# Patient Record
Sex: FEMALE | Race: BLACK OR AFRICAN AMERICAN | Employment: OTHER | URBAN - METROPOLITAN AREA
[De-identification: names, ages, dates, MRNs, and addresses within clinical notes are randomized per-mention and may not be internally consistent; named-entity substitution may affect disease eponyms.]

---

## 2017-02-28 ENCOUNTER — OFFICE VISIT (OUTPATIENT)
Dept: NEUROLOGY | Age: 82
End: 2017-02-28

## 2017-02-28 VITALS
RESPIRATION RATE: 18 BRPM | SYSTOLIC BLOOD PRESSURE: 138 MMHG | DIASTOLIC BLOOD PRESSURE: 64 MMHG | HEIGHT: 65 IN | BODY MASS INDEX: 35.65 KG/M2 | HEART RATE: 60 BPM | WEIGHT: 214 LBS | OXYGEN SATURATION: 95 %

## 2017-02-28 DIAGNOSIS — G31.84 MILD COGNITIVE IMPAIRMENT: Primary | ICD-10-CM

## 2017-02-28 NOTE — MR AVS SNAPSHOT
Visit Information Date & Time Provider Department Dept. Phone Encounter #  
 2/28/2017  3:40 PM Christiano Ordoñez MD [facility] at [address] [id number] Follow-up Instructions Return in about 4 months (around 6/28/2017). Upcoming Health Maintenance Date Due DTaP/Tdap/Td series (1 - Tdap) 11/9/1956 ZOSTER VACCINE AGE 60> 11/9/1997 GLAUCOMA SCREENING Q2Y 11/9/2002 OSTEOPOROSIS SCREENING (DEXA) 11/9/2002 MEDICARE YEARLY EXAM 11/9/2002 INFLUENZA AGE 9 TO ADULT 8/1/2016 Pneumococcal 65+ Low/Medium Risk (2 of 2 - PPSV23) 1/1/2018 Allergies as of 2/28/2017  Review Complete On: 2/28/2017 By: Carmella Erickson LPN Severity Noted Reaction Type Reactions Pcn [Penicillins] Medium 03/20/2011   Systemic Rash But can take cephalosporins. Allergic to all \"cillins\". Codeine  03/20/2011   Side Effect Other (comments) Hallucinations, takes hydrocodone at home for pain Contrast Dye [Iodine]  06/14/2016    Other (comments) Not to take due to kidney function Prednisone  06/27/2016    Other (comments) \"makes my pancreas numbers go way up\" Current Immunizations  Reviewed on 2/28/2014 Name Date Pneumococcal Vaccine (Unspecified Type) 1/1/2013 Not reviewed this visit You Were Diagnosed With   
  
 Codes Comments Mild cognitive impairment    -  Primary ICD-10-CM: G31.84 ICD-9-CM: 331.83 Vitals BP  
  
  
  
  
  
 138/64 Vitals History BMI and BSA Data Body Mass Index Body Surface Area  
 35.61 kg/m 2 2.11 m 2 Preferred Pharmacy Pharmacy Name Phone [id number] [phone] Your Updated Medication List  
  
   
This list is accurate as of: 2/28/17  4:24 PM.  Always use your most recent med list.  
  
  
  
  
 ALBUTEROL IN Take 2 Puffs by inhalation as needed. * allopurinol 100 mg tablet Commonly known as:  Xenia Diallo Take 300 mg by mouth nightly. * allopurinol 300 mg tablet Commonly known as:  ZYLOPRIM  
  
 b complex-vitamin c-folic acid 1 mg capsule Commonly known as:  Lupe Renee Take 1 Cap by mouth daily. CARDIZEM  mg ER capsule Generic drug:  dilTIAZem CD Take 120 mg by mouth nightly. chlorpheniramine-HYDROcodone 10-8 mg/5 mL suspension Commonly known as:  Ani Peace Take 1 tsp by mouth every twelve (12) hours as needed for Cough. donepezil 5 mg tablet Commonly known as:  ARICEPT  
  
 famotidine 40 mg tablet Commonly known as:  PEPCID Take 2 tablets twice a day  
  
 ferrous sulfate 325 mg (65 mg iron) tablet  
  
 furosemide 40 mg tablet Commonly known as:  LASIX Take  by mouth as needed. HYDROcodone-acetaminophen 5-325 mg per tablet Commonly known as:  Juanetta Rasp Take 1-2 Tabs by mouth every four (4) hours as needed. Max Daily Amount: 12 Tabs. losartan 100 mg tablet Commonly known as:  COZAAR Take 100 mg by mouth nightly. montelukast 10 mg tablet Commonly known as:  SINGULAIR Take 10 mg by mouth nightly. PROAIR HFA 90 mcg/actuation inhaler Generic drug:  albuterol ROLAIDS PO Take 1 Tab by mouth as needed. sertraline 100 mg tablet Commonly known as:  ZOLOFT Take 100 mg by mouth nightly. * Notice: This list has 2 medication(s) that are the same as other medications prescribed for you. Read the directions carefully, and ask your doctor or other care provider to review them with you. Follow-up Instructions Return in about 4 months (around 6/28/2017). Patient Instructions PRESCRIPTION REFILL POLICY [facility] Statement to Patients April 1, 2014 In an effort to ensure the large volume of patient prescription refills is processed in the most efficient and expeditious manner, we are asking our patients to assist us by calling your Pharmacy for all prescription refills, this will include also your  Mail Order Pharmacy. The pharmacy will contact our office electronically to continue the refill process. Please do not wait until the last minute to call your pharmacy. We need at least 48 hours (2days) to fill prescriptions. We also encourage you to call your pharmacy before going to  your prescription to make sure it is ready. With regard to controlled substance prescription refill requests (narcotic refills) that need to be picked up at our office, we ask your cooperation by providing us with at least 72 hours (3days) notice that you will need a refill. We will not refill narcotic prescription refill requests after 4:00pm on any weekday, Monday through Thursday, or after 2:00pm on Fridays, or on the weekends. We encourage everyone to explore another way of getting your prescription refill request processed using [employer], our patient web portal through our electronic medical record system. Well[employer] is an efficient and effective way to communicate your medication request directly to the office and  downloadable as an nusrat on your smart phone . Well[employer] also features a review functionality that allows you to view your medication list as well as leave messages for your physician. Are you ready to get connected? If so please review the attatched instructions or speak to any of our staff to get you set up right away! Thank you so much for your cooperation. Should you have any questions please contact our Practice Administrator. The Physicians and Staff,  [facility] Introducing [facility]! [facility] introduces [employer] patient portal. Now you can access parts of your medical record, email your doctor's office, and request medication refills online. 1. In your internet browser, go to [email or web address] 2. Click on the First Time User? Click Here link in the Sign In box. You will see the New Member Sign Up page. 3. Enter your Nextiva Access Code exactly as it appears below. You will not need to use this code after youve completed the sign-up process. If you do not sign up before the expiration date, you must request a new code. · Nextiva Access Code: [email or web address] Expires: 3/19/2017 12:54 PM 
 
4. Enter the last four digits of your Social Security Number (xxxx) and Date of Birth (mm/dd/yyyy) as indicated and click Submit. You will be taken to the next sign-up page. 5. Create a Nextiva ID. This will be your Nextiva login ID and cannot be changed, so think of one that is secure and easy to remember. 6. Create a Nextiva password. You can change your password at any time. 7. Enter your Password Reset Question and Answer. This can be used at a later time if you forget your password. 8. Enter your e-mail address. You will receive e-mail notification when new information is available in [address] 9. Click Sign Up. You can now view and download portions of your medical record. 10. Click the Download Summary menu link to download a portable copy of your medical information. If you have questions, please visit the Frequently Asked Questions section of the Nextiva website. Remember, Nextiva is NOT to be used for urgent needs. For medical emergencies, dial 911. Now available from your iPhone and Android! Please provide this summary of care documentation to your next provider. Your primary care clinician is listed as Donnie Eisenmenger. If you have any questions after today's visit, please call [phone].

## 2017-02-28 NOTE — PROGRESS NOTES
Neurology Consult Note      HISTORY PROVIDED BY: patient and daughter    Chief Complaint:   Chief Complaint   Patient presents with    Memory Loss    Results      Subjective:   Pt is a 80 y.o. right handed female initially and last seen on 12/19/16 with over one year h/o memory loss reported by family. Exam revealed MMSE score of 28/30 missing 2 at delayed recall, able to recall both with prompting, o/w non-focal and unremarkable. Suspected mild dementia, differential would include MCI after surgery and stroke given multiple stroke risk factors. Discussed diagnosis of dementia, evaluating for other causes of memory loss, and goals of therapy. Recommended CT of the head without contrast, \"memory labs \"including vitamin B12, vitamin D, CMP, CBC with diff, RPR, HgbA1C, and 24 hour urine heavy metals (well water at home.)  Continued Aricept 5 mg daily. She returns for f/u. Labs - Vit D, TSH, RPR, B12 - wnl, CMP with slightly elevated Cr 1.18 and Ca 10.9, CBC with improvement in Hgb - 10.5, HgbA1C improved - 6.4, HM was negative. CT head wo contrast 12/29/16 - reviewed in PACS, CIWM changes, reported as stable compared to 5/28/08. No new complaints. Tolerating Aricept.      Past Medical History:   Diagnosis Date    Arrhythmia     has pacemaker for low HR    Arthritis     Asthma     Cancer ([facility]     right kidney - surgery    Chronic pain     right side    Deep vein thrombosis (DVT) during current hospitalization ([facility] [address] [facility]     history of DVT was on coumadin in the past    Diabetes ([facility] [facility]     GERD (gastroesophageal reflux disease)     H/O acute pancreatitis     Hiatal hernia     Hypertension     Nausea & vomiting     MARLEN (obstructive sleep apnea)     does not use CPAP/pt states she has not used since a pacemaker inserted    Other ill-defined conditions(799.89)     lymph node enlargement - left chest - benign bx - watching    Other ill-defined conditions(799.89)     wheezes    Psychiatric disorder depression    PUD (peptic ulcer disease)     hx of    [facility]emb[facility] [facility]     Unspecified adverse effect of anesthesia     passed out during EGD per pt - stopped breathing per pt per MD      Past Surgical History:   Procedure Laterality Date    HX APPENDECTOMY      HX CHOLECYSTECTOMY      HX GYN          HX HEENT Right     laser eye surgery to stop bleeding in back of eye - multiple laser surgeries    HX KNEE ARTHROSCOPY      left knee; cartilage repair    HX ORTHOPAEDIC      right hip replacement    HX ORTHOPAEDIC      lower back surgery    HX ORTHOPAEDIC      straighten toe - 2nd toe on right    HX ORTHOPAEDIC Bilateral     carpal tunnel release    HX OTHER SURGICAL      partial right kidney removal    HX PACEMAKER      not defibrillator    HX UROLOGICAL      implant in hip to control urine      Social History     Social History    Marital status:      Spouse name: N/A    Number of children: N/A    Years of education: N/A     Occupational History    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      Social History Main Topics    Smoking status: Former Smoker     Packs/day: 0.25     Years: 20.00     Quit date: 1971    Smokeless tobacco: Never Used    Alcohol use No    Drug use: No    Sexual activity: No     Other Topics Concern    Not on file     Social History Narrative     Family History   Problem Relation Age of Onset   [facility] Stroke Mother      brain aneurysm    Hypertension Father     Heart Disease Father     Cancer Sister      breast    Cancer Brother      lung    Cancer Sister      breast    SLE Other      half siblings - (11)   [facility] Hypertension Daughter     Diabetes Daughter          Objective:   ROS:  Per HPI o/w reviewed and negative. Allergies   Allergen Reactions    Pcn [Penicillins] Rash     But can take cephalosporins. Allergic to all \"cillins\".     Codeine Other (comments)     Hallucinations, takes hydrocodone at home for pain    Contrast Dye [Iodine] Other (comments)     Not to take due to kidney function    Prednisone Other (comments)     \"makes my pancreas numbers go way up\"        Meds:  Outpatient Medications Prior to Visit   Medication Sig Dispense Refill    allopurinol (ZYLOPRIM) 300 mg tablet       donepezil (ARICEPT) 5 mg tablet   1    famotidine (PEPCID) 40 mg tablet Take 2 tablets twice a day      ferrous sulfate 325 mg (65 mg iron) tablet   0    PROAIR HFA 90 mcg/actuation inhaler       b complex-vitamin c-folic acid (NEPHROCAPS) 1 mg capsule Take 1 Cap by mouth daily.  furosemide (LASIX) 40 mg tablet Take  by mouth as needed.  montelukast (SINGULAIR) 10 mg tablet Take 10 mg by mouth nightly.  ALBUTEROL IN Take 2 Puffs by inhalation as needed.  sertraline (ZOLOFT) 100 mg tablet Take 100 mg by mouth nightly.  diltiazem CD (CARDIZEM CD) 120 mg ER capsule Take 120 mg by mouth nightly.  losartan (COZAAR) 100 mg tablet Take 100 mg by mouth nightly.  HYDROcodone-acetaminophen (NORCO) 5-325 mg per tablet Take 1-2 Tabs by mouth every four (4) hours as needed. Max Daily Amount: 12 Tabs. 80 Tab 0    CALCIUM CARB/MAGNESIUM HYDROX (ROLAIDS PO) Take 1 Tab by mouth as needed.  chlorpheniramine-HYDROcodone (TUSSIONEX) 8-10 mg/5 mL suspension Take 1 tsp by mouth every twelve (12) hours as needed for Cough.  allopurinol (ZYLOPRIM) 100 mg tablet Take 300 mg by mouth nightly. No facility-administered medications prior to visit. Imaging:  MRI Results (most recent):  No results found for this or any previous visit. CT Results (most recent):    Results from Hospital Encounter encounter on 12/29/16   CT HEAD WO CONT   Narrative EXAM:  CT HEAD WO CONT    INDICATION:   Memory loss, eval for stroke, NPH    COMPARISON: 5/27/2008. TECHNIQUE: Unenhanced CT of the head was performed using 5 mm images. Brain and  bone windows were generated.   CT dose reduction was achieved through use of a  standardized protocol tailored for this examination and automatic exposure  control for dose modulation. FINDINGS:  The ventricles and sulci are normal in size, shape and configuration and  midline. There is hypodensity of the cerebral white matter, likely due to  intracranial small vessel disease. . There is no intracranial hemorrhage,  extra-axial collection, mass, mass effect or midline shift. The basilar  cisterns are open. No acute infarct is identified. The bone windows demonstrate  no abnormalities. The visualized portions of the paranasal sinuses and mastoid  air cells are clear. Impression IMPRESSION: Hypodensity of the cerebral white matter likely due to intracranial  small vessel disease. No significant change since 5/27/2008. Reviewed records in [facility] tab today    Lab Review   Results for orders placed or performed in visit on 12/23/16   HEAVY METALS PROFILE, UR   Result Value Ref Range    Creatinine(Crt), urine 1.05 0.30 - 3.00 g/L    Arsenic total, urine 15 0 - 50 ug/L    Arsenic, Inorganic, urine None Detected 0 - 19 ug/L    Arsenic,Urine 24 Hr 14 0 - 50 ug/24 hr    Lead, urine None Detected 0 - 49 ug/L    Mercury, urine None Detected 0 - 19 ug/L        Exam:  Visit Vitals    /64    Pulse 60    Resp 18    Ht 5' 5\" (1.651 m)    Wt 97.1 kg (214 lb)    SpO2 95%    BMI 35.61 kg/m2       General:  FROM 12/19/16 VISIT:   Alert, cooperative, no distress. Head:  Normocephalic, without obvious abnormality, atraumatic. Respiratory:  Heart:   Non labored breathing  Regular rate and rhythm, 3/6 STEVIE   Neck:   2+ carotids, no bruits   Extremities: Warm, no cyanosis or edema. Pulses: 2+ radial pulses. Neurologic:  MS: Alert, speech intact. Language - see MMSE. Attention and fund of knowledge appropriate. Recent and remote memory intact.   Cranial Nerves:  II: visual fields Full to confrontation   II: pupils Equal, round, reactive to light   II: optic disc    III,VII: ptosis Left ptosis   III,IV,VI: extraocular muscles  EOMI, no nystagmus or diplopia   V: facial light touch sensation  normal   VII: facial muscle function   symmetric   VIII: hearing intact   IX: soft palate elevation  normal   XI: trapezius strength  5/5   XI: sternocleidomastoid strength 5/5   XII: tongue  Midline     Motor: normal bulk and tone, no tremor              Strength: 5/5 throughout, no PD  Sensory: intact to LT, PP  Coordination: FTN and HTS intact, BJ intact,Romberg negative  Gait: normal gait, able to heel, toe, and tandem walk  Reflexes: 2+ symmetric in UE and right knee, 1+ ankles, unable to elicit left knee, toes downgoing  Mini Mental State Exam 12/19/2016   What is the Year 1   What is the Season 1   What is the Date 1   What is the Day 1   What is the Month 1   Where are we State 1   Where are we Country 1   Where are we [place] [facility] 1   [place] are we Floor 1   Name three objects, then ask the patient to say them 3   Serial sevens Subtract 7 from 100 in increments 5   Ask for the three objects repeated above 1   Name a pencil 1   Name a watch 1   Have the patient repeat this phrase \"No ifs, ands, or buts\" 1   Three stage command: Take the paper in your right hand 1   Fold the paper in half 1   Put the paper on the floor 1   Read and obey the following: CLOSE YOUR EYES 1   Have the patient write a sentence 1   Have the patient copy a figure 1   Mini Mental Score 28            Assessment/Plan   Pt is a 80 y.o. RH female presenting in Dec, 2016 with over one year h/o memory loss reported by family and on exam had MMSE score of 28/30, o/w non-focal exam.  CT of head with stable CIWM changes, without other explanation for memory loss. Memory labs are unremarkable. Disucssed MCI and risk of progression to dementia. Continue Aricept 5 mg daily.    F/u in clinic in six months to reassess memory, instructed to call in the interim with questions, concerns, or significant changes in cognitive function. ICD-10-CM ICD-9-CM    1. Mild cognitive impairment G31.84 331.83      Contributing components for this patient's visit included discussion MCI disease process, CT and lab results, prognosis and potential progression to dementia, treatment options, medications risks and benefits. This comprised greater than 50% face-to-face time with the patient, the total of which was approximately 30 minutes. Signed:   Thais Quick MD  2/28/2017

## 2017-02-28 NOTE — PATIENT INSTRUCTIONS
[facility]   Statement to Patients  April 1, 2014      In an effort to ensure the large volume of patient prescription refills is processed in the most efficient and expeditious manner, we are asking our patients to assist us by calling your Pharmacy for all prescription refills, this will include also your  Mail Order Pharmacy. The pharmacy will contact our office electronically to continue the refill process. Please do not wait until the last minute to call your pharmacy. We need at least 48 hours (2days) to fill prescriptions. We also encourage you to call your pharmacy before going to  your prescription to make sure it is ready. With regard to controlled substance prescription refill requests (narcotic refills) that need to be picked up at our office, we ask your cooperation by providing us with at least 72 hours (3days) notice that you will need a refill. We will not refill narcotic prescription refill requests after 4:00pm on any weekday, Monday through Thursday, or after 2:00pm on Fridays, or on the weekends. We encourage everyone to explore another way of getting your prescription refill request processed using [employer], our patient web portal through our electronic medical record system. Web[employer] is an efficient and effective way to communicate your medication request directly to the office and  downloadable as an nusrat on your smart phone . [employer] also features a review functionality that allows you to view your medication list as well as leave messages for your physician. Are you ready to get connected? If so please review the attatched instructions or speak to any of our staff to get you set up right away! Thank you so much for your cooperation. Should you have any questions please contact our Practice Administrator.     The Physicians and Staff,  [facility]

## 2017-06-11 ENCOUNTER — APPOINTMENT (OUTPATIENT)
Dept: GENERAL RADIOLOGY | Age: 82
End: 2017-06-11
Attending: EMERGENCY MEDICINE
Payer: MEDICARE

## 2017-06-11 ENCOUNTER — HOSPITAL ENCOUNTER (EMERGENCY)
Age: 82
Discharge: HOME | End: 2017-06-12
Attending: EMERGENCY MEDICINE
Payer: MEDICARE

## 2017-06-11 DIAGNOSIS — S62.101A RIGHT WRIST FRACTURE, CLOSED, INITIAL ENCOUNTER: ICD-10-CM

## 2017-06-11 DIAGNOSIS — M25.551 RIGHT HIP PAIN: Primary | ICD-10-CM

## 2017-06-11 LAB
APPEARANCE UR: CLEAR
BACTERIA URNS QL MICRO: ABNORMAL /HPF
BILIRUB UR QL: NEGATIVE
COLOR UR: ABNORMAL
EPITH CASTS URNS QL MICRO: ABNORMAL /LPF
GLUCOSE UR STRIP.AUTO-MCNC: NEGATIVE MG/DL
HGB UR QL STRIP: NEGATIVE
HYALINE CASTS URNS QL MICRO: ABNORMAL /LPF (ref 0–5)
KETONES UR QL STRIP.AUTO: NEGATIVE MG/DL
LEUKOCYTE ESTERASE UR QL STRIP.AUTO: ABNORMAL
NITRITE UR QL STRIP.AUTO: NEGATIVE
PH UR STRIP: 5 [PH] (ref 5–8)
PROT UR STRIP-MCNC: NEGATIVE MG/DL
RBC #/AREA URNS HPF: ABNORMAL /HPF (ref 0–5)
SP GR UR REFRACTOMETRY: 1.01 (ref 1–1.03)
UA: UC IF INDICATED,UAUC: ABNORMAL
URATE SERPL-MCNC: 4.7 MG/DL (ref 2.6–6)
UROBILINOGEN UR QL STRIP.AUTO: 0.2 EU/DL (ref 0.2–1)
WBC URNS QL MICRO: ABNORMAL /HPF (ref 0–4)

## 2017-06-11 PROCEDURE — 81001 URINALYSIS AUTO W/SCOPE: CPT | Performed by: EMERGENCY MEDICINE

## 2017-06-11 PROCEDURE — 87077 CULTURE AEROBIC IDENTIFY: CPT | Performed by: EMERGENCY MEDICINE

## 2017-06-11 PROCEDURE — 73110 X-RAY EXAM OF WRIST: CPT

## 2017-06-11 PROCEDURE — 87186 SC STD MICRODIL/AGAR DIL: CPT | Performed by: EMERGENCY MEDICINE

## 2017-06-11 PROCEDURE — 96375 TX/PRO/DX INJ NEW DRUG ADDON: CPT

## 2017-06-11 PROCEDURE — L3908 WHO COCK-UP NONMOLDE PRE OTS: HCPCS

## 2017-06-11 PROCEDURE — 73502 X-RAY EXAM HIP UNI 2-3 VIEWS: CPT

## 2017-06-11 PROCEDURE — 87086 URINE CULTURE/COLONY COUNT: CPT | Performed by: EMERGENCY MEDICINE

## 2017-06-11 PROCEDURE — 74011250637 HC RX REV CODE- 250/637: Performed by: EMERGENCY MEDICINE

## 2017-06-11 PROCEDURE — 84550 ASSAY OF BLOOD/URIC ACID: CPT | Performed by: EMERGENCY MEDICINE

## 2017-06-11 PROCEDURE — 74011250636 HC RX REV CODE- 250/636: Performed by: EMERGENCY MEDICINE

## 2017-06-11 PROCEDURE — 99284 EMERGENCY DEPT VISIT MOD MDM: CPT

## 2017-06-11 PROCEDURE — 96374 THER/PROPH/DIAG INJ IV PUSH: CPT

## 2017-06-11 PROCEDURE — 75810000053 HC SPLINT APPLICATION

## 2017-06-11 RX ORDER — DIAZEPAM 2 MG/1
2 TABLET ORAL
Status: COMPLETED | OUTPATIENT
Start: 2017-06-11 | End: 2017-06-11

## 2017-06-11 RX ORDER — NITROFURANTOIN MACROCRYSTALS 50 MG/1
100 CAPSULE ORAL
Status: DISCONTINUED | OUTPATIENT
Start: 2017-06-11 | End: 2017-06-11

## 2017-06-11 RX ORDER — OXYCODONE AND ACETAMINOPHEN 5; 325 MG/1; MG/1
1 TABLET ORAL
Qty: 20 TAB | Refills: 0 | Status: SHIPPED | OUTPATIENT
Start: 2017-06-11 | End: 2017-06-11

## 2017-06-11 RX ORDER — ONDANSETRON 2 MG/ML
4 INJECTION INTRAMUSCULAR; INTRAVENOUS
Status: COMPLETED | OUTPATIENT
Start: 2017-06-11 | End: 2017-06-11

## 2017-06-11 RX ORDER — HYDROCODONE BITARTRATE AND ACETAMINOPHEN 5; 325 MG/1; MG/1
1 TABLET ORAL
Qty: 20 TAB | Refills: 0 | Status: SHIPPED | OUTPATIENT
Start: 2017-06-11 | End: 2017-11-25

## 2017-06-11 RX ORDER — HYDROCODONE BITARTRATE AND ACETAMINOPHEN 5; 325 MG/1; MG/1
1 TABLET ORAL
Status: COMPLETED | OUTPATIENT
Start: 2017-06-11 | End: 2017-06-11

## 2017-06-11 RX ORDER — FENTANYL CITRATE 50 UG/ML
50 INJECTION, SOLUTION INTRAMUSCULAR; INTRAVENOUS
Status: COMPLETED | OUTPATIENT
Start: 2017-06-11 | End: 2017-06-11

## 2017-06-11 RX ORDER — NITROFURANTOIN MACROCRYSTALS 50 MG/1
100 CAPSULE ORAL
Status: COMPLETED | OUTPATIENT
Start: 2017-06-11 | End: 2017-06-12

## 2017-06-11 RX ADMIN — DIAZEPAM 2 MG: 2 TABLET ORAL at 22:29

## 2017-06-11 RX ADMIN — FENTANYL CITRATE 50 MCG: 50 INJECTION, SOLUTION INTRAMUSCULAR; INTRAVENOUS at 21:31

## 2017-06-11 RX ADMIN — HYDROCODONE BITARTRATE AND ACETAMINOPHEN 1 TABLET: 5; 325 TABLET ORAL at 22:29

## 2017-06-11 RX ADMIN — ONDANSETRON HYDROCHLORIDE 4 MG: 2 INJECTION, SOLUTION INTRAMUSCULAR; INTRAVENOUS at 21:29

## 2017-06-12 VITALS
TEMPERATURE: 98.1 F | OXYGEN SATURATION: 90 % | HEIGHT: 66 IN | SYSTOLIC BLOOD PRESSURE: 149 MMHG | DIASTOLIC BLOOD PRESSURE: 72 MMHG | BODY MASS INDEX: 32.38 KG/M2 | WEIGHT: 201.5 LBS | HEART RATE: 60 BPM | RESPIRATION RATE: 18 BRPM

## 2017-06-12 PROCEDURE — 74011250637 HC RX REV CODE- 250/637: Performed by: EMERGENCY MEDICINE

## 2017-06-12 RX ADMIN — NITROFURANTOIN MACROCRYSTALS 100 MG: 50 CAPSULE ORAL at 00:04

## 2017-06-12 NOTE — ED PROVIDER NOTES
HPI Comments: Astrid Cheng is a 80 y.o. female with PMhx significant for HTN, DM, asthma, DVT,  who presents via wheelchair to the ED c/o an acute onset of 9/10 right hip pain and right wrist pain with associated swelling x this morning. She reports associated sx of transient lower back pain this morning. The pt discloses a h/o a right hip replacement but endorses she has had no complications s/p surgery. She denies sustaining any trauma or injuring the affected area PTA. The pt expresses that her pain is exacerbated with ambulation and there are no relieving factors leading her to the ED for further evaluation. She endorses that she has no back pain at this time and took an Excedrin this morning with some relief in her sx. The pt does disclose a h/o gout and is on medication to manage her sx. The pt denies any recent travels or surgeries. She specifically denies any numbness, chest pain, or SOB. PCP: Vicenta Mcgraw MD      There are no other complaints, changes or physical findings at this time. Written by LYNN Delacruz, as dictated by Suleman Gerber MD      The history is provided by the patient. No  was used.         Past Medical History:   Diagnosis Date    Arrhythmia     has pacemaker for low HR    Arthritis     Asthma     Cancer ([facility] [address]     right kidney - surgery    Chronic pain     right side    Deep vein thrombosis (DVT) during current hospitalization ([address]     history of DVT was on coumadin in the past    Diabetes ([address]     GERD (gastroesophageal reflux disease)     H/O acute pancreatitis     Hiatal hernia     Hypertension     Nausea & vomiting     MARLEN (obstructive sleep apnea)     does not use CPAP/pt states she has not used since a pacemaker inserted    Other ill-defined conditions     lymph node enlargement - left chest - benign bx - watching    Other ill-defined conditions     wheezes    Psychiatric disorder     depression    PUD (peptic ulcer disease)     hx of    [facility]boembolus [facility] [address]     Unspecified adverse effect of anesthesia     passed out during EGD per pt - stopped breathing per pt per MD       Past Surgical History:   Procedure Laterality Date    HX APPENDECTOMY      HX CHOLECYSTECTOMY      HX GYN          HX HEENT Right     laser eye surgery to stop bleeding in back of eye - multiple laser surgeries    HX KNEE ARTHROSCOPY      left knee; cartilage repair    HX ORTHOPAEDIC      right hip replacement    HX ORTHOPAEDIC      lower back surgery    HX ORTHOPAEDIC      straighten toe - 2nd toe on right    HX ORTHOPAEDIC Bilateral     carpal tunnel release    HX OTHER SURGICAL      partial right kidney removal    HX PACEMAKER      not defibrillator    HX UROLOGICAL      implant in hip to control urine         Family History:   Problem Relation Age of Onset    Stroke Mother      brain aneurysm    Hypertension Father     Heart Disease Father     Cancer Sister      breast    Cancer Brother      lung    Cancer Sister      breast    SLE Other      half siblings - (11)   Al Miguel A Hypertension Daughter     Diabetes Daughter        Social History     Social History    Marital status:      Spouse name: N/A    Number of children: N/A    Years of education: N/A     Occupational History    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 HENT: Negative for congestion. Eyes: Negative. Respiratory: Negative for shortness of breath. Cardiovascular: Negative for chest pain. Gastrointestinal: Negative for abdominal pain. Endocrine: Negative for heat intolerance. Genitourinary: Negative for dysuria. Musculoskeletal: Positive for arthralgias (right hip and wrist), back pain (lower) and joint swelling (right wrist). Skin: Negative for rash. Allergic/Immunologic: Negative for immunocompromised state. Neurological: Negative for numbness. Hematological: Does not bruise/bleed easily. Psychiatric/Behavioral: Negative. All other systems reviewed and are negative. Patient Vitals for the past 12 hrs:   Temp Pulse Resp BP SpO2   06/12/17 0001 - - - 149/72 90 %   06/11/17 2330 - - - 140/62 92 %   06/11/17 2300 - - - 155/69 93 %   06/11/17 2209 - - - 159/62 -   06/11/17 2100 - - - 157/63 95 %   06/11/17 2030 - - - 195/76 95 %   06/11/17 2020 - - - (!) 204/89 -   06/11/17 1957 98.1 °F (36.7 °C) 60 18 157/83 97 %        Physical Exam   Constitutional: She appears well-nourished. She appears distressed (mild). HENT:   Head: Normocephalic. Eyes: EOM are normal.   Neck: Neck supple. Cardiovascular: Intact distal pulses. Pulmonary/Chest: Effort normal and breath sounds normal.   Abdominal: Soft. Bowel sounds are normal. There is tenderness in the epigastric area. Musculoskeletal: Normal range of motion. Back non tender   Edema of the right wrist, TTP   TTP right hip and pelvis, decreased ROM of the RLE secondary to pain    Neurological: She is alert. She has normal strength. No sensory deficit. Equal  strength    Skin: Skin is warm and dry. Psychiatric: Her behavior is normal.   Nursing note and vitals reviewed. MDM  Number of Diagnoses or Management Options  Right hip pain:   Right wrist fracture, closed, initial encounter:   Diagnosis management comments: DDx: Arthritis, Gout, Sciatica.         Amount and/or Complexity of Data Reviewed  Clinical lab tests: ordered and reviewed  Tests in the radiology section of CPT®: ordered and reviewed  Review and summarize past medical records: yes    Patient Progress  Patient progress: stable    ED Course       Procedures    Procedure Note - Splint Placement:  11:38 PM  Performed by: Suleman Gerber MD  Neurovascularly intact prior to tx. An Orthoglass velcro splint was placed on pt's right wrist.  Joint was placed in neutral position. Neurovascularly intact after tx. The procedure took 1-15 minutes, and pt tolerated well. Written by Alexa Hendrix ED Scribe, as dictated by Suleman Gerber MD.     LABORATORY TESTS:  Recent Results (from the past 12 hour(s))   URINALYSIS W/ REFLEX CULTURE    Collection Time: 06/11/17  9:56 PM   Result Value Ref Range    Color YELLOW/STRAW      Appearance CLEAR CLEAR      Specific gravity 1.010 1.003 - 1.030      pH (UA) 5.0 5.0 - 8.0      Protein NEGATIVE  NEG mg/dL    Glucose NEGATIVE  NEG mg/dL    Ketone NEGATIVE  NEG mg/dL    Bilirubin NEGATIVE  NEG      Blood NEGATIVE  NEG      Urobilinogen 0.2 0.2 - 1.0 EU/dL    Nitrites NEGATIVE  NEG      Leukocyte Esterase SMALL (A) NEG      UA:UC IF INDICATED URINE CULTURE ORDERED (A) CNI      WBC 10-20 0 - 4 /hpf    RBC 0-5 0 - 5 /hpf    Epithelial cells FEW FEW /lpf    Bacteria 1+ (A) NEG /hpf    Hyaline cast 0-2 0 - 5 /lpf   URIC ACID    Collection Time: 06/11/17 10:35 PM   Result Value Ref Range    Uric acid 4.7 2.6 - 6.0 MG/DL       IMAGING RESULTS:  XR WRIST RT AP/LAT/OBL MIN 3V   Final Result   INDICATION: pain     COMPARISON: None     FINDINGS:  3 views of the right wrist demonstrate small fragment dorsal wrist. There is  dorsal soft tissue swelling. There is no radiopaque foreign body.     IMPRESSION  IMPRESSION:  Small fragments dorsal wrist could represent avulsion fracture, correlate with  point tenderness.    XR HIP RT W OR WO PELV 2-3 VWS   Final Result   INDICATION: pain     EXAMINATION: HIP RADIOGRAPHS     COMPARISON: 11/11/14     FINDINGS:     Single AP view of the pelvis and frogleg lateral view of the right hip  demonstrate no acute fracture or dislocation. Right hip arthroplasty. The  pubic symphysis is intact. The sacroiliac joints are intact.     IMPRESSION  IMPRESSION:  No acute process.           MEDICATIONS GIVEN:  Medications   fentaNYL citrate (PF) injection 50 mcg (50 mcg IntraVENous Given 6/11/17 2131)   ondansetron (ZOFRAN) injection 4 mg (4 mg IntraVENous Given 6/11/17 2129)   HYDROcodone-acetaminophen (NORCO) 5-325 mg per tablet 1 Tab (1 Tab Oral Given 6/11/17 2229)   diazePAM (VALIUM) tablet 2 mg (2 mg Oral Given 6/11/17 2229)   nitrofurantoin (MACRODANTIN) capsule 100 mg (100 mg Oral Given 6/12/17 0004)       IMPRESSION:  1. Right hip pain    2. Right wrist fracture, closed, initial encounter        PLAN:  1. Current Discharge Medication List      START taking these medications    Details   HYDROcodone-acetaminophen (NORCO) 5-325 mg per tablet Take 1 Tab by mouth every four (4) hours as needed for Pain. Max Daily Amount: 6 Tabs. Qty: 20 Tab, Refills: 0           2. Follow-up Information     Follow up With Details Comments Contact Info    [facility] Call today  [facility]  [address]    Gavin Vick MD In 2 days As needed [address] [facility]  [phone]      [facility] EMERGENCY DEPT  If symptoms worsen [address]  [phone]        3. Return to ED if worse   Discharge Note:  11:36 PM  The patient is ready for discharge. The patient's signs, symptoms, diagnosis, and discharge instruction have been discussed and the patient has conveyed their understanding. The patient is to follow up as recommended or return to the ER should their symptoms worsen. Plan has been discussed and the patient is in agreement. Written by Ihsan Laird ED Scribe, as dictated by Temi Mendoza MD    Attestation: This note is prepared by Tory Laird, acting as Scribe for Temi Mendoza, MD.      Lupe Norton MD: The scribe's documentation has been prepared under my direction and personally reviewed by me in its entirety. I confirm that the note above accurately reflects all work, treatment, procedures, and medical decision making performed by me.

## 2017-06-12 NOTE — ED NOTES
Discharge instructions reviewed with patient. Discharge instructions given to patient per Dr. Melissa Romero. Patient able to return/verbalize discharge instructions. Copy of discharge instructions provided. Patient condition stable, respiratory status within normal limits, neuro status intact. Escorted by wheelchair out of ER, accompanied by family.

## 2017-06-12 NOTE — DISCHARGE INSTRUCTIONS
Hip Pain: Care Instructions  Your Care Instructions  Hip pain may be caused by many things, including overuse, a fall, or a twisting movement. Another cause of hip pain is arthritis. Your pain may increase when you stand up, walk, or squat. The pain may come and go or may be constant. Home treatment can help relieve hip pain, swelling, and stiffness. If your pain is ongoing, you may need more tests and treatment. Follow-up care is a key part of your treatment and safety. Be sure to make and go to all appointments, and call your doctor if you are having problems. Its also a good idea to know your test results and keep a list of the medicines you take. How can you care for yourself at home? · Take pain medicines exactly as directed. ¨ If the doctor gave you a prescription medicine for pain, take it as prescribed. ¨ If you are not taking a prescription pain medicine, ask your doctor if you can take an over-the-counter medicine. · Rest and protect your hip. Take a break from any activity, including standing or walking, that may cause pain. · Put ice or a cold pack against your hip for 10 to 20 minutes at a time. Try to do this every 1 to 2 hours for the next 3 days (when you are awake) or until the swelling goes down. Put a thin cloth between the ice and your skin. · Sleep on your healthy side with a pillow between your knees, or sleep on your back with pillows under your knees. · If there is no swelling, you can put moist heat, a heating pad, or a warm cloth on your hip. Do gentle stretching exercises to help keep your hip flexible. · Learn how to prevent falls. Have your vision and hearing checked regularly. Wear slippers or shoes with a nonskid sole. · Stay at a healthy weight. · Wear comfortable shoes. When should you call for help? Call 911 anytime you think you may need emergency care. For example, call if:  · You have sudden chest pain and shortness of breath, or you cough up blood.   · You are not able to stand or walk or bear weight. · Your buttocks, legs, or feet feel numb or tingly. · Your leg or foot is cool or pale or changes color. · You have severe pain. Call your doctor now or seek immediate medical care if:  · You have signs of infection, such as:  ¨ Increased pain, swelling, warmth, or redness in the hip area. ¨ Red streaks leading from the hip area. ¨ Pus draining from the hip area. ¨ A fever. · You have signs of a blood clot, such as:  ¨ Pain in your calf, back of the knee, thigh, or groin. ¨ Redness and swelling in your leg or groin. · You are not able to bend, straighten, or move your leg normally. · You have trouble urinating or having bowel movements. Watch closely for changes in your health, and be sure to contact your doctor if:  · You do not get better as expected. Where can you learn more? Go to http://contsantino-aziza.info/. Enter W192 in the search box to learn more about \"Hip Pain: Care Instructions. \"  Current as of: May 27, 2016  Content Version: 11.2  © 2763-8706 [employer]. Care instructions adapted under license by [employer] (which disclaims liability or warranty for this information). If you have questions about a medical condition or this instruction, always ask your healthcare professional. [place] [address] any warranty or liability for your use of this information. Broken Wrist: Care Instructions  Your Care Instructions    Your wrist can break, or fracture, during sports, a fall, or other accidents. The break may happen when your wrist is hit or is used to protect you in a fall. Fractures can range from a small, hairline crack, to a bone or bones broken into two or more pieces. Your treatment depends on how bad the break is. Your doctor may have put your wrist in a cast or splint. This will help keep your wrist stable until your follow-up appointment.  It may take weeks or months for your wrist to heal. You can help it heal with care at home. You heal best when you take good care of yourself. Eat a variety of healthy foods, and don't smoke. Follow-up care is a key part of your treatment and safety. Be sure to make and go to all appointments, and call your doctor if you are having problems. It's also a good idea to know your test results and keep a list of the medicines you take. How can you care for yourself at home? · Put ice or a cold pack on your wrist for 10 to 20 minutes at a time. Try to do this every 1 to 2 hours for the next 3 days (when you are awake). Put a thin cloth between the ice and your cast or splint. Keep your cast or splint dry. · Follow the splint or cast care instructions your doctor gives you. If you have a splint, do not take it off unless your doctor tells you to. Be careful not to put the splint on too tight. · Be safe with medicines. Take pain medicines exactly as directed. ¨ If the doctor gave you a prescription medicine for pain, take it as prescribed. ¨ If you are not taking a prescription pain medicine, ask your doctor if you can take an over-the-counter medicine. · Prop up your wrist on pillows when you sit or lie down in the first few days after the injury. Keep your wrist higher than the level of your heart. This will help reduce swelling. · Move your fingers often to reduce swelling and stiffness, but do not use that hand to grab or carry anything. · Follow instructions for exercises to keep your arm strong. When should you call for help? Call your doctor now or seek immediate medical care if:  · You have increased or severe pain. · Your cast or splint feels too tight. · You cannot move your fingers. · You have tingling, weakness, or numbness in your hand and fingers. · Your hand and fingers are cool or pale or change color. · You have a lot of swelling near your cast or splint. · The skin under your cast or splint is burning or stinging.   Watch closely for changes in your health, and be sure to contact your doctor if:  · You do not get better as expected. Where can you learn more? Go to http://constantnio-aziza.info/. Enter [id number] in the search box to learn more about \"Broken Wrist: Care Instructions. \"  Current as of: May 23, 2016  Content Version: 11.2  © 7445-0898 [employer]. Care instructions adapted under license by [employer] (which disclaims liability or warranty for this information). If you have questions about a medical condition or this instruction, always ask your healthcare professional. [place] [address] any warranty or liability for your use of this information.

## 2017-06-12 NOTE — ED NOTES
Patient states she had an episode of dark stools earlier this afternoon and an episode of bowel incontinence shortly after. Patient denies any blood in stool. Patient states she then went to a picnic this afternoon and felt severe pain in her right hip and right wrist. Patient denies any fall or injury. Patient also states she was experiencing lower back pain this morning. Patient is alert and oriented, respirations even and unlabored, monitor x2. Call bell within reach.

## 2017-06-14 RX ORDER — CEPHALEXIN 500 MG/1
500 CAPSULE ORAL 4 TIMES DAILY
Qty: 28 CAP | Refills: 0 | Status: SHIPPED | OUTPATIENT
Start: 2017-06-14 | End: 2017-06-21

## 2017-06-15 LAB
BACTERIA SPEC CULT: ABNORMAL
BACTERIA SPEC CULT: ABNORMAL
CC UR VC: ABNORMAL
SERVICE CMNT-IMP: ABNORMAL

## 2017-06-21 ENCOUNTER — HOSPITAL ENCOUNTER (OUTPATIENT)
Age: 82
Setting detail: OUTPATIENT SURGERY
Discharge: HOME | End: 2017-06-21
Attending: INTERNAL MEDICINE | Admitting: INTERNAL MEDICINE
Payer: COMMERCIAL

## 2017-06-21 ENCOUNTER — ANESTHESIA EVENT (OUTPATIENT)
Dept: ENDOSCOPY | Age: 82
End: 2017-06-21
Payer: COMMERCIAL

## 2017-06-21 ENCOUNTER — ANESTHESIA (OUTPATIENT)
Dept: ENDOSCOPY | Age: 82
End: 2017-06-21
Payer: COMMERCIAL

## 2017-06-21 ENCOUNTER — APPOINTMENT (OUTPATIENT)
Dept: ULTRASOUND IMAGING | Age: 82
End: 2017-06-21
Attending: INTERNAL MEDICINE
Payer: COMMERCIAL

## 2017-06-21 VITALS
BODY MASS INDEX: 32.49 KG/M2 | RESPIRATION RATE: 16 BRPM | SYSTOLIC BLOOD PRESSURE: 148 MMHG | HEIGHT: 65 IN | TEMPERATURE: 97.6 F | OXYGEN SATURATION: 98 % | DIASTOLIC BLOOD PRESSURE: 94 MMHG | HEART RATE: 60 BPM | WEIGHT: 195 LBS

## 2017-06-21 PROCEDURE — 74011000250 HC RX REV CODE- 250

## 2017-06-21 PROCEDURE — 76040000019: Performed by: INTERNAL MEDICINE

## 2017-06-21 PROCEDURE — 76060000031 HC ANESTHESIA FIRST 0.5 HR: Performed by: INTERNAL MEDICINE

## 2017-06-21 PROCEDURE — 74011250636 HC RX REV CODE- 250/636

## 2017-06-21 RX ORDER — NALOXONE HYDROCHLORIDE 0.4 MG/ML
0.4 INJECTION, SOLUTION INTRAMUSCULAR; INTRAVENOUS; SUBCUTANEOUS
Status: DISCONTINUED | OUTPATIENT
Start: 2017-06-21 | End: 2017-06-21 | Stop reason: HOSPADM

## 2017-06-21 RX ORDER — PROPOFOL 10 MG/ML
INJECTION, EMULSION INTRAVENOUS AS NEEDED
Status: DISCONTINUED | OUTPATIENT
Start: 2017-06-21 | End: 2017-06-21 | Stop reason: HOSPADM

## 2017-06-21 RX ORDER — SODIUM CHLORIDE 9 MG/ML
50 INJECTION, SOLUTION INTRAVENOUS CONTINUOUS
Status: DISCONTINUED | OUTPATIENT
Start: 2017-06-21 | End: 2017-06-21 | Stop reason: HOSPADM

## 2017-06-21 RX ORDER — EPINEPHRINE 0.1 MG/ML
1 INJECTION INTRACARDIAC; INTRAVENOUS
Status: DISCONTINUED | OUTPATIENT
Start: 2017-06-21 | End: 2017-06-21 | Stop reason: HOSPADM

## 2017-06-21 RX ORDER — SODIUM CHLORIDE 0.9 % (FLUSH) 0.9 %
5-10 SYRINGE (ML) INJECTION AS NEEDED
Status: DISCONTINUED | OUTPATIENT
Start: 2017-06-21 | End: 2017-06-21 | Stop reason: HOSPADM

## 2017-06-21 RX ORDER — FLUMAZENIL 0.1 MG/ML
0.2 INJECTION INTRAVENOUS
Status: DISCONTINUED | OUTPATIENT
Start: 2017-06-21 | End: 2017-06-21 | Stop reason: HOSPADM

## 2017-06-21 RX ORDER — SODIUM CHLORIDE 0.9 % (FLUSH) 0.9 %
5-10 SYRINGE (ML) INJECTION EVERY 8 HOURS
Status: DISCONTINUED | OUTPATIENT
Start: 2017-06-21 | End: 2017-06-21 | Stop reason: HOSPADM

## 2017-06-21 RX ORDER — ATROPINE SULFATE 0.1 MG/ML
0.5 INJECTION INTRAVENOUS
Status: DISCONTINUED | OUTPATIENT
Start: 2017-06-21 | End: 2017-06-21 | Stop reason: HOSPADM

## 2017-06-21 RX ORDER — SODIUM CHLORIDE 9 MG/ML
INJECTION, SOLUTION INTRAVENOUS
Status: DISCONTINUED | OUTPATIENT
Start: 2017-06-21 | End: 2017-06-21 | Stop reason: HOSPADM

## 2017-06-21 RX ORDER — DEXTROMETHORPHAN/PSEUDOEPHED 2.5-7.5/.8
1.2 DROPS ORAL
Status: DISCONTINUED | OUTPATIENT
Start: 2017-06-21 | End: 2017-06-21 | Stop reason: HOSPADM

## 2017-06-21 RX ORDER — LIDOCAINE HYDROCHLORIDE 20 MG/ML
INJECTION, SOLUTION EPIDURAL; INFILTRATION; INTRACAUDAL; PERINEURAL AS NEEDED
Status: DISCONTINUED | OUTPATIENT
Start: 2017-06-21 | End: 2017-06-21 | Stop reason: HOSPADM

## 2017-06-21 RX ORDER — FENTANYL CITRATE 50 UG/ML
100 INJECTION, SOLUTION INTRAMUSCULAR; INTRAVENOUS
Status: DISCONTINUED | OUTPATIENT
Start: 2017-06-21 | End: 2017-06-21 | Stop reason: HOSPADM

## 2017-06-21 RX ORDER — MIDAZOLAM HYDROCHLORIDE 1 MG/ML
.25-1 INJECTION, SOLUTION INTRAMUSCULAR; INTRAVENOUS
Status: DISCONTINUED | OUTPATIENT
Start: 2017-06-21 | End: 2017-06-21 | Stop reason: HOSPADM

## 2017-06-21 RX ADMIN — PROPOFOL 20 MG: 10 INJECTION, EMULSION INTRAVENOUS at 10:35

## 2017-06-21 RX ADMIN — PROPOFOL 30 MG: 10 INJECTION, EMULSION INTRAVENOUS at 10:30

## 2017-06-21 RX ADMIN — PROPOFOL 60 MG: 10 INJECTION, EMULSION INTRAVENOUS at 10:26

## 2017-06-21 RX ADMIN — PROPOFOL 20 MG: 10 INJECTION, EMULSION INTRAVENOUS at 10:32

## 2017-06-21 RX ADMIN — SODIUM CHLORIDE: 9 INJECTION, SOLUTION INTRAVENOUS at 10:13

## 2017-06-21 RX ADMIN — PROPOFOL 20 MG: 10 INJECTION, EMULSION INTRAVENOUS at 10:34

## 2017-06-21 RX ADMIN — LIDOCAINE HYDROCHLORIDE 50 MG: 20 INJECTION, SOLUTION EPIDURAL; INFILTRATION; INTRACAUDAL; PERINEURAL at 10:26

## 2017-06-21 RX ADMIN — PROPOFOL 40 MG: 10 INJECTION, EMULSION INTRAVENOUS at 10:27

## 2017-06-21 NOTE — ANESTHESIA PREPROCEDURE EVALUATION
Anesthetic History     PONV          Review of Systems / Medical History  Patient summary reviewed, nursing notes reviewed and pertinent labs reviewed    Pulmonary        Sleep apnea: No treatment    Asthma        Neuro/Psych         Psychiatric history     Cardiovascular    Hypertension          Pacemaker         GI/Hepatic/Renal     GERD      PUD     Endo/Other    Diabetes    Arthritis and cancer     Other Findings              Physical Exam    Airway  Mallampati: II  TM Distance: > 6 cm  Neck ROM: normal range of motion   Mouth opening: Normal     Cardiovascular  Regular rate and rhythm,  S1 and S2 normal,  no murmur, click, rub, or gallop             Dental  No notable dental hx       Pulmonary  Breath sounds clear to auscultation               Abdominal  GI exam deferred       Other Findings            Anesthetic Plan    ASA: 3  Anesthesia type: MAC            Anesthetic plan and risks discussed with: Patient

## 2017-06-21 NOTE — IP AVS SNAPSHOT
[address] 
[phone] Patient: Rose Mary Aguilar MRN: [id number] 
RBT:44/7/8805 You are allergic to the following Allergen Reactions Pcn (Penicillins) Rash But can take cephalosporins. Allergic to all \"cillins\". Codeine Other (comments) Hallucinations, takes hydrocodone at home for pain Contrast Dye (Iodine) Other (comments) Not to take due to kidney function Prednisone Other (comments) \"makes my pancreas numbers go way up\" Recent Documentation Height Weight BMI OB Status Smoking Status 1.651 m 88.5 kg 32.45 kg/m2 Postmenopausal Former Smoker Emergency Contacts Name Discharge Info Relation Home Work Mobile Wilman Coley DISCHARGE CAREGIVER [3] Son [22] [phone] Yane Delgado  Daughter [21] [phone] About your hospitalization You were admitted on:  June 21, 2017 You last received care in the:  [facility] ENDOSCOPY You were discharged on:  June 21, 2017 Unit phone number:  [phone] Why you were hospitalized Your primary diagnosis was:  Not on File Providers Seen During Your Hospitalizations Provider Role Specialty Primary office phone Renzo Page MD Attending Provider Gastroenterology [phone] Your Primary Care Physician (PCP) Primary Care Physician Office Phone Office Fax Katia Sesay [phone] Follow-up Information None Your Appointments Wednesday June 21, 2017 ENDOSCOPIC ULTRASOUND (EUS) with Renzo Page MD  
[facility] ENDOSCOPY (RI OR PRE ASSESSMENT) [address]  
[phone]  OP Registration: [id number] Telephone: [phone] Fax: [phone] ** Pt will need to arrive 30 min prior unless appointment prep instructions indicate otherwise** **** Send All ENDO pt to the MAIN Admitting Department, off the hospitals main lobby at [facility]. ****  
  
    
OP Registration: [facility]- [address] Telephone: [phone] Fax: [phone] ** Pt will need to arrive 30 min prior unless appointment prep instructions indicate otherwise** **** Send All ENDO pt to the MAIN Admitting Department, off the [facility] lobby at [facility]. **** Wednesday June 28, 2017 11:40 AM EDT Follow Up with Dinesh Shin MD  
[facility] at [address]  
[phone] Current Discharge Medication List  
  
CONTINUE these medications which have NOT CHANGED Dose & Instructions Dispensing Information Comments Morning Noon Evening Bedtime ALBUTEROL IN Your last dose was: Your next dose is:    
   
   
 Dose:  2 Puff Take 2 Puffs by inhalation as needed. Refills:  0  
     
   
   
   
  
 * allopurinol 100 mg tablet Commonly known as:  Ritta Popper Your last dose was: Your next dose is:    
   
   
 Dose:  300 mg Take 300 mg by mouth nightly. Refills:  0  
     
   
   
   
  
 * allopurinol 300 mg tablet Commonly known as:  Ritta Popper Your last dose was: Your next dose is:    
   
   
  Refills:  0  
     
   
   
   
  
 b complex-vitamin c-folic acid 1 mg capsule Commonly known as:  Chris Shauna Your last dose was: Your next dose is:    
   
   
 Dose:  1 Cap Take 1 Cap by mouth daily. Refills:  0 CARDIZEM  mg ER capsule Generic drug:  dilTIAZem CD Your last dose was: Your next dose is:    
   
   
 Dose:  120 mg Take 120 mg by mouth nightly. Refills:  0  
     
   
   
   
  
 cephALEXin 500 mg capsule Commonly known as:  Umusanket Colmenares Your last dose was:     
   
Your next dose is:    
   
   
 Dose:  500 mg  
 Take 1 Cap by mouth four (4) times daily for 7 days. Quantity:  28 Cap Refills:  0  
     
   
   
   
  
 donepezil 5 mg tablet Commonly known as:  ARICEPT Your last dose was: Your next dose is:    
   
   
  Refills:  1  
     
   
   
   
  
 famotidine 40 mg tablet Commonly known as:  PEPCID Your last dose was: Your next dose is: Take 2 tablets twice a day Refills:  0  
     
   
   
   
  
 ferrous sulfate 325 mg (65 mg iron) tablet Your last dose was: Your next dose is:    
   
   
  Refills:  0  
     
   
   
   
  
 furosemide 40 mg tablet Commonly known as:  LASIX Your last dose was: Your next dose is: Take  by mouth as needed. Refills:  0 HYDROcodone-acetaminophen 5-325 mg per tablet Commonly known as:  Maisha Proper Your last dose was: Your next dose is:    
   
   
 Dose:  1 Tab Take 1 Tab by mouth every four (4) hours as needed for Pain. Max Daily Amount: 6 Tabs. Quantity:  20 Tab Refills:  0  
     
   
   
   
  
 losartan 100 mg tablet Commonly known as:  COZAAR Your last dose was: Your next dose is:    
   
   
 Dose:  100 mg Take 100 mg by mouth nightly. Refills:  0  
     
   
   
   
  
 montelukast 10 mg tablet Commonly known as:  SINGULAIR Your last dose was: Your next dose is:    
   
   
 Dose:  10 mg Take 10 mg by mouth nightly. Refills:  0 PROAIR HFA 90 mcg/actuation inhaler Generic drug:  albuterol Your last dose was: Your next dose is:    
   
   
  Refills:  0  
     
   
   
   
  
 ROLAIDS PO Your last dose was: Your next dose is:    
   
   
 Dose:  1 Tab Take 1 Tab by mouth as needed. Refills:  0  
     
   
   
   
  
 sertraline 100 mg tablet Commonly known as:  ZOLOFT Your last dose was: Your next dose is: Dose:  100 mg Take 100 mg by mouth nightly. Refills:  0  
     
   
   
   
  
 * Notice: This list has 2 medication(s) that are the same as other medications prescribed for you. Read the directions carefully, and ask your doctor or other care provider to review them with you. Discharge Instructions [address]ie [address]. 
[address] [facility] [address] [place], [address] 
[phone] 
  
  
        
  
  
  DISCHARGE INSTRUCTIONS 
  
Dg Elizondo [id number] 
1935 
  
DISCOMFORT: 
Sore throat- throat lozenges or warm salt water gargle 
redness at IV site- apply warm compress to area; if redness or soreness persist- contact your physician Gaseous discomfort- walking, belching will help relieve any discomfort You may not operate a vehicle for 12 hours You may not engage in an occupation involving machinery or appliances for rest of today You may not drink alcoholic beverages for at least 12 hours Avoid making any critical decisions for at least 24 hour DIET You may eat and drink after you leave. You may resume your regular diet  however -  remember your colon is empty and a heavy meal will produce gas. Avoid these foods:  vegetables, fried / greasy foods, carbonated drinks 
  
ACTIVITY You may resume your normal daily activities Spend the remainder of the day resting -  avoid any strenuous activity. 
Eric Paul M.D. ANY SIGN OF Increasing pain, nausea, vomiting Abdominal distension (swelling) New increased bleeding (oral or rectal) Fever (chills) Pain in chest area Bloody discharge from nose or mouth Shortness of breath 
  
Follow-up Instructions: 
                      Call Dr. Sean Patel for any questions or problems. 
   
If we took a biopsy please call the office within 2 weeks to discuss your                             pathology results.  Telephone # [phone] 
   
  
 Continue same medications. Discharge Orders None [employer] Announcement We are excited to announce that we are making your provider's discharge notes available to you in [employer]. You will see these notes when they are completed and signed by the physician that discharged you from your recent hospital stay. If you have any questions or concerns about any information you see in [employer], please call the Health Information Department where you were seen or reach out to your Primary Care Provider for more information about your plan of care. Introducing [facility] SERVICES! Rani Islas introduces [employer] patient portal. Now you can access parts of your medical record, email your doctor's office, and request medication refills online. 1. In your internet browser, go to [email or web address] [email or web address] 2. Click on the First Time User? Click Here link in the Sign In box. You will see the New Member Sign Up page. 3. Enter your [employer] Access Code exactly as it appears below. You will not need to use this code after youve completed the sign-up process. If you do not sign up before the expiration date, you must request a new code. · [employer] Access Code: [email or web address] Expires: 9/9/2017 11:34 PM 
 
4. Enter the last four digits of your Social Security Number (xxxx) and Date of Birth (mm/dd/yyyy) as indicated and click Submit. You will be taken to the next sign-up page. 5. Create a [employer] ID. This will be your [employer] login ID and cannot be changed, so think of one that is secure and easy to remember. 6. Create a [employer] password. You can change your password at any time. 7. Enter your Password Reset Question and Answer. This can be used at a later time if you forget your password. 8. Enter your e-mail address. You will receive e-mail notification when new information is available in [address] 9. Click Sign Up. You can now view and download portions of your medical record. 10. Click the Download Summary menu link to download a portable copy of your medical information. If you have questions, please visit the Frequently Asked Questions section of the [employer] website. Remember, [employer] is NOT to be used for urgent needs. For medical emergencies, dial 911. Now available from your iPhone and Android! General Information Please provide this summary of care documentation to your next provider. Patient Signature:  ____________________________________________________________ Date:  ____________________________________________________________  
  
[facility] Provider Signature:  ____________________________________________________________ Date:  ____________________________________________________________

## 2017-06-21 NOTE — DISCHARGE INSTRUCTIONS
[address]  [address] [facility]  [phone]                         DISCHARGE INSTRUCTIONS     Emmalene Essex  [id number]  1935     DISCOMFORT:  Sore throat- throat lozenges or warm salt water gargle  redness at IV site- apply warm compress to area; if redness or soreness persist- contact your physician  Gaseous discomfort- walking, belching will help relieve any discomfort  You may not operate a vehicle for 12 hours  You may not engage in an occupation involving machinery or appliances for rest of today  You may not drink alcoholic beverages for at least 12 hours  Avoid making any critical decisions for at least 24 hour  DIET  You may eat and drink after you leave. You may resume your regular diet - however -  remember your colon is empty and a heavy meal will produce gas. Avoid these foods:  vegetables, fried / greasy foods, carbonated drinks     ACTIVITY  You may resume your normal daily activities   Spend the remainder of the day resting -  avoid any strenuous activity.  Vilma Pradhan M.D. ANY SIGN OF                                                   Increasing pain, nausea, vomiting  Abdominal distension (swelling)  New increased bleeding (oral or rectal)  Fever (chills)  Pain in chest area  Bloody discharge from nose or mouth  Shortness of breath     Follow-up Instructions:                        Call Dr. Savannah Mendoza for any questions or problems.      If we took a biopsy please call the office within 2 weeks to discuss your                             pathology results. Telephone # [phone]                               Continue same medications.

## 2017-06-21 NOTE — DISCHARGE SUMMARY
118 [facility].  [address] [place] [address]  [phone]                     DISCHARGE INSTRUCTIONS    Dg Elizondo  [id number]  1935    DISCOMFORT:  Sore throat- throat lozenges or warm salt water gargle  redness at IV site- apply warm compress to area; if redness or soreness persist- contact your physician  Gaseous discomfort- walking, belching will help relieve any discomfort  You may not operate a vehicle for 12 hours  You may not engage in an occupation involving machinery or appliances for rest of today  You may not drink alcoholic beverages for at least 12 hours  Avoid making any critical decisions for at least 24 hour  DIET  You may eat and drink after you leave. You may resume your regular diet - however -  remember your colon is empty and a heavy meal will produce gas. Avoid these foods:  vegetables, fried / greasy foods, carbonated drinks    ACTIVITY  You may resume your normal daily activities   Spend the remainder of the day resting -  avoid any strenuous activity. CALL M.D. ANY SIGN OF   Increasing pain, nausea, vomiting  Abdominal distension (swelling)  New increased bleeding (oral or rectal)  Fever (chills)  Pain in chest area  Bloody discharge from nose or mouth  Shortness of breath    Follow-up Instructions:   Call Dr. Sean Patel for any questions or problems. If we took a biopsy please call the office within 2 weeks to discuss your                             pathology results. Telephone # [phone]        Continue same medications.

## 2017-06-21 NOTE — PROCEDURES
118 [facility].  [address] [facility] [address], [address]   [phone]                                Endoscopic Ultrasound    NAME:  Javad Ramirez   :   1935   MRN:   [id number]       Date/Time:  2017   Procedure Type: Linear Upper EUS without path    Indications: Pancreatic cyst    Pre-operative Diagnosis: see indication above    Post-operative Diagnosis:  See findings below    : Ronn Centeno MD    Referring Provider: -Jesus Manuel Alex MD -Anum Lomas MD    Anethesia/Sedation:  MAC anesthesia      Procedure Details   After infom consent was obtained for the procedure, with all risks and benefits of procedure explained the patient was taken to the endoscopy suite and placed in the left lateral decubitus position. Following sequential administration of sedation as per above, the linear echoendoscope was inserted into the mouth and advanced under direct vision to second portion of the duodenum. A careful inspection was made as the gastroscope was withdrawn, including a retroflexed view of the proximal stomach; findings and interventions are described below. Findings:     Endoscopic:   Esophagus:normal   Stomach: normal    Duodenum/jejunum: normal    Ultrasound:   Esophagus: not examined   Stomach: not examined   Pancreas:     Areas examined: the entire gland    Parenchyma: -The entire gland was mildly atrophic. A cyst with thin wall and nor mural nodule was seen in the head of the pancreas. This measured about 13 X 8 mm. No communication with PD could be determined. The pancreatic duct was slightly prominent and measured about 4 mm in head region. No stricture or filing defect was seen. Liver:     Parenchyma: not examined    Gallbladder: not examined    Bile Duct: the common bile duct was normal                Lymph Node: no adenopathy        Specimen Removed:  None    Complications: None. EBL:  None.     Interventions: none      Recommendations: -Monitor clinical course   -Continue current medications  -Linear EUS with path in 1 year  -Follow up with Dr Deja Quiroz as scheduled    Familia Officer, MD  6/21/2017  10:42 AM

## 2017-06-21 NOTE — ROUTINE PROCESS
Manjinder Fraire  1935  [id number]    Situation:  Verbal report received from: Kendell Ocasio  Procedure: Procedure(s):  LINEAR EUS WITHOUT PATH    Background:    Preoperative diagnosis: ABNORMAL CT SCAN, CYST OF PANCREAS  Postoperative diagnosis: 1.- Pancreas Cyst    :  Dr. Bowen Salgado  Assistant(s): Endoscopy Technician-1: Cholo uD  Endoscopy RN-1: Abimael Sinclair RN    Specimens: * No specimens in log *  H. Pylori  no    Assessment:  Intra-procedure medications   Anesthesia gave intra-procedure sedation and medications, see anesthesia flow sheet yes    Intravenous fluids: NS@ KVO     Vital signs stable     Abdominal assessment: round and soft     Recommendation:  Discharge patient per MD order.   Family or Friend  Son  Permission to share finding with family or friend yes

## 2017-06-21 NOTE — PROGRESS NOTES

## 2017-06-21 NOTE — PROGRESS NOTES
Bedside and Verbal shift change report given to kong,rn (oncoming nurse) by Eli Diaz nurse). Report included the following information SBAR.

## 2017-06-21 NOTE — H&P
118 [facility].  [address] [facility] [address], [address]  [phone]                                History and Physical     NAME: Susan Styles   :  1935   MRN:  [id number]     HPI:  The patient was seen and examined.     Past Surgical History:   Procedure Laterality Date    HX APPENDECTOMY      HX CHOLECYSTECTOMY      HX GYN          HX HEENT Right     laser eye surgery to stop bleeding in back of eye - multiple laser surgeries    HX KNEE ARTHROSCOPY      left knee; cartilage repair    HX ORTHOPAEDIC      right hip replacement    HX ORTHOPAEDIC      lower back surgery    HX ORTHOPAEDIC      straighten toe - 2nd toe on right    HX ORTHOPAEDIC Bilateral     carpal tunnel release    HX OTHER SURGICAL      partial right kidney removal    HX PACEMAKER      not defibrillator    HX UROLOGICAL      implant in hip to control urine    HX UROLOGICAL      Right kidney partial nephrectomy     Past Medical History:   Diagnosis Date    Arrhythmia     has pacemaker for low HR    Arthritis     Asthma     Cancer ([address]     right kidney - surgery    Chronic pain     right side    Deep vein thrombosis (DVT) during current hospitalization ([address]     history of DVT was on coumadin in the past    Diabetes ([address]     GERD (gastroesophageal reflux disease)     H/O acute pancreatitis     Hiatal hernia     Hypertension     Nausea & vomiting     MARLEN (obstructive sleep apnea)     does not use CPAP/pt states she has not used since a pacemaker inserted    Other ill-defined conditions     lymph node enlargement - left chest - benign bx - watching    Other ill-defined conditions     wheezes    Psychiatric disorder     depression    PUD (peptic ulcer disease)     hx of    Thromboembolus ([address]     Unspecified adverse effect of anesthesia     passed out during EGD per pt - stopped breathing per pt per MD     Social History   Substance Use Topics    Smoking status: Former Smoker Packs/day: 0.25     Years: 20.00     Quit date: 8/13/1971    Smokeless tobacco: Never Used    Alcohol use No     Allergies   Allergen Reactions    Pcn [Penicillins] Rash     But can take cephalosporins. Allergic to all \"cillins\".  Codeine Other (comments)     Hallucinations, takes hydrocodone at home for pain    Contrast Dye [Iodine] Other (comments)     Not to take due to kidney function    Prednisone Other (comments)     \"makes my pancreas numbers go way up\"     Family History   Problem Relation Age of Onset    Stroke Mother      brain aneurysm    Hypertension Father     Heart Disease Father     Cancer Sister      breast    Cancer Brother      lung    Cancer Sister      breast    SLE Other      half siblings - (84) [address] Hypertension Daughter     Diabetes Daughter      No current facility-administered medications for this encounter. PHYSICAL EXAM:  General: WD, WN. Alert, cooperative, no acute distress    HEENT: NC, Atraumatic. PERRLA, EOMI. Anicteric sclerae. Lungs:  CTA Bilaterally. No Wheezing/Rhonchi/Rales. Heart:  Regular  rhythm,  No murmur, No Rubs, No Gallops  Abdomen: Soft, Non distended, Non tender.  +Bowel sounds, no HSM  Extremities: No c/c/e  Neurologic:  CN 2-12 gi, Alert and oriented X 3. No acute neurological distress   Psych:   Good insight. Not anxious nor agitated. The heart, lungs and mental status were satisfactory for the administration of MAC sedation and for the procedure.       Mallampati score: 3       Assessment:   · Pancreas cyst    Plan:   · Endoscopic procedure  · MAC sedation   ·

## 2017-06-21 NOTE — ANESTHESIA POSTPROCEDURE EVALUATION
Post-Anesthesia Evaluation and Assessment    Patient: Joao Current MRN: [id number]  SSN: [id number]    YOB: 1935  Age: 80 y.o. Sex: female       Cardiovascular Function/Vital Signs  Visit Vitals    BP (!) 148/94    Pulse 60    Temp 36.4 °C (97.6 °F)    Resp 16    Ht 5' 5\" (1.651 m)    Wt 88.5 kg (195 lb)    SpO2 98%    BMI 32.45 kg/m2       Patient is status post MAC anesthesia for Procedure(s):  LINEAR EUS WITHOUT PATH. Nausea/Vomiting: None    Postoperative hydration reviewed and adequate. Pain:  Pain Scale 1: Numeric (0 - 10) (06/21/17 1108)  Pain Intensity 1: 0 (06/21/17 1108)   Managed    Neurological Status: At baseline    Mental Status and Level of Consciousness: Arousable    Pulmonary Status:   O2 Device: Room air (06/21/17 1108)   Adequate oxygenation and airway patent    Complications related to anesthesia: None    Post-anesthesia assessment completed.  No concerns    Signed By: David Esparza MD     June 21, 2017

## 2017-07-31 ENCOUNTER — OFFICE VISIT (OUTPATIENT)
Dept: NEUROLOGY | Age: 82
End: 2017-07-31

## 2017-07-31 VITALS
HEART RATE: 64 BPM | RESPIRATION RATE: 18 BRPM | HEIGHT: 65 IN | BODY MASS INDEX: 33.99 KG/M2 | SYSTOLIC BLOOD PRESSURE: 156 MMHG | WEIGHT: 204 LBS | OXYGEN SATURATION: 94 % | DIASTOLIC BLOOD PRESSURE: 82 MMHG

## 2017-07-31 DIAGNOSIS — G31.84 MILD COGNITIVE IMPAIRMENT: Primary | ICD-10-CM

## 2017-07-31 RX ORDER — ASPIRIN 81 MG/1
81 TABLET ORAL DAILY
COMMUNITY
End: 2018-02-22

## 2017-07-31 RX ORDER — TRAMADOL HYDROCHLORIDE 50 MG/1
TABLET ORAL
Refills: 0 | COMMUNITY
Start: 2017-07-28 | End: 2017-11-25

## 2017-07-31 RX ORDER — NAPROXEN 500 MG/1
TABLET ORAL
Refills: 0 | COMMUNITY
Start: 2017-07-28 | End: 2017-10-24

## 2017-07-31 NOTE — PATIENT INSTRUCTIONS
10 [facility]   Statement to Patients  April 1, 2014      In an effort to ensure the large volume of patient prescription refills is processed in the most efficient and expeditious manner, we are asking our patients to assist us by calling your Pharmacy for all prescription refills, this will include also your  Mail Order Pharmacy. The pharmacy will contact our office electronically to continue the refill process. Please do not wait until the last minute to call your pharmacy. We need at least 48 hours (2days) to fill prescriptions. We also encourage you to call your pharmacy before going to  your prescription to make sure it is ready. With regard to controlled substance prescription refill requests (narcotic refills) that need to be picked up at our office, we ask your cooperation by providing us with at least 72 hours (3days) notice that you will need a refill. We will not refill narcotic prescription refill requests after 4:00pm on any weekday, Monday through Thursday, or after 2:00pm on Fridays, or on the weekends. We encourage everyone to explore another way of getting your prescription refill request processed using [employer], our patient web portal through our electronic medical record system. [employer] is an efficient and effective way to communicate your medication request directly to the office and  downloadable as an nusrat on your smart phone . [employer] also features a review functionality that allows you to view your medication list as well as leave messages for your physician. Are you ready to get connected? If so please review the attatched instructions or speak to any of our staff to get you set up right away! Thank you so much for your cooperation. Should you have any questions please contact our Practice Administrator.     The Physicians and Staff,  [facility]             Please bring all medication bottles, including vitamins, supplements and any over-the-counter medications, to your next office visit.

## 2017-07-31 NOTE — PROGRESS NOTES
Patient here for follow up on memory loss. Has had 2 falls since last office visit. Most recent fall was on 7/28/17 and had to get staples in her head.

## 2017-07-31 NOTE — MR AVS SNAPSHOT
Visit Information Date & Time Provider Department Dept. Phone Encounter #  
 7/31/2017  2:00 PM Alexey Strange MD [facility] at [address] [id number] Follow-up Instructions Return in about 6 months (around 1/31/2018). Upcoming Health Maintenance Date Due DTaP/Tdap/Td series (1 - Tdap) 11/9/1956 ZOSTER VACCINE AGE 60> 9/9/1997 GLAUCOMA SCREENING Q2Y 11/9/2002 OSTEOPOROSIS SCREENING (DEXA) 11/9/2002 MEDICARE YEARLY EXAM 11/9/2002 INFLUENZA AGE 9 TO ADULT 8/1/2017 Pneumococcal 65+ Low/Medium Risk (2 of 2 - PPSV23) 1/1/2018 Allergies as of 7/31/2017  Review Complete On: 7/31/2017 By: Lorrie Novoa LPN Severity Noted Reaction Type Reactions Pcn [Penicillins] Medium 03/20/2011   Systemic Rash But can take cephalosporins. Allergic to all \"cillins\". Codeine  03/20/2011   Side Effect Other (comments) Hallucinations, takes hydrocodone at home for pain Contrast Dye [Iodine]  06/14/2016    Other (comments) Not to take due to kidney function Prednisone  06/27/2016    Other (comments) \"makes my pancreas numbers go way up\" Current Immunizations  Reviewed on 2/28/2014 Name Date Pneumococcal Vaccine (Unspecified Type) 1/1/2013 Not reviewed this visit You Were Diagnosed With   
  
 Codes Comments Mild cognitive impairment    -  Primary ICD-10-CM: G31.84 ICD-9-CM: 331.83 Vitals BP Pulse Resp Height(growth percentile) Weight(growth percentile) SpO2  
 156/82 64 18 5' 5\" (1.651 m) 204 lb (92.5 kg) 94% BMI OB Status Smoking Status 33.95 kg/m2 Postmenopausal Former Smoker Vitals History BMI and BSA Data Body Mass Index Body Surface Area 33.95 kg/m 2 2.06 m 2 Preferred Pharmacy Pharmacy Name Phone [facility] [address] [phone] Your Updated Medication List  
  
   
 This list is accurate as of: 7/31/17  2:42 PM.  Always use your most recent med list.  
  
  
  
  
 ALBUTEROL IN Take 2 Puffs by inhalation as needed. allopurinol 300 mg tablet Commonly known as:  ZYLOPRIM  
  
 aspirin delayed-release 81 mg tablet Take 81 mg by mouth daily. CARDIZEM  mg ER capsule Generic drug:  dilTIAZem CD Take 120 mg by mouth nightly. donepezil 5 mg tablet Commonly known as:  ARICEPT  
  
 famotidine 40 mg tablet Commonly known as:  PEPCID Take 2 tablets twice a day  
  
 ferrous sulfate 325 mg (65 mg iron) tablet  
  
 furosemide 40 mg tablet Commonly known as:  LASIX Take  by mouth as needed. HYDROcodone-acetaminophen 5-325 mg per tablet Commonly known as:  Fredrica Vance Take 1 Tab by mouth every four (4) hours as needed for Pain. Max Daily Amount: 6 Tabs. losartan 100 mg tablet Commonly known as:  COZAAR Take 100 mg by mouth nightly. montelukast 10 mg tablet Commonly known as:  SINGULAIR Take 10 mg by mouth nightly. naproxen 500 mg tablet Commonly known as:  NAPROSYN  
take 1 tablet by mouth every 12 hours if needed for pain PROAIR HFA 90 mcg/actuation inhaler Generic drug:  albuterol  
  
 sertraline 100 mg tablet Commonly known as:  ZOLOFT Take 100 mg by mouth nightly. traMADol 50 mg tablet Commonly known as:  ULTRAM  
take 1 tablet by mouth every 6 hours if needed for pain Follow-up Instructions Return in about 6 months (around 1/31/2018). Patient Instructions PRESCRIPTION REFILL POLICY [facility] Statement to Patients April 1, 2014 In an effort to ensure the large volume of patient prescription refills is processed in the most efficient and expeditious manner, we are asking our patients to assist us by calling your Pharmacy for all prescription refills, this will include also your  Mail Order Pharmacy.  The pharmacy will contact our office electronically to continue the refill process. Please do not wait until the last minute to call your pharmacy. We need at least 48 hours (2days) to fill prescriptions. We also encourage you to call your pharmacy before going to  your prescription to make sure it is ready. With regard to controlled substance prescription refill requests (narcotic refills) that need to be picked up at our office, we ask your cooperation by providing us with at least 72 hours (3days) notice that you will need a refill. We will not refill narcotic prescription refill requests after 4:00pm on any weekday, Monday through Thursday, or after 2:00pm on Fridays, or on the weekends. We encourage everyone to explore another way of getting your prescription refill request processed using [employer], our patient web portal through our electronic medical record system. [facility] [employer] [facility] [employer] is an efficient and effective way to communicate your medication request directly to the office and  downloadable as an nusrat on your smart phone . [facility] [employer] [facility] [employer] also features a review functionality that allows you to view your medication list as well as leave messages for your physician. Are you ready to get connected? If so please review the attatched instructions or speak to any of our staff to get you set up right away! Thank you so much for your cooperation. Should you have any questions please contact our Practice Administrator. The Physicians and Staff,  [facility] Please bring all medication bottles, including vitamins, supplements and any over-the-counter medications, to your next office visit. Introducing [facility]! [facility] introduces [employer] [facility] [employer] patient portal. Now you can access parts of your medical record, email your doctor's office, and request medication refills online. 1. In your internet browser, go to [email or web address] 2. Click on the First Time User? Click Here link in the Sign In box. You will see the New Member Sign Up page. 3. Enter your [employer] Access Code exactly as it appears below. You will not need to use this code after youve completed the sign-up process. If you do not sign up before the expiration date, you must request a new code. · [employer] Access Code: [email or web address] Expires: 9/9/2017 11:34 PM 
 
4. Enter the last four digits of your Social Security Number (xxxx) and Date of Birth (mm/dd/yyyy) as indicated and click Submit. You will be taken to the next sign-up page. 5. Create a [employer] ID. This will be your [employer] login ID and cannot be changed, so think of one that is secure and easy to remember. 6. Create a [employer] password. You can change your password at any time. 7. Enter your Password Reset Question and Answer. This can be used at a later time if you forget your password. 8. Enter your e-mail address. You will receive e-mail notification when new information is available in [address] 9. Click Sign Up. You can now view and download portions of your medical record. 10. Click the Download Summary menu link to download a portable copy of your medical information. If you have questions, please visit the Frequently Asked Questions section of the [employer] website. Remember, [employer] is NOT to be used for urgent needs. For medical emergencies, dial 911. Now available from your iPhone and Android! Please provide this summary of care documentation to your next provider. Your primary care clinician is listed as Hector Molrey. If you have any questions after today's visit, please call [phone].

## 2017-07-31 NOTE — PROGRESS NOTES
Neurology Consult Note      HISTORY PROVIDED BY: patient and daughter    Chief Complaint:   Chief Complaint   Patient presents with    Memory Loss     f/u      Subjective:   Pt is a 80 y.o. RH female last seen in clinic on 2/25/17 in f/u for MCI. Pt presented in Dec, 2016 with over one year h/o memory loss reported by family and on exam had MMSE score of 28/30, o/w non-focal exam.  CT of head with stable CIWM changes, without other explanation for memory loss. Memory labs are unremarkable. Disucssed MCI and risk of progression to dementia. Continued Aricept 5 mg daily. She returns for f/u accompanied by her daughter. She reports that she is in pain, grease splashed out of pan on Friday and she jumped back and hit head on floor. No LOC. Hit chair and table. Was seen in ED at [facility], 7 staples to back of head. Pain is in left leg, back of knee to buttocks. She is taking Norco and Ultram for pain. She is still taking Aricept 5mg daily. No other new complaints.    Past Medical History:   Diagnosis Date    Arrhythmia     has pacemaker for low HR    Arthritis     Asthma     Cancer ([address]     right kidney - surgery    Chronic pain     right side    Deep vein thrombosis (DVT) during current hospitalization ([address]     history of DVT was on coumadin in the past    Diabetes ([address]     GERD (gastroesophageal reflux disease)     H/O acute pancreatitis     Hiatal hernia     Hypertension     Nausea & vomiting     MARLEN (obstructive sleep apnea)     does not use CPAP/pt states she has not used since a pacemaker inserted    Other ill-defined conditions     lymph node enlargement - left chest - benign bx - watching    Other ill-defined conditions     wheezes    Psychiatric disorder     depression    PUD (peptic ulcer disease)     hx of    Thromboembolus ([address]     Unspecified adverse effect of anesthesia     passed out during EGD per pt - stopped breathing per pt per MD      Past Surgical History: Procedure Laterality Date    HX APPENDECTOMY      HX CHOLECYSTECTOMY      HX GYN          HX HEENT Right     laser eye surgery to stop bleeding in back of eye - multiple laser surgeries    HX KNEE ARTHROSCOPY      left knee; cartilage repair    HX ORTHOPAEDIC      right hip replacement    HX ORTHOPAEDIC      lower back surgery    HX ORTHOPAEDIC      straighten toe - 2nd toe on right    HX ORTHOPAEDIC Bilateral     carpal tunnel release    HX OTHER SURGICAL      partial right kidney removal    HX PACEMAKER      not defibrillator    HX UROLOGICAL      implant in hip to control urine    HX UROLOGICAL      Right kidney partial nephrectomy      Social History     Social History    Marital status:      Spouse name: N/A    Number of children: N/A    Years of education: N/A     Occupational History    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      Social History Main Topics    Smoking status: Former Smoker     Packs/day: 0.25     Years: 20.00     Quit date: 1971    Smokeless tobacco: Never Used    Alcohol use No    Drug use: No    Sexual activity: No     Other Topics Concern    Not on file     Social History Narrative     Family History   Problem Relation Age of Onset   Desi Cook Stroke Mother      brain aneurysm    Hypertension Father     Heart Disease Father     Cancer Sister      breast    Cancer Brother      lung    Cancer Sister      breast    SLE Other      half siblings - (11)   Desi Joey Hypertension Daughter     Diabetes Daughter          Objective:   ROS:  Per HPI o/w reviewed and negative. Allergies   Allergen Reactions    Pcn [Penicillins] Rash     But can take cephalosporins. Allergic to all \"cillins\".     Codeine Other (comments)     Hallucinations, takes hydrocodone at home for pain    Contrast Dye [Iodine] Other (comments)     Not to take due to kidney function    Prednisone Other (comments)     \"makes my pancreas numbers go way up\" Meds:  Outpatient Medications Prior to Visit   Medication Sig Dispense Refill    HYDROcodone-acetaminophen (NORCO) 5-325 mg per tablet Take 1 Tab by mouth every four (4) hours as needed for Pain. Max Daily Amount: 6 Tabs. 20 Tab 0    allopurinol (ZYLOPRIM) 300 mg tablet       donepezil (ARICEPT) 5 mg tablet   1    famotidine (PEPCID) 40 mg tablet Take 2 tablets twice a day      ferrous sulfate 325 mg (65 mg iron) tablet   0    PROAIR HFA 90 mcg/actuation inhaler       furosemide (LASIX) 40 mg tablet Take  by mouth as needed.  montelukast (SINGULAIR) 10 mg tablet Take 10 mg by mouth nightly.  ALBUTEROL IN Take 2 Puffs by inhalation as needed.  sertraline (ZOLOFT) 100 mg tablet Take 100 mg by mouth nightly.  diltiazem CD (CARDIZEM CD) 120 mg ER capsule Take 120 mg by mouth nightly.  losartan (COZAAR) 100 mg tablet Take 100 mg by mouth nightly.  b complex-vitamin c-folic acid (NEPHROCAPS) 1 mg capsule Take 1 Cap by mouth daily.  CALCIUM CARB/MAGNESIUM HYDROX (ROLAIDS PO) Take 1 Tab by mouth as needed.  allopurinol (ZYLOPRIM) 100 mg tablet Take 300 mg by mouth nightly. No facility-administered medications prior to visit. Imaging:  MRI Results (most recent):  No results found for this or any previous visit. CT Results (most recent):    Results from Hospital Encounter encounter on 12/29/16   CT HEAD WO CONT   Narrative EXAM:  CT HEAD WO CONT    INDICATION:   Memory loss, eval for stroke, NPH    COMPARISON: 5/27/2008. TECHNIQUE: Unenhanced CT of the head was performed using 5 mm images. Brain and  bone windows were generated. CT dose reduction was achieved through use of a  standardized protocol tailored for this examination and automatic exposure  control for dose modulation. FINDINGS:  The ventricles and sulci are normal in size, shape and configuration and  midline.  There is hypodensity of the cerebral white matter, likely due to  intracranial small vessel disease. . There is no intracranial hemorrhage,  extra-axial collection, mass, mass effect or midline shift. The basilar  cisterns are open. No acute infarct is identified. The bone windows demonstrate  no abnormalities. The visualized portions of the paranasal sinuses and mastoid  air cells are clear. Impression IMPRESSION: Hypodensity of the cerebral white matter likely due to intracranial  small vessel disease. No significant change since 5/27/2008. Reviewed records in [employer] [facility] tab today    Lab Review   Results for orders placed or performed during the hospital encounter of 06/11/17   CULTURE, URINE   Result Value Ref Range    Special Requests: NO SPECIAL REQUESTS  Reflexed from [id number]        [place] Count >100,000  COLONIES/mL        Culture result: (A)       GRAM NEGATIVE RODS  . Annamary Ripa .. IDENTIFICATION AND SENSITIVITIES SAME AS PREVIOUSLY REPORTED KLEBSIELLA PNEUMONIAE      Culture result: KLEBSIELLA PNEUMONIAE (A)         Susceptibility    Klebsiella pneumoniae - EZEKIEL     Amikacin ($) <=16 Susceptible ug/mL     Ampicillin/sulbactam ($) <=8/4 Susceptible ug/mL     Aztreonam ($$$$) <=4 Susceptible ug/mL     Cefazolin ($) <=8 Susceptible ug/mL     Cefepime ($$) <=4 Susceptible ug/mL     Cefotaxime <=2 Susceptible ug/mL     Ceftazidime ($) <=1 Susceptible ug/mL     Ceftriaxone ($) <=1 Susceptible ug/mL     Cefuroxime ($) <=4 Susceptible ug/mL     Ciprofloxacin ($) <=1 Susceptible ug/mL     Gentamicin ($) <=4 Susceptible ug/mL     Imipenem <=1 Susceptible ug/mL     Levofloxacin ($) <=2 Susceptible ug/mL     Meropenem ($$) <=1 Susceptible ug/mL     Nitrofurantoin 64 Intermediate ug/mL     Piperacillin/Tazobac ($) <=16 Susceptible ug/mL     Tobramycin ($) <=4 Susceptible ug/mL     Trimeth-Sulfamethoxa <=2/38 Susceptible ug/mL   URINALYSIS W/ REFLEX CULTURE   Result Value Ref Range    Color YELLOW/STRAW      Appearance CLEAR CLEAR      Specific gravity 1.010 1.003 - 1.030 pH (UA) 5.0 5.0 - 8.0      Protein NEGATIVE  NEG mg/dL    Glucose NEGATIVE  NEG mg/dL    Ketone NEGATIVE  NEG mg/dL    Bilirubin NEGATIVE  NEG      Blood NEGATIVE  NEG      Urobilinogen 0.2 0.2 - 1.0 EU/dL    Nitrites NEGATIVE  NEG      Leukocyte Esterase SMALL (A) NEG      UA:UC IF INDICATED URINE CULTURE ORDERED (A) CNI      WBC 10-20 0 - 4 /hpf    RBC 0-5 0 - 5 /hpf    Epithelial cells FEW FEW /lpf    Bacteria 1+ (A) NEG /hpf    Hyaline cast 0-2 0 - 5 /lpf   URIC ACID   Result Value Ref Range    Uric acid 4.7 2.6 - 6.0 MG/DL        Exam:  Visit Vitals    /82    Pulse 64    Resp 18    Ht 5' 5\" (1.651 m)    Wt 92.5 kg (204 lb)    SpO2 94%    BMI 33.95 kg/m2       General:    Alert, cooperative, no distress. Head:  Normocephalic, without obvious abnormality, atraumatic. Respiratory:  Heart:   Non labored breathing  Regular rate and rhythm, 3/6 STEVIE   Neck:      Extremities: Warm, no cyanosis or edema. Pulses: 2+ radial pulses. Neurologic:  MS: Alert, speech intact. Language - see MMSE. Attention and fund of knowledge appropriate. Recent and remote memory intact.   Cranial Nerves:  II: visual fields Full to confrontation   II: pupils    II: optic disc    III,VII: ptosis Left ptosis   III,IV,VI: extraocular muscles  EOMI, no nystagmus or diplopia   V: facial light touch sensation     VII: facial muscle function   symmetric   VIII: hearing intact   IX: soft palate elevation  normal   XI: trapezius strength     XI: sternocleidomastoid strength    XII: tongue  Midline     Motor: normal bulk and tone, no tremor              Strength: 5/5 throughout, no PD  Sensory:   Coordination: FTN and BJ intact  Gait: normal gait  Reflexes: 2+ symmetric in UE and right knee, 1+ ankles, unable to elicit left knee  Mini Mental State Exam 7/31/2017 12/19/2016   What is the Year 1 1   What is the Season 1 1   What is the Date 1 1   What is the Day 1 1   What is the Month 1 1   Where are we State 1 1   Where are we Country 1 1   Where are we [place] [facility] 1 1   Whree are we Floor 1 1   Name three objects, then ask the patient to say them 3 3   Serial sevens Subtract 7 from 100 in increments 5 5   Ask for the three objects repeated above 2 1   Name a pencil 1 1   Name a watch 1 1   Have the patient repeat this phrase \"No ifs, ands, or buts\" 1 1   Three stage command: Take the paper in your right hand 1 1   Fold the paper in half 1 1   Put the paper on the floor 1 1   Read and obey the followin [address] 1 1   Have the patient write a sentence 1 1   Have the patient copy a figure 0 1   Mini Mental Score 28 28            Assessment/Plan   Pt is a 80 y.o. RH female with MCI, initially presented in Dec, 2016 with over one year h/o memory loss reported by family and on exam had MMSE score of 28/30. CT of head 16 with stable CIWM changes, without other explanation for memory loss. Memory labs were unremarkable. Exam is stable with MMSE score of 28/30 missing 1 at delayed recall and unable to copy, o/w non-focal.  Continue Aricept 5 mg daily. ICD-10-CM ICD-9-CM    1. Mild cognitive impairment G31.84 331.83        Signed:   Chrystine Gaucher, MD  2017

## 2017-10-18 ENCOUNTER — HOSPITAL ENCOUNTER (OUTPATIENT)
Dept: GENERAL RADIOLOGY | Age: 82
Discharge: HOME | End: 2017-10-18
Payer: COMMERCIAL

## 2017-10-18 DIAGNOSIS — R06.02 SOB (SHORTNESS OF BREATH): ICD-10-CM

## 2017-10-18 PROCEDURE — 71020 XR CHEST PA LAT: CPT

## 2017-10-23 ENCOUNTER — HOSPITAL ENCOUNTER (OUTPATIENT)
Dept: CARDIAC CATH/INVASIVE PROCEDURES | Age: 82
Discharge: HOME | End: 2017-10-24
Attending: INTERNAL MEDICINE | Admitting: INTERNAL MEDICINE
Payer: COMMERCIAL

## 2017-10-23 ENCOUNTER — APPOINTMENT (OUTPATIENT)
Dept: GENERAL RADIOLOGY | Age: 82
End: 2017-10-23
Attending: INTERNAL MEDICINE
Payer: COMMERCIAL

## 2017-10-23 ENCOUNTER — APPOINTMENT (OUTPATIENT)
Dept: NUCLEAR MEDICINE | Age: 82
End: 2017-10-23
Attending: INTERNAL MEDICINE
Payer: COMMERCIAL

## 2017-10-23 LAB
GLUCOSE BLD STRIP.AUTO-MCNC: 115 MG/DL (ref 65–100)
GLUCOSE BLD STRIP.AUTO-MCNC: 118 MG/DL (ref 65–100)
GLUCOSE BLD STRIP.AUTO-MCNC: 122 MG/DL (ref 65–100)
GLUCOSE BLD STRIP.AUTO-MCNC: 123 MG/DL (ref 65–100)
SERVICE CMNT-IMP: ABNORMAL

## 2017-10-23 PROCEDURE — 74011636320 HC RX REV CODE- 636/320

## 2017-10-23 PROCEDURE — C1769 GUIDE WIRE: HCPCS

## 2017-10-23 PROCEDURE — C1894 INTRO/SHEATH, NON-LASER: HCPCS

## 2017-10-23 PROCEDURE — 74011250636 HC RX REV CODE- 250/636: Performed by: INTERNAL MEDICINE

## 2017-10-23 PROCEDURE — 71020 XR CHEST PA LAT: CPT

## 2017-10-23 PROCEDURE — A9558 XE133 XENON 10MCI: HCPCS

## 2017-10-23 PROCEDURE — 74011000250 HC RX REV CODE- 250

## 2017-10-23 PROCEDURE — 74011250636 HC RX REV CODE- 250/636

## 2017-10-23 PROCEDURE — 74011250637 HC RX REV CODE- 250/637: Performed by: INTERNAL MEDICINE

## 2017-10-23 PROCEDURE — 77030016704 HC CATH ANGI DX PRF1 MRTM -B

## 2017-10-23 PROCEDURE — 77030008543 HC TBNG MON PRSS MRTM -A

## 2017-10-23 PROCEDURE — 99152 MOD SED SAME PHYS/QHP 5/>YRS: CPT

## 2017-10-23 PROCEDURE — 77030030195 HC CATH ANGI DX PRF4 MRTM -A

## 2017-10-23 PROCEDURE — 77030010221 HC SPLNT WR POS TELE -B

## 2017-10-23 PROCEDURE — 77030004549 HC CATH ANGI DX PRF MRTM -A

## 2017-10-23 PROCEDURE — 82962 GLUCOSE BLOOD TEST: CPT

## 2017-10-23 PROCEDURE — 77030019569 HC BND COMPR RAD TERU -B

## 2017-10-23 RX ORDER — SODIUM CHLORIDE 0.9 % (FLUSH) 0.9 %
5-10 SYRINGE (ML) INJECTION AS NEEDED
Status: DISCONTINUED | OUTPATIENT
Start: 2017-10-23 | End: 2017-10-24 | Stop reason: HOSPADM

## 2017-10-23 RX ORDER — METOPROLOL SUCCINATE 50 MG/1
50 TABLET, EXTENDED RELEASE ORAL DAILY
Qty: 30 TAB | Refills: 11 | Status: SHIPPED | OUTPATIENT
Start: 2017-10-23 | End: 2022-03-17 | Stop reason: SDUPTHER

## 2017-10-23 RX ORDER — NALOXONE HYDROCHLORIDE 0.4 MG/ML
0.4 INJECTION, SOLUTION INTRAMUSCULAR; INTRAVENOUS; SUBCUTANEOUS AS NEEDED
Status: DISCONTINUED | OUTPATIENT
Start: 2017-10-23 | End: 2017-10-24 | Stop reason: HOSPADM

## 2017-10-23 RX ORDER — MIDAZOLAM HYDROCHLORIDE 1 MG/ML
.5-2 INJECTION, SOLUTION INTRAMUSCULAR; INTRAVENOUS
Status: DISCONTINUED | OUTPATIENT
Start: 2017-10-23 | End: 2017-10-23

## 2017-10-23 RX ORDER — FENTANYL CITRATE 50 UG/ML
INJECTION, SOLUTION INTRAMUSCULAR; INTRAVENOUS
Status: COMPLETED
Start: 2017-10-23 | End: 2017-10-23

## 2017-10-23 RX ORDER — HEPARIN SODIUM 1000 [USP'U]/ML
2500 INJECTION, SOLUTION INTRAVENOUS; SUBCUTANEOUS ONCE
Status: COMPLETED | OUTPATIENT
Start: 2017-10-23 | End: 2017-10-23

## 2017-10-23 RX ORDER — ASPIRIN 81 MG/1
81 TABLET ORAL DAILY
Status: DISCONTINUED | OUTPATIENT
Start: 2017-10-23 | End: 2017-10-23

## 2017-10-23 RX ORDER — SODIUM CHLORIDE 0.9 % (FLUSH) 0.9 %
5-10 SYRINGE (ML) INJECTION EVERY 8 HOURS
Status: DISCONTINUED | OUTPATIENT
Start: 2017-10-23 | End: 2017-10-24 | Stop reason: HOSPADM

## 2017-10-23 RX ORDER — HEPARIN SODIUM 200 [USP'U]/100ML
INJECTION, SOLUTION INTRAVENOUS
Status: COMPLETED
Start: 2017-10-23 | End: 2017-10-23

## 2017-10-23 RX ORDER — VERAPAMIL HYDROCHLORIDE 2.5 MG/ML
INJECTION, SOLUTION INTRAVENOUS
Status: COMPLETED
Start: 2017-10-23 | End: 2017-10-23

## 2017-10-23 RX ORDER — DIPHENHYDRAMINE HYDROCHLORIDE 50 MG/ML
25-50 INJECTION, SOLUTION INTRAMUSCULAR; INTRAVENOUS ONCE
Status: DISCONTINUED | OUTPATIENT
Start: 2017-10-23 | End: 2017-10-23

## 2017-10-23 RX ORDER — IODIXANOL 320 MG/ML
0-200 INJECTION, SOLUTION INTRAVASCULAR
Status: DISCONTINUED | OUTPATIENT
Start: 2017-10-23 | End: 2017-10-23

## 2017-10-23 RX ORDER — SODIUM CHLORIDE 9 MG/ML
125 INJECTION, SOLUTION INTRAVENOUS CONTINUOUS
Status: DISCONTINUED | OUTPATIENT
Start: 2017-10-23 | End: 2017-10-23

## 2017-10-23 RX ORDER — ONDANSETRON 2 MG/ML
4 INJECTION INTRAMUSCULAR; INTRAVENOUS
Status: DISCONTINUED | OUTPATIENT
Start: 2017-10-23 | End: 2017-10-24 | Stop reason: HOSPADM

## 2017-10-23 RX ORDER — SODIUM CHLORIDE 9 MG/ML
100 INJECTION, SOLUTION INTRAVENOUS CONTINUOUS
Status: DISCONTINUED | OUTPATIENT
Start: 2017-10-23 | End: 2017-10-24

## 2017-10-23 RX ORDER — HEPARIN SODIUM 200 [USP'U]/100ML
500 INJECTION, SOLUTION INTRAVENOUS ONCE
Status: COMPLETED | OUTPATIENT
Start: 2017-10-23 | End: 2017-10-23

## 2017-10-23 RX ORDER — LIDOCAINE HYDROCHLORIDE 10 MG/ML
1-30 INJECTION, SOLUTION EPIDURAL; INFILTRATION; INTRACAUDAL; PERINEURAL
Status: DISCONTINUED | OUTPATIENT
Start: 2017-10-23 | End: 2017-10-23

## 2017-10-23 RX ORDER — VERAPAMIL HYDROCHLORIDE 2.5 MG/ML
2.5 INJECTION, SOLUTION INTRAVENOUS ONCE
Status: COMPLETED | OUTPATIENT
Start: 2017-10-23 | End: 2017-10-23

## 2017-10-23 RX ORDER — SERTRALINE HYDROCHLORIDE 50 MG/1
100 TABLET, FILM COATED ORAL
Status: DISCONTINUED | OUTPATIENT
Start: 2017-10-23 | End: 2017-10-24 | Stop reason: HOSPADM

## 2017-10-23 RX ORDER — HYDROCODONE BITARTRATE AND ACETAMINOPHEN 5; 325 MG/1; MG/1
1 TABLET ORAL
Status: DISCONTINUED | OUTPATIENT
Start: 2017-10-23 | End: 2017-10-24 | Stop reason: HOSPADM

## 2017-10-23 RX ORDER — PRAVASTATIN SODIUM 20 MG/1
20 TABLET ORAL
Qty: 30 TAB | Refills: 11 | Status: SHIPPED | OUTPATIENT
Start: 2017-10-23

## 2017-10-23 RX ORDER — IODIXANOL 320 MG/ML
INJECTION, SOLUTION INTRAVASCULAR
Status: COMPLETED
Start: 2017-10-23 | End: 2017-10-23

## 2017-10-23 RX ORDER — ALLOPURINOL 100 MG/1
300 TABLET ORAL
Status: DISCONTINUED | OUTPATIENT
Start: 2017-10-23 | End: 2017-10-24 | Stop reason: HOSPADM

## 2017-10-23 RX ORDER — ASPIRIN 81 MG/1
81 TABLET ORAL
Status: DISCONTINUED | OUTPATIENT
Start: 2017-10-24 | End: 2017-10-24 | Stop reason: HOSPADM

## 2017-10-23 RX ORDER — MONTELUKAST SODIUM 10 MG/1
10 TABLET ORAL
Status: DISCONTINUED | OUTPATIENT
Start: 2017-10-23 | End: 2017-10-24 | Stop reason: HOSPADM

## 2017-10-23 RX ORDER — SODIUM CHLORIDE 9 MG/ML
100 INJECTION, SOLUTION INTRAVENOUS CONTINUOUS
Status: DISCONTINUED | OUTPATIENT
Start: 2017-10-23 | End: 2017-10-23 | Stop reason: SDUPTHER

## 2017-10-23 RX ORDER — ACETAMINOPHEN 325 MG/1
650 TABLET ORAL
Status: DISCONTINUED | OUTPATIENT
Start: 2017-10-23 | End: 2017-10-24 | Stop reason: HOSPADM

## 2017-10-23 RX ORDER — ALLOPURINOL 300 MG/1
300 TABLET ORAL DAILY
Status: DISCONTINUED | OUTPATIENT
Start: 2017-10-23 | End: 2017-10-23

## 2017-10-23 RX ORDER — METOPROLOL SUCCINATE 50 MG/1
50 TABLET, EXTENDED RELEASE ORAL DAILY
Status: DISCONTINUED | OUTPATIENT
Start: 2017-10-23 | End: 2017-10-24 | Stop reason: HOSPADM

## 2017-10-23 RX ORDER — METOPROLOL SUCCINATE 25 MG/1
25 TABLET, EXTENDED RELEASE ORAL DAILY
Status: ON HOLD | COMMUNITY
End: 2017-10-23

## 2017-10-23 RX ORDER — DONEPEZIL HYDROCHLORIDE 5 MG/1
5 TABLET, FILM COATED ORAL DAILY
Status: DISCONTINUED | OUTPATIENT
Start: 2017-10-23 | End: 2017-10-23

## 2017-10-23 RX ORDER — DONEPEZIL HYDROCHLORIDE 5 MG/1
5 TABLET, FILM COATED ORAL
Status: DISCONTINUED | OUTPATIENT
Start: 2017-10-24 | End: 2017-10-24 | Stop reason: HOSPADM

## 2017-10-23 RX ORDER — FENTANYL CITRATE 50 UG/ML
25-50 INJECTION, SOLUTION INTRAMUSCULAR; INTRAVENOUS
Status: DISCONTINUED | OUTPATIENT
Start: 2017-10-23 | End: 2017-10-23

## 2017-10-23 RX ORDER — PANTOPRAZOLE SODIUM 20 MG/1
20 TABLET, DELAYED RELEASE ORAL DAILY
COMMUNITY
End: 2018-02-13

## 2017-10-23 RX ORDER — PRAVASTATIN SODIUM 10 MG/1
20 TABLET ORAL
Status: DISCONTINUED | OUTPATIENT
Start: 2017-10-23 | End: 2017-10-24 | Stop reason: HOSPADM

## 2017-10-23 RX ORDER — MIDAZOLAM HYDROCHLORIDE 1 MG/ML
INJECTION, SOLUTION INTRAMUSCULAR; INTRAVENOUS
Status: COMPLETED
Start: 2017-10-23 | End: 2017-10-23

## 2017-10-23 RX ORDER — LIDOCAINE HYDROCHLORIDE 10 MG/ML
INJECTION, SOLUTION EPIDURAL; INFILTRATION; INTRACAUDAL; PERINEURAL
Status: COMPLETED
Start: 2017-10-23 | End: 2017-10-23

## 2017-10-23 RX ORDER — HEPARIN SODIUM 1000 [USP'U]/ML
INJECTION, SOLUTION INTRAVENOUS; SUBCUTANEOUS
Status: COMPLETED
Start: 2017-10-23 | End: 2017-10-23

## 2017-10-23 RX ORDER — DIPHENHYDRAMINE HYDROCHLORIDE 50 MG/ML
INJECTION, SOLUTION INTRAMUSCULAR; INTRAVENOUS
Status: DISCONTINUED
Start: 2017-10-23 | End: 2017-10-23

## 2017-10-23 RX ADMIN — MONTELUKAST SODIUM 10 MG: 10 TABLET, FILM COATED ORAL at 20:39

## 2017-10-23 RX ADMIN — LIDOCAINE HYDROCHLORIDE 0.5 ML: 10 INJECTION, SOLUTION EPIDURAL; INFILTRATION; INTRACAUDAL; PERINEURAL at 08:59

## 2017-10-23 RX ADMIN — MIDAZOLAM HYDROCHLORIDE 1 MG: 1 INJECTION, SOLUTION INTRAMUSCULAR; INTRAVENOUS at 08:51

## 2017-10-23 RX ADMIN — HEPARIN SODIUM 1000 UNITS: 200 INJECTION, SOLUTION INTRAVENOUS at 08:54

## 2017-10-23 RX ADMIN — FENTANYL CITRATE 50 MCG: 50 INJECTION, SOLUTION INTRAMUSCULAR; INTRAVENOUS at 08:51

## 2017-10-23 RX ADMIN — IODIXANOL 50 ML: 320 INJECTION, SOLUTION INTRAVASCULAR at 09:14

## 2017-10-23 RX ADMIN — SODIUM CHLORIDE 100 ML/HR: 900 INJECTION, SOLUTION INTRAVENOUS at 20:00

## 2017-10-23 RX ADMIN — ALLOPURINOL 300 MG: 100 TABLET ORAL at 20:35

## 2017-10-23 RX ADMIN — VERAPAMIL HYDROCHLORIDE 2.5 MG: 2.5 INJECTION, SOLUTION INTRAVENOUS at 09:01

## 2017-10-23 RX ADMIN — METOPROLOL SUCCINATE 50 MG: 50 TABLET, EXTENDED RELEASE ORAL at 15:46

## 2017-10-23 RX ADMIN — HEPARIN SODIUM 2500 UNITS: 1000 INJECTION, SOLUTION INTRAVENOUS; SUBCUTANEOUS at 09:01

## 2017-10-23 RX ADMIN — Medication 5 ML: at 14:00

## 2017-10-23 RX ADMIN — VERAPAMIL HYDROCHLORIDE 2.5 MG: 2.5 INJECTION INTRAVENOUS at 09:01

## 2017-10-23 RX ADMIN — SERTRALINE HYDROCHLORIDE 100 MG: 50 TABLET ORAL at 20:36

## 2017-10-23 RX ADMIN — NITROGLYCERIN 200 MCG: 5 INJECTION, SOLUTION INTRAVENOUS at 09:00

## 2017-10-23 RX ADMIN — PRAVASTATIN SODIUM 20 MG: 10 TABLET ORAL at 20:35

## 2017-10-23 RX ADMIN — SODIUM CHLORIDE 125 ML/HR: 900 INJECTION, SOLUTION INTRAVENOUS at 07:58

## 2017-10-23 NOTE — DISCHARGE INSTRUCTIONS
[address] [facility], [address]   [phone]  [address]    [email or web address] My Name    Patient Discharge Instructions    Efren Mcdonald / [id number] : 1935    Admitted 10/23/2017 Discharged: 10/23/2017       · It is important that you take the medication exactly as they are prescribed. · Keep your medication in the bottles provided by the pharmacist and keep a list of the medication names, dosages, and times to be taken in your wallet. · Do not take other medications without consulting your doctor. BRING ALL OF YOUR MEDICINES TO YOUR OFFICE VISIT. Follow-up with Meagan Frey MD in 2 weeks. Cardiac Catheterization  Discharge Instructions     Do not drive, operate any machinery, or sign any legal documents for 24 hours after your procedure. You must have someone to drive you home.  You may take a shower 24 hours after your cardiac catheterization. Be sure to get the dressing wet and then remove it; gently wash the area with warm soapy water. Pat dry and leave open to air. To help prevent infections, be sure to keep the cath site clean and dry. No lotions, creams, powders, ointments, etc. in the cath site for approximately 1 week.  Do not take a tub bath, get in a hot tub or swimming pool for approximately 5 days or until the cath site is completely healed.  No strenuous activity or heavy lifting over 10 lbs. for 7 days.  Drink plenty of fluids for 24-48 hours after your cath to flush the contrast dye from your kidneys. No alcoholic beverages for 24 hours. You may resume your previous diet (low fat, low cholesterol) after your cath.  After your cath, some bruising or discomfort is common during the healing process. Tylenol, 1-2 tablets every 6 hours as needed, is recommended if you experience any discomfort.   If you experience any signs or symptoms of infection such as fever, chills, or poorly healing incision, persistent tenderness or swelling in the groin, redness and/or warmth to the touch, numbness, significant tingling or pain at the groin site or affected extremity, rash, drainage from the cath site, or if the leg feels tight or swollen, call your physician right away.  If bleeding at the cath site occurs, take a clean gauze pad and apply direct pressure to the groin just above the puncture site. Call 911 immediately, and continue to apply direct pressure until an ambulance gets to your location.  You may return to work  2  days after your cardiac cath if no groin bleeding. Information obtained by :  I understand that if any problems occur once I am at home I am to contact my physician. I understand and acknowledge receipt of the instructions indicated above. R.N.'s Signature                                                                  Date/Time                                                                                                                                              Patient or Representative Signature                                                          Date/Time      Gaston Martinez MD             [address] [place] [address], [address]    [phone]  [address]    [email or web address]

## 2017-10-23 NOTE — IP AVS SNAPSHOT
[address] [facility] 
[phone] Patient: Jeffry Jordan MRN: [id number] 
OZIEL:[birth year] About your hospitalization You were admitted on:  October 23, 2017 You last received care in the:  MRM 2 INTRVNTNL CARDIO You were discharged on:  October 24, 2017 Why you were hospitalized Your primary diagnosis was:  Not on File Things You Need To Do Follow up with Jose Mckeon MD in 2 week(s) Phone:  [phone] Where:  [address], [address] [facility], [address] [facility] Follow up with Alex Hallman MD  
  
Phone:  [phone] Where:  [address], [address] [facility],[address], [address] [place] [id number] Discharge Orders None A check noe indicates which time of day the medication should be taken. My Medications STOP taking these medications CARDIZEM  mg ER capsule Generic drug:  dilTIAZem CD  
   
  
 naproxen 500 mg tablet Commonly known as:  NAPROSYN  
   
  
  
TAKE these medications as instructed Instructions Each Dose to Equal  
 Morning Noon Evening Bedtime  
 allopurinol 300 mg tablet Commonly known as:  Michel Night Your last dose was: Your next dose is:    
   
   
      
   
   
   
  
 aspirin delayed-release 81 mg tablet Your last dose was: Your next dose is: Take 81 mg by mouth daily. 81 mg  
    
   
   
   
  
 donepezil 5 mg tablet Commonly known as:  ARICEPT Your last dose was: Your next dose is:    
   
   
      
   
   
   
  
 ferrous sulfate 325 mg (65 mg iron) tablet Your last dose was: Your next dose is:    
   
   
      
   
   
   
  
 furosemide 40 mg tablet Commonly known as:  LASIX Your last dose was: Your next dose is: Take  by mouth as needed. HYDROcodone-acetaminophen 5-325 mg per tablet Commonly known as:  Shay Huggins Your last dose was: Your next dose is: Take 1 Tab by mouth every four (4) hours as needed for Pain. Max Daily Amount: 6 Tabs. 1 Tab  
    
   
   
   
  
 losartan 100 mg tablet Commonly known as:  COZAAR Your last dose was: Your next dose is: Take 100 mg by mouth nightly. 100 mg  
    
   
   
   
  
 metoprolol succinate 50 mg XL tablet Commonly known as:  TOPROL-XL Your last dose was: Your next dose is: Take 1 Tab by mouth daily. 50 mg  
    
   
   
   
  
 montelukast 10 mg tablet Commonly known as:  SINGULAIR Your last dose was: Your next dose is: Take 10 mg by mouth nightly. 10 mg  
    
   
   
   
  
 pravastatin 20 mg tablet Commonly known as:  PRAVACHOL Your last dose was: Your next dose is: Take 1 Tab by mouth nightly. 20 mg  
    
   
   
   
  
 PROAIR HFA 90 mcg/actuation inhaler Generic drug:  albuterol Your last dose was: Your next dose is:    
   
   
      
   
   
   
  
 PROTONIX 20 mg tablet Generic drug:  pantoprazole Your last dose was: Your next dose is: Take 20 mg by mouth daily. 20 mg  
    
   
   
   
  
 sertraline 100 mg tablet Commonly known as:  ZOLOFT Your last dose was: Your next dose is: Take 100 mg by mouth nightly. 100 mg  
    
   
   
   
  
 traMADol 50 mg tablet Commonly known as:  ULTRAM  
   
Your last dose was: Your next dose is:    
   
   
 take 1 tablet by mouth every 6 hours if needed for pain Where to Get Your Medications Information on where to get these meds will be given to you by the nurse or doctor. ! Ask your nurse or doctor about these medications  
  metoprolol succinate 50 mg XL tablet  
 pravastatin 20 mg tablet Discharge Instructions [address] [facility], [address]   [phone] [address]    [email or web address] Patient Discharge Instructions Jayden Lewis / [id number] : 1935 Admitted 10/23/2017 Discharged: 10/23/2017 · It is important that you take the medication exactly as they are prescribed. · Keep your medication in the bottles provided by the pharmacist and keep a list of the medication names, dosages, and times to be taken in your wallet. · Do not take other medications without consulting your doctor. BRING ALL OF YOUR MEDICINES TO YOUR OFFICE VISIT. Follow-up with Maura Castro MD in 2 weeks. Cardiac Catheterization  Discharge Instructions ? Do not drive, operate any machinery, or sign any legal documents for 24 hours after your procedure. You must have someone to drive you home. ? You may take a shower 24 hours after your cardiac catheterization. Be sure to get the dressing wet and then remove it; gently wash the area with warm soapy water. Pat dry and leave open to air. To help prevent infections, be sure to keep the cath site clean and dry. No lotions, creams, powders, ointments, etc. in the cath site for approximately 1 week. ? Do not take a tub bath, get in a hot tub or swimming pool for approximately 5 days or until the cath site is completely healed. ? No strenuous activity or heavy lifting over 10 lbs. for 7 days. ? Drink plenty of fluids for 24-48 hours after your cath to flush the contrast dye from your kidneys. No alcoholic beverages for 24 hours. You may resume your previous diet (low fat, low cholesterol) after your cath. ? After your cath, some bruising or discomfort is common during the healing process. Tylenol, 1-2 tablets every 6 hours as needed, is recommended if you experience any discomfort.   If you experience any signs or symptoms of infection such as fever, chills, or poorly healing incision, persistent tenderness or swelling in the groin, redness and/or warmth to the touch, numbness, significant tingling or pain at the groin site or affected extremity, rash, drainage from the cath site, or if the leg feels tight or swollen, call your physician right away. ? If bleeding at the cath site occurs, take a clean gauze pad and apply direct pressure to the groin just above the puncture site. Call 911 immediately, and continue to apply direct pressure until an ambulance gets to your location. ? You may return to work  2  days after your cardiac cath if no groin bleeding. Information obtained by : 
I understand that if any problems occur once I am at home I am to contact my physician. I understand and acknowledge receipt of the instructions indicated above. R.N.'s Signature                                                                  Date/Time Patient or Representative Signature                                                          Date/Time Dai Underwood MD 
          
[address] [facility], [address]    [phone] [address]    [email or web address] Introducing [facility]! [facility] introduces [employer] patient portal. Now you can access parts of your medical record, email your doctor's office, and request medication refills online. 1. In your internet browser, go to [email or web address] 2. Click on the First Time User? Click Here link in the Sign In box. You will see the New Member Sign Up page. 3. Enter your [employer] Access Code exactly as it appears below.  You will not need to use this code after youve completed the sign-up process. If you do not sign up before the expiration date, you must request a new code. · [employer] Access Code: [email or web address] 
Expires: 1/16/2018 11:22 AM 
 
4. Enter the last four digits of your Social Security Number (xxxx) and Date of Birth (mm/dd/yyyy) as indicated and click Submit. You will be taken to the next sign-up page. 5. Create a [employer] ID. This will be your [employer] login ID and cannot be changed, so think of one that is secure and easy to remember. 6. Create a [employer] password. You can change your password at any time. 7. Enter your Password Reset Question and Answer. This can be used at a later time if you forget your password. 8. Enter your e-mail address. You will receive e-mail notification when new information is available in [address] 9. Click Sign Up. You can now view and download portions of your medical record. 10. Click the Download Summary menu link to download a portable copy of your medical information. If you have questions, please visit the Frequently Asked Questions section of the [employer] website. Remember, [employer] is NOT to be used for urgent needs. For medical emergencies, dial 911. Now available from your iPhone and Android! Providers Seen During Your Hospitalization Provider Specialty Primary office phone Amber Jennings MD Cardiology [phone] Your Primary Care Physician (PCP) Primary Care Physician Office Phone Office Fax Siobhan Garcia [phone] You are allergic to the following Allergen Reactions Pcn (Penicillins) Rash But can take cephalosporins. Allergic to all \"cillins\". Codeine Other (comments) Hallucinations, takes hydrocodone at home for pain Contrast Dye (Iodine) Other (comments) Not to take due to kidney function Prednisone Other (comments) \"makes my pancreas numbers go way up\" Recent Documentation Height Weight Breastfeeding? BMI OB Status Smoking Status 1.651 m 88.5 kg No 32.45 kg/m2 Postmenopausal Former Smoker Emergency Contacts Name Discharge Info Relation Home Work Mobile Wilman Coley DISCHARGE CAREGIVER [3] Son [22] [phone] [facility] [place] DISCHARGE CAREGIVER [3] Daughter [21] [phone] Patient Belongings The following personal items are in your possession at time of discharge: 
  Dental Appliances: None  Visual Aid: Glasses      Home Medications: None   Jewelry: Earrings, Ring (2 rings)  Clothing: At bedside    Other Valuables: Sent home  Personal Items Sent to Safe: no 
 
  
  
 Please provide this summary of care documentation to your next provider. Signatures-by signing, you are acknowledging that this After Visit Summary has been reviewed with you and you have received a copy. Patient Signature:  ____________________________________________________________ Date:  ____________________________________________________________  
  
Cathy Saavedra Provider Signature:  ____________________________________________________________ Date:  ____________________________________________________________

## 2017-10-23 NOTE — PROGRESS NOTES
Bedside shift change report given to [address] (oncoming nurse) by Serge Santizoer RN (offgoing nurse). Report included the following information SBAR, Kardex, MAR and Recent Results.

## 2017-10-24 VITALS
WEIGHT: 195 LBS | RESPIRATION RATE: 20 BRPM | HEART RATE: 60 BPM | DIASTOLIC BLOOD PRESSURE: 53 MMHG | BODY MASS INDEX: 32.49 KG/M2 | OXYGEN SATURATION: 93 % | TEMPERATURE: 98.8 F | HEIGHT: 65 IN | SYSTOLIC BLOOD PRESSURE: 123 MMHG

## 2017-10-24 LAB
ANION GAP SERPL CALC-SCNC: 4 MMOL/L (ref 5–15)
BUN SERPL-MCNC: 34 MG/DL (ref 6–20)
BUN/CREAT SERPL: 27 (ref 12–20)
CALCIUM SERPL-MCNC: 9.4 MG/DL (ref 8.5–10.1)
CHLORIDE SERPL-SCNC: 113 MMOL/L (ref 97–108)
CO2 SERPL-SCNC: 23 MMOL/L (ref 21–32)
CREAT SERPL-MCNC: 1.27 MG/DL (ref 0.55–1.02)
GLUCOSE BLD STRIP.AUTO-MCNC: 100 MG/DL (ref 65–100)
GLUCOSE SERPL-MCNC: 97 MG/DL (ref 65–100)
POTASSIUM SERPL-SCNC: 4.6 MMOL/L (ref 3.5–5.1)
SERVICE CMNT-IMP: NORMAL
SODIUM SERPL-SCNC: 140 MMOL/L (ref 136–145)

## 2017-10-24 PROCEDURE — 80048 BASIC METABOLIC PNL TOTAL CA: CPT | Performed by: INTERNAL MEDICINE

## 2017-10-24 PROCEDURE — 74011250637 HC RX REV CODE- 250/637: Performed by: INTERNAL MEDICINE

## 2017-10-24 PROCEDURE — 36415 COLL VENOUS BLD VENIPUNCTURE: CPT | Performed by: INTERNAL MEDICINE

## 2017-10-24 PROCEDURE — 82962 GLUCOSE BLOOD TEST: CPT

## 2017-10-24 RX ADMIN — Medication 10 ML: at 04:02

## 2017-10-24 RX ADMIN — METOPROLOL SUCCINATE 50 MG: 50 TABLET, EXTENDED RELEASE ORAL at 08:56

## 2017-10-24 NOTE — PROGRESS NOTES
Progress Note      10/24/2017 8:07 AM  NAME: Lian Thakkar   MRN:  [id number]   Admit Diagnosis: cath     Problem List:     Cath ok  VQ negative        Assessment/Plan:     Right radial healed. Cr ok. Ambulating. Home this Am. Follow up with primary and Dr. Beryl Sacks from GI. [x]       High complexity decision making was performed in this patient at high risk for decompensation with multiple organ involvement. Subjective:     Lian Thakkar denies chest pain, dyspnea. Discussed with RN events overnight. Review of Systems:    Symptom Y/N Comments  Symptom Y/N Comments   Fever/Chills N   Chest Pain N    Poor Appetite N   Edema N    Cough N   Abdominal Pain N    Sputum N   Joint Pain N    SOB/LORENZO N   Pruritis/Rash N    Nausea/vomit N   Tolerating PT/OT Y    Diarrhea N   Tolerating Diet Y    Constipation N   Other       Could NOT obtain due to:      Objective:      Physical Exam:    Last 24hrs VS reviewed since prior progress note. Most recent are:    Visit Vitals    BP (P) 123/53 (BP 1 Location: Left arm, BP Patient Position: At rest)    Pulse 60    Temp (P) 98.8 °F (37.1 °C)    Resp (P) 18    Ht 5' 5\" (1.651 m)    Wt 88.5 kg (195 lb)    SpO2 (P) 93%    Breastfeeding No    BMI 32.45 kg/m2       Intake/Output Summary (Last 24 hours) at 10/24/17 6185  Last data filed at 10/24/17 0802   Gross per 24 hour   Intake          2501.67 ml   Output             1900 ml   Net           601.67 ml        General Appearance: Well developed, well nourished, alert & oriented x 3,    no acute distress. Ears/Nose/Mouth/Throat: Hearing grossly normal.  Neck: Supple. Chest: Lungs clear to auscultation bilaterally. Cardiovascular: Regular rate and rhythm, S1S2 normal, no murmur. Abdomen: Soft, non-tender, bowel sounds are active. Extremities: No edema bilaterally. Skin: Warm and dry.     PMH/SH reviewed - no change compared to H&P    Data Review    Telemetry: normal sinus rhythm     Lab Data Personally Reviewed:    No results for input(s): WBC, HGB, HCT, PLT, HGBEXT, HCTEXT, PLTEXT in the last 72 hours. No results for input(s): INR, PTP, APTT in the last 72 hours. No lab exists for component: INREXT   Recent Labs      10/24/17   0404   NA  140   K  4.6   CL  113*   CO2  23   BUN  34*   CREA  1.27*   GLU  97   CA  9.4     No results for input(s): CPK, CKNDX, TROIQ in the last 72 hours. No lab exists for component: CPKMB  Lab Results   Component Value Date/Time    Triglyceride 87 02/24/2014 02:30 AM       No results for input(s): SGOT, GPT, AP, TBIL, TP, ALB, GLOB, GGT, AML, LPSE in the last 72 hours. No lab exists for component: AMYP, HLPSE  No results for input(s): PH, PCO2, PO2 in the last 72 hours.     Medications Personally Reviewed:    Current Facility-Administered Medications   Medication Dose Route Frequency    HYDROcodone-acetaminophen (NORCO) 5-325 mg per tablet 1 Tab  1 Tab Oral Q4H PRN    montelukast (SINGULAIR) tablet 10 mg  10 mg Oral QHS    sertraline (ZOLOFT) tablet 100 mg  100 mg Oral QHS    metoprolol succinate (TOPROL-XL) XL tablet 50 mg  50 mg Oral DAILY    pravastatin (PRAVACHOL) tablet 20 mg  20 mg Oral QHS    sodium chloride (NS) flush 5-10 mL  5-10 mL IntraVENous Q8H    sodium chloride (NS) flush 5-10 mL  5-10 mL IntraVENous PRN    acetaminophen (TYLENOL) tablet 650 mg  650 mg Oral Q4H PRN    naloxone (NARCAN) injection 0.4 mg  0.4 mg IntraVENous PRN    ondansetron (ZOFRAN) injection 4 mg  4 mg IntraVENous Q4H PRN    allopurinol (ZYLOPRIM) tablet 300 mg  300 mg Oral QHS    aspirin delayed-release tablet 81 mg  81 mg Oral QHS    donepezil (ARICEPT) tablet 5 mg  5 mg Oral QHS         Jakub Aguillon MD

## 2017-10-24 NOTE — PROGRESS NOTES
Patient ambulated in hallway without difficulty. Dressing CDI. No complaints. Discharge instructions reviewed with patient; to be discharged to home with family. Site care instructions reviewed; site(s) CDI. Patient instructed on which medications to continue, which to start, and which to stop. Prescriptions given to patient. Medication info provided and reviewed with patient. Follow-up appointment information given; follow-up appointment to be made by patient. IV and tele box removed. Opportunity for questions provided; all questions answered. All belongings returned. Patient wheeled to front door via wheelchair by volunteer; to be transported home by daughter.

## 2017-10-25 NOTE — PROCEDURES
[place], [address] [facility]   CORONARY ANGIOGRAPHY       Name:  Cary Estrada   MR#:  [id number]   :  1935   Account #:  [de-identified]        Date of Adm:  10/18/2017       CINE NUMBER: . PROCEDURES PERFORMED:   1. Left heart cardiac catheterization. 2. Selective coronary angiography. 3. Sedation. ANESTHESIA: Sedation was administered by cath lab staff and I   supervised them as they monitored the patient for the duration of the   procedure. Duration was 25 minutes. Sedation was Versed and   fentanyl. INDICATION: Chest pain with an increased troponin, concerning for   unstable angina. ESTIMATED BLOOD LOSS: Less than 50 mL. SPECIMENS REMOVED: None. COMPLICATIONS: None. TECHNIQUE: A 5-Andorran right radial artery. RESULTS:   1. LEFT HEART CARDIAC CATHETERIZATION: Arterial pressure   140/70 with a mean of 96. Left ventricular pressure 140/15. COMMENTS: Left ventricular end-diastolic pressures within normal   limits. There is no aortic stenosis. Systemic arterial pressures are   within normal limits. 2. SELECTIVE CORONARY ANGIOGRAPHY   LEFT MAIN: The left main is normal in caliber with mild luminal   irregularities but no significant obstruction. LEFT ANTERIOR DESCENDING: The left anterior descending is   tortuous. The left anterior descending is a normal caliber vessel that   gives rise to a moderate-sized diagonal branch. The left anterior   descending and diagonal have diffuse mild luminal irregularities. LEFT CIRCUMFLEX: The left circumflex is a moderate caliber vessel   that gives rise to a moderate-sized obtuse marginal. The left circumflex   and obtuse marginal have diffuse mild luminal irregularities. RIGHT CORONARY ARTERY: The right coronary artery is dominant. The right coronary artery is a moderate caliber vessel that gives rise to   a moderate RPDA and moderate posterolateral branch.  The right   coronary artery and its branches have diffuse mild luminal   irregularities. At the completion of the procedure, hemostasis was obtained via a TR   band. CONCLUSION:   1. Mild nonobstructive coronary artery disease in a right dominant   system. 2. Normal left ventricular filling pressures. No aortic stenosis. Systemic   arterial pressures within normal limits. 3. Mild nonobstructive coronary artery disease in a right dominant   system. 4. Continue preventative care.         Roney Heimlich, MD      SR / HILARY   D:  10/25/2017   14:50   T:  10/25/2017   15:42   Job #:  [id number]

## 2017-10-28 NOTE — PROCEDURES
[place], [address]   CORONARY ANGIOGRAPHY       Name:  Marissa Hicks   MR#:  [id number]   :  1935   Account #:  [de-identified]        Date of Adm:  10/23/2017       CORRECTED DOCUMENT - corrected CSN/dates; 10/28/17    CINE NUMBER: . PROCEDURES PERFORMED:   1. Left heart cardiac catheterization. 2. Selective coronary angiography. 3. Sedation. ANESTHESIA: Sedation was administered by cath lab staff and I   supervised them as they monitored the patient for the duration of the   procedure. Duration was 25 minutes. Sedation was Versed and   fentanyl. INDICATION: Chest pain with an increased troponin, concerning for   unstable angina. ESTIMATED BLOOD LOSS: Less than 50 mL. SPECIMENS REMOVED: None. COMPLICATIONS: None. TECHNIQUE: A 5-Montenegrin right radial artery. RESULTS:   1. LEFT HEART CARDIAC CATHETERIZATION: Arterial pressure   140/70 with a mean of 96. Left ventricular pressure 140/15. COMMENTS: Left ventricular end-diastolic pressures within normal   limits. There is no aortic stenosis. Systemic arterial pressures are   within normal limits. 2. SELECTIVE CORONARY ANGIOGRAPHY   LEFT MAIN: The left main is normal in caliber with mild luminal   irregularities but no significant obstruction. LEFT ANTERIOR DESCENDING: The left anterior descending is   tortuous. The left anterior descending is a normal caliber vessel that   gives rise to a moderate-sized diagonal branch. The left anterior   descending and diagonal have diffuse mild luminal irregularities. LEFT CIRCUMFLEX: The left circumflex is a moderate caliber vessel   that gives rise to a moderate-sized obtuse marginal. The left circumflex   and obtuse marginal have diffuse mild luminal irregularities. RIGHT CORONARY ARTERY: The right coronary artery is dominant.    The right coronary artery is a moderate caliber vessel that gives rise to   a moderate RPDA and moderate posterolateral branch. The right   coronary artery and its branches have diffuse mild luminal   irregularities. At the completion of the procedure, hemostasis was obtained via a TR   band. CONCLUSION:   1. Mild nonobstructive coronary artery disease in a right dominant   system. 2. Normal left ventricular filling pressures. No aortic stenosis. Systemic   arterial pressures within normal limits. 3. Mild nonobstructive coronary artery disease in a right dominant   system. 4. Continue preventative care.         Farrah Singh MD      SR / HILARY   D:  10/25/2017   14:50   T:  10/25/2017   15:42   Job #:  [id number]

## 2017-11-25 ENCOUNTER — HOSPITAL ENCOUNTER (EMERGENCY)
Age: 82
Discharge: HOME | End: 2017-11-25
Attending: EMERGENCY MEDICINE
Payer: COMMERCIAL

## 2017-11-25 ENCOUNTER — APPOINTMENT (OUTPATIENT)
Dept: CT IMAGING | Age: 82
End: 2017-11-25
Attending: PHYSICIAN ASSISTANT
Payer: COMMERCIAL

## 2017-11-25 VITALS
HEART RATE: 59 BPM | WEIGHT: 204.37 LBS | RESPIRATION RATE: 16 BRPM | BODY MASS INDEX: 34.05 KG/M2 | DIASTOLIC BLOOD PRESSURE: 75 MMHG | OXYGEN SATURATION: 95 % | HEIGHT: 65 IN | SYSTOLIC BLOOD PRESSURE: 149 MMHG | TEMPERATURE: 98.7 F

## 2017-11-25 DIAGNOSIS — M48.061 SPINAL STENOSIS OF LUMBAR REGION WITHOUT NEUROGENIC CLAUDICATION: Primary | ICD-10-CM

## 2017-11-25 LAB
GLUCOSE BLD STRIP.AUTO-MCNC: 109 MG/DL (ref 65–100)
SERVICE CMNT-IMP: ABNORMAL

## 2017-11-25 PROCEDURE — 74011250637 HC RX REV CODE- 250/637: Performed by: PHYSICIAN ASSISTANT

## 2017-11-25 PROCEDURE — 82962 GLUCOSE BLOOD TEST: CPT

## 2017-11-25 PROCEDURE — 72131 CT LUMBAR SPINE W/O DYE: CPT

## 2017-11-25 PROCEDURE — 99283 EMERGENCY DEPT VISIT LOW MDM: CPT

## 2017-11-25 RX ORDER — CYCLOBENZAPRINE HCL 10 MG
10 TABLET ORAL
Status: COMPLETED | OUTPATIENT
Start: 2017-11-25 | End: 2017-11-25

## 2017-11-25 RX ORDER — TRAMADOL HYDROCHLORIDE 50 MG/1
50 TABLET ORAL
Status: COMPLETED | OUTPATIENT
Start: 2017-11-25 | End: 2017-11-25

## 2017-11-25 RX ORDER — TRAMADOL HYDROCHLORIDE 50 MG/1
50 TABLET ORAL
Qty: 20 TAB | Refills: 0 | Status: SHIPPED | OUTPATIENT
Start: 2017-11-25 | End: 2018-02-13

## 2017-11-25 RX ADMIN — TRAMADOL HYDROCHLORIDE 50 MG: 50 TABLET, FILM COATED ORAL at 20:58

## 2017-11-25 RX ADMIN — CYCLOBENZAPRINE HYDROCHLORIDE 10 MG: 10 TABLET, FILM COATED ORAL at 20:58

## 2017-11-26 NOTE — ED NOTES
Discharge instructions reviewed w/ pt and copy given by Hermilo VÁSQUEZ. Pt discharged ambulatory from ED.

## 2017-11-26 NOTE — DISCHARGE INSTRUCTIONS
Lumbar Spinal Stenosis: Care Instructions  Your Care Instructions    Stenosis in the spine is a narrowing of the canal that is around the spinal cord and nerve roots in your back. It can happen as part of aging. Sometimes bone and other tissue grow into this canal and press on the nerves that branch out from the spinal cord. This can cause pain, numbness, and weakness. When it happens in the lower part of your back, it is called lumbar spinal stenosis. It can cause problems in the legs, feet, and rear end (buttocks). You may be able to get relief from the symptoms of spinal stenosis by taking pain medicine. Your doctor may suggest physical therapy and exercises to keep your spine strong and flexible. Some people try steroid shots to reduce swelling. If pain and numbness in your legs are still so bad that you cannot do your normal activities, you may need surgery. Follow-up care is a key part of your treatment and safety. Be sure to make and go to all appointments, and call your doctor if you are having problems. It's also a good idea to know your test results and keep a list of the medicines you take. How can you care for yourself at home? · Take an over-the-counter pain medicine, such as acetaminophen (Tylenol), ibuprofen (Advil, Motrin), or naproxen (Aleve). Be safe with medicines. Read and follow all instructions on the label. · Do not take two or more pain medicines at the same time unless the doctor told you to. Many pain medicines have acetaminophen, which is Tylenol. Too much acetaminophen (Tylenol) can be harmful. · Stay at a healthy weight. Being overweight puts extra strain on your spine. · Change positions often when you sit or stand. This can ease pain. It may also reduce pressure on the spinal cord and its nerves. · Avoid doing things that make your symptoms worse. Walking downhill and standing for a long time may cause pain.   · Stretch and strengthen your back muscles as your doctor or physical therapist recommends. If your doctor says it is okay to do them, these exercises may help. ¨ Lie on your back with your knees bent. Gently pull one bent knee to your chest. Put that foot back on the floor, and then pull the other knee to your chest.  ¨ Do pelvic tilts. Lie on your back with your knees bent. Tighten your stomach muscles. Pull your belly button (navel) in and up toward your ribs. You should feel like your back is pressing to the floor and your hips and pelvis are slightly lifting off the floor. Hold for 6 seconds while breathing smoothly. ¨ Stand with your back flat against a wall. Slowly slide down until your knees are slightly bent. Hold for 10 seconds, then slide back up the wall. · Remove or change anything in your house that may cause you to fall. Keep walkways clear of clutter, electrical cords, and throw rugs. When should you call for help? Call 911 anytime you think you may need emergency care. For example, call if:  ? · You are unable to move a leg at all. ?Call your doctor now or seek immediate medical care if:  ? · You have new or worse symptoms in your legs, belly, or buttocks. Symptoms may include:  ¨ Numbness or tingling. ¨ Weakness. ¨ Pain. ? · You lose bladder or bowel control. ? Watch closely for changes in your health, and be sure to contact your doctor if you are not getting better as expected. Where can you learn more? Go to http://constantino-aziza.info/. Aguilar Steele in the search box to learn more about \"Lumbar Spinal Stenosis: Care Instructions. \"  Current as of: March 21, 2017  Content Version: 11.4  © 7857-3538 [employer]. Care instructions adapted under license by [employer] (which disclaims liability or warranty for this information).  If you have questions about a medical condition or this instruction, always ask your healthcare professional. [address]  any warranty or liability for your use of this information.

## 2017-11-26 NOTE — ED NOTES
Pt arrives ambulatory to room c/o left low back pain and left shoulder pain x 1 week. Pt states she fell about a month ago and saw a physician but did not follow up. Pt placed on stretcher with call bell in reach.

## 2017-11-26 NOTE — ED PROVIDER NOTES
EMERGENCY DEPARTMENT HISTORY AND PHYSICAL EXAM      Date: 11/25/2017  Patient Name: Melvi Joiner    History of Presenting Illness     Chief Complaint   Patient presents with    Back Pain     L lower back pain x week along with L shoulder pain Pt states recent fall which caused pain States she was seen after fall and was \"supposed to get more testing\" but didn't       History Provided By: Patient    HPI: Melvi Joiner, [de-identified] y.o. female, with PMHx significant for HTN, DM, GERD, arthritis, and cancer presents ambulatory to the ED with cc of 10/10 gradually worsening lower back pain that radiates down her L leg stopping at the knee x 1 week, along with associated L shoulder pain. She reports a GLF on July, 2017 to which she saw Dr. Erasmo Berry, Orthopedics for; she was told by Dr. Ora Sin that she would need surgery for her L shoulder. Pt claims that her lower back pain could be due to a damaged nerve. Of note, she took a Tramadol last night with no relief. Lastly, pt does not take her DM medication, and does not check her blood glucose level regularly; she stated her blood glucose level being 139 when she checked it a couple of weeks ago. She denies any ear pain, sore throat, headaches, chest pain, shortness of breath, abdominal pain, dysuria, hematuria, fecal incontinence, and fevers. Social Hx:   ETOH: no  Tobacco: no  Illicit drug use: no    PCP: Yao Barroso MD    Current Outpatient Prescriptions   Medication Sig Dispense Refill    traMADol (ULTRAM) 50 mg tablet Take 1 Tab by mouth every six (6) hours as needed for Pain. Max Daily Amount: 200 mg. 20 Tab 0    pantoprazole (PROTONIX) 20 mg tablet Take 20 mg by mouth daily.  metoprolol succinate (TOPROL-XL) 50 mg XL tablet Take 1 Tab by mouth daily. 30 Tab 11    pravastatin (PRAVACHOL) 20 mg tablet Take 1 Tab by mouth nightly. 30 Tab 11    aspirin delayed-release 81 mg tablet Take 81 mg by mouth daily.       allopurinol (ZYLOPRIM) 300 mg tablet       donepezil (ARICEPT) 5 mg tablet   1    ferrous sulfate 325 mg (65 mg iron) tablet   0    PROAIR HFA 90 mcg/actuation inhaler       furosemide (LASIX) 40 mg tablet Take  by mouth as needed.  montelukast (SINGULAIR) 10 mg tablet Take 10 mg by mouth nightly.  sertraline (ZOLOFT) 100 mg tablet Take 100 mg by mouth nightly.  losartan (COZAAR) 100 mg tablet Take 100 mg by mouth nightly.          Past History     Past Medical History:  Past Medical History:   Diagnosis Date    Arrhythmia     has pacemaker for low HR    Arthritis     Asthma     Cancer ([facility]     right kidney - surgery    Chronic pain     right side    Deep vein thrombosis (DVT) during current hospitalization ([facility]     history of DVT was on coumadin in the past    Diabetes ([facility]     GERD (gastroesophageal reflux disease)     H/O acute pancreatitis     Hiatal hernia     Hypertension     Nausea & vomiting     MARLEN (obstructive sleep apnea)     does not use CPAP/pt states she has not used since a pacemaker inserted    Other ill-defined conditions(799.89)     lymph node enlargement - left chest - benign bx - watching    Other ill-defined conditions(799.89)     wheezes    Psychiatric disorder     depression    PUD (peptic ulcer disease)     hx of    Thromboembolus ([facility]     Unspecified adverse effect of anesthesia     passed out during EGD per pt - stopped breathing per pt per MD       Past Surgical History:  Past Surgical History:   Procedure Laterality Date    HX APPENDECTOMY      HX CHOLECYSTECTOMY      HX GYN          HX HEENT Right     laser eye surgery to stop bleeding in back of eye - multiple laser surgeries    HX KNEE ARTHROSCOPY      left knee; cartilage repair    HX ORTHOPAEDIC      right hip replacement    HX ORTHOPAEDIC      lower back surgery    HX ORTHOPAEDIC      straighten toe - 2nd toe on right    HX ORTHOPAEDIC Bilateral     carpal tunnel release    HX OTHER SURGICAL partial right kidney removal    HX PACEMAKER      not defibrillator    HX UROLOGICAL      implant in hip to control urine    HX UROLOGICAL      Right kidney partial nephrectomy       Family History:  Family History   Problem Relation Age of Onset    Stroke Mother      brain aneurysm    Hypertension Father     Heart Disease Father     Cancer Sister      breast    Cancer Brother      lung    Cancer Sister      breast    SLE Other      half siblings - (11)   Jassi Harder Hypertension Daughter     Diabetes Daughter        Social History:  Social History   Substance Use Topics    Smoking status: Former Smoker     Packs/day: 0.25     Years: 20.00     Quit date: 8/13/1971    Smokeless tobacco: Never Used    Alcohol use No       Allergies: Allergies   Allergen Reactions    Pcn [Penicillins] Rash     But can take cephalosporins. Allergic to all \"cillins\".  Codeine Other (comments)     Hallucinations, takes hydrocodone at home for pain    Contrast Dye [Iodine] Other (comments)     Not to take due to kidney function    Prednisone Other (comments)     \"makes my pancreas numbers go way up\"         Review of Systems   Review of Systems   Constitutional: Negative for chills, diaphoresis, fever and unexpected weight change. HENT: Negative for ear pain, rhinorrhea and sore throat. Eyes: Negative for pain. Respiratory: Negative for shortness of breath, wheezing and stridor. Cardiovascular: Negative for chest pain and leg swelling. Gastrointestinal: Negative for abdominal pain, blood in stool, diarrhea, nausea and vomiting.        (-)fecal incontinence   Genitourinary: Negative for difficulty urinating, dysuria, flank pain and hematuria. Musculoskeletal: Positive for arthralgias, back pain and myalgias. Negative for neck stiffness. Skin: Negative for rash. Neurological: Negative for seizures, syncope, weakness, light-headedness and headaches. Psychiatric/Behavioral: Negative for confusion.    All other systems reviewed and are negative. Physical Exam   Physical Exam   Constitutional: She is oriented to person, place, and time. She appears well-developed and well-nourished. No distress. [de-identified] yo -American female   HENT:   Head: Normocephalic and atraumatic. Eyes: Conjunctivae are normal. Right eye exhibits no discharge. Left eye exhibits no discharge. Neck: Normal range of motion. Neck supple. Cardiovascular: Normal rate and regular rhythm. Murmur heard. Pulmonary/Chest: Effort normal and breath sounds normal. No respiratory distress. She has no wheezes. Genitourinary: Rectum normal. Rectal exam shows no tenderness and anal tone normal.   Musculoskeletal:   BACK: Normal spinal curvatures. No step off or deformity. Slight tenderness to palpation of the lower back. Negative supine SLR bilaterally. Strength of the LE 5/5 and equal bilaterally. Patellar DTR's 2+ bilaterally. Ambulatory without difficulty. Lymphadenopathy:     She has no cervical adenopathy. Neurological: She is alert and oriented to person, place, and time. Skin: Skin is warm and dry. She is not diaphoretic. Psychiatric: She has a normal mood and affect. Her behavior is normal.   Nursing note and vitals reviewed. Diagnostic Study Results     Labs -     Recent Results (from the past 12 hour(s))   GLUCOSE, POC    Collection Time: 11/25/17  8:46 PM   Result Value Ref Range    Glucose (POC) 109 (H) 65 - 100 mg/dL    Performed by Uriel Barreto      Radiologic Studies -     CT Results  (Last 48 hours)               11/25/17 2113  CT SPINE LUMB WO CONT Final result    Impression:  IMPRESSION:      1. No evidence of acute traumatic injury. 2. Marked central stenosis at L3-4.   3. Subluxation L4-5 with persisting narrowing of thecal sac consistent with   spinal stenosis. Marked foraminal narrowing bilaterally.                Narrative:  EXAM:  CT SPINE LUMB WO CONT       INDICATION:  Left lower back pain x1 week fell about a month ago       COMPARISON: CT abdomen pelvis 3/18/2016. TECHNIQUE:   Unenhanced multislice helical CT of the lumbar spine was performed   in the axial plane. Coronal and sagittal reconstructions were obtained. CT   dose reduction was achieved through use of a standardized protocol tailored for   this examination and automatic exposure control for dose modulation. FINDINGS:       Previous posterior laminectomy at L5 is noted. There is 8.7 mm anterolisthesis   of L4 on L5 similar to the previous study. Vertebral body height is preserved. There is no evidence of fracture. No lytic lesion or sclerotic lesion noted. There are degenerative changes of the sacroiliac joints. Sacrum is intact. Paraspinal soft tissues normal.       T12-L1: The spinal canal is widely patent. There is mild narrowing of the right   neural foramen secondary to spondylosis. L1-L2:   The spinal canal and neural foramina are widely patent. L2-L3:   The spinal canal and neural foramina are widely patent. L3-L4:   Degenerative disc disease change noted with loss of disc height and   vacuum phenomenon. There is moderately severe degenerative change of facet   joints. Bulging of the disc annulus is noted. There is marked central spinal   stenosis. Mild bony narrowing of the left neural foramen. L4-L5:   Previous posterior laminectomy. There is marked bulging of the disc   annulus and degenerative changes of the facet joints. There is narrowing of the   thecal sac despite the posterior laminectomy and element of stenosis is most   likely still present. There is bulging of the disc into the neural foramina   bilaterally with narrowing of the neural foramina which is marked. L5-S1:   Moderate degenerative change of the right facet joint with moderate   narrowing of the right neural foramen.  No disc herniation                 Medical Decision Making   I am the first provider for this patient. I reviewed the vital signs, available nursing notes, past medical history, past surgical history, family history and social history. Vital Signs-Reviewed the patient's vital signs. Patient Vitals for the past 12 hrs:   Temp Pulse Resp BP SpO2   11/25/17 2138 - (!) 59 16 149/75 95 %   11/25/17 1816 98.7 °F (37.1 °C) - 16 (!) 221/99 97 %       ED Course:   8:47 PM    reviewed. Pt has not filled a controlled substance since late August 2017. MEDICATIONS GIVEN:  Medications   traMADol (ULTRAM) tablet 50 mg (50 mg Oral Given 11/25/17 2058)   cyclobenzaprine (FLEXERIL) tablet 10 mg (10 mg Oral Given 11/25/17 2058)     Disposition:  DISCHARGE NOTE  10:15 PM  The patient has been re-evaluated and is ready for discharge. Reviewed available results with patient. Counseled pt on diagnosis and care plan. Pt has expressed understanding, and all questions have been answered. Pt agrees with plan and agrees to F/U as recommended, or return to the ED if their sxs worsen. Discharge instructions have been provided and explained to the pt, along with reasons to return to the ED. Provider Notes (Medical Decision Making):     DDx: fracture, sprain, strain, DJD, DDD, herniated disc, exacerbation of chronic back pain, sciatica. Plan:  Discharge Medication List as of 11/25/2017  9:50 PM      CONTINUE these medications which have CHANGED    Details   traMADol (ULTRAM) 50 mg tablet Take 1 Tab by mouth every six (6) hours as needed for Pain. Max Daily Amount: 200 mg., Print, Disp-20 Tab, R-0         CONTINUE these medications which have NOT CHANGED    Details   pantoprazole (PROTONIX) 20 mg tablet Take 20 mg by mouth daily. , Historical Med      metoprolol succinate (TOPROL-XL) 50 mg XL tablet Take 1 Tab by mouth daily. , Print, Disp-30 Tab, R-11      pravastatin (PRAVACHOL) 20 mg tablet Take 1 Tab by mouth nightly. , Print, Disp-30 Tab, R-11      aspirin delayed-release 81 mg tablet Take 81 mg by mouth daily. , Historical Med      allopurinol (ZYLOPRIM) 300 mg tablet Historical Med      donepezil (ARICEPT) 5 mg tablet Historical Med, R-1      ferrous sulfate 325 mg (65 mg iron) tablet Historical Med, R-0      PROAIR HFA 90 mcg/actuation inhaler Historical Med, MINESH      furosemide (LASIX) 40 mg tablet Take  by mouth as needed., Historical Med      montelukast (SINGULAIR) 10 mg tablet Take 10 mg by mouth nightly., Historical Med      sertraline (ZOLOFT) 100 mg tablet Take 100 mg by mouth nightly., Historical Med      losartan (COZAAR) 100 mg tablet Take 100 mg by mouth nightly., Historical Med         STOP taking these medications       HYDROcodone-acetaminophen (NORCO) 5-325 mg per tablet Comments:   Reason for Stopping: Follow-up Information     Follow up With Details Comments [address], [place] In 1 week  [address] [facility]  [address] [facility] [address]  [address] [facility]  [phone]          Return to the closest emergency room for deterioration. Diagnosis     Clinical Impression:   1. 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      The scribe's documentation has been prepared under my direction and personally reviewed by me in its entirety. I confirm that the note above accurately reflects all work, treatment, procedures, and medical decision making performed by me.   AISHA Gardner

## 2018-01-22 NOTE — PROGRESS NOTES
Pre call completed with patient regarding upcoming procedure. Patient stated her last dose of Aspirin 81 mg was 1/21/2018.

## 2018-01-23 ENCOUNTER — HOSPITAL ENCOUNTER (OUTPATIENT)
Dept: GENERAL RADIOLOGY | Age: 83
Discharge: HOME | End: 2018-01-23
Attending: ORTHOPAEDIC SURGERY
Payer: MEDICARE

## 2018-01-23 ENCOUNTER — HOSPITAL ENCOUNTER (OUTPATIENT)
Dept: CT IMAGING | Age: 83
Discharge: HOME | End: 2018-01-23
Attending: ORTHOPAEDIC SURGERY
Payer: MEDICARE

## 2018-01-23 VITALS
HEART RATE: 76 BPM | OXYGEN SATURATION: 97 % | RESPIRATION RATE: 18 BRPM | SYSTOLIC BLOOD PRESSURE: 125 MMHG | TEMPERATURE: 98 F | DIASTOLIC BLOOD PRESSURE: 69 MMHG

## 2018-01-23 DIAGNOSIS — M47.26 OSTEOARTHRITIS OF SPINE WITH RADICULOPATHY, LUMBAR REGION: ICD-10-CM

## 2018-01-23 DIAGNOSIS — M54.40 LOW BACK PAIN WITH SCIATICA: ICD-10-CM

## 2018-01-23 PROCEDURE — 77030014113 XR MYELO LUMBAR

## 2018-01-23 PROCEDURE — 74011000250 HC RX REV CODE- 250: Performed by: RADIOLOGY

## 2018-01-23 PROCEDURE — 74011636320 HC RX REV CODE- 636/320: Performed by: RADIOLOGY

## 2018-01-23 PROCEDURE — 72132 CT LUMBAR SPINE W/DYE: CPT

## 2018-01-23 RX ORDER — LIDOCAINE HYDROCHLORIDE 10 MG/ML
4 INJECTION, SOLUTION EPIDURAL; INFILTRATION; INTRACAUDAL; PERINEURAL
Status: COMPLETED | OUTPATIENT
Start: 2018-01-23 | End: 2018-01-23

## 2018-01-23 RX ADMIN — IOHEXOL 20 ML: 180 INJECTION INTRAVENOUS at 09:00

## 2018-01-23 RX ADMIN — LIDOCAINE HYDROCHLORIDE 4 ML: 10 INJECTION, SOLUTION EPIDURAL; INFILTRATION; INTRACAUDAL; PERINEURAL at 09:00

## 2018-01-23 NOTE — IP AVS SNAPSHOT
[address] [facility] 
[phone] Patient: Malika Devlin MRN: [id number] 
VBJ:[birth year] About your hospitalization You were admitted on:  January 23, 2018 You last received care in the:  [facility] RADIOLOGY You were discharged on:  January 23, 2018 Why you were hospitalized Your primary diagnosis was:  Not on File Follow-up Information None Your Scheduled Appointments Tuesday January 30, 2018  2:20 PM EST Follow Up with Todd Alexis MD  
[facility] at [address] [place] [address]  
[phone] Wednesday February 14, 2018 ESOPHAGOGASTRODUODENOSCOPY (EGD) with Esperanza Medina MD  
[facility] ENDOSCOPY (RI OR PRE ASSESSMENT) [address]  
[phone] OP Registration: [facility] Telephone: [phone] Fax: [phone] ** Pt will need to arrive 30 min prior unless appointment prep instructions indicate otherwise** **** Send All ENDO pt to the MAIN Admitting Department, off the [facility] main lobby at [employer]. ****  
  
    
OP Registration: [facility] [employer]- [address] [place] [address] Telephone: [phone] Fax: [phone] ** Pt will need to arrive 30 min prior unless appointment prep instructions indicate otherwise** **** Send All ENDO pt to the MAIN Admitting Department[facility] [employer] [facility]. **** Discharge Orders None A check noe indicates which time of day the medication should be taken. My Medications ASK your doctor about these medications Instructions Each Dose to Equal  
 Morning Noon Evening Bedtime  
 allopurinol 300 mg tablet Commonly known as:  Dusty Paulino Your last dose was: Your next dose is: aspirin delayed-release 81 mg tablet Your last dose was: Your next dose is: Take 81 mg by mouth daily. 81 mg  
    
   
   
   
  
 donepezil 5 mg tablet Commonly known as:  ARICEPT Your last dose was: Your next dose is:    
   
   
      
   
   
   
  
 ferrous sulfate 325 mg (65 mg iron) tablet Your last dose was: Your next dose is:    
   
   
      
   
   
   
  
 furosemide 40 mg tablet Commonly known as:  LASIX Your last dose was: Your next dose is: Take  by mouth as needed. losartan 100 mg tablet Commonly known as:  COZAAR Your last dose was: Your next dose is: Take 100 mg by mouth nightly. 100 mg  
    
   
   
   
  
 metoprolol succinate 50 mg XL tablet Commonly known as:  TOPROL-XL Your last dose was: Your next dose is: Take 1 Tab by mouth daily. 50 mg  
    
   
   
   
  
 montelukast 10 mg tablet Commonly known as:  SINGULAIR Your last dose was: Your next dose is: Take 10 mg by mouth nightly. 10 mg  
    
   
   
   
  
 pravastatin 20 mg tablet Commonly known as:  PRAVACHOL Your last dose was: Your next dose is: Take 1 Tab by mouth nightly. 20 mg  
    
   
   
   
  
 PROAIR HFA 90 mcg/actuation inhaler Generic drug:  albuterol Your last dose was: Your next dose is:    
   
   
      
   
   
   
  
 PROTONIX 20 mg tablet Generic drug:  pantoprazole Your last dose was: Your next dose is: Take 20 mg by mouth daily. 20 mg  
    
   
   
   
  
 sertraline 100 mg tablet Commonly known as:  ZOLOFT Your last dose was: Your next dose is: Take 100 mg by mouth nightly. 100 mg  
    
   
   
   
  
 traMADol 50 mg tablet Commonly known as:  ULTRAM  
   
Your last dose was: Your next dose is: Take 1 Tab by mouth every six (6) hours as needed for Pain. Max Daily Amount: 200 mg.  
 50 mg Discharge Instructions [address] [facility] Special Procedures/Radiology Department Radiologist: Dr. Wendy Breaux Date: 1/23/2018 Myelogram Discharge Instructions Restrict your activity for the next 48 hours. You may get up and sit in the chair or get up and go to the bathroom with slow movements. You must keep your head above your chest until 8 a.m. tomorrow. Rest as much as possible tonight and tomorrow. Resume your previous diet and follow the medication reconciliation form. Drink plenty of water. Avoid products with caffeine. You may take Tylenol, as directed on the label, for pain or discomfort. Avoid ibuprofen (Advil, Motrin) and aspirin as they may cause bleeding. Avoid lifting anything heavier than 5 pounds. Avoid excessive bending and lifting as this may increase the chance of having a headache. Be sure to follow up with your physician. Take your CD disk with you. If you have severe pain, or if you have any questions or concerns, please call [phone] and ask to speak to the nurse on-call. Introducing \[facility]\"" & HEALTH SERVICES! [address] [facility] introduces [employer]ga patient portal. Now you can access parts of your medical record, email your doctor's office, and request medication refills online. 1. In your internet browser, go to [email or web address] 2. Click on the First Time User? Click Here link in the Sign In box. You will see the New Member Sign Up page. 3. Enter your tagga Access Code exactly as it appears below. You will not need to use this code after youve completed the sign-up process. If you do not sign up before the expiration date, you must request a new code. · tagga Access Code: [email or web address] Expires: 4/18/2018 12:19 PM 
 
 4. Enter the last four digits of your Social Security Number (xxxx) and Date of Birth (mm/dd/yyyy) as indicated and click Submit. You will be taken to the next sign-up page. 5. Create a [facility] [facility] ID. This will be your [facility] login ID and cannot be changed, so think of one that is secure and easy to remember. 6. Create a [facility] password. You can change your password at any time. 7. Enter your Password Reset Question and Answer. This can be used at a later time if you forget your password. 8. Enter your e-mail address. You will receive e-mail notification when new information is available in [address] 9. Click Sign Up. You can now view and download portions of your medical record. 10. Click the Download Summary menu link to download a portable copy of your medical information. If you have questions, please visit the Frequently Asked Questions section of the [employer] website. Remember, [facility] is NOT to be used for urgent needs. For medical emergencies, dial 911. Now available from your iPhone and Android! Unresulted Labs-Please follow up with your PCP about these lab tests Order Current Status XR MYELO LUMBAR In process Providers Seen During Your Hospitalization Provider Specialty Primary office phone Galilea Jordan MD Orthopedic Surgery [phone] Your Primary Care Physician (PCP) Primary Care Physician Office Phone Office Fax [facility] [phone] You are allergic to the following Allergen Reactions Pcn (Penicillins) Rash But can take cephalosporins. Allergic to all \"cillins\". Codeine Other (comments) Hallucinations, takes hydrocodone at home for pain Contrast Dye (Iodine) Other (comments) Not to take due to kidney function Prednisone Other (comments) \"makes my pancreas numbers go way up\" Recent Documentation OB Status Smoking Status Postmenopausal Former Smoker Emergency Contacts Name Discharge Info Relation Home Work Mobile Wilman Coley DISCHARGE CAREGIVER [3] Son [22] [phone] [facility] DISCHARGE CAREGIVER [3] Daughter [21] [phone] Patient Belongings The following personal items are in your possession at time of discharge: 
                             
 
  
  
 Please provide this summary of care documentation to your next provider. Signatures-by signing, you are acknowledging that this After Visit Summary has been reviewed with you and you have received a copy. Patient Signature:  ____________________________________________________________ Date:  ____________________________________________________________  
  
[facility] Provider Signature:  ____________________________________________________________ Date:  ____________________________________________________________

## 2018-01-23 NOTE — DISCHARGE INSTRUCTIONS
[facility]  Special Procedures/Radiology Department    Radiologist: Dr. Acosta Able    Date: 1/23/2018       Myelogram Discharge Instructions    Restrict your activity for the next 48 hours. You may get up and sit in the chair or get up and go to the bathroom with slow movements. You must keep your head above your chest until 8 a.m. tomorrow. Rest as much as possible tonight and tomorrow. Resume your previous diet and follow the medication reconciliation form. Drink plenty of water. Avoid products with caffeine. You may take Tylenol, as directed on the label, for pain or discomfort. Avoid ibuprofen (Advil, Motrin) and aspirin as they may cause bleeding. Avoid lifting anything heavier than 5 pounds. Avoid excessive bending and lifting as this may increase the chance of having a headache. Be sure to follow up with your physician. Take your CD disk with you. If you have severe pain, or if you have any questions or concerns, please call [phone] and ask to speak to the nurse on-call.

## 2018-01-23 NOTE — PROGRESS NOTES
Pt arrived in xray recovery for procedure. VSS. No C/O pain. Dressing to site D&I. No bleeding or hematoma noted to site. Discharge instructions given. Copy on chart and copy given to pt. Pt and family verbalize understanding. Will monitor.

## 2018-02-13 RX ORDER — MULTIVIT WITH MINERALS/HERBS
1 TABLET ORAL DAILY
COMMUNITY
End: 2022-05-27

## 2018-02-13 RX ORDER — GABAPENTIN 100 MG/1
200 CAPSULE ORAL
Status: ON HOLD | COMMUNITY
End: 2018-12-03

## 2018-02-13 RX ORDER — AMLODIPINE BESYLATE 2.5 MG/1
2.5 TABLET ORAL DAILY
COMMUNITY
End: 2018-10-03

## 2018-02-14 ENCOUNTER — APPOINTMENT (OUTPATIENT)
Dept: GENERAL RADIOLOGY | Age: 83
DRG: 379 | End: 2018-02-14
Attending: SPECIALIST
Payer: COMMERCIAL

## 2018-02-14 ENCOUNTER — ANESTHESIA (OUTPATIENT)
Dept: ENDOSCOPY | Age: 83
DRG: 379 | End: 2018-02-14
Payer: COMMERCIAL

## 2018-02-14 ENCOUNTER — HOSPITAL ENCOUNTER (OUTPATIENT)
Age: 83
Setting detail: OUTPATIENT SURGERY
Discharge: HOME | DRG: 379 | End: 2018-02-14
Attending: SPECIALIST | Admitting: SPECIALIST
Payer: COMMERCIAL

## 2018-02-14 ENCOUNTER — ANESTHESIA EVENT (OUTPATIENT)
Dept: ENDOSCOPY | Age: 83
DRG: 379 | End: 2018-02-14
Payer: COMMERCIAL

## 2018-02-14 VITALS
HEIGHT: 65 IN | TEMPERATURE: 98.3 F | BODY MASS INDEX: 33.99 KG/M2 | DIASTOLIC BLOOD PRESSURE: 79 MMHG | RESPIRATION RATE: 14 BRPM | SYSTOLIC BLOOD PRESSURE: 157 MMHG | OXYGEN SATURATION: 96 % | WEIGHT: 204 LBS | HEART RATE: 60 BPM

## 2018-02-14 LAB
ATRIAL RATE: 277 BPM
CALCULATED P AXIS, ECG09: 18 DEGREES
CALCULATED R AXIS, ECG10: -13 DEGREES
CALCULATED T AXIS, ECG11: 132 DEGREES
DIAGNOSIS, 93000: NORMAL
H PYLORI FROM TISSUE: NEGATIVE
KIT LOT NO., HCLOLOT: NORMAL
NEGATIVE CONTROL: NEGATIVE
P-R INTERVAL, ECG05: 164 MS
POSITIVE CONTROL: POSITIVE
Q-T INTERVAL, ECG07: 428 MS
QRS DURATION, ECG06: 84 MS
QTC CALCULATION (BEZET), ECG08: 428 MS
VENTRICULAR RATE, ECG03: 60 BPM

## 2018-02-14 PROCEDURE — 74011000250 HC RX REV CODE- 250

## 2018-02-14 PROCEDURE — 0DB68ZX EXCISION OF STOMACH, VIA NATURAL OR ARTIFICIAL OPENING ENDOSCOPIC, DIAGNOSTIC: ICD-10-PCS | Performed by: SPECIALIST

## 2018-02-14 PROCEDURE — 74011250637 HC RX REV CODE- 250/637: Performed by: SPECIALIST

## 2018-02-14 PROCEDURE — 93005 ELECTROCARDIOGRAM TRACING: CPT

## 2018-02-14 PROCEDURE — 87077 CULTURE AEROBIC IDENTIFY: CPT | Performed by: SPECIALIST

## 2018-02-14 PROCEDURE — 71046 X-RAY EXAM CHEST 2 VIEWS: CPT

## 2018-02-14 PROCEDURE — 88305 TISSUE EXAM BY PATHOLOGIST: CPT | Performed by: SPECIALIST

## 2018-02-14 PROCEDURE — 77030009426 HC FCPS BIOP ENDOSC BSC -B: Performed by: SPECIALIST

## 2018-02-14 PROCEDURE — 76060000031 HC ANESTHESIA FIRST 0.5 HR: Performed by: SPECIALIST

## 2018-02-14 PROCEDURE — 76040000019: Performed by: SPECIALIST

## 2018-02-14 PROCEDURE — 74011250636 HC RX REV CODE- 250/636

## 2018-02-14 RX ORDER — SODIUM CHLORIDE 0.9 % (FLUSH) 0.9 %
5-10 SYRINGE (ML) INJECTION EVERY 8 HOURS
Status: DISCONTINUED | OUTPATIENT
Start: 2018-02-14 | End: 2018-02-14 | Stop reason: HOSPADM

## 2018-02-14 RX ORDER — EPINEPHRINE 0.1 MG/ML
1 INJECTION INTRACARDIAC; INTRAVENOUS
Status: DISCONTINUED | OUTPATIENT
Start: 2018-02-14 | End: 2018-02-14 | Stop reason: HOSPADM

## 2018-02-14 RX ORDER — SODIUM CHLORIDE 9 MG/ML
50 INJECTION, SOLUTION INTRAVENOUS CONTINUOUS
Status: DISCONTINUED | OUTPATIENT
Start: 2018-02-14 | End: 2018-02-14 | Stop reason: HOSPADM

## 2018-02-14 RX ORDER — SODIUM CHLORIDE 9 MG/ML
INJECTION, SOLUTION INTRAVENOUS
Status: DISCONTINUED | OUTPATIENT
Start: 2018-02-14 | End: 2018-02-14 | Stop reason: HOSPADM

## 2018-02-14 RX ORDER — ATROPINE SULFATE 0.1 MG/ML
0.5 INJECTION INTRAVENOUS
Status: DISCONTINUED | OUTPATIENT
Start: 2018-02-14 | End: 2018-02-14 | Stop reason: HOSPADM

## 2018-02-14 RX ORDER — DEXTROMETHORPHAN/PSEUDOEPHED 2.5-7.5/.8
1.2 DROPS ORAL
Status: DISCONTINUED | OUTPATIENT
Start: 2018-02-14 | End: 2018-02-14 | Stop reason: HOSPADM

## 2018-02-14 RX ORDER — MIDAZOLAM HYDROCHLORIDE 1 MG/ML
.25-1 INJECTION, SOLUTION INTRAMUSCULAR; INTRAVENOUS
Status: DISCONTINUED | OUTPATIENT
Start: 2018-02-14 | End: 2018-02-14 | Stop reason: HOSPADM

## 2018-02-14 RX ORDER — LIDOCAINE HYDROCHLORIDE 20 MG/ML
INJECTION, SOLUTION EPIDURAL; INFILTRATION; INTRACAUDAL; PERINEURAL AS NEEDED
Status: DISCONTINUED | OUTPATIENT
Start: 2018-02-14 | End: 2018-02-14 | Stop reason: HOSPADM

## 2018-02-14 RX ORDER — NALOXONE HYDROCHLORIDE 0.4 MG/ML
0.4 INJECTION, SOLUTION INTRAMUSCULAR; INTRAVENOUS; SUBCUTANEOUS
Status: DISCONTINUED | OUTPATIENT
Start: 2018-02-14 | End: 2018-02-14 | Stop reason: HOSPADM

## 2018-02-14 RX ORDER — SODIUM CHLORIDE 0.9 % (FLUSH) 0.9 %
5-10 SYRINGE (ML) INJECTION AS NEEDED
Status: DISCONTINUED | OUTPATIENT
Start: 2018-02-14 | End: 2018-02-14 | Stop reason: HOSPADM

## 2018-02-14 RX ORDER — LORAZEPAM 2 MG/ML
2 INJECTION INTRAMUSCULAR AS NEEDED
Status: DISCONTINUED | OUTPATIENT
Start: 2018-02-14 | End: 2018-02-14 | Stop reason: HOSPADM

## 2018-02-14 RX ORDER — KETAMINE HYDROCHLORIDE 100 MG/ML
INJECTION, SOLUTION INTRAMUSCULAR; INTRAVENOUS AS NEEDED
Status: DISCONTINUED | OUTPATIENT
Start: 2018-02-14 | End: 2018-02-14 | Stop reason: HOSPADM

## 2018-02-14 RX ORDER — PROPOFOL 10 MG/ML
INJECTION, EMULSION INTRAVENOUS AS NEEDED
Status: DISCONTINUED | OUTPATIENT
Start: 2018-02-14 | End: 2018-02-14 | Stop reason: HOSPADM

## 2018-02-14 RX ORDER — FENTANYL CITRATE 50 UG/ML
200 INJECTION, SOLUTION INTRAMUSCULAR; INTRAVENOUS
Status: DISCONTINUED | OUTPATIENT
Start: 2018-02-14 | End: 2018-02-14 | Stop reason: HOSPADM

## 2018-02-14 RX ORDER — FLUMAZENIL 0.1 MG/ML
0.2 INJECTION INTRAVENOUS
Status: DISCONTINUED | OUTPATIENT
Start: 2018-02-14 | End: 2018-02-14 | Stop reason: HOSPADM

## 2018-02-14 RX ADMIN — PROPOFOL 10 MG: 10 INJECTION, EMULSION INTRAVENOUS at 07:34

## 2018-02-14 RX ADMIN — PROPOFOL 20 MG: 10 INJECTION, EMULSION INTRAVENOUS at 07:30

## 2018-02-14 RX ADMIN — Medication 30 ML: at 08:38

## 2018-02-14 RX ADMIN — SODIUM CHLORIDE: 9 INJECTION, SOLUTION INTRAVENOUS at 07:25

## 2018-02-14 RX ADMIN — KETAMINE HYDROCHLORIDE 20 MG: 100 INJECTION, SOLUTION INTRAMUSCULAR; INTRAVENOUS at 07:31

## 2018-02-14 RX ADMIN — LIDOCAINE HYDROCHLORIDE 80 MG: 20 INJECTION, SOLUTION EPIDURAL; INFILTRATION; INTRACAUDAL; PERINEURAL at 07:30

## 2018-02-14 RX ADMIN — KETAMINE HYDROCHLORIDE 30 MG: 100 INJECTION, SOLUTION INTRAMUSCULAR; INTRAVENOUS at 07:30

## 2018-02-14 NOTE — ANESTHESIA POSTPROCEDURE EVALUATION
Post-Anesthesia Evaluation and Assessment    Patient: Je King MRN: [id number]  SSN: [id number]    YOB: 1935  Age: [de-identified] y.o. Sex: female       Cardiovascular Function/Vital Signs  Visit Vitals    /79    Pulse 60    Temp 36.8 °C (98.3 °F)    Resp 14    Ht 5' 5\" (1.651 m)    Wt 92.5 kg (204 lb)    SpO2 96%    Breastfeeding No    BMI 33.95 kg/m2       Patient is status post MAC anesthesia for Procedure(s):  ESOPHAGOGASTRODUODENOSCOPY (EGD)  ESOPHAGOGASTRODUODENAL (EGD) BIOPSY. Nausea/Vomiting: None    Postoperative hydration reviewed and adequate. Pain:  Pain Scale 1: Numeric (0 - 10) (02/14/18 0831)  Pain Intensity 1: 0 (02/14/18 0831)   Managed    Neurological Status: At baseline    Mental Status and Level of Consciousness: Arousable    Pulmonary Status:   O2 Device: Room air (02/14/18 0831)   Adequate oxygenation and airway patent    Complications related to anesthesia: None    Post-anesthesia assessment completed.  No concerns    Signed By: Michael Olivares MD     February 14, 2018

## 2018-02-14 NOTE — ANESTHESIA PREPROCEDURE EVALUATION
Anesthetic History               Review of Systems / Medical History  Patient summary reviewed, nursing notes reviewed and pertinent labs reviewed    Pulmonary        Sleep apnea    Asthma        Neuro/Psych         Psychiatric history     Cardiovascular    Hypertension          Pacemaker    Exercise tolerance: >4 METS     GI/Hepatic/Renal     GERD      PUD    Comments: Pancreatic cyst Endo/Other    Diabetes         Other Findings            Physical Exam    Airway  Mallampati: I  TM Distance: > 6 cm  Neck ROM: normal range of motion   Mouth opening: Normal     Cardiovascular    Rhythm: regular  Rate: normal         Dental  No notable dental hx       Pulmonary  Breath sounds clear to auscultation               Abdominal         Other Findings            Anesthetic Plan    ASA: 3  Anesthesia type: MAC          Induction: Intravenous  Anesthetic plan and risks discussed with: Patient

## 2018-02-14 NOTE — PROGRESS NOTES
\"I am having some chest tightness, not pain\" \"I think my bra is too tight\" Pt bra unhooked and she is drinking gingerale at this time

## 2018-02-14 NOTE — H&P
Pre-endoscopy H and P    The patient was seen and examined. The airway was assessed and documented. The problem list, past medical history, and medications were reviewed.      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      Social History Main Topics    Smoking status: Former Smoker     Packs/day: 0.25     Years: 20.00     Quit date: 8/13/1971    Smokeless tobacco: Never Used    Alcohol use No    Drug use: No    Sexual activity: No     Other Topics Concern    Not on file     Social History Narrative     Past Medical History:   Diagnosis Date    Adverse effect of anesthesia     passed out during EGD/stopped breathing    Arrhythmia     has pacemaker for low HR    Arthritis     Asthma     Cancer ([address]     right kidney - surgery    Chronic pain     right side    Deep vein thrombosis (DVT) during current hospitalization ([facility] [address]     history of DVT was on coumadin in the past    Diabetes ([address]     diet controlled    GERD (gastroesophageal reflux disease)     H/O acute pancreatitis     Hiatal hernia     Hypertension     MARLEN (obstructive sleep apnea)     does not use CPAP/pt states she has not used since a pacemaker inserted    Other ill-defined conditions(799.89)     lymph node enlargement - left chest - benign bx - watching    Other ill-defined conditions(799.89)     wheezes    Psychiatric disorder     depression    PUD (peptic ulcer disease)     hx of    Thromboembolus ([address]     Unspecified adverse effect of anesthesia     passed out during EGD per pt - stopped breathing per pt per MD     The patient has a family history of na    Prior to Admission Medications   Prescriptions Last Dose Informant Patient Reported? Taking? FERROUS SULFATE PO   Yes Yes   Sig: Take 325 mg by mouth daily. LACTOBACILLUS ACIDOPHILUS (PROBIOTIC PO)   Yes Yes   Sig: Take  by mouth. Takes one po once daily. PROAIR HFA 90 mcg/actuation inhaler   Yes Yes   Sig: Take 2 Puffs by inhalation four (4) times daily as needed. allopurinol (ZYLOPRIM) 300 mg tablet   Yes Yes   Sig: Take 300 mg by mouth daily. amLODIPine (NORVASC) 2.5 mg tablet   Yes Yes   Sig: Take 2.5 mg by mouth daily. aspirin delayed-release 81 mg tablet   Yes Yes   Sig: Take 81 mg by mouth daily. b complex vitamins tablet   Yes Yes   Sig: Take 1 Tab by mouth daily. donepezil (ARICEPT) 5 mg tablet   Yes Yes   Sig: Take 5 mg by mouth nightly. furosemide (LASIX) 40 mg tablet   Yes Yes   Sig: Take 40 mg by mouth daily as needed. gabapentin (NEURONTIN) 100 mg capsule   Yes Yes   Sig: Take 100 mg by mouth three (3) times daily. losartan (COZAAR) 100 mg tablet   Yes Yes   Sig: Take 100 mg by mouth nightly. metoprolol succinate (TOPROL-XL) 50 mg XL tablet   No Yes   Sig: Take 1 Tab by mouth daily. montelukast (SINGULAIR) 10 mg tablet   Yes Yes   Sig: Take 10 mg by mouth nightly. pravastatin (PRAVACHOL) 20 mg tablet   No Yes   Sig: Take 1 Tab by mouth nightly. sertraline (ZOLOFT) 100 mg tablet   Yes Yes   Sig: Take 100 mg by mouth nightly. Facility-Administered Medications: None         The review of systems is:  negative for shortness of breath or chest pain      The heart, lungs and mental status were satisfactory for the administration of MAC sedation and for the procedure. Mallampati score: See Anesthesia. I discussed with the patient the objectives, risks, consequences and alternatives to the procedure. Plan: Endoscopic procedure with MAC sedation.     Radha Wyatt MD  2/14/2018  7:23 AM

## 2018-02-14 NOTE — DISCHARGE INSTRUCTIONS
Lavinia Enriquez  [id number]  1935    EGD DISCHARGE INSTRUCTIONS  Discomfort:  Sore throat- throat lozenges or warm salt water gargle  redness at IV site- apply warm compress to area; if redness or soreness persist- contact your physician  Gaseous discomfort- walking, belching will help relieve any discomfort  You may not operate a vehicle for 12 hours  You may not engage in an occupation involving machinery or appliances for rest of today. You may not drink alcoholic beverages for at least 12 hours  Avoid making any critical decisions for at least 24 hour  DIET  You may resume your regular diet - however -  remember your colon is empty and a heavy meal will produce gas. Avoid these foods:  vegetables, fried / greasy foods, carbonated drinks  ACTIVITY  You may resume your normal daily activities. Spend the remainder of the day resting -  avoid any strenuous activity. CALL M.D. ANY SIGN OF   Increasing pain, nausea, vomiting  Abdominal distension (swelling)  New increased bleeding (oral or rectal)  Fever (chills)  Pain in chest area  Bloody discharge from nose or mouth  Shortness of breath    You may not not take any Advil, Aspirin, Ibuprofen, Motrin, Aleve, or Goodys except low dose aspirin, ONLY  Tylenol as needed for pain.   Inflammation of the stomach, small hiatal hernia    Follow-up Instructions:   Call Dr. Shobha Gramajo office to make appointment  Office telephone for problems or questions [phone]  Results of procedure / biopsy in 10-14 days     Start pantoprazole  Take 1-2 tablespoonfuls of gaviscon 1 hour after each meal for indigestion  Call for results of chest xray

## 2018-02-14 NOTE — PROGRESS NOTES
EKG showed to both Dr Narendra Duff and Dr Cathy Devine. Troponin cancelled.  Will give Gaviscon as directed

## 2018-02-14 NOTE — IP AVS SNAPSHOT
[address] [facility] [address] 
[phone] Patient: Lloyd Hanson MRN: [id number] 
[facility]:[birth year] About your hospitalization You were admitted on:  February 14, 2018 [facility] You [facility] discharged on:  February 14, 2018 Why you were hospitalized Your primary diagnosis was:  Not on File Follow-up Information None Discharge Orders None A check noe indicates which time of day the medication should be taken. My Medications CONTINUE taking these medications Instructions Each Dose to Equal  
 Morning Noon Evening Bedtime  
 allopurinol 300 mg tablet Commonly known as:  Evorn Slight Your last dose was: Your next dose is: Take 300 mg by mouth daily. 300 mg  
    
   
   
   
  
 amLODIPine 2.5 mg tablet Commonly known as:  Bree Mcconnell Your last dose was: Your next dose is: Take 2.5 mg by mouth daily. 2.5 mg  
    
   
   
   
  
 aspirin delayed-release 81 mg tablet Your last dose was: Your next dose is: Take 81 mg by mouth daily. 81 mg  
    
   
   
   
  
 b complex vitamins tablet Your last dose was: Your next dose is: Take 1 Tab by mouth daily. 1 Tab  
    
   
   
   
  
 donepezil 5 mg tablet Commonly known as:  ARICEPT Your last dose was: Your next dose is: Take 5 mg by mouth nightly. 5 mg FERROUS SULFATE PO Your last dose was: Your next dose is: Take 325 mg by mouth daily. 325 mg  
    
   
   
   
  
 furosemide 40 mg tablet Commonly known as:  LASIX Your last dose was: Your next dose is: Take 40 mg by mouth daily as needed. 40 mg  
    
   
   
   
  
 gabapentin 100 mg capsule Commonly known as:  NEURONTIN  
   
 Your last dose was: Your next dose is: Take 100 mg by mouth three (3) times daily. 100 mg  
    
   
   
   
  
 losartan 100 mg tablet Commonly known as:  COZAAR Your last dose was: Your next dose is: Take 100 mg by mouth nightly. 100 mg  
    
   
   
   
  
 metoprolol succinate 50 mg XL tablet Commonly known as:  TOPROL-XL Your last dose was: Your next dose is: Take 1 Tab by mouth daily. 50 mg  
    
   
   
   
  
 montelukast 10 mg tablet Commonly known as:  SINGULAIR Your last dose was: Your next dose is: Take 10 mg by mouth nightly. 10 mg  
    
   
   
   
  
 pravastatin 20 mg tablet Commonly known as:  PRAVACHOL Your last dose was: Your next dose is: Take 1 Tab by mouth nightly. 20 mg  
    
   
   
   
  
 PROAIR HFA 90 mcg/actuation inhaler Generic drug:  albuterol Your last dose was: Your next dose is: Take 2 Puffs by inhalation four (4) times daily as needed. 2 Puff PROBIOTIC PO Your last dose was: Your next dose is: Take  by mouth. Takes one po once daily. sertraline 100 mg tablet Commonly known as:  ZOLOFT Your last dose was: Your next dose is: Take 100 mg by mouth nightly. 100 mg Discharge Instructions Luz Urbina [id number] 
1935 EGD DISCHARGE INSTRUCTIONS Discomfort: 
Sore throat- throat lozenges or warm salt water gargle 
redness at IV site- apply warm compress to area; if redness or soreness persist- contact your physician Gaseous discomfort- walking, belching will help relieve any discomfort You may not operate a vehicle for 12 hours You may not engage in an occupation involving machinery or appliances for rest of today. You may not drink alcoholic beverages for at least 12 hours Avoid making any critical decisions for at least 24 hour DIET You may resume your regular diet  however -  remember your colon is empty and a heavy meal will produce gas. Avoid these foods:  vegetables, fried / greasy foods, carbonated drinks ACTIVITY You may resume your normal daily activities. Spend the remainder of the day resting -  avoid any strenuous activity. CALL M.D. ANY SIGN OF Increasing pain, nausea, vomiting Abdominal distension (swelling) New increased bleeding (oral or rectal) Fever (chills) Pain in chest area Bloody discharge from nose or mouth Shortness of breath You may not not take any Advil, Aspirin, Ibuprofen, Motrin, Aleve, or Goodys except low dose aspirin, ONLY  Tylenol as needed for pain. Inflammation of the stomach, small hiatal hernia Follow-up Instructions: 
 Call Dr. Goncalves [facility] office to make appointment Office telephone for problems or questions [phone] Results of procedure / biopsy in 10-14 days Start pantoprazole Take 1-2 tablespoonfuls of gaviscon 1 hour after each meal for indigestion Call for results of chest xray Introducing [facility]! [employer] introduces [employer] patient portal. Now you can access parts of your medical record, email your doctor's office, and request medication refills online. 1. In your internet browser, go to [email or web address] 2. Click on the First Time User? Click Here link in the Sign In box. You will see the New Member Sign Up page. 3. Enter your MoodMe Access Code exactly as it appears below. You will not need to use this code after youve completed the sign-up process. If you do not sign up before the expiration date, you must request a new code. · MoodMe Access Code: [email or web address] Expires: 4/18/2018 12:19 PM 
 
4.  Enter the last four digits of your Social Security Number (xxxx) and Date of Birth (mm/dd/yyyy) as indicated and click Submit. You will be taken to the next sign-up page. 5. Create a [employer] ID. This will be your [employer] login ID and cannot be changed, so think of one that is secure and easy to remember. 6. Create a [employer] password. You can change your password at any time. 7. Enter your Password Reset Question and Answer. This can be used at a later time if you forget your password. 8. Enter your e-mail address. You will receive e-mail notification when new information is available in [address] 9. Click Sign Up. You can now view and download portions of your medical record. 10. Click the Download Summary menu link to download a portable copy of your medical information. If you have questions, please visit the Frequently Asked Questions section of the [employer] website. Remember, [employer] is NOT to be used for urgent needs. For medical emergencies, dial 911. Now available from your iPhone and Android! Providers Seen During Your Hospitalization Provider Specialty Primary office phone Radha Wyatt MD Gastroenterology [phone] Your Primary Care Physician (PCP) Primary Care Physician Office Phone Office Fax Joel Mazariegos [phone] You are allergic to the following Allergen Reactions Pcn (Penicillins) Rash But can take cephalosporins. Allergic to all \"cillins\". Codeine Other (comments) Hallucinations, takes hydrocodone at home for pain Contrast Dye (Iodine) Other (comments) Not to take due to kidney function Prednisone Other (comments) \"makes my pancreas numbers go way up\" Recent Documentation Height Weight Breastfeeding? BMI OB Status Smoking Status 1.651 m 92.5 kg No 33.95 kg/m2 Postmenopausal Former Smoker Emergency Contacts Name Discharge Info Relation Home Work Mobile Wilman Coley DISCHARGE CAREGIVER [3] Son [22] [phone] [facility] DISCHARGE CAREGIVER [3] Daughter [21] [phone] Patient Belongings The following personal items are in your possession at time of discharge: 
  Dental Appliances: None  Visual Aid: Glasses Please provide this summary of care documentation to your next provider. Signatures-by signing, you are acknowledging that this After Visit Summary has been reviewed with you and you have received a copy. Patient Signature:  ____________________________________________________________ Date:  ____________________________________________________________  
  
[facility] Provider Signature:  ____________________________________________________________ Date:  ____________________________________________________________

## 2018-02-14 NOTE — ROUTINE PROCESS
Radha Jose Raul  1935  [id number]    Situation:  Verbal report received from: Osmany Townsend RN  Procedure: Procedure(s):  ESOPHAGOGASTRODUODENOSCOPY (EGD)  ESOPHAGOGASTRODUODENAL (EGD) BIOPSY    Background:    Preoperative diagnosis: CHEST PAIN   Postoperative diagnosis: 1. Erosive gastritis    :  Dr. Momo Baker  Assistant(s): Endoscopy Technician-1: Kae Gates IV  Endoscopy RN-1: Amy Smith    Specimens:   ID Type Source Tests Collected by Time Destination   1 : antrum biopsy stain for h pylori, r/o barretts Preservative Gastric  Syeda Graham MD 2/14/2018 6342 Pathology   2 : body biopsy, stain for h pylori, r/o barretts Preservative Gastric  Syeda Graham MD 2/14/2018 9709 Pathology   3 : GE Junction biopsy stain for H pylori r/o barretts Preservative Fab Graham MD 2/14/2018 2686 Pathology     H. Pylori  yes    Assessment:  Intra-procedure medications       Anesthesia gave intra-procedure sedation and medications, see anesthesia flow sheet yes    Intravenous fluids: NS@ KVO     Vital signs stable     Abdominal assessment: round and soft     Recommendation:  Discharge patient per MD order  Family or Friend Pt sister  Permission to share finding with family or friend yes

## 2018-02-15 ENCOUNTER — HOSPITAL ENCOUNTER (INPATIENT)
Age: 83
LOS: 7 days | Discharge: HOME | DRG: 379 | End: 2018-02-22
Attending: STUDENT IN AN ORGANIZED HEALTH CARE EDUCATION/TRAINING PROGRAM | Admitting: FAMILY MEDICINE
Payer: COMMERCIAL

## 2018-02-15 ENCOUNTER — APPOINTMENT (OUTPATIENT)
Dept: CT IMAGING | Age: 83
DRG: 379 | End: 2018-02-15
Attending: SPECIALIST
Payer: COMMERCIAL

## 2018-02-15 DIAGNOSIS — K92.2 GASTROINTESTINAL HEMORRHAGE, UNSPECIFIED GASTROINTESTINAL HEMORRHAGE TYPE: Primary | ICD-10-CM

## 2018-02-15 LAB
ALBUMIN SERPL-MCNC: 3 G/DL (ref 3.5–5)
ALBUMIN/GLOB SERPL: 1 {RATIO} (ref 1.1–2.2)
ALP SERPL-CCNC: 48 U/L (ref 45–117)
ALT SERPL-CCNC: 19 U/L (ref 12–78)
ANION GAP SERPL CALC-SCNC: 7 MMOL/L (ref 5–15)
AST SERPL-CCNC: 19 U/L (ref 15–37)
ATRIAL RATE: 60 BPM
BASOPHILS # BLD: 0 K/UL (ref 0–0.1)
BASOPHILS NFR BLD: 0 % (ref 0–1)
BILIRUB SERPL-MCNC: 0.4 MG/DL (ref 0.2–1)
BUN SERPL-MCNC: 54 MG/DL (ref 6–20)
BUN/CREAT SERPL: 49 (ref 12–20)
CALCIUM SERPL-MCNC: 9.8 MG/DL (ref 8.5–10.1)
CALCULATED P AXIS, ECG09: 47 DEGREES
CALCULATED R AXIS, ECG10: -4 DEGREES
CALCULATED T AXIS, ECG11: -3 DEGREES
CHLORIDE SERPL-SCNC: 113 MMOL/L (ref 97–108)
CO2 SERPL-SCNC: 22 MMOL/L (ref 21–32)
CREAT SERPL-MCNC: 1.11 MG/DL (ref 0.55–1.02)
DIAGNOSIS, 93000: NORMAL
DIFFERENTIAL METHOD BLD: ABNORMAL
EOSINOPHIL # BLD: 0.1 K/UL (ref 0–0.4)
EOSINOPHIL NFR BLD: 1 % (ref 0–7)
ERYTHROCYTE [DISTWIDTH] IN BLOOD BY AUTOMATED COUNT: 16.4 % (ref 11.5–14.5)
GLOBULIN SER CALC-MCNC: 3.1 G/DL (ref 2–4)
GLUCOSE BLD STRIP.AUTO-MCNC: 106 MG/DL (ref 65–100)
GLUCOSE SERPL-MCNC: 97 MG/DL (ref 65–100)
HCT VFR BLD AUTO: 26.7 % (ref 35–47)
HGB BLD-MCNC: 7.9 G/DL (ref 11.5–16)
HGB BLD-MCNC: 8.3 G/DL (ref 11.5–16)
IMM GRANULOCYTES # BLD: 0 K/UL (ref 0–0.04)
IMM GRANULOCYTES NFR BLD AUTO: 0 % (ref 0–0.5)
LIPASE SERPL-CCNC: 440 U/L (ref 73–393)
LYMPHOCYTES # BLD: 2 K/UL (ref 0.8–3.5)
LYMPHOCYTES NFR BLD: 28 % (ref 12–49)
MCH RBC QN AUTO: 27.9 PG (ref 26–34)
MCHC RBC AUTO-ENTMCNC: 31.1 G/DL (ref 30–36.5)
MCV RBC AUTO: 89.9 FL (ref 80–99)
MONOCYTES # BLD: 1.1 K/UL (ref 0–1)
MONOCYTES NFR BLD: 16 % (ref 5–13)
NEUTS SEG # BLD: 3.8 K/UL (ref 1.8–8)
NEUTS SEG NFR BLD: 54 % (ref 32–75)
NRBC # BLD: 0 K/UL (ref 0–0.01)
NRBC BLD-RTO: 0 PER 100 WBC
P-R INTERVAL, ECG05: 174 MS
PLATELET # BLD AUTO: 141 K/UL (ref 150–400)
PMV BLD AUTO: 11.7 FL (ref 8.9–12.9)
POTASSIUM SERPL-SCNC: 5.2 MMOL/L (ref 3.5–5.1)
PROT SERPL-MCNC: 6.1 G/DL (ref 6.4–8.2)
Q-T INTERVAL, ECG07: 412 MS
QRS DURATION, ECG06: 84 MS
QTC CALCULATION (BEZET), ECG08: 412 MS
RBC # BLD AUTO: 2.97 M/UL (ref 3.8–5.2)
SERVICE CMNT-IMP: ABNORMAL
SODIUM SERPL-SCNC: 142 MMOL/L (ref 136–145)
TROPONIN I SERPL-MCNC: <0.04 NG/ML
VENTRICULAR RATE, ECG03: 60 BPM
WBC # BLD AUTO: 7 K/UL (ref 3.6–11)

## 2018-02-15 PROCEDURE — 74011250637 HC RX REV CODE- 250/637: Performed by: FAMILY MEDICINE

## 2018-02-15 PROCEDURE — 99285 EMERGENCY DEPT VISIT HI MDM: CPT

## 2018-02-15 PROCEDURE — 96374 THER/PROPH/DIAG INJ IV PUSH: CPT

## 2018-02-15 PROCEDURE — 74011000258 HC RX REV CODE- 258: Performed by: FAMILY MEDICINE

## 2018-02-15 PROCEDURE — C9113 INJ PANTOPRAZOLE SODIUM, VIA: HCPCS | Performed by: NURSE PRACTITIONER

## 2018-02-15 PROCEDURE — 84484 ASSAY OF TROPONIN QUANT: CPT | Performed by: SPECIALIST

## 2018-02-15 PROCEDURE — 36415 COLL VENOUS BLD VENIPUNCTURE: CPT | Performed by: FAMILY MEDICINE

## 2018-02-15 PROCEDURE — 74011250636 HC RX REV CODE- 250/636: Performed by: NURSE PRACTITIONER

## 2018-02-15 PROCEDURE — 74011000250 HC RX REV CODE- 250: Performed by: NURSE PRACTITIONER

## 2018-02-15 PROCEDURE — 82962 GLUCOSE BLOOD TEST: CPT

## 2018-02-15 PROCEDURE — 74011250636 HC RX REV CODE- 250/636: Performed by: STUDENT IN AN ORGANIZED HEALTH CARE EDUCATION/TRAINING PROGRAM

## 2018-02-15 PROCEDURE — 80053 COMPREHEN METABOLIC PANEL: CPT | Performed by: EMERGENCY MEDICINE

## 2018-02-15 PROCEDURE — 85025 COMPLETE CBC W/AUTO DIFF WBC: CPT | Performed by: EMERGENCY MEDICINE

## 2018-02-15 PROCEDURE — 83690 ASSAY OF LIPASE: CPT | Performed by: EMERGENCY MEDICINE

## 2018-02-15 PROCEDURE — 65660000000 HC RM CCU STEPDOWN

## 2018-02-15 PROCEDURE — 85018 HEMOGLOBIN: CPT | Performed by: FAMILY MEDICINE

## 2018-02-15 PROCEDURE — 93005 ELECTROCARDIOGRAM TRACING: CPT

## 2018-02-15 PROCEDURE — C9113 INJ PANTOPRAZOLE SODIUM, VIA: HCPCS | Performed by: STUDENT IN AN ORGANIZED HEALTH CARE EDUCATION/TRAINING PROGRAM

## 2018-02-15 PROCEDURE — 74011000250 HC RX REV CODE- 250: Performed by: STUDENT IN AN ORGANIZED HEALTH CARE EDUCATION/TRAINING PROGRAM

## 2018-02-15 RX ORDER — ALBUTEROL SULFATE 90 UG/1
2 AEROSOL, METERED RESPIRATORY (INHALATION)
Status: DISCONTINUED | OUTPATIENT
Start: 2018-02-15 | End: 2018-02-15 | Stop reason: CLARIF

## 2018-02-15 RX ORDER — ALBUTEROL SULFATE 0.83 MG/ML
2.5 SOLUTION RESPIRATORY (INHALATION)
Status: DISCONTINUED | OUTPATIENT
Start: 2018-02-15 | End: 2018-02-17

## 2018-02-15 RX ORDER — TRIAMCINOLONE ACETONIDE 5 MG/G
CREAM TOPICAL 3 TIMES DAILY
COMMUNITY
End: 2018-08-27

## 2018-02-15 RX ORDER — SERTRALINE HYDROCHLORIDE 50 MG/1
100 TABLET, FILM COATED ORAL
Status: DISCONTINUED | OUTPATIENT
Start: 2018-02-15 | End: 2018-02-22 | Stop reason: HOSPADM

## 2018-02-15 RX ORDER — DEXTROSE MONOHYDRATE AND SODIUM CHLORIDE 5; .45 G/100ML; G/100ML
75 INJECTION, SOLUTION INTRAVENOUS CONTINUOUS
Status: DISCONTINUED | OUTPATIENT
Start: 2018-02-15 | End: 2018-02-16

## 2018-02-15 RX ORDER — SODIUM CHLORIDE 0.9 % (FLUSH) 0.9 %
5-10 SYRINGE (ML) INJECTION EVERY 8 HOURS
Status: DISCONTINUED | OUTPATIENT
Start: 2018-02-15 | End: 2018-02-22 | Stop reason: HOSPADM

## 2018-02-15 RX ORDER — SODIUM CHLORIDE 0.9 % (FLUSH) 0.9 %
5-10 SYRINGE (ML) INJECTION AS NEEDED
Status: DISCONTINUED | OUTPATIENT
Start: 2018-02-15 | End: 2018-02-22 | Stop reason: HOSPADM

## 2018-02-15 RX ORDER — DONEPEZIL HYDROCHLORIDE 5 MG/1
5 TABLET, FILM COATED ORAL
Status: DISCONTINUED | OUTPATIENT
Start: 2018-02-15 | End: 2018-02-22 | Stop reason: HOSPADM

## 2018-02-15 RX ORDER — AMLODIPINE BESYLATE 5 MG/1
2.5 TABLET ORAL DAILY
Status: DISCONTINUED | OUTPATIENT
Start: 2018-02-16 | End: 2018-02-22 | Stop reason: HOSPADM

## 2018-02-15 RX ORDER — METOPROLOL SUCCINATE 50 MG/1
50 TABLET, EXTENDED RELEASE ORAL DAILY
Status: DISCONTINUED | OUTPATIENT
Start: 2018-02-16 | End: 2018-02-22 | Stop reason: HOSPADM

## 2018-02-15 RX ORDER — PANTOPRAZOLE SODIUM 40 MG/1
40 TABLET, DELAYED RELEASE ORAL DAILY
COMMUNITY
End: 2022-03-17 | Stop reason: SDUPTHER

## 2018-02-15 RX ORDER — ONDANSETRON 2 MG/ML
4 INJECTION INTRAMUSCULAR; INTRAVENOUS
Status: DISCONTINUED | OUTPATIENT
Start: 2018-02-15 | End: 2018-02-22 | Stop reason: HOSPADM

## 2018-02-15 RX ADMIN — DEXTROSE MONOHYDRATE AND SODIUM CHLORIDE 75 ML/HR: 5; .45 INJECTION, SOLUTION INTRAVENOUS at 20:36

## 2018-02-15 RX ADMIN — Medication 10 ML: at 20:37

## 2018-02-15 RX ADMIN — DONEPEZIL HYDROCHLORIDE 5 MG: 5 TABLET, FILM COATED ORAL at 20:38

## 2018-02-15 RX ADMIN — SODIUM CHLORIDE 40 MG: 9 INJECTION, SOLUTION INTRAMUSCULAR; INTRAVENOUS; SUBCUTANEOUS at 14:22

## 2018-02-15 RX ADMIN — SODIUM CHLORIDE 40 MG: 9 INJECTION INTRAMUSCULAR; INTRAVENOUS; SUBCUTANEOUS at 20:40

## 2018-02-15 RX ADMIN — SERTRALINE HYDROCHLORIDE 100 MG: 50 TABLET ORAL at 20:38

## 2018-02-15 NOTE — PROCEDURES
EGD Procedure Note    Indications:  Cheat pain, GERD   Referring Physician: Adelina Sanchez MD   Anesthesia/Sedation:MAC  Endoscopist:  Dr. Beryl Choudhury  Assistant:  Endoscopy Technician-1: Theo Robison IV  Endoscopy RN-1: Cheryn Smoke    Preoperative diagnosis: CHEST PAIN     Postoperative diagnosis: 1. Erosive gastritis      Procedure in Detail:  Informed consent was obtained for the procedure, including sedation. Risks of perforation, hemorrhage, adverse drug reaction, and aspiration were discussed. The patient was placed in the left lateral decubitus position. Based on the pre-procedure assessment, including review of the patient's medical history, medications, allergies, and review of systems, she had been deemed to be an appropriate candidate for moderate sedation; she was therefore sedated with the medications listed above. The patient was monitored continuously with ECG tracing, pulse oximetry, blood pressure monitoring, and direct observations. An Olympus video endoscope was inserted into the mouth and advanced under direct vision to into the esophagus, then stomach and duodenum. A careful inspection was made as the gastroscope was withdrawn, including a retroflexed view of the proximal stomach; findings and interventions are described below. Findings:   Esophagus:granulation at GEJ - Bx; small hiatal hernia  Stomach: atrophic gastritis - Pyloritek from the antrum and fundus, separate bx for pathe from the antrum and fundus  Duodenum/jejunum: normal    Therapies:  See above    Specimens: see above           EBL: None    Complications:   None; patient tolerated the procedure well. Recommendations:  -Acid suppression with a proton pump inhibitor. , -Await pathology. , -Await ROBERT test result and treat for Helicobacter pylori if positive. , -No NSAIDS.  DC omeprazole start pantoprazole     Merlin Mo, MD

## 2018-02-15 NOTE — IP AVS SNAPSHOT
[address] [address] 
[phone] Patient: Julian Garrison MRN: [id number] 
QVW:78/8/0220 You are allergic to the following Allergen Reactions Pcn (Penicillins) Rash But can take cephalosporins. Allergic to all \"cillins\". Codeine Other (comments) Hallucinations, takes hydrocodone at home for pain Contrast Dye (Iodine) Other (comments) Not to take due to kidney function Prednisone Other (comments) \"makes my pancreas numbers go way up\" Immunizations Administered for This Admission Name Date Influenza Vaccine (Quad) PF  Deferred () Recent Documentation Height Weight Breastfeeding? BMI OB Status Smoking Status 1.651 m 90.1 kg No 33.05 kg/m2 Postmenopausal Former Smoker Emergency Contacts  (Rel.) Home Phone Work Phone Mobile Phone Ivonne Zafar (Son) [phone] -- --  
 Rigojose Dear (Daughter) [phone] -- -- About your hospitalization You were admitted on:  February 15, 2018 [facility] on:  February 22, 2018 Why you were hospitalized Your primary diagnosis was:  Gi Bleed Your diagnoses also included:  Anemia, Gerd (Gastroesophageal Reflux Disease), Asthma, Htn (Hypertension), Acute Gastric Ulcer Providers Seen During Your Hospitalization Provider Specialty Primary office phone William Flores MD Emergency Medicine [phone] Alana Panchal MD [facility] [phone] Salina Jimenez MD Internal Medicine [phone] Dewain Simmonds, MD Internal Medicine [phone] Thomas Bergeron MD Internal Medicine [phone] Your Primary Care Physician (PCP) Primary Care Physician Office Phone Office Fax Omari Blanton [phone] Follow-up Information Follow up With Details Comments Contact Info Frankey Gouge, MD  follow up Monday [address] [id number] [phone] Syeda Graham MD  follow up with GI in 2-3 weeks  [address] [facility] [address] 
[phone] My Medications STOP taking these medications   
 aspirin delayed-release 81 mg tablet TAKE these medications as instructed Instructions Each Dose to Equal  
 Morning Noon Evening Bedtime  
 allopurinol 300 mg tablet Commonly known as:  Red Mack Your last dose was: Your next dose is: Take 300 mg by mouth daily. 300 mg  
    
   
   
   
  
 amLODIPine 2.5 mg tablet Commonly known as:  Sunwestblu Schroeder Your last dose was: Your next dose is: Take 2.5 mg by mouth daily. 2.5 mg  
    
   
   
   
  
 b complex vitamins tablet Your last dose was: Your next dose is: Take 1 Tab by mouth daily. 1 Tab  
    
   
   
   
  
 donepezil 5 mg tablet Commonly known as:  ARICEPT Your last dose was: Your next dose is: Take 5 mg by mouth nightly. 5 mg  
    
   
   
   
  
 famotidine 20 mg tablet Commonly known as:  PEPCID Your last dose was: Your next dose is: Take 1 Tab by mouth nightly. Indications: ZOLLINGER-ROBERSON SYNDROME  
 20 mg FERROUS SULFATE PO Your last dose was: Your next dose is: Take 325 mg by mouth daily. 325 mg  
    
   
   
   
  
 furosemide 40 mg tablet Commonly known as:  LASIX Your last dose was: Your next dose is: Take 40 mg by mouth daily as needed. 40 mg  
    
   
   
   
  
 gabapentin 100 mg capsule Commonly known as:  NEURONTIN Your last dose was: Your next dose is: Take 200 mg by mouth two (2) times daily as needed.   
 200 mg  
 losartan 100 mg tablet Commonly known as:  COZAAR Your last dose was: Your next dose is: Take 100 mg by mouth nightly. 100 mg  
    
   
   
   
  
 metoprolol succinate 50 mg XL tablet Commonly known as:  TOPROL-XL Your last dose was: Your next dose is: Take 1 Tab by mouth daily. 50 mg  
    
   
   
   
  
 montelukast 10 mg tablet Commonly known as:  SINGULAIR Your last dose was: Your next dose is: Take 10 mg by mouth nightly. 10 mg  
    
   
   
   
  
 pantoprazole 40 mg tablet Commonly known as:  PROTONIX Your last dose was: Your next dose is: Take 40 mg by mouth daily. 40 mg  
    
   
   
   
  
 pravastatin 20 mg tablet Commonly known as:  PRAVACHOL Your last dose was: Your next dose is: Take 1 Tab by mouth nightly. 20 mg  
    
   
   
   
  
 PROAIR HFA 90 mcg/actuation inhaler Generic drug:  albuterol Your last dose was: Your next dose is: Take 2 Puffs by inhalation four (4) times daily as needed. 2 Puff PROBIOTIC PO Your last dose was: Your next dose is: Take 1 Cap by mouth daily. 1 Cap  
    
   
   
   
  
 sertraline 100 mg tablet Commonly known as:  ZOLOFT Your last dose was: Your next dose is: Take 100 mg by mouth nightly. 100 mg  
    
   
   
   
  
 sucralfate 100 mg/mL suspension Commonly known as:  Farnaz Jurist Your last dose was: Your next dose is: Take 10 mL by mouth Before breakfast, lunch, dinner and at bedtime for 14 days. Indications: Duodenal Ulcer 1 g  
    
   
   
   
  
 triamcinolone 0.5 % topical cream  
Commonly known as:  ARISTOCORT Your last dose was: Your next dose is: Apply  to affected area three (3) times daily. use thin layer Where to Get Your Medications Information on where to get these meds will be given to you by the nurse or doctor. ! Ask your nurse or doctor about these medications  
  famotidine 20 mg tablet  
 sucralfate 100 mg/mL suspension Discharge Instructions Discharge Instructions PATIENT ID: Ira Curry MRN: [id number] YOB: 1935 DATE OF ADMISSION: 2/15/2018  1:19 PM   
DATE OF DISCHARGE: 2/22/2018 PRIMARY CARE PROVIDER: Ranjana Lobo MD  
 
ATTENDING PHYSICIAN: Roge Gaming MD 
DISCHARGING PROVIDER: Bela Georges NP To contact this individual call [phone] and ask the  to page. If unavailable ask to be transferred the Adult Hospitalist Department. DISCHARGE DIAGNOSES Gastric Ulcer, Acute on Chronic Anemia, GERD 
 
CONSULTATIONS: IP CONSULT TO GASTROENTEROLOGY 
IP CONSULT TO HEMATOLOGY 
IP CONSULT TO HOSPITALIST 
 
PROCEDURES/SURGERIES: Procedure(s): ESOPHAGOGASTRODUODENOSCOPY (EGD) BICAP RESOLUTION CLIP PENDING TEST RESULTS:  
At the time of discharge the following test results are still pending: none FOLLOW UP APPOINTMENTS:  
Follow-up Information Follow up With Details Comments Contact Info Ranjana Lobo MD  follow up Monday [address] [place] [address] [phone] German Coe MD  follow up with GI in 2-3 weeks  [address] [place] [address] [address] 
[phone] ADDITIONAL CARE RECOMMENDATIONS:  
-Follow up with your primary care provider on Monday to have your hemoglobin rechecked. It was 6.6 at discharge.  
-Follow up with Dr Rudolph Hernandez in 2-3 weeks. 
-Hold you baby Aspirin for 2 weeks then restart. Start taking Pepcid and Carafate for your stomach ulcer DIET: Gi lite ACTIVITY: as tolerated WOUND CARE: none EQUIPMENT needed: none DISCHARGE MEDICATIONS: 
 See Medication Reconciliation Form · It is important that you take the medication exactly as they are prescribed. · Keep your medication in the bottles provided by the pharmacist and keep a list of the medication names, dosages, and times to be taken in your wallet. · Do not take other medications without consulting your doctor. NOTIFY YOUR PHYSICIAN FOR ANY OF THE FOLLOWING:  
Fever over 101 degrees for 24 hours. Chest pain, shortness of breath, fever, chills, nausea, vomiting, diarrhea, change in mentation, falling, weakness, bleeding. Severe pain or pain not relieved by medications. Or, any other signs or symptoms that you may have questions about. DISPOSITION: 
X  Home With: 
 OT  PT  [facility]  RN  
  
 SNF/Inpatient Rehab/LTAC Independent/assisted living Hospice Other: PROBLEM LIST Updated: Yes X Signed:  
Jim Mcdaniel NP 
2/22/2018 12:09 PM 
 
Discharge Orders None [employer] Announcement We are excited to announce that we are making your provider's discharge notes available to you in [employer]. You will see these notes when they are completed and signed by the physician that discharged you from your recent hospital stay. If you have any questions or concerns about any information you see in [employer], please call the Health Information Department where you were seen or reach out to your Primary Care Provider for more information about your plan of care. Introducing [facility]! Vance Maynard introduces [employer] patient portal. Now you can access parts of your medical record, email your doctor's office, and request medication refills online. 1. In your internet browser, go to [email or web address] 2. Click on the First Time User? Click Here link in the Sign In box. You will see the New Member Sign Up page. 3. Enter your [employer] Access Code exactly as it appears below.  You will not need to use this code after youve completed the sign-up process. If you do not sign up before the expiration date, you must request a new code. · [employer] Access Code: [email or web address] Expires: 4/18/2018 12:19 PM 
 
4. Enter the last four digits of your Social Security Number (xxxx) and Date of Birth (mm/dd/yyyy) as indicated and click Submit. You will be taken to the next sign-up page. 5. Create a [employer] ID. This will be your [employer] login ID and cannot be changed, so think of one that is secure and easy to remember. 6. Create a [employer] password. You can change your password at any time. 7. Enter your Password Reset Question and Answer. This can be used at a later time if you forget your password. 8. Enter your e-mail address. You will receive e-mail notification when new information is available in [address] 9. Click Sign Up. You can now view and download portions of your medical record. 10. Click the Download Summary menu link to download a portable copy of your medical information. If you have questions, please visit the Frequently Asked Questions section of the [employer] website. Remember, [employer] is NOT to be used for urgent needs. For medical emergencies, dial 911. Now available from your iPhone and Android! General Information Please provide this summary of care documentation to your next provider. Patient Signature:  ____________________________________________________________ Date:  ____________________________________________________________  
  
Janie Herrera Provider Signature:  ____________________________________________________________ Date:  ____________________________________________________________

## 2018-02-15 NOTE — ROUTINE PROCESS
TRANSFER - OUT REPORT:    Verbal report given to RN (name) on Jory Castellanos  being transferred to  (unit) for routine progression of care       Report consisted of patients Situation, Background, Assessment and   Recommendations(SBAR). Information from the following report(s) SBAR, ED Summary, [facility] and Recent Results was reviewed with the receiving nurse. Lines:   Peripheral IV 02/15/18 Left Antecubital (Active)   Site Assessment Clean, dry, & intact 2/15/2018  1:25 PM   Phlebitis Assessment 0 2/15/2018  1:25 PM   Infiltration Assessment 0 2/15/2018  1:25 PM   Dressing Status Clean, dry, & intact 2/15/2018  1:25 PM   Dressing Type Transparent 2/15/2018  1:25 PM   Hub Color/Line Status Pink;Flushed;Patent; Positional 2/15/2018  1:25 PM   Action Taken Blood drawn 2/15/2018  1:25 PM   Alcohol Cap Used No 2/15/2018  1:25 PM        Opportunity for questions and clarification was provided.       Patient transported with:

## 2018-02-15 NOTE — CONSULTS
[address] [facility].  [address] [facility] [address]  [place], [address]  [phone]                     GI CONSULTATION NOTE  Chelsea Adam, [facility]  Work Cell: [phone]      NAME:  Ira Curry   :   1935   MRN:   [id number]       Referring Provider: Dr. Boone Schmidt Date: 2/15/2018     Chief Complaint: Melena     History of Present Illness:  Patient is a [de-identified] y.o. who is seen in consultation at the request of Dr. Desirae Davis for GI bleed. Ms. Ivett Harp has a PMH as detailed below. She presented to the ER with melena and lightheadedness. Onset of symptoms was yesterday evening. She had EGD 18 for GERD and chest pain which demonstrated granulation at GEJ, small hiatal hernia, and atrophic gastritis. Biopsies were taken. Yesterday, evening she had black stool. This morning black stools continued and she reports associated lightheadedness. She denies any abdominal pain. She is taking iron supplements along with pantoprazole. She has a long-standing history of iron deficiency anemia for which previous work-up has been unremarkable. She reports CBC completed a week ago showed normal Hgb. She was seen in the office today and rectal exam revealed heme positive stool. She was referred to ER for further evaluation. Work-up here in ER with Hgb 8.3 (usual range around 10). CT scan pending.        PMH:  Past Medical History:   Diagnosis Date    Adverse effect of anesthesia     passed out during EGD/stopped breathing    Arrhythmia     has pacemaker for low HR    Arthritis     Asthma     Cancer ([address]     right kidney - surgery    Chronic pain     right side    Deep vein thrombosis (DVT) during current hospitalization ([address]     history of DVT was on coumadin in the past    Diabetes [address] [address]     diet controlled    GERD (gastroesophageal reflux disease)     H/O acute pancreatitis     Hiatal hernia     Hypertension     MARLEN (obstructive sleep apnea)     does not use CPAP/pt states she has not used since a pacemaker inserted    Other ill-defined conditions(799.89)     lymph node enlargement - left chest - benign bx - watching    Other ill-defined conditions(799.89)     wheezes    Psychiatric disorder     depression    PUD (peptic ulcer disease)     hx of    Thromboembolus ([address]     Unspecified adverse effect of anesthesia     passed out during EGD per pt - stopped breathing per pt per MD       PSH:  Past Surgical History:   Procedure Laterality Date    HX APPENDECTOMY      HX CHOLECYSTECTOMY      HX GYN          HX HEENT Right     laser eye surgery to stop bleeding in back of eye - multiple laser surgeries    HX KNEE ARTHROSCOPY      left knee; cartilage repair    HX ORTHOPAEDIC      right hip replacement    HX ORTHOPAEDIC      lower back surgery    HX ORTHOPAEDIC      straighten toe - 2nd toe on right    HX ORTHOPAEDIC Bilateral     carpal tunnel release    HX PACEMAKER      not defibrillator    HX UROLOGICAL      implant in hip to control urine and then removed    HX UROLOGICAL      Right kidney partial nephrectomy       Allergies: Allergies   Allergen Reactions    Pcn [Penicillins] Rash     But can take cephalosporins. Allergic to all \"cillins\".  Codeine Other (comments)     Hallucinations, takes hydrocodone at home for pain    Contrast Dye [Iodine] Other (comments)     Not to take due to kidney function    Prednisone Other (comments)     \"makes my pancreas numbers go way up\"       Home Medications:  Prior to Admission Medications   Prescriptions Last Dose Informant Patient Reported? Taking? FERROUS SULFATE PO 2/15/2018 at AM  Yes Yes   Sig: Take 325 mg by mouth daily. LACTOBACILLUS ACIDOPHILUS (PROBIOTIC PO) 2/15/2018 at AM  Yes Yes   Sig: Take 1 Cap by mouth daily. PROAIR HFA 90 mcg/actuation inhaler   Yes Yes   Sig: Take 2 Puffs by inhalation four (4) times daily as needed.    allopurinol (ZYLOPRIM) 300 mg tablet 2/15/2018 at AM  Yes Yes   Sig: Take 300 mg by mouth daily. amLODIPine (NORVASC) 2.5 mg tablet 2/15/2018 at AM  Yes Yes   Sig: Take 2.5 mg by mouth daily. aspirin delayed-release 81 mg tablet 2/15/2018 at AM  Yes Yes   Sig: Take 81 mg by mouth daily. b complex vitamins tablet 2/15/2018 at AM  Yes Yes   Sig: Take 1 Tab by mouth daily. donepezil (ARICEPT) 5 mg tablet 2/14/2018 at PM  Yes Yes   Sig: Take 5 mg by mouth nightly. furosemide (LASIX) 40 mg tablet   Yes Yes   Sig: Take 40 mg by mouth daily as needed. gabapentin (NEURONTIN) 100 mg capsule 2/15/2018 at AM  Yes Yes   Sig: Take 200 mg by mouth two (2) times daily as needed. losartan (COZAAR) 100 mg tablet 2/14/2018 at PM  Yes Yes   Sig: Take 100 mg by mouth nightly. metoprolol succinate (TOPROL-XL) 50 mg XL tablet 2/15/2018 at AM  No Yes   Sig: Take 1 Tab by mouth daily. montelukast (SINGULAIR) 10 mg tablet 2/14/2018 at PM  Yes Yes   Sig: Take 10 mg by mouth nightly. pantoprazole (PROTONIX) 40 mg tablet 2/15/2018 at AM  Yes Yes   Sig: Take 40 mg by mouth daily. pravastatin (PRAVACHOL) 20 mg tablet 2/14/2018 at PM  No Yes   Sig: Take 1 Tab by mouth nightly. sertraline (ZOLOFT) 100 mg tablet 2/14/2018 at PM  Yes Yes   Sig: Take 100 mg by mouth nightly. triamcinolone (ARISTOCORT) 0.5 % topical cream 2/15/2018 at AM  Yes Yes   Sig: Apply  to affected area three (3) times daily. use thin layer      Facility-Administered Medications: None       Hospital Medications:  No current facility-administered medications for this encounter. Current Outpatient Prescriptions   Medication Sig    pantoprazole (PROTONIX) 40 mg tablet Take 40 mg by mouth daily.  triamcinolone (ARISTOCORT) 0.5 % topical cream Apply  to affected area three (3) times daily. use thin layer    b complex vitamins tablet Take 1 Tab by mouth daily.  LACTOBACILLUS ACIDOPHILUS (PROBIOTIC PO) Take 1 Cap by mouth daily.  FERROUS SULFATE PO Take 325 mg by mouth daily.     amLODIPine (NORVASC) 2.5 mg tablet Take 2.5 mg by mouth daily.  gabapentin (NEURONTIN) 100 mg capsule Take 200 mg by mouth two (2) times daily as needed.  metoprolol succinate (TOPROL-XL) 50 mg XL tablet Take 1 Tab by mouth daily.  pravastatin (PRAVACHOL) 20 mg tablet Take 1 Tab by mouth nightly.  aspirin delayed-release 81 mg tablet Take 81 mg by mouth daily.  allopurinol (ZYLOPRIM) 300 mg tablet Take 300 mg by mouth daily.  donepezil (ARICEPT) 5 mg tablet Take 5 mg by mouth nightly.  PROAIR HFA 90 mcg/actuation inhaler Take 2 Puffs by inhalation four (4) times daily as needed.  furosemide (LASIX) 40 mg tablet Take 40 mg by mouth daily as needed.  montelukast (SINGULAIR) 10 mg tablet Take 10 mg by mouth nightly.  sertraline (ZOLOFT) 100 mg tablet Take 100 mg by mouth nightly.  losartan (COZAAR) 100 mg tablet Take 100 mg by mouth nightly.        Social History:  Social History   Substance Use Topics    Smoking status: Former Smoker     Packs/day: 0.25     Years: 20.00     Quit date: 8/13/1971    Smokeless tobacco: Never Used    Alcohol use No       Family History:  Family History   Problem Relation Age of Onset   [address] [facility] [address] Stroke Mother      brain aneurysm    Hypertension Father     Heart Disease Father     Cancer Sister      breast    Cancer Brother      lung    Cancer Sister      breast    SLE Other      half siblings (5+) - lupus    Hypertension Daughter     Diabetes Daughter        Review of Systems:    Constitutional: negative fever, negative chills, negative weight loss  Eyes:   negative visual changes  ENT:   negative sore throat, tongue or lip swelling  Respiratory:  negative cough, negative dyspnea  Cards:  negative for chest pain, palpitations, lower extremity edema  GI:   See HPI  :  negative for frequency, dysuria  Integument:  negative for rash and pruritus  Heme:  negative for easy bruising and gum/nose bleeding  Musculoskel: negative for myalgias, back pain and muscle weakness  Neuro: negative for headaches, dizziness, vertigo  Psych:  negative for feelings of anxiety, depression      Objective:   Patient Vitals for the past 8 hrs:   BP Temp Pulse Resp SpO2 Height Weight   02/15/18 1500 148/64 - - - 94 % - -   02/15/18 1400 158/66 - - - 96 % - -   02/15/18 1254 120/64 99.1 °F (37.3 °C) 60 16 97 % 5' 5\" (1.651 m) 89.8 kg (198 lb)               PHYSICAL EXAM:  General: WD, WN. Alert, cooperative, no acute distress.    HEENT: NC, Atraumatic. PERRLA, EOMI. Anicteric sclerae. Lungs:  CTA Bilaterally. No Wheezing/Rhonchi/Rales. Heart:  Regular rate and rhythm, (+) murmur, No Rubs, No Gallops  Abdomen: Soft, non-distended, moderate epigastric tenderness.  +Bowel sounds, no HSM  Extremities: No c/c/e  Neurologic:  Alert and oriented X 3. No acute neurological distress. Psych:   Good insight. Not anxious nor agitated. Data Review     Recent Labs      02/15/18   1311   WBC  7.0   HGB  8.3*   HCT  26.7*   PLT  141*     Recent Labs      02/15/18   1311   NA  142   K  5.2*   CL  113*   CO2  22   BUN  54*   CREA  1.11*   GLU  97   CA  9.8     Recent Labs      02/15/18   1311   SGOT  19   AP  48   TP  6.1*   ALB  3.0*   GLOB  3.1   LPSE  440*     No results for input(s): INR, PTP, APTT in the last 72 hours. No lab exists for component: INREXT     Imaging studies reviewed      Assessment:   1. Melena - with epigastric pain, s/p recent EGD with biopsies taken, latter may be source of bleeding   2. Anemia - related to the above   3.  Type 2 DM     Patient Active Problem List   Diagnosis Code    Sinus node dysfunction (HCC) I49.5    Localized primary osteoarthritis of left lower leg M17.12    Primary localized osteoarthritis of left knee M17.12              Plan:   -Admit under hospitalist  -Supportive management per primary team  -IV PPI BID  -CT abd/pelvis - GI bleed protocol   -NPO after midnight  -Plan for EGD tomorrow  -Further recommendations to follow  -Monitor H&H and follow clinical course for any overt signs of bleeding  -Transfuse as necessary   -Discussed with Dr. Baldev Smith  -Will follow along with you  -Thank you kindly for allowing us to participate in the care of this patient

## 2018-02-15 NOTE — ED PROVIDER NOTES
HPI Comments: .[de-identified] y.o. female with past medical history significant for HTN, DM, arthritis, GERD, PUD, thromboembolus, asthma, acute pancreatitis, hiatal hernia, MARLEN, DVT, kidney CA, appendectomy, pacemaker, cholecuyctectoy who presents from home with chief complaint of melena. The pt reports that she started to have melena with lightheadedness that worsens when she stands up. The pt was sent in by GI after a positive hemoccult test to test her hemoglobin. The pt takes Advil once a day. The pt cannot receive blood transfusions for [place] reasons. The pt denies abdominal pain, hematochezia, blood thinners, vomiting, and prior blood transfusion. There are no other acute medical concerns at this time. Social hx: former smoker, no EtOH use  PCP: Mary Lou Langston MD    Old Chart Reivew: The pt had an EGD one day ago with Trevor Castaneda. Faby Freeman MD for a biopsy. The pt started to have black stools when she got home. The pt was hemoccult positive. Note written by Melisa Marquis, as dictated by Zach Baptiste MD 1:51 PM      The history is provided by the patient. No  was used.         Past Medical History:   Diagnosis Date    Adverse effect of anesthesia     passed out during EGD/stopped breathing    Arrhythmia     has pacemaker for low HR    Arthritis     Asthma     Cancer ([address]     right kidney - surgery    Chronic pain     right side    Deep vein thrombosis (DVT) during current hospitalization ([address]     history of DVT was on coumadin in the past    Diabetes ([address]     diet controlled    GERD (gastroesophageal reflux disease)     H/O acute pancreatitis     Hiatal hernia     Hypertension     MARLEN (obstructive sleep apnea)     does not use CPAP/pt states she has not used since a pacemaker inserted    Other ill-defined conditions(799.89)     lymph node enlargement - left chest - benign bx - watching    Other ill-defined conditions(799.89)     wheezes    Psychiatric disorder     depression    PUD (peptic ulcer disease)     hx of    Thrombo[facility]ol[facility] [address] [address]     Unspecified adverse effect of anesthesia     passed out during EGD per pt - stopped breathing per pt per MD       Past Surgical History:   Procedure Laterality Date    HX APPENDECTOMY      HX CHOLECYSTECTOMY      HX GYN          HX HEENT Right     laser eye surgery to stop bleeding in back of eye - multiple laser surgeries    HX KNEE ARTHROSCOPY      left knee; cartilage repair    HX ORTHOPAEDIC      right hip replacement    HX ORTHOPAEDIC      lower back surgery    HX ORTHOPAEDIC      straighten toe - 2nd toe on right    HX ORTHOPAEDIC Bilateral     carpal tunnel release    HX PACEMAKER      not defibrillator    HX UROLOGICAL      implant in hip to control urine and then removed    HX UROLOGICAL      Right kidney partial nephrectomy         Family History:   Problem Relation Age of Onset    Stroke Mother      brain aneurysm    Hypertension Father     Heart Disease Father     Cancer Sister      breast    Cancer Brother      lung    Cancer Sister      breast    SLE Other      half siblings (5+) - lupus    Hypertension Daughter     Diabetes Daughter        Social History     Social History    Marital status:      Spouse name: N/A    Number of children: N/A    Years of education: N/A     Occupational History    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 HENT: Negative for sore throat.     Respiratory: Negative for cough and shortness of breath. Cardiovascular: Negative for chest pain. Gastrointestinal: Positive for blood in stool. Negative for abdominal pain and vomiting. Genitourinary: Negative for dysuria. Musculoskeletal: Negative for back pain. Skin: Negative for rash. Neurological: Positive for light-headedness. Negative for syncope and headaches. Psychiatric/Behavioral: Negative for confusion. All other systems reviewed and are negative. Vitals:    02/15/18 1254   BP: 120/64   Pulse: 60   Resp: 16   Temp: 99.1 °F (37.3 °C)   SpO2: 97%   Weight: 89.8 kg (198 lb)   Height: 5' 5\" (1.651 m)            Physical Exam   Constitutional: She is oriented to person, place, and time. She appears well-developed. No distress. HENT:   Head: Normocephalic and atraumatic. Eyes: Conjunctivae and EOM are normal. Pupils are equal, round, and reactive to light. Neck: Normal range of motion. Neck supple. Cardiovascular: Normal rate, regular rhythm and normal heart sounds. No murmur heard. Pulmonary/Chest: Effort normal and breath sounds normal. No respiratory distress. Abdominal: Soft. Bowel sounds are normal. She exhibits no distension. There is tenderness (mild LUQ). There is no rebound and no guarding. Musculoskeletal: Normal range of motion. She exhibits edema (trace B/L LE). Neurological: She is alert and oriented to person, place, and time. No cranial nerve deficit. She exhibits normal muscle tone. Coordination normal.   Skin: Skin is warm and dry. No rash noted. Psychiatric: She has a normal mood and affect. Her behavior is normal.   Nursing note and vitals reviewed. Note written by Melisa Rene, as dictated by Britney Duggan MD 1:53 PM      [facility]      ED Course       Procedures    ED EKG interpretation:  Rhythm: atrial paced rhythm; and regular .  Rate (approx.): 60; Axis: normal; ST/T wave: no STEMI; LVH  Note written by Melisa Rene, as dictated by Britney Duggan MD 1:34 PM      I spoke to Dr. Leighton Ramires, hospitalist on-call, at [phone]. We discussed the case and he accepted the patient for admission.

## 2018-02-15 NOTE — ED TRIAGE NOTES
Pt went to her GI doctor today and was told there was blood in her stool and to come to the ER. Pt states that she is having mid abdominal pain since yesterday. Pt states that she is also light headed. Pt states that she had an upper GI study yesterday and she noticed dark stools when she got home from the procedure.

## 2018-02-15 NOTE — ED NOTES
12:59 PM  I have evaluated the patient as the Provider in Triage. I have reviewed Her vital signs and the triage nurse assessment. I have talked with the patient and any available family and advised that I am the provider in triage and have ordered the appropriate study to initiate their work up based on the clinical presentation during my assessment. I have advised that the patient will be accommodated in the Main ED as soon as possible. I have also requested to contact the triage nurse or myself immediately if the patient experiences any changes in their condition during this brief waiting period. Pt presenting to ER from GI office for blood in stool. Pt has endoscopy done yesterday which showed ulcers per patient. Pt noted some generalized abdominal pain yesterday that has continued into today. Pt also noted dark stool last night into today. At GI office, pt was told she had blood in stool. Pt reports fatigue, dizziness, shortness of breath. No chest pain, nausea, vomiting, headache, cough, congestion. Pt was given mylanta yesterday. No other change in medicines. Pt awake and alert, mild generalized pain on exam. No peritoneal signs. No tachycardia, no hypoxia.   P: labs, CT, ekg    Melecio Patiño PA-C

## 2018-02-15 NOTE — ED NOTES
PT resting comfortably on stretcher with no complaints at this time.  Call bell within reach; will continue to monitor

## 2018-02-15 NOTE — PROGRESS NOTES
Admission Medication Reconciliation:    Information obtained from:  Patient, RxQuery, chart review    Comments/Recommendations: Updated PTA meds/reviewed patient's allergies. 1)  Medications added: pantoprazole 40 mg, triamcinolone 0.5% cream    2)  Medications deleted: none    3)  Medications changed: gabapentin 100 mg dose changed from 1 cap TID to 2 caps BID PRN. Significant PMH/Disease States:   Past Medical History:   Diagnosis Date    Adverse effect of anesthesia     passed out during EGD/stopped breathing    Arrhythmia     has pacemaker for low HR    Arthritis     Asthma     [facility] [address] [address]     right kidney - surgery    Chronic pain     right side    Deep vein thrombosis (DVT) during current hospitalization ([facility] [address]     history of DVT was on coumadin in the past    Diabetes ([address]     diet controlled    GERD (gastroesophageal reflux disease)     H/O acute pancreatitis     Hiatal hernia     Hypertension     MARLEN (obstructive sleep apnea)     does not use CPAP/pt states she has not used since a pacemaker inserted    Other ill-defined conditions(799.89)     lymph node enlargement - left chest - benign bx - watching    Other ill-defined conditions(799.89)     wheezes    Psychiatric disorder     depression    PUD (peptic ulcer disease)     hx of    Thromboembolus ([address]     Unspecified adverse effect of anesthesia     passed out during EGD per pt - stopped breathing per pt per MD       Chief Complaint for this Admission:    Chief Complaint   Patient presents with    Abdominal Pain       Allergies:  Pcn [penicillins]; Codeine; Contrast dye [iodine]; and Prednisone    Prior to Admission Medications:   Prior to Admission Medications   Prescriptions Last Dose Informant Patient Reported? Taking? FERROUS SULFATE PO 2/15/2018 at AM  Yes Yes   Sig: Take 325 mg by mouth daily. LACTOBACILLUS ACIDOPHILUS (PROBIOTIC PO) 2/15/2018 at AM  Yes Yes   Sig: Take 1 Cap by mouth daily.    PROAIR HFA 90 mcg/actuation inhaler   Yes Yes   Sig: Take 2 Puffs by inhalation four (4) times daily as needed. allopurinol (ZYLOPRIM) 300 mg tablet 2/15/2018 at AM  Yes Yes   Sig: Take 300 mg by mouth daily. amLODIPine (NORVASC) 2.5 mg tablet 2/15/2018 at AM  Yes Yes   Sig: Take 2.5 mg by mouth daily. aspirin delayed-release 81 mg tablet 2/15/2018 at AM  Yes Yes   Sig: Take 81 mg by mouth daily. b complex vitamins tablet 2/15/2018 at AM  Yes Yes   Sig: Take 1 Tab by mouth daily. donepezil (ARICEPT) 5 mg tablet 2/14/2018 at PM  Yes Yes   Sig: Take 5 mg by mouth nightly. furosemide (LASIX) 40 mg tablet   Yes Yes   Sig: Take 40 mg by mouth daily as needed. gabapentin (NEURONTIN) 100 mg capsule 2/15/2018 at AM  Yes Yes   Sig: Take 200 mg by mouth two (2) times daily as needed. losartan (COZAAR) 100 mg tablet 2/14/2018 at PM  Yes Yes   Sig: Take 100 mg by mouth nightly. metoprolol succinate (TOPROL-XL) 50 mg XL tablet 2/15/2018 at AM  No Yes   Sig: Take 1 Tab by mouth daily. montelukast (SINGULAIR) 10 mg tablet 2/14/2018 at PM  Yes Yes   Sig: Take 10 mg by mouth nightly. pantoprazole (PROTONIX) 40 mg tablet 2/15/2018 at AM  Yes Yes   Sig: Take 40 mg by mouth daily. pravastatin (PRAVACHOL) 20 mg tablet 2/14/2018 at PM  No Yes   Sig: Take 1 Tab by mouth nightly. sertraline (ZOLOFT) 100 mg tablet 2/14/2018 at PM  Yes Yes   Sig: Take 100 mg by mouth nightly. triamcinolone (ARISTOCORT) 0.5 % topical cream 2/15/2018 at AM  Yes Yes   Sig: Apply  to affected area three (3) times daily. use thin layer      Facility-Administered Medications: None     Thank you for allowing me to participate in the care of this patient. Please contact the medication reconciliation pharmacist () with any questions.       Cony Ladd, PharmD Candidate [id number]

## 2018-02-15 NOTE — H&P
Fernando Morrison MD  Please call  and page for questions. Call physician on-call through the  7pm-7am      History & Physical    Primary Care Provider: Gerry Dickey MD  Source of Information: Patient and her medical record. History of Presenting Illness:   Becky Elder is a [de-identified] y.o. female who presents with HTN, asthma, anemia, arrhythmia S/P pace maker, PUD, arthritis, DVT, GERD, who was sent to the ED today form her GI office. Patient had EGD on 02/14 for GERD by Dr Todd Griffin and no acute pathology was detected. Patient had mild abdominal pain and had black color stool last night. Patient had black color stool this AM as well. Patient went to her GI MD today form where she was referred here. Patient feels fatigue and and lightheadedness. The patient denies any fever, chills, chest pain, cough, congestion, recent illness, palpitations, or dysuria. She denies any nausea and vomiting. She denies any abdominal pain now. Review of Systems:  A comprehensive review of systems was negative except for that written in the History of Present Illness.      Past Medical History:   Diagnosis Date    Adverse effect of anesthesia     passed out during EGD/stopped breathing    Arrhythmia     has pacemaker for low HR    Arthritis     Asthma     Cancer [address] [address]     right kidney - surgery    Chronic pain     right side    Deep vein thrombosis (DVT) during current hospitalization ([address]     history of DVT was on coumadin in the past    Diabetes ([address]     diet controlled    GERD (gastroesophageal reflux disease)     H/O acute pancreatitis     Hiatal hernia     Hypertension     MARLEN (obstructive sleep apnea)     does not use CPAP/pt states she has not used since a pacemaker inserted    Other ill-defined conditions(799.89)     lymph node enlargement - left chest - benign bx - watching    Other ill-defined conditions(799.89)     wheezes    Psychiatric disorder     depression    PUD (peptic ulcer disease)     hx of    Thromboembolus ([address]     Unspecified adverse effect of anesthesia     passed out during EGD per pt - stopped breathing per pt per MD      Past Surgical History:   Procedure Laterality Date    HX APPENDECTOMY      HX CHOLECYSTECTOMY      HX GYN          HX HEENT Right     laser eye surgery to stop bleeding in back of eye - multiple laser surgeries    HX KNEE ARTHROSCOPY      left knee; cartilage repair    HX ORTHOPAEDIC      right hip replacement    HX ORTHOPAEDIC      lower back surgery    HX ORTHOPAEDIC      straighten toe - 2nd toe on right    HX ORTHOPAEDIC Bilateral     carpal tunnel release    HX PACEMAKER      not defibrillator    HX UROLOGICAL      implant in hip to control urine and then removed    HX UROLOGICAL      Right kidney partial nephrectomy     Prior to Admission medications    Medication Sig Start Date End Date Taking? Authorizing Provider   pantoprazole (PROTONIX) 40 mg tablet Take 40 mg by mouth daily. Yes Historical Provider   triamcinolone (ARISTOCORT) 0.5 % topical cream Apply  to affected area three (3) times daily. use thin layer   Yes Historical Provider   b complex vitamins tablet Take 1 Tab by mouth daily. Yes Historical Provider   LACTOBACILLUS ACIDOPHILUS (PROBIOTIC PO) Take 1 Cap by mouth daily. Yes Historical Provider   FERROUS SULFATE PO Take 325 mg by mouth daily. Yes Historical Provider   amLODIPine (NORVASC) 2.5 mg tablet Take 2.5 mg by mouth daily. Yes Historical Provider   gabapentin (NEURONTIN) 100 mg capsule Take 200 mg by mouth two (2) times daily as needed. Yes Historical Provider   metoprolol succinate (TOPROL-XL) 50 mg XL tablet Take 1 Tab by mouth daily. 10/23/17  Yes Vick Palomino MD   pravastatin (PRAVACHOL) 20 mg tablet Take 1 Tab by mouth nightly. 10/23/17  Yes Vick Palomino MD   aspirin delayed-release 81 mg tablet Take 81 mg by mouth daily.    Yes Historical Provider   allopurinol (ZYLOPRIM) 300 mg tablet Take 300 mg by mouth daily. 10/25/16  Yes Historical Provider   donepezil (ARICEPT) 5 mg tablet Take 5 mg by mouth nightly. 12/12/16  Yes Historical Provider   PROAIR HFA 90 mcg/actuation inhaler Take 2 Puffs by inhalation four (4) times daily as needed. 10/4/16  Yes Historical Provider   furosemide (LASIX) 40 mg tablet Take 40 mg by mouth daily as needed. Yes Historical Provider   montelukast (SINGULAIR) 10 mg tablet Take 10 mg by mouth nightly. Yes Historical Provider   sertraline (ZOLOFT) 100 mg tablet Take 100 mg by mouth nightly. Yes Historical Provider   losartan (COZAAR) 100 mg tablet Take 100 mg by mouth nightly. Yes Historical Provider     Allergies   Allergen Reactions    Pcn [Penicillins] Rash     But can take cephalosporins. Allergic to all \"cillins\".     Codeine Other (comments)     Hallucinations, takes hydrocodone at home for pain    Contrast Dye [Iodine] Other (comments)     Not to take due to kidney function    Prednisone Other (comments)     \"makes my pancreas numbers go way up\"      Family History   Problem Relation Age of Onset   [facility] Stroke Mother      brain aneurysm    Hypertension Father     Heart Disease Father     Cancer Sister      breast    Cancer Brother      lung    Cancer Sister      breast    SLE Other      half siblings (5+) - lupus    Hypertension Daughter     Diabetes Daughter         SOCIAL HISTORY:  Patient resides:  Independently X   Assisted Living    SNF    With family care       Smoking history:   None X   Former    Chronic      Alcohol history:   None X   Social    Chronic      Ambulates:   Independently X   w/cane    w/walker    w/wc    CODE STATUS:  DNR    Full X   Other      Objective:     Physical Exam:     Visit Vitals    /64    Pulse 60    Temp 99.1 °F (37.3 °C)    Resp 16    Ht 5' 5\" (1.651 m)    Wt 89.8 kg (198 lb)    SpO2 94%    BMI 32.95 kg/m2      O2 Device: Room air    General:  Alert, cooperative, no distress, appears stated age. Head:  Normocephalic, without obvious abnormality, atraumatic. Eyes:  Conjunctivae/corneas clear. PERRL, EOMs intact. Throat: Lips, mucosa, and tongue normal. Teeth and gums normal.   Neck: Supple, symmetrical, trachea midline, no adenopathy, thyroid: no enlargement/tenderness/nodules, no carotid bruit and no JVD. Back:   Symmetric, no curvature. ROM normal. No CVA tenderness. Lungs:   Clear to auscultation bilaterally. Chest wall:  No tenderness or deformity. Heart:  Regular rate and rhythm, S1, S2 normal, no murmur. Abdomen:   Soft, non-tender. Bowel sounds normal.    Extremities: Extremities normal, atraumatic, no cyanosis or edema. Skin: Skin color, texture, turgor normal. No rashes or lesions   Neurologic: CNII-XII intact. EKG:  Pacer. Data Review:     Recent Days:  Recent Labs      02/15/18   1311   WBC  7.0   HGB  8.3*   HCT  26.7*   PLT  141*     Recent Labs      02/15/18   1311   NA  142   K  5.2*   CL  113*   CO2  22   GLU  97   BUN  54*   CREA  1.11*   CA  9.8   ALB  3.0*   SGOT  19   ALT  19     No results for input(s): PH, PCO2, PO2, HCO3, FIO2 in the last 72 hours.     24 Hour Results:  Recent Results (from the past 24 hour(s))   TROPONIN I    Collection Time: 02/15/18  1:11 PM   Result Value Ref Range    Troponin-I, Qt. <0.04 <0.05 ng/mL   CBC WITH AUTOMATED DIFF    Collection Time: 02/15/18  1:11 PM   Result Value Ref Range    WBC 7.0 3.6 - 11.0 K/uL    RBC 2.97 (L) 3.80 - 5.20 M/uL    HGB 8.3 (L) 11.5 - 16.0 g/dL    HCT 26.7 (L) 35.0 - 47.0 %    MCV 89.9 80.0 - 99.0 FL    MCH 27.9 26.0 - 34.0 PG    MCHC 31.1 30.0 - 36.5 g/dL    RDW 16.4 (H) 11.5 - 14.5 %    PLATELET 229 (L) 432 - 400 K/uL    MPV 11.7 8.9 - 12.9 FL    NRBC 0.0 0  WBC    ABSOLUTE NRBC 0.00 0.00 - 0.01 K/uL    NEUTROPHILS 54 32 - 75 %    LYMPHOCYTES 28 12 - 49 %    MONOCYTES 16 (H) 5 - 13 %    EOSINOPHILS 1 0 - 7 %    BASOPHILS 0 0 - 1 %    IMMATURE GRANULOCYTES 0 0.0 - 0.5 %    ABS. NEUTROPHILS 3.8 1.8 - 8.0 K/UL    ABS. LYMPHOCYTES 2.0 0.8 - 3.5 K/UL    ABS. MONOCYTES 1.1 (H) 0.0 - 1.0 K/UL    ABS. EOSINOPHILS 0.1 0.0 - 0.4 K/UL    ABS. BASOPHILS 0.0 0.0 - 0.1 K/UL    ABS. IMM. GRANS. 0.0 0.00 - 0.04 K/UL    DF AUTOMATED     METABOLIC PANEL, COMPREHENSIVE    Collection Time: 02/15/18  1:11 PM   Result Value Ref Range    Sodium 142 136 - 145 mmol/L    Potassium 5.2 (H) 3.5 - 5.1 mmol/L    Chloride 113 (H) 97 - 108 mmol/L    CO2 22 21 - 32 mmol/L    Anion gap 7 5 - 15 mmol/L    Glucose 97 65 - 100 mg/dL    BUN 54 (H) 6 - 20 MG/DL    Creatinine 1.11 (H) 0.55 - 1.02 MG/DL    BUN/Creatinine ratio 49 (H) 12 - 20      GFR est AA 57 (L) >60 ml/min/1.73m2    GFR est non-AA 47 (L) >60 ml/min/1.73m2    Calcium 9.8 8.5 - 10.1 MG/DL    Bilirubin, total 0.4 0.2 - 1.0 MG/DL    ALT (SGPT) 19 12 - 78 U/L    AST (SGOT) 19 15 - 37 U/L    Alk. phosphatase 48 45 - 117 U/L    Protein, total 6.1 (L) 6.4 - 8.2 g/dL    Albumin 3.0 (L) 3.5 - 5.0 g/dL    Globulin 3.1 2.0 - 4.0 g/dL    A-G Ratio 1.0 (L) 1.1 - 2.2     LIPASE    Collection Time: 02/15/18  1:11 PM   Result Value Ref Range    Lipase 440 (H) 73 - 393 U/L   EKG, 12 LEAD, INITIAL    Collection Time: 02/15/18  1:34 PM   Result Value Ref Range    Ventricular Rate 60 BPM    Atrial Rate 60 BPM    P-R Interval 174 ms    QRS Duration 84 ms    Q-T Interval 412 ms    QTC Calculation (Bezet) 412 ms    Calculated P Axis 47 degrees    Calculated R Axis -4 degrees    Calculated T Axis -3 degrees    Diagnosis       Atrial-paced rhythm  Voltage criteria for left ventricular hypertrophy  When compared with ECG of 14-FEB-2018 08:24,  Electronic atrial pacemaker has replaced Sinus rhythm  Nonspecific T wave abnormality, worse in Inferior leads  Confirmed by Balbir Euceda M.D., Suzi Polanco ([id number]) on 2/15/2018 1:58:01 PM           Imaging:     Assessment and Plan:       GI bleed: Will admit to the step down. Monitor H/H. IV PPI, NPO except medicines. Hold ASA. Follow on abdominal scan. Acute on chronic anemia:   Patient said does not want to be transfused as she is Jehovah witness. Gi on board. Plan as per above. Hypertension: BP is reasonable. Will resume her home medicine gradually. Hyperkalemia: Mild. Probably due to GI bleed. Will do D5 and monitor. Due to GI bleed she is not a candidate for kayexalate for now. Asthma: Compensated, continue her home medicines. See orders for other plans:   VTE prophylaxis: SCD  Code status: Full  Discussed plan of care with Patient/Family and Nurse. Patient daughter is at the bedside. Pre-admission lived at home:   Disposition: to step down.            Signed By: Irais Alejandro MD     February 15, 2018

## 2018-02-16 ENCOUNTER — APPOINTMENT (OUTPATIENT)
Dept: NUCLEAR MEDICINE | Age: 83
DRG: 379 | End: 2018-02-16
Attending: FAMILY MEDICINE
Payer: COMMERCIAL

## 2018-02-16 LAB
ALBUMIN SERPL-MCNC: 2.1 G/DL (ref 3.5–5)
ALBUMIN/GLOB SERPL: 0.6 {RATIO} (ref 1.1–2.2)
ALP SERPL-CCNC: 35 U/L (ref 45–117)
ALT SERPL-CCNC: 18 U/L (ref 12–78)
ANION GAP SERPL CALC-SCNC: 11 MMOL/L (ref 5–15)
AST SERPL-CCNC: 25 U/L (ref 15–37)
BASOPHILS # BLD: 0 K/UL (ref 0–0.1)
BASOPHILS NFR BLD: 0 % (ref 0–1)
BILIRUB SERPL-MCNC: 0.3 MG/DL (ref 0.2–1)
BUN SERPL-MCNC: 69 MG/DL (ref 6–20)
BUN/CREAT SERPL: 54 (ref 12–20)
CALCIUM SERPL-MCNC: 9.9 MG/DL (ref 8.5–10.1)
CHLORIDE SERPL-SCNC: 118 MMOL/L (ref 97–108)
CO2 SERPL-SCNC: 13 MMOL/L (ref 21–32)
CREAT SERPL-MCNC: 1.28 MG/DL (ref 0.55–1.02)
DIFFERENTIAL METHOD BLD: ABNORMAL
EOSINOPHIL # BLD: 0.2 K/UL (ref 0–0.4)
EOSINOPHIL NFR BLD: 2 % (ref 0–7)
ERYTHROCYTE [DISTWIDTH] IN BLOOD BY AUTOMATED COUNT: 16.4 % (ref 11.5–14.5)
GLOBULIN SER CALC-MCNC: 3.3 G/DL (ref 2–4)
GLUCOSE BLD STRIP.AUTO-MCNC: 161 MG/DL (ref 65–100)
GLUCOSE SERPL-MCNC: 126 MG/DL (ref 65–100)
HCT VFR BLD AUTO: 22.8 % (ref 35–47)
HGB BLD-MCNC: 6.8 G/DL (ref 11.5–16)
IMM GRANULOCYTES # BLD: 0.1 K/UL (ref 0–0.04)
IMM GRANULOCYTES NFR BLD AUTO: 1 % (ref 0–0.5)
LYMPHOCYTES # BLD: 2 K/UL (ref 0.8–3.5)
LYMPHOCYTES NFR BLD: 26 % (ref 12–49)
MCH RBC QN AUTO: 27.5 PG (ref 26–34)
MCHC RBC AUTO-ENTMCNC: 29.8 G/DL (ref 30–36.5)
MCV RBC AUTO: 92.3 FL (ref 80–99)
MONOCYTES # BLD: 1.1 K/UL (ref 0–1)
MONOCYTES NFR BLD: 14 % (ref 5–13)
NEUTS SEG # BLD: 4.2 K/UL (ref 1.8–8)
NEUTS SEG NFR BLD: 57 % (ref 32–75)
NRBC # BLD: 0 K/UL (ref 0–0.01)
NRBC BLD-RTO: 0 PER 100 WBC
PLATELET # BLD AUTO: 113 K/UL (ref 150–400)
PMV BLD AUTO: 11.3 FL (ref 8.9–12.9)
POTASSIUM SERPL-SCNC: 5.4 MMOL/L (ref 3.5–5.1)
PROT SERPL-MCNC: 5.4 G/DL (ref 6.4–8.2)
RBC # BLD AUTO: 2.47 M/UL (ref 3.8–5.2)
RBC MORPH BLD: ABNORMAL
SERVICE CMNT-IMP: ABNORMAL
SODIUM SERPL-SCNC: 142 MMOL/L (ref 136–145)
WBC # BLD AUTO: 7.6 K/UL (ref 3.6–11)

## 2018-02-16 PROCEDURE — 77030010857 HC CATH PRB GLD BSC -C: Performed by: SPECIALIST

## 2018-02-16 PROCEDURE — 74011000250 HC RX REV CODE- 250: Performed by: NURSE PRACTITIONER

## 2018-02-16 PROCEDURE — 74011250636 HC RX REV CODE- 250/636: Performed by: INTERNAL MEDICINE

## 2018-02-16 PROCEDURE — 77030010937 HC CLP LIG BSC -I: Performed by: SPECIALIST

## 2018-02-16 PROCEDURE — 99152 MOD SED SAME PHYS/QHP 5/>YRS: CPT

## 2018-02-16 PROCEDURE — 65660000000 HC RM CCU STEPDOWN

## 2018-02-16 PROCEDURE — 74011250636 HC RX REV CODE- 250/636: Performed by: NURSE PRACTITIONER

## 2018-02-16 PROCEDURE — 0W3P8ZZ CONTROL BLEEDING IN GASTROINTESTINAL TRACT, VIA NATURAL OR ARTIFICIAL OPENING ENDOSCOPIC: ICD-10-PCS | Performed by: SPECIALIST

## 2018-02-16 PROCEDURE — 74011250637 HC RX REV CODE- 250/637: Performed by: SPECIALIST

## 2018-02-16 PROCEDURE — 74011000258 HC RX REV CODE- 258: Performed by: NURSE PRACTITIONER

## 2018-02-16 PROCEDURE — 82962 GLUCOSE BLOOD TEST: CPT

## 2018-02-16 PROCEDURE — 74011250636 HC RX REV CODE- 250/636: Performed by: HOSPITALIST

## 2018-02-16 PROCEDURE — 74011250637 HC RX REV CODE- 250/637: Performed by: FAMILY MEDICINE

## 2018-02-16 PROCEDURE — 74011250636 HC RX REV CODE- 250/636: Performed by: FAMILY MEDICINE

## 2018-02-16 PROCEDURE — 74011250636 HC RX REV CODE- 250/636: Performed by: SPECIALIST

## 2018-02-16 PROCEDURE — 85025 COMPLETE CBC W/AUTO DIFF WBC: CPT | Performed by: FAMILY MEDICINE

## 2018-02-16 PROCEDURE — 76040000007: Performed by: SPECIALIST

## 2018-02-16 PROCEDURE — 36415 COLL VENOUS BLD VENIPUNCTURE: CPT | Performed by: FAMILY MEDICINE

## 2018-02-16 PROCEDURE — 80053 COMPREHEN METABOLIC PANEL: CPT | Performed by: FAMILY MEDICINE

## 2018-02-16 PROCEDURE — 78278 ACUTE GI BLOOD LOSS IMAGING: CPT

## 2018-02-16 PROCEDURE — 99153 MOD SED SAME PHYS/QHP EA: CPT

## 2018-02-16 PROCEDURE — C9113 INJ PANTOPRAZOLE SODIUM, VIA: HCPCS | Performed by: NURSE PRACTITIONER

## 2018-02-16 RX ORDER — EPINEPHRINE 0.1 MG/ML
1 INJECTION INTRACARDIAC; INTRAVENOUS
Status: ACTIVE | OUTPATIENT
Start: 2018-02-16 | End: 2018-02-16

## 2018-02-16 RX ORDER — MIDAZOLAM HYDROCHLORIDE 1 MG/ML
.25-1 INJECTION, SOLUTION INTRAMUSCULAR; INTRAVENOUS
Status: DISPENSED | OUTPATIENT
Start: 2018-02-16 | End: 2018-02-16

## 2018-02-16 RX ORDER — SUCRALFATE 1 G/10ML
1 SUSPENSION ORAL
Status: DISCONTINUED | OUTPATIENT
Start: 2018-02-16 | End: 2018-02-22 | Stop reason: HOSPADM

## 2018-02-16 RX ORDER — SODIUM CHLORIDE 0.9 % (FLUSH) 0.9 %
5-10 SYRINGE (ML) INJECTION AS NEEDED
Status: ACTIVE | OUTPATIENT
Start: 2018-02-16 | End: 2018-02-16

## 2018-02-16 RX ORDER — DEXTROMETHORPHAN/PSEUDOEPHED 2.5-7.5/.8
1.2 DROPS ORAL
Status: DISCONTINUED | OUTPATIENT
Start: 2018-02-16 | End: 2018-02-16 | Stop reason: HOSPADM

## 2018-02-16 RX ORDER — SODIUM CHLORIDE, SODIUM LACTATE, POTASSIUM CHLORIDE, CALCIUM CHLORIDE 600; 310; 30; 20 MG/100ML; MG/100ML; MG/100ML; MG/100ML
75 INJECTION, SOLUTION INTRAVENOUS CONTINUOUS
Status: DISCONTINUED | OUTPATIENT
Start: 2018-02-16 | End: 2018-02-17

## 2018-02-16 RX ORDER — LANOLIN ALCOHOL/MO/W.PET/CERES
250 CREAM (GRAM) TOPICAL DAILY
Status: DISCONTINUED | OUTPATIENT
Start: 2018-02-17 | End: 2018-02-22 | Stop reason: HOSPADM

## 2018-02-16 RX ORDER — ATROPINE SULFATE 0.1 MG/ML
0.5 INJECTION INTRAVENOUS
Status: ACTIVE | OUTPATIENT
Start: 2018-02-16 | End: 2018-02-16

## 2018-02-16 RX ORDER — FENTANYL CITRATE 50 UG/ML
200 INJECTION, SOLUTION INTRAMUSCULAR; INTRAVENOUS
Status: DISPENSED | OUTPATIENT
Start: 2018-02-16 | End: 2018-02-16

## 2018-02-16 RX ORDER — SODIUM CHLORIDE 9 MG/ML
50 INJECTION, SOLUTION INTRAVENOUS CONTINUOUS
Status: DISPENSED | OUTPATIENT
Start: 2018-02-16 | End: 2018-02-16

## 2018-02-16 RX ORDER — ACETAMINOPHEN 325 MG/1
650 TABLET ORAL
Status: DISCONTINUED | OUTPATIENT
Start: 2018-02-16 | End: 2018-02-22 | Stop reason: HOSPADM

## 2018-02-16 RX ORDER — FOLIC ACID 1 MG/1
1 TABLET ORAL DAILY
Status: DISCONTINUED | OUTPATIENT
Start: 2018-02-17 | End: 2018-02-22 | Stop reason: HOSPADM

## 2018-02-16 RX ORDER — SODIUM CHLORIDE 0.9 % (FLUSH) 0.9 %
5-10 SYRINGE (ML) INJECTION EVERY 8 HOURS
Status: ACTIVE | OUTPATIENT
Start: 2018-02-16 | End: 2018-02-16

## 2018-02-16 RX ORDER — LORAZEPAM 2 MG/ML
2 INJECTION INTRAMUSCULAR AS NEEDED
Status: ACTIVE | OUTPATIENT
Start: 2018-02-16 | End: 2018-02-16

## 2018-02-16 RX ORDER — FLUMAZENIL 0.1 MG/ML
0.2 INJECTION INTRAVENOUS
Status: ACTIVE | OUTPATIENT
Start: 2018-02-16 | End: 2018-02-16

## 2018-02-16 RX ORDER — NALOXONE HYDROCHLORIDE 0.4 MG/ML
0.4 INJECTION, SOLUTION INTRAMUSCULAR; INTRAVENOUS; SUBCUTANEOUS
Status: ACTIVE | OUTPATIENT
Start: 2018-02-16 | End: 2018-02-16

## 2018-02-16 RX ADMIN — SUCRALFATE 1 G: 1 SUSPENSION ORAL at 22:58

## 2018-02-16 RX ADMIN — FENTANYL CITRATE 25 MCG: 50 INJECTION, SOLUTION INTRAMUSCULAR; INTRAVENOUS at 08:45

## 2018-02-16 RX ADMIN — ACETAMINOPHEN 650 MG: 325 TABLET, FILM COATED ORAL at 22:20

## 2018-02-16 RX ADMIN — Medication 10 ML: at 22:29

## 2018-02-16 RX ADMIN — MIDAZOLAM HYDROCHLORIDE 0.5 MG: 1 INJECTION, SOLUTION INTRAMUSCULAR; INTRAVENOUS at 08:30

## 2018-02-16 RX ADMIN — SUCRALFATE 1 G: 1 SUSPENSION ORAL at 17:20

## 2018-02-16 RX ADMIN — ONDANSETRON 4 MG: 2 INJECTION INTRAMUSCULAR; INTRAVENOUS at 03:44

## 2018-02-16 RX ADMIN — MIDAZOLAM HYDROCHLORIDE 0.5 MG: 1 INJECTION, SOLUTION INTRAMUSCULAR; INTRAVENOUS at 08:32

## 2018-02-16 RX ADMIN — MIDAZOLAM HYDROCHLORIDE 0.5 MG: 1 INJECTION, SOLUTION INTRAMUSCULAR; INTRAVENOUS at 08:45

## 2018-02-16 RX ADMIN — FENTANYL CITRATE 25 MCG: 50 INJECTION, SOLUTION INTRAMUSCULAR; INTRAVENOUS at 08:22

## 2018-02-16 RX ADMIN — SODIUM CHLORIDE 40 MG: 9 INJECTION INTRAMUSCULAR; INTRAVENOUS; SUBCUTANEOUS at 22:58

## 2018-02-16 RX ADMIN — SODIUM CHLORIDE, SODIUM LACTATE, POTASSIUM CHLORIDE, AND CALCIUM CHLORIDE 75 ML/HR: 600; 310; 30; 20 INJECTION, SOLUTION INTRAVENOUS at 13:48

## 2018-02-16 RX ADMIN — MIDAZOLAM HYDROCHLORIDE 1 MG: 1 INJECTION, SOLUTION INTRAMUSCULAR; INTRAVENOUS at 08:22

## 2018-02-16 RX ADMIN — DONEPEZIL HYDROCHLORIDE 5 MG: 5 TABLET, FILM COATED ORAL at 22:58

## 2018-02-16 RX ADMIN — IRON SUCROSE 200 MG: 20 INJECTION, SOLUTION INTRAVENOUS at 17:20

## 2018-02-16 RX ADMIN — MIDAZOLAM HYDROCHLORIDE 1 MG: 1 INJECTION, SOLUTION INTRAMUSCULAR; INTRAVENOUS at 08:28

## 2018-02-16 RX ADMIN — EPOETIN ALFA 24000 UNITS: 20000 SOLUTION INTRAVENOUS; SUBCUTANEOUS at 22:21

## 2018-02-16 RX ADMIN — SERTRALINE HYDROCHLORIDE 100 MG: 50 TABLET ORAL at 22:58

## 2018-02-16 NOTE — PROGRESS NOTES
Hospitalist Progress Note  Martin Rojas NP  Answering service: [phone] from in house phone  Cell: [phone]      Date of Service:  2018  NAME:  Makayla Jang  :  1935  MRN:  [id number]      Admission Summary:   Makayla Jang is a [de-identified] y.o. female who presents with HTN, asthma, anemia, arrhythmia S/P pace maker, PUD, arthritis, DVT, GERD, who was sent to the ED today form her GI office. Patient had EGD on  for GERD by Dr Tru Cevallos and no acute pathology was detected. Patient had mild abdominal pain and had black color stool last night. Patient had black color stool this AM as well. Patient went to her GI MD today form where she was referred here. Patient feels fatigue and and lightheadedness. The patient denies any fever, chills, chest pain, cough, congestion, recent illness, palpitations, or dysuria. She denies any nausea and vomiting. She denies any abdominal pain now. Interval history / Subjective:   Pending bleeding scan  Ordered IV venofer. Patient and family are anxious to get hgb up  Hematology consult requested by GI     Assessment & Plan:     GI bleed:   2/2 gastic ulcer, probably from bx site earlier this week  S/p egd with Dr. Tru Cevallos  Monitor H&H  Ppi, carafate, clears  Hold asa    Acute on chronic anemia: NO BLOOD PRODUCTS [facility]  Iv Venofer  Hematology consult     Hypertension: normotensive. C/w home med's     Mild Hyperkalemia: Probably due to GI bleed. Will do D5 and monitor.  NO kayexalate 2/2 gib     Asthma: Compensated, continue her home medicines.        Code status: full  DVT prophylaxis: SCD's  Care Plan discussed with: patient, daughters, attending MD  Disposition: lives with her       Hospital Problems  Date Reviewed: 2017          Codes Class Noted POA    * (Principal)GI bleed ICD-10-CM: K92.2  ICD-9-CM: 578.9  2/15/2018 Yes                Review of Systems: Jittery stomach  Denies abd pain n/v/d  Denies cp/sob      Vital Signs:    Last 24hrs VS reviewed since prior progress note. Most recent are:  Visit Vitals    /51    Pulse 64    Temp 98.3 °F (36.8 °C)    Resp 18    Ht 5' 5\" (1.651 m)    Wt 89.8 kg (198 lb)    SpO2 100%    Breastfeeding No    BMI 32.95 kg/m2         Intake/Output Summary (Last 24 hours) at 02/16/18 1515  Last data filed at 02/16/18 0000   Gross per 24 hour   Intake              255 ml   Output                2 ml   Net              253 ml        Physical Examination:             Constitutional:  No acute distress, cooperative, pleasant    ENT:  Oral mucous moist, oropharynx benign. Neck supple   Resp:  CTA bilaterally. No wheezing/rhonchi/rales. No accessory muscle use   CV:  Regular rhythm, normal rate, no murmurs, gallops, rubs    GI:  Soft, non distended, non tender. normoactive bowel sounds, no hepatosplenomegaly     Musculoskeletal:  No edema, warm, 2+ pulses throughout    Neurologic:  Moves all extremities. AAOx3, CN II-XII reviewed     Psych:  Good insight, Not anxious nor agitated. Skin:  Good turgor, no rashes or ulcers       Data Review:    Review and/or order of clinical lab test  Review and/or order of tests in the radiology section of CPT  Review and/or order of tests in the medicine section of CPT      Labs:     Recent Labs      02/16/18   0403  02/15/18   2057  02/15/18   1311   WBC  7.6   --   7.0   HGB  6.8*  7.9*  8.3*   HCT  22.8*   --   26.7*   PLT  113*   --   141*     Recent Labs      02/16/18   0403  02/15/18   1311   NA  142  142   K  5.4*  5.2*   CL  118*  113*   CO2  13*  22   BUN  69*  54*   CREA  1.28*  1.11*   GLU  126*  97   CA  9.9  9.8     Recent Labs      02/16/18   0403  02/15/18   1311   SGOT  25  19   ALT  18  19   AP  35*  48   TBILI  0.3  0.4   TP  5.4*  6.1*   ALB  2.1*  3.0*   GLOB  3.3  3.1   LPSE   --   440*     No results for input(s): INR, PTP, APTT in the last 72 hours.     No lab exists for component: INREXT   No results for input(s): FE, TIBC, PSAT, FERR in the last 72 hours. Lab Results   Component Value Date/Time    Folate 15.1 02/25/2014 07:30 PM      No results for input(s): PH, PCO2, PO2 in the last 72 hours.   Recent Labs      02/15/18   1311   TROIQ  <0.04     Lab Results   Component Value Date/Time    Triglyceride 87 02/24/2014 02:30 AM     Lab Results   Component Value Date/Time    Glucose (POC) 161 (H) 02/16/2018 06:43 AM    Glucose (POC) 106 (H) 02/15/2018 10:12 PM    Glucose (POC) 109 (H) 11/25/2017 08:46 PM    Glucose (POC) 100 10/24/2017 07:25 AM    Glucose (POC) 118 (H) 10/23/2017 08:32 PM     Lab Results   Component Value Date/Time    Color YELLOW/STRAW 06/11/2017 09:56 PM    Appearance CLEAR 06/11/2017 09:56 PM    Specific gravity 1.010 06/11/2017 09:56 PM    pH (UA) 5.0 06/11/2017 09:56 PM    Protein NEGATIVE  06/11/2017 09:56 PM    Glucose NEGATIVE  06/11/2017 09:56 PM    Ketone NEGATIVE  06/11/2017 09:56 PM    Bilirubin NEGATIVE  06/11/2017 09:56 PM    Urobilinogen 0.2 06/11/2017 09:56 PM    Nitrites NEGATIVE  06/11/2017 09:56 PM    Leukocyte Esterase SMALL (A) 06/11/2017 09:56 PM    Epithelial cells FEW 06/11/2017 09:56 PM    Bacteria 1+ (A) 06/11/2017 09:56 PM    WBC 10-20 06/11/2017 09:56 PM    RBC 0-5 06/11/2017 09:56 PM         Medications Reviewed:     Current Facility-Administered Medications   Medication Dose Route Frequency    lactated Ringers infusion  75 mL/hr IntraVENous CONTINUOUS    sucralfate (CARAFATE) 100 mg/mL oral suspension 1 g  1 g Oral AC&HS    iron sucrose (VENOFER) 200 mg in 0.9% sodium chloride 100 mL IVPB  200 mg IntraVENous Q24H    pantoprazole (PROTONIX) 40 mg in sodium chloride 10 mL injection  40 mg IntraVENous Q12H    amLODIPine (NORVASC) tablet 2.5 mg  2.5 mg Oral DAILY    donepezil (ARICEPT) tablet 5 mg  5 mg Oral QHS    metoprolol succinate (TOPROL-XL) XL tablet 50 mg  50 mg Oral DAILY    sertraline (ZOLOFT) tablet 100 mg  100 mg Oral QHS    sodium chloride (NS) flush 5-10 mL  5-10 mL IntraVENous Q8H    sodium chloride (NS) flush 5-10 mL  5-10 mL IntraVENous PRN    ondansetron (ZOFRAN) injection 4 mg  4 mg IntraVENous Q4H PRN    albuterol (PROVENTIL VENTOLIN) nebulizer solution 2.5 mg  2.5 mg Nebulization QID PRN    influenza vaccine 2017-18 (3 yrs+)(PF) (FLUZONE QUAD/FLUARIX QUAD) injection 0.5 mL  0.5 mL IntraMUSCular PRIOR TO DISCHARGE     ______________________________________________________________________  EXPECTED LENGTH OF STAY: 2d 4h  ACTUAL LENGTH OF STAY:          1                 Una Cordero V, NP

## 2018-02-16 NOTE — CONSULTS
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 She was admitted yesterday after reporting black stools on aspirin with hgb 8.3 dropping further to 6.8 today. She had had EGD with bx 2/14. Repeat EGD today noted \"Esophagus: esophagitis at the GEJ area of Bx not bleeding Stomach: tiny ulcer of the antrum from prior Bx; small moderately deep ulcer with nonbleeding visible vessel in the funds presumed at site of prior Bx - cauterized with Gold Probe and clipped x 5 Duodenum/jejunum: normal\"    Currently reports she is feeling about the same. No black stools prior to the earlier EGD. No pains or palpable masses. No numbness or tingling. Some back discomfort but stable without any recent significant progression. She reports she is     PAST MEDICAL HISTORY:  diabetes, hypertension, arteriosclerotic heart disease, status post total abdominal hysterectomy, appendectomy, hiatal hernia, history of pancreatitis, questionably had cancer of the right kidney and right leg DVT.     FAMILY HISTORY: Positive in that 2 members had lung cancer. A couple had other cancers. SOCIAL HISTORY: She has 4 children. She lives at home with her . 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 She smoked ~40 years ago, but has not smoked since. She used  to drink a little bit and does not drink anymore.      Lewis Bill-    Patient Vitals for the past 24 hrs:   Temp Pulse Resp BP SpO2   02/16/18 0920 - - - 102/46 97 %   02/16/18 0910 - 70 18 121/49 96 %   02/16/18 0900 - 68 20 107/49 96 %   02/16/18 0855 - 64 25 112/49 97 %   02/16/18 0850 - 67 22 134/50 95 %   02/16/18 0845 - 71 27 (!) 159/117 94 %   02/16/18 0840 - 63 20 123/53 96 %   02/16/18 0836 - 72 20 139/65 97 %   02/16/18 0835 - 73 20 139/65 96 %   02/16/18 0833 - 69 - 111/50 92 %   02/16/18 0830 - 61 21 117/46 95 %   02/16/18 0827 - 62 18 136/59 100 %   02/16/18 0825 - 62 20 136/59 100 %   02/16/18 0824 - 63 23 136/59 100 %   02/16/18 0821 - 62 15 136/59 98 %   02/16/18 0820 - 60 14 136/59 99 %   02/16/18 0818 - 61 16 - 96 %   02/16/18 0805 - 62 16 136/59 -   02/16/18 0335 98.5 °F (36.9 °C) 60 18 133/54 98 %   02/15/18 2340 98.5 °F (36.9 °C) 60 16 141/57 95 %   02/15/18 1919 99 °F (37.2 °C) 60 18 161/56 96 %   02/15/18 1800 - - - 136/62 96 %   02/15/18 1741 - 76 18 135/64 96 %   02/15/18 1700 - - - 154/70 95 %   02/15/18 1600 - - - 150/59 98 %   02/15/18 1500 - - - 148/64 94 %   02/15/18 1400 - - - 158/66 96 %        Gen: nad  H+N: oral cavity moist  Lymph: no palpable RITU neck, ax  Chest: CTAB  CV: RRR  Abd: s/nt  Extr: no edema   Skin: no excessive bruising  Neuro: moving all extremities    -Labs-    Recent Labs      02/16/18   0403  02/15/18   2057  02/15/18   1311   WBC  7.6   --   7.0   HGB  6.8*  7.9*  8.3*   PLT  113*   --   141*   ANEU  4.2   --   3.8   NA  142   --   142   K  5.4*   --   5.2*   GLU  126*   --   97   BUN  69*   --   54*   CREA  1.28*   --   1.11*   ALT  18   --   19   SGOT  25   --   19   TBILI  0.3   --   0.4   AP  35*   --   48   CA  9.9   --   9.8     12/19/16: hgb 10.5      -Imaging-  --1/23/18- CT L spine   1. L3-4 severe spinal stenosis. 2. L4-5 prior posterior laminectomy. Degenerative spondylolisthesis with  moderately severe spinal stenosis and bilateral foramina stenosis. 3. L5-S1 right facet arthrosis and right foramina stenosis. -- 2/14/18- pa/lat- No acute process.  Stable exam.    -Assessment + Plan-     *) anemia, acute on chronic, secondary to GI blood loss after EGD w bx 2/14  ---- refuses blood transfusions for [employer] reasons  ---- getting venofer 200 mg IV daily x 3, 2/16->  ---- start epo, and oral supplements (b12, folate)

## 2018-02-16 NOTE — PROGRESS NOTES
Problem: Upper and Lower GI Bleed: Day 1  Goal: *Optimal pain control at patient's stated goal  Outcome: Progressing Towards Goal  Patient denies any pain at this time. Goal: *Hemodynamically stable  Outcome: Progressing Towards Goal  Last HGB of 7.9 patient currently on Q 8hr H&H. Vital signs are also stable as well. Goal: *Demonstrates progressive activity  Outcome: Progressing Towards Goal  Patient up with assistance X 1 and patient calls when needing to get OOB.

## 2018-02-16 NOTE — ROUTINE PROCESS
TRANSFER - IN REPORT:    Verbal report received from BODØ (name) on Becky Elder  being received from ED (unit) for routine progression of care      Report consisted of patients Situation, Background, Assessment and   Recommendations(SBAR). Information from the following report(s) SBAR, Intake/Output, MAR, Recent Results and Cardiac Rhythm A-Paced was reviewed with the receiving nurse. Opportunity for questions and clarification was provided. Assessment completed upon patients arrival to unit and care assumed.

## 2018-02-16 NOTE — ROUTINE PROCESS
Bedside shift change report given to Candy Hargrove (oncoming nurse) by Erna Reyna (offgoing nurse). Report included the following information SBAR, Intake/Output, MAR, Recent Results and Cardiac Rhythm A-Paced.

## 2018-02-16 NOTE — PERIOP NOTES
Pt orally suctioned intermittently during the procedure for some bile colored secretions. Abd soft. Pt denies pain. TRANSFER - OUT REPORT:    Verbal report given to April(name) on Reymundo Campbell  being transferred to [facility](unit) for ordered procedure       Report consisted of patients Situation, Background, Assessment and   Recommendations(SBAR). Information from the following report(s) SBAR and Procedure Summary was reviewed with the receiving nurse. Lines:   Peripheral IV 02/15/18 Left Antecubital (Active)   Site Assessment Clean, dry, & intact 2/16/2018 12:00 AM   Phlebitis Assessment 0 2/16/2018 12:00 AM   Infiltration Assessment 0 2/16/2018 12:00 AM   Dressing Status Clean, dry, & intact 2/16/2018 12:00 AM   Dressing Type Transparent;Tape 2/16/2018 12:00 AM   Hub Color/Line Status Pink; Infusing 2/16/2018 12:00 AM   Action Taken Open ports on tubing capped 2/15/2018  7:53 PM   Alcohol Cap Used Yes 2/16/2018 12:00 AM        Opportunity for questions and clarification was provided. Nuclear med scan anticipated before transfer to floor. .Endoscope was pre-cleaned at bedside immediately following procedure by ISAIAH Mattson.

## 2018-02-16 NOTE — PROGRESS NOTES
0000: receive report from Lance Richardson, [address] [facility]. Patient resting in bed at this time. 0335: Patient assisted to [facility] with PCT. PCT alerted nurse that patient felt very dizzy while sitting on BSC. This RN in patient's room to assist with getting patient back to bed. Patient with medium amount of dark watery stools at this time. 0340: Patient assisted back to bed at this time. VS taken as well.  0400: Labs drawn early including CBC at this time. 7081: Zofran given at this time for some nausea. 0515: Patient resting in bed at this time. 26: Spoke with nuclear med person erick and stated will be up to get patient in 30 minutes  0730: Spoke with Mag from endoscopy report given. [facility] spoke with Dr. Todd Topete and would like for patient to go to endoscopy first and then nuclear med next. TRANSFER - OUT REPORT:    Verbal report given to Mag(name) on Zackery Lozano  being transferred to endo(unit) for ordered procedure       Report consisted of patients Situation, Background, Assessment and   Recommendations(SBAR). Information from the following report(s) SBAR, Kardex, Intake/Output, MAR, Recent Results, Med Rec Status and Cardiac Rhythm paced was reviewed with the receiving nurse. Lines:   Peripheral IV 02/15/18 Left Antecubital (Active)   Site Assessment Clean, dry, & intact 2/16/2018 12:00 AM   Phlebitis Assessment 0 2/16/2018 12:00 AM   Infiltration Assessment 0 2/16/2018 12:00 AM   Dressing Status Clean, dry, & intact 2/16/2018 12:00 AM   Dressing Type Transparent;Tape 2/16/2018 12:00 AM   Hub Color/Line Status Pink; Infusing 2/16/2018 12:00 AM   Action Taken Open ports on tubing capped 2/15/2018  7:53 PM   Alcohol Cap Used Yes 2/16/2018 12:00 AM        Opportunity for questions and clarification was provided. Patient transported with:   Monitor        0800: Bedside and Verbal shift change report given to RAPHAEL Page (oncoming nurse) by RAPHAEL Atkinson (offgoing nurse).  Report included the following information SBAR, Kardex, Intake/Output, MAR, Recent Results, Med Rec Status and Cardiac Rhythm Paced.

## 2018-02-16 NOTE — ROUTINE PROCESS
Lloyd Hanson  1935  [id number]    Situation:  Verbal report received from: Marlyn  Procedure: Procedure(s):  ESOPHAGOGASTRODUODENOSCOPY (EGD)  BICAP  RESOLUTION CLIP    Background:    Preoperative diagnosis: GIB  Postoperative diagnosis: GI ulcer    :  Dr. Baldev Smith  Assistant(s): Endoscopy Technician-1: Juan Dickinson  Endoscopy RN-1: Luisito Bales RN    Specimens: * No specimens in log *  H. Pylori  no    Assessment:  Intra-procedure medications     Anesthesia gave intra-procedure sedation and medications, see anesthesia flow sheet yes    Intravenous fluids: NS@ KVO     Vital signs stable yes    Abdominal assessment: round and soft  yse     Recommendation:  Discharge patient per MD order naReturn to floor yes  Family or Friend na  Permission to share finding with family or friend yes and n/a

## 2018-02-16 NOTE — PERIOP NOTES
TRANSFER - IN REPORT:    Verbal report received from Gloria(name) on Jeb Hews  being received from [facility](unit) for ordered procedure      Report consisted of patients Situation, Background, Assessment and   Recommendations(SBAR). Information from the following report(s) SBAR was reviewed with the receiving nurse. Opportunity for questions and clarification was provided. Assessment completed upon patients arrival to unit and care assumed.

## 2018-02-16 NOTE — PROCEDURES
EGD Procedure Note    Indications:  Gastrointestinal hemorrhage, unspecified, pt s/p EGD and Bx 2 days ago   Referring Physician: Sabra Phillips MD   Anesthesia/Sedation:Versed 3.5 mg and Fentanyl 50 mcg  Endoscopist:  Dr. Hugo Osuna  Assistant:  Endoscopy Technician-1: Clarice Mcclure  Endoscopy RN-1: Isa Varner RN    Preoperative diagnosis: GIB    Postoperative diagnosis: GI ulcer      Procedure in Detail:  Informed consent was obtained for the procedure, including sedation. Risks of perforation, hemorrhage, adverse drug reaction, and aspiration were discussed. The patient was placed in the left lateral decubitus position. Based on the pre-procedure assessment, including review of the patient's medical history, medications, allergies, and review of systems, she had been deemed to be an appropriate candidate for moderate sedation; she was therefore sedated with the medications listed above. The patient was monitored continuously with ECG tracing, pulse oximetry, blood pressure monitoring, and direct observations. An Olympus video endoscope was inserted into the mouth and advanced under direct vision to into the esophagus, then stomach and duodenum. A careful inspection was made as the gastroscope was withdrawn, including a retroflexed view of the proximal stomach; findings and interventions are described below. Findings:no blood in the UGI   Esophagus:esophagitis at the GEJ area of Bx not bleeding  Stomach: tiny ulcer of the antrum from prior Bx; small moderately deep ulcer with nonbleeding visible vessel in the funds presumed at site of prior Bx - cauterized with Gold Probe and clipped x 5  Duodenum/jejunum: normal    Therapies:  See above    Specimens: See above           EBL: None    Complications:   None; patient tolerated the procedure well. Recommendations:  -Acid suppression with a proton pump inhibitor. , -No NSAIDS, -start carafate,clear liquids, still need to check bleeding scan, Heme consult for Procrit and iron infusion    Ila Rojo MD

## 2018-02-17 LAB
ANION GAP SERPL CALC-SCNC: 5 MMOL/L (ref 5–15)
BUN SERPL-MCNC: 55 MG/DL (ref 6–20)
BUN/CREAT SERPL: 43 (ref 12–20)
CALCIUM SERPL-MCNC: 9.2 MG/DL (ref 8.5–10.1)
CHLORIDE SERPL-SCNC: 114 MMOL/L (ref 97–108)
CO2 SERPL-SCNC: 23 MMOL/L (ref 21–32)
CREAT SERPL-MCNC: 1.28 MG/DL (ref 0.55–1.02)
GLUCOSE BLD STRIP.AUTO-MCNC: 112 MG/DL (ref 65–100)
GLUCOSE BLD STRIP.AUTO-MCNC: 136 MG/DL (ref 65–100)
GLUCOSE SERPL-MCNC: 89 MG/DL (ref 65–100)
HGB BLD-MCNC: 5.7 G/DL (ref 11.5–16)
POTASSIUM SERPL-SCNC: 4.1 MMOL/L (ref 3.5–5.1)
SERVICE CMNT-IMP: ABNORMAL
SERVICE CMNT-IMP: ABNORMAL
SODIUM SERPL-SCNC: 142 MMOL/L (ref 136–145)

## 2018-02-17 PROCEDURE — 74011000258 HC RX REV CODE- 258: Performed by: NURSE PRACTITIONER

## 2018-02-17 PROCEDURE — 85018 HEMOGLOBIN: CPT | Performed by: FAMILY MEDICINE

## 2018-02-17 PROCEDURE — 80048 BASIC METABOLIC PNL TOTAL CA: CPT | Performed by: HOSPITALIST

## 2018-02-17 PROCEDURE — 74011250637 HC RX REV CODE- 250/637: Performed by: SPECIALIST

## 2018-02-17 PROCEDURE — 74011250636 HC RX REV CODE- 250/636: Performed by: NURSE PRACTITIONER

## 2018-02-17 PROCEDURE — 74011250637 HC RX REV CODE- 250/637: Performed by: FAMILY MEDICINE

## 2018-02-17 PROCEDURE — 74011250637 HC RX REV CODE- 250/637: Performed by: INTERNAL MEDICINE

## 2018-02-17 PROCEDURE — 74011000250 HC RX REV CODE- 250: Performed by: NURSE PRACTITIONER

## 2018-02-17 PROCEDURE — C9113 INJ PANTOPRAZOLE SODIUM, VIA: HCPCS | Performed by: NURSE PRACTITIONER

## 2018-02-17 PROCEDURE — 36415 COLL VENOUS BLD VENIPUNCTURE: CPT | Performed by: FAMILY MEDICINE

## 2018-02-17 PROCEDURE — 65270000029 HC RM PRIVATE

## 2018-02-17 PROCEDURE — 82962 GLUCOSE BLOOD TEST: CPT

## 2018-02-17 RX ORDER — ALBUTEROL SULFATE 0.83 MG/ML
2.5 SOLUTION RESPIRATORY (INHALATION)
Status: DISCONTINUED | OUTPATIENT
Start: 2018-02-17 | End: 2018-02-22 | Stop reason: HOSPADM

## 2018-02-17 RX ADMIN — SUCRALFATE 1 G: 1 SUSPENSION ORAL at 21:53

## 2018-02-17 RX ADMIN — SERTRALINE HYDROCHLORIDE 100 MG: 50 TABLET ORAL at 21:34

## 2018-02-17 RX ADMIN — CYANOCOBALAMIN TAB 500 MCG 250 MCG: 500 TAB at 08:58

## 2018-02-17 RX ADMIN — SODIUM CHLORIDE 40 MG: 9 INJECTION INTRAMUSCULAR; INTRAVENOUS; SUBCUTANEOUS at 08:58

## 2018-02-17 RX ADMIN — SUCRALFATE 1 G: 1 SUSPENSION ORAL at 07:06

## 2018-02-17 RX ADMIN — Medication 10 ML: at 21:35

## 2018-02-17 RX ADMIN — FOLIC ACID 1 MG: 1 TABLET ORAL at 08:57

## 2018-02-17 RX ADMIN — DONEPEZIL HYDROCHLORIDE 5 MG: 5 TABLET, FILM COATED ORAL at 21:34

## 2018-02-17 RX ADMIN — SODIUM CHLORIDE 40 MG: 9 INJECTION INTRAMUSCULAR; INTRAVENOUS; SUBCUTANEOUS at 21:34

## 2018-02-17 RX ADMIN — ACETAMINOPHEN 650 MG: 325 TABLET, FILM COATED ORAL at 14:39

## 2018-02-17 RX ADMIN — IRON SUCROSE 200 MG: 20 INJECTION, SOLUTION INTRAVENOUS at 17:20

## 2018-02-17 RX ADMIN — SUCRALFATE 1 G: 1 SUSPENSION ORAL at 11:11

## 2018-02-17 RX ADMIN — Medication 10 ML: at 16:47

## 2018-02-17 RX ADMIN — SUCRALFATE 1 G: 1 SUSPENSION ORAL at 16:46

## 2018-02-17 RX ADMIN — Medication 10 ML: at 07:07

## 2018-02-17 NOTE — ROUTINE PROCESS
Bedside and Verbal shift change report given to April RN (oncoming nurse) by MINI Hamilton RN (offgoing nurse). Report given with SBAR, Kardex, Intake/Output, MAR and Recent Results.

## 2018-02-17 NOTE — PROGRESS NOTES
I saw and examined MS. Becky Elder. reviewed chart. Noted drop in HB. She is [employer] and confirmed she wont receive blood products. She rightly questioned need for frequent blood draw when she is not going to receive blood. I agree. We can check it once daily and minimize blood draw only for ESSENTIAL TEST. Receiving IV iron. See the A&P from [facility] for details.     Jasmin Padilla MD

## 2018-02-17 NOTE — PROGRESS NOTES
Oncology Progress Note     Admit Date: 2/15/2018    Pt is doing fair, had another large black stool last night, feels lightheaded; no cp or sob; hgb 5.7    Interval History       Subjective:         Current Facility-Administered Medications   Medication Dose Route Frequency    sucralfate (CARAFATE) 100 mg/mL oral suspension 1 g  1 g Oral AC&HS    iron sucrose (VENOFER) 200 mg in 0.9% sodium chloride 100 mL IVPB  200 mg IntraVENous D37D    folic acid (FOLVITE) tablet 1 mg  1 mg Oral DAILY    cyanocobalamin (VITAMIN B12) tablet 250 mcg  250 mcg Oral DAILY    epoetin gigi (EPOGEN;PROCRIT) 24,000 Units  24,000 Units SubCUTAneous Q48H    acetaminophen (TYLENOL) tablet 650 mg  650 mg Oral Q6H PRN    pantoprazole (PROTONIX) 40 mg in sodium chloride 10 mL injection  40 mg IntraVENous Q12H    amLODIPine (NORVASC) tablet 2.5 mg  2.5 mg Oral DAILY    donepezil (ARICEPT) tablet 5 mg  5 mg Oral QHS    metoprolol succinate (TOPROL-XL) XL tablet 50 mg  50 mg Oral DAILY    sertraline (ZOLOFT) tablet 100 mg  100 mg Oral QHS    sodium chloride (NS) flush 5-10 mL  5-10 mL IntraVENous Q8H    sodium chloride (NS) flush 5-10 mL  5-10 mL IntraVENous PRN    ondansetron (ZOFRAN) injection 4 mg  4 mg IntraVENous Q4H PRN    albuterol (PROVENTIL VENTOLIN) nebulizer solution 2.5 mg  2.5 mg Nebulization QID PRN    influenza vaccine - (3 yrs+)(PF) (FLUZONE QUAD/FLUARIX QUAD) injection 0.5 mL  0.5 mL IntraMUSCular PRIOR TO DISCHARGE        Review of Systems:  Pertinent items are noted in HPI.     Objective:     Patient Vitals for the past 8 hrs:   BP Temp Pulse Resp SpO2 Weight   18 0740 128/49 98.6 °F (37 °C) 71 18 96 % -   18 0410 104/47 98.2 °F (36.8 °C) 71 18 96 % 90.1 kg (198 lb 10.2 oz)       Temp (24hrs), Av.3 °F (36.8 °C), Min:98.2 °F (36.8 °C), Max:98.6 °F (37 °C)           Physical Exam:  Alert  No icterus  Lungs cta  Heart regular  Abdomen soft      Labs:    Recent Results (from the past 24 hour(s)) HEMOGLOBIN    Collection Time: 02/17/18  1:05 AM   Result Value Ref Range    HGB 5.7 (LL) 11.5 - 83.5 g/dL   METABOLIC PANEL, BASIC    Collection Time: 02/17/18  1:10 AM   Result Value Ref Range    Sodium 142 136 - 145 mmol/L    Potassium 4.1 3.5 - 5.1 mmol/L    Chloride 114 (H) 97 - 108 mmol/L    CO2 23 21 - 32 mmol/L    Anion gap 5 5 - 15 mmol/L    Glucose 89 65 - 100 mg/dL    BUN 55 (H) 6 - 20 MG/DL    Creatinine 1.28 (H) 0.55 - 1.02 MG/DL    BUN/Creatinine ratio 43 (H) 12 - 20      GFR est AA 49 (L) >60 ml/min/1.73m2    GFR est non-AA 40 (L) >60 ml/min/1.73m2    Calcium 9.2 8.5 - 10.1 MG/DL       Assessment:     Principal Problem/plan:  Anemia 2 to bleeding , [employer]; refuses blood transfusion, on procrit, T24, folic acid , s/p venofer; minimize blood draws;    GI bleeding  On PPI and sucralfate; another event last night; GI following

## 2018-02-17 NOTE — PROGRESS NOTES
Problem: Falls - Risk of  Goal: *Absence of Falls  Document Mayuri Fall Risk and appropriate interventions in the flowsheet.    Outcome: Progressing Towards Goal  Fall Risk Interventions:  Mobility Interventions: Patient to call before getting OOB         Medication Interventions: Patient to call before getting OOB    Elimination Interventions: Call light in reach    History of Falls Interventions: Door open when patient unattended, Investigate reason for fall

## 2018-02-17 NOTE — PROGRESS NOTES
Bedside shift change report given to Bree Reinoso RN (oncoming nurse) by RAPHAEL Page (offgoing nurse). Report included the following information SBAR, Kardex, Intake/Output, MAR, Accordion and Cardiac Rhythm paced.

## 2018-02-17 NOTE — PROGRESS NOTES
[address]  [address] [facility], [address]    GI PROGRESS NOTE    NAME: Annel Darby   :  1935   MRN:  [id number]       Subjective:     No more GI bleed  Review of Systems    Constitutional: negative fever, negative chills, negative weight loss  Eyes:   negative visual changes  ENT:   negative sore throat, tongue or lip swelling  Respiratory:  negative cough, negative dyspnea  Cards:  negative for chest pain, palpitations, lower extremity edema  GI:   See HPI  :  negative for frequency, dysuria  Integument:  negative for rash and pruritus  Heme:  negative for easy bruising and gum/nose bleeding  Musculoskel: negative for myalgias,  back pain and muscle weakness  Neuro: negative for headaches, dizziness, vertigo  Psych:  negative for feelings of anxiety, depression         Objective:     VITALS:   Last 24hrs VS reviewed since prior progress note. Most recent are:  Visit Vitals    /43 (BP 1 Location: Left arm, BP Patient Position: At rest;Lying right side)    Pulse 69    Temp 98.9 °F (37.2 °C)    Resp 18    Ht 5' 5\" (1.651 m)    Wt 90.1 kg (198 lb 10.2 oz)    SpO2 97%    Breastfeeding No    BMI 33.05 kg/m2       Intake/Output Summary (Last 24 hours) at 18 1437  Last data filed at 18 0855   Gross per 24 hour   Intake              350 ml   Output                0 ml   Net              350 ml     PHYSICAL EXAM:  General: WD, WN. Alert, cooperative, no acute distress    HEENT: NC, Atraumatic. PERRLA, EOMI. Anicteric sclerae. Lungs:  CTA Bilaterally. No Wheezing/Rhonchi/Rales. Heart:  Regular  rhythm,  No murmur (), No Rubs, No Gallops  Abdomen: Soft, Non distended, Non tender.  +Bowel sounds, no HSM  Extremities: No c/c/e  Neurologic:  CN 2-12 gi, Alert and oriented X 3. No acute neurological distress   Psych:   Good insight. Not anxious nor agitated.     Lab Data Reviewed:   Recent Labs      18   0105  18   0403   02/15/18   1311 WBC   --   7.6   --   7.0   HGB  5.7*  6.8*   < >  8.3*   HCT   --   22.8*   --   26.7*   PLT   --   113*   --   141*    < > = values in this interval not displayed.      Recent Labs      02/17/18   0110  02/16/18   0403   NA  142  142   K  4.1  5.4*   CL  114*  118*   CO2  23  13*   BUN  55*  69*   CREA  1.28*  1.28*   GLU  89  126*   CA  9.2  9.9     Recent Labs      02/16/18   0403  02/15/18   1311   SGOT  25  19   AP  35*  48   TP  5.4*  6.1*   ALB  2.1*  3.0*   GLOB  3.3  3.1   LPSE   --   440*       ________________________________________________________________________       Assessment:   · Anemia from GI bleed from gastric bx site, treated with cautery and clips     Patient Active Problem List   Diagnosis Code    Sinus node dysfunction (HCC) I49.5    Localized primary 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 Indigo Calvillo will follow on monday     Signed By: Tia Pizano MD     2/17/2018  2:37 PM

## 2018-02-17 NOTE — PROGRESS NOTES
Hospitalist Progress Note  Cristi Azevedo NP  Answering service: [phone] from in house phone  Cell: [phone]      Date of Service:  2018  NAME:  Jory Castellanos  :  1935  MRN:  [id number]      Admission Summary:   Jory Castellanos is a [de-identified] y.o. female who presents with HTN, asthma, anemia, arrhythmia S/P pace maker, PUD, arthritis, DVT, GERD, who was sent to the ED today form her GI office. Patient had EGD on  for GERD by Dr Hector Aguilar and no acute pathology was detected. Patient had mild abdominal pain and had black color stool last night. Patient had black color stool this AM as well. Patient went to her GI MD today form where she was referred here. Patient feels fatigue and and lightheadedness. The patient denies any fever, chills, chest pain, cough, congestion, recent illness, palpitations, or dysuria. She denies any nausea and vomiting. She denies any abdominal pain now. Interval history / Subjective:   Negative bleeding scan  Large dark stool last night, some dizziness  Agree to minimize blood draws and allow medication to work, will recheck on Monday morning  Advance diet to gi lite     Assessment & Plan:     GI bleed:   2/2 gastic ulcer, probably from bx site earlier this week  S/p egd with Dr. Hector Aguilar  Monitor H&H  Ppi, carafate, clears  Hold asa    Acute on chronic anemia: NO BLOOD PRODUCTS Davin Payal  Hx of ckd likely contributing to chronicity  Iv Alejandro,. Procrit, G93, folic acid  Hematology consult  Fall precuations     Hypertension: normotensive. C/w home med's     CKD stage III - stable    Mild Hyperkalemia: Resolved   Probably due to GI bleed. Will do D5 and monitor.  NO kayexalate 2/2 gib     Asthma: Compensated, continue her home medicines.        Code status: full  DVT prophylaxis: SCD's  Care Plan discussed with: patient, daughters, attending MD  Disposition: lives with her  Hospital Problems  Date Reviewed: 8/20/2017          Codes Class Noted POA    * (Principal)GI bleed ICD-10-CM: K92.2  ICD-9-CM: 578.9  2/15/2018 Yes                Review of Systems:   Jittery stomach  Denies abd pain n/v/d  Denies cp/sob      Vital Signs:    Last 24hrs VS reviewed since prior progress note. Most recent are:  Visit Vitals    /49 (BP 1 Location: Right arm, BP Patient Position: At rest)    Pulse 71    Temp 98.6 °F (37 °C)    Resp 18    Ht 5' 5\" (1.651 m)    Wt 90.1 kg (198 lb 10.2 oz)    SpO2 96%    Breastfeeding No    BMI 33.05 kg/m2         Intake/Output Summary (Last 24 hours) at 02/17/18 1042  Last data filed at 02/17/18 0110   Gross per 24 hour   Intake              230 ml   Output                0 ml   Net              230 ml        Physical Examination:             Constitutional:  No acute distress, cooperative, pleasant    ENT:  Oral mucous moist, oropharynx benign. Neck supple   Resp:  CTA bilaterally. No wheezing/rhonchi/rales. No accessory muscle use   CV:  Regular rhythm, normal rate, no murmurs, gallops, rubs    GI:  Soft, non distended, non tender. normoactive bowel sounds, no hepatosplenomegaly     Musculoskeletal:  No edema, warm, 2+ pulses throughout    Neurologic:  Moves all extremities. AAOx3, CN II-XII reviewed     Psych:  Good insight, Not anxious nor agitated. Skin:  Good turgor, no rashes or ulcers       Data Review:    Review and/or order of clinical lab test  Review and/or order of tests in the radiology section of CPT  Review and/or order of tests in the medicine section of CPT      Labs:     Recent Labs      02/17/18   0105  02/16/18   0403   02/15/18   1311   WBC   --   7.6   --   7.0   HGB  5.7*  6.8*   < >  8.3*   HCT   --   22.8*   --   26.7*   PLT   --   113*   --   141*    < > = values in this interval not displayed.      Recent Labs      02/17/18   0110  02/16/18   0403  02/15/18   1311   NA  142  142  142   K  4.1  5.4*  5.2*   CL  114*  118* 113*   CO2  23  13*  22   BUN  55*  69*  54*   CREA  1.28*  1.28*  1.11*   GLU  89  126*  97   CA  9.2  9.9  9.8     Recent Labs      02/16/18   0403  02/15/18   1311   SGOT  25  19   ALT  18  19   AP  35*  48   TBILI  0.3  0.4   TP  5.4*  6.1*   ALB  2.1*  3.0*   GLOB  3.3  3.1   LPSE   --   440*     No results for input(s): INR, PTP, APTT in the last 72 hours. No lab exists for component: INREXT, INREXT   No results for input(s): FE, TIBC, PSAT, FERR in the last 72 hours. Lab Results   Component Value Date/Time    Folate 15.1 02/25/2014 07:30 PM      No results for input(s): PH, PCO2, PO2 in the last 72 hours.   Recent Labs      02/15/18   1311   TROIQ  <0.04     Lab Results   Component Value Date/Time    Triglyceride 87 02/24/2014 02:30 AM     Lab Results   Component Value Date/Time    Glucose (POC) 161 (H) 02/16/2018 06:43 AM    Glucose (POC) 106 (H) 02/15/2018 10:12 PM    Glucose (POC) 109 (H) 11/25/2017 08:46 PM    Glucose (POC) 100 10/24/2017 07:25 AM    Glucose (POC) 118 (H) 10/23/2017 08:32 PM     Lab Results   Component Value Date/Time    Color YELLOW/STRAW 06/11/2017 09:56 PM    Appearance CLEAR 06/11/2017 09:56 PM    Specific gravity 1.010 06/11/2017 09:56 PM    pH (UA) 5.0 06/11/2017 09:56 PM    Protein NEGATIVE  06/11/2017 09:56 PM    Glucose NEGATIVE  06/11/2017 09:56 PM    Ketone NEGATIVE  06/11/2017 09:56 PM    Bilirubin NEGATIVE  06/11/2017 09:56 PM    Urobilinogen 0.2 06/11/2017 09:56 PM    Nitrites NEGATIVE  06/11/2017 09:56 PM    Leukocyte Esterase SMALL (A) 06/11/2017 09:56 PM    Epithelial cells FEW 06/11/2017 09:56 PM    Bacteria 1+ (A) 06/11/2017 09:56 PM    WBC 10-20 06/11/2017 09:56 PM    RBC 0-5 06/11/2017 09:56 PM         Medications Reviewed:     Current Facility-Administered Medications   Medication Dose Route Frequency    sucralfate (CARAFATE) 100 mg/mL oral suspension 1 g  1 g Oral AC&HS    iron sucrose (VENOFER) 200 mg in 0.9% sodium chloride 100 mL IVPB  200 mg IntraVENous U98D    folic acid (FOLVITE) tablet 1 mg  1 mg Oral DAILY    cyanocobalamin (VITAMIN B12) tablet 250 mcg  250 mcg Oral DAILY    epoetin gigi (EPOGEN;PROCRIT) 24,000 Units  24,000 Units SubCUTAneous Q48H    acetaminophen (TYLENOL) tablet 650 mg  650 mg Oral Q6H PRN    pantoprazole (PROTONIX) 40 mg in sodium chloride 10 mL injection  40 mg IntraVENous Q12H    amLODIPine (NORVASC) tablet 2.5 mg  2.5 mg Oral DAILY    donepezil (ARICEPT) tablet 5 mg  5 mg Oral QHS    metoprolol succinate (TOPROL-XL) XL tablet 50 mg  50 mg Oral DAILY    sertraline (ZOLOFT) tablet 100 mg  100 mg Oral QHS    sodium chloride (NS) flush 5-10 mL  5-10 mL IntraVENous Q8H    sodium chloride (NS) flush 5-10 mL  5-10 mL IntraVENous PRN    ondansetron (ZOFRAN) injection 4 mg  4 mg IntraVENous Q4H PRN    albuterol (PROVENTIL VENTOLIN) nebulizer solution 2.5 mg  2.5 mg Nebulization QID PRN    influenza vaccine 2017-18 (3 yrs+)(PF) (FLUZONE QUAD/FLUARIX QUAD) injection 0.5 mL  0.5 mL IntraMUSCular PRIOR TO DISCHARGE     ______________________________________________________________________  EXPECTED LENGTH OF STAY: 2d 4h  ACTUAL LENGTH OF STAY:          2                 Una Cordero V, NP

## 2018-02-17 NOTE — PROGRESS NOTES
Primary Nurse Tracy Knight and Alex Fernandez, RN performed a dual skin assessment on this patient - skin intact  Anson score is 17

## 2018-02-17 NOTE — PROGRESS NOTES
Problem: Falls - Risk of  Goal: *Absence of Falls  Document Mayuri Fall Risk and appropriate interventions in the flowsheet.    Outcome: Progressing Towards Goal  Fall Risk Interventions:  Mobility Interventions: Communicate number of staff needed for ambulation/transfer, Patient to call before getting OOB         Medication Interventions: Teach patient to arise slowly, Patient to call before getting OOB         History of Falls Interventions: Door open when patient unattended

## 2018-02-17 NOTE — PROGRESS NOTES
TRANSFER - OUT REPORT:    Verbal report given to Kae Jimenez RN(name) on Inge Miller  being transferred to (unit) for routine progression of care       Report consisted of patients Situation, Background, Assessment and   Recommendations(SBAR). Information from the following report(s) SBAR, Kardex, Intake/Output, MAR, Accordion, Recent Results and Cardiac Rhythm Paced was reviewed with the receiving nurse. Lines:   Peripheral IV 02/17/18 Left Forearm (Active)   Site Assessment Clean, dry, & intact 2/17/2018  8:55 AM   Phlebitis Assessment 0 2/17/2018  8:55 AM   Infiltration Assessment 0 2/17/2018  8:55 AM   Dressing Status Clean, dry, & intact 2/17/2018  8:55 AM   Dressing Type Transparent;Tape 2/17/2018  8:55 AM   Hub Color/Line Status Pink;Capped 2/17/2018  8:55 AM   Action Taken Open ports on tubing capped 2/17/2018  8:55 AM   Alcohol Cap Used Yes 2/17/2018  8:55 AM        Opportunity for questions and clarification was provided.       Patient transported with:   [employer]

## 2018-02-18 LAB
GLUCOSE BLD STRIP.AUTO-MCNC: 110 MG/DL (ref 65–100)
GLUCOSE BLD STRIP.AUTO-MCNC: 113 MG/DL (ref 65–100)
GLUCOSE BLD STRIP.AUTO-MCNC: 138 MG/DL (ref 65–100)
GLUCOSE BLD STRIP.AUTO-MCNC: 153 MG/DL (ref 65–100)
SERVICE CMNT-IMP: ABNORMAL

## 2018-02-18 PROCEDURE — 74011000250 HC RX REV CODE- 250: Performed by: NURSE PRACTITIONER

## 2018-02-18 PROCEDURE — 74011250637 HC RX REV CODE- 250/637: Performed by: SPECIALIST

## 2018-02-18 PROCEDURE — 74011250636 HC RX REV CODE- 250/636: Performed by: INTERNAL MEDICINE

## 2018-02-18 PROCEDURE — 82962 GLUCOSE BLOOD TEST: CPT

## 2018-02-18 PROCEDURE — 74011250637 HC RX REV CODE- 250/637: Performed by: INTERNAL MEDICINE

## 2018-02-18 PROCEDURE — 65270000029 HC RM PRIVATE

## 2018-02-18 PROCEDURE — 74011250636 HC RX REV CODE- 250/636: Performed by: NURSE PRACTITIONER

## 2018-02-18 PROCEDURE — 74011250637 HC RX REV CODE- 250/637: Performed by: FAMILY MEDICINE

## 2018-02-18 PROCEDURE — C9113 INJ PANTOPRAZOLE SODIUM, VIA: HCPCS | Performed by: NURSE PRACTITIONER

## 2018-02-18 PROCEDURE — 74011000258 HC RX REV CODE- 258: Performed by: NURSE PRACTITIONER

## 2018-02-18 RX ADMIN — SUCRALFATE 1 G: 1 SUSPENSION ORAL at 22:01

## 2018-02-18 RX ADMIN — DONEPEZIL HYDROCHLORIDE 5 MG: 5 TABLET, FILM COATED ORAL at 22:00

## 2018-02-18 RX ADMIN — EPOETIN ALFA 24000 UNITS: 20000 SOLUTION INTRAVENOUS; SUBCUTANEOUS at 17:46

## 2018-02-18 RX ADMIN — IRON SUCROSE 200 MG: 20 INJECTION, SOLUTION INTRAVENOUS at 16:10

## 2018-02-18 RX ADMIN — SERTRALINE HYDROCHLORIDE 100 MG: 50 TABLET ORAL at 21:58

## 2018-02-18 RX ADMIN — Medication 10 ML: at 11:35

## 2018-02-18 RX ADMIN — SODIUM CHLORIDE 40 MG: 9 INJECTION INTRAMUSCULAR; INTRAVENOUS; SUBCUTANEOUS at 21:42

## 2018-02-18 RX ADMIN — SUCRALFATE 1 G: 1 SUSPENSION ORAL at 11:33

## 2018-02-18 RX ADMIN — CYANOCOBALAMIN TAB 500 MCG 250 MCG: 500 TAB at 08:51

## 2018-02-18 RX ADMIN — ACETAMINOPHEN 650 MG: 325 TABLET, FILM COATED ORAL at 10:26

## 2018-02-18 RX ADMIN — Medication 10 ML: at 22:01

## 2018-02-18 RX ADMIN — Medication 10 ML: at 06:00

## 2018-02-18 RX ADMIN — SUCRALFATE 1 G: 1 SUSPENSION ORAL at 07:16

## 2018-02-18 RX ADMIN — FOLIC ACID 1 MG: 1 TABLET ORAL at 08:51

## 2018-02-18 RX ADMIN — SODIUM CHLORIDE 40 MG: 9 INJECTION INTRAMUSCULAR; INTRAVENOUS; SUBCUTANEOUS at 08:50

## 2018-02-18 RX ADMIN — SUCRALFATE 1 G: 1 SUSPENSION ORAL at 16:14

## 2018-02-18 NOTE — PROGRESS NOTES
Hospitalist Progress Note  Dakota Harkins, NP  Answering service: [phone] from in house phone  Cell: [phone]      Date of Service:  2018  NAME:  Padmini Justin  :  1935  MRN:  [id number]      Admission Summary:   Padmini Justin is a [de-identified] y.o. female who presents with HTN, asthma, anemia, arrhythmia S/P pace maker, PUD, arthritis, DVT, GERD, who was sent to the ED today form her GI office. Patient had EGD on  for GERD by Dr Prabhu Diallo and no acute pathology was detected. Patient had mild abdominal pain and had black color stool last night. Patient had black color stool this AM as well. Patient went to her GI MD today form where she was referred here. Patient feels fatigue and and lightheadedness. The patient denies any fever, chills, chest pain, cough, congestion, recent illness, palpitations, or dysuria. She denies any nausea and vomiting. She denies any abdominal pain now. Interval history / Subjective:   Brown smear on toilet paper, not dark at all  Denies dizziness, but had a headache this morning with some photophobia, subsided with tylenol  Hgb in am     Assessment & Plan:     GI bleed:   2/2 gastic ulcer, probably from bx site earlier this week  Negative bleeding scan  S/p egd with Dr. Prabhu Diallo  Monitor H&H  Ppi, carafate, clears  Hold asa    Acute on chronic anemia: NO BLOOD PRODUCTS Derinda Horsfall  Hx of ckd likely contributing to chronicity  Iv Venofer,. Procrit, G23, folic acid  Hematology consult  Fall precuations     Hypertension: normotensive. C/w home med's     CKD stage III - stable    Mild Hyperkalemia: Resolved   Probably due to GI bleed. Will do D5 and monitor.  NO kayexalate 2/2 gib     Asthma: Compensated, continue her home medicines.        Code status: full  DVT prophylaxis: SCD's  Care Plan discussed with: patient, daughters, attending MD  Disposition: lives with her       [facility] Problems  Date Reviewed: 8/20/2017          Codes Class Noted POA    * (Principal)GI bleed ICD-10-CM: K92.2  ICD-9-CM: 578.9  2/15/2018 Yes                Review of Systems:   Jittery stomach  Denies abd pain n/v/d  Denies cp/sob      Vital Signs:    Last 24hrs VS reviewed since prior progress note. Most recent are:  Visit Vitals    /60 (BP 1 Location: Right arm, BP Patient Position: At rest)    Pulse 68    Temp 99 °F (37.2 °C)    Resp 16    Ht 5' 5\" (1.651 m)    Wt 90.1 kg (198 lb 10.2 oz)    SpO2 94%    Breastfeeding No    BMI 33.05 kg/m2         Intake/Output Summary (Last 24 hours) at 02/18/18 1400  Last data filed at 02/18/18 1332   Gross per 24 hour   Intake              536 ml   Output                0 ml   Net              536 ml        Physical Examination:             Constitutional:  No acute distress, cooperative, pleasant    ENT:  Oral mucous moist, oropharynx benign. Neck supple   Resp:  CTA bilaterally. No wheezing/rhonchi/rales. No accessory muscle use   CV:  Regular rhythm, normal rate, no murmurs, gallops, rubs    GI:  Soft, non distended, non tender. normoactive bowel sounds, no hepatosplenomegaly     Musculoskeletal:  No edema, warm, 2+ pulses throughout    Neurologic:  Moves all extremities. AAOx3, CN II-XII reviewed     Psych:  Good insight, Not anxious nor agitated.   Skin:  Good turgor, no rashes or ulcers       Data Review:    Review and/or order of clinical lab test  Review and/or order of tests in the radiology section of CPT  Review and/or order of tests in the medicine section of CPT      Labs:     Recent Labs      02/17/18   0105  02/16/18   0403   WBC   --   7.6   HGB  5.7*  6.8*   HCT   --   22.8*   PLT   --   113*     Recent Labs      02/17/18   0110  02/16/18   0403   NA  142  142   K  4.1  5.4*   CL  114*  118*   CO2  23  13*   BUN  55*  69*   CREA  1.28*  1.28*   GLU  89  126*   CA  9.2  9.9     Recent Labs      02/16/18   0403   SGOT  25   ALT  18   AP  35*   TBILI 0.3   TP  5.4*   ALB  2.1*   GLOB  3.3     No results for input(s): INR, PTP, APTT in the last 72 hours. No lab exists for component: INREXT, INREXT   No results for input(s): FE, TIBC, PSAT, FERR in the last 72 hours. Lab Results   Component Value Date/Time    Folate 15.1 02/25/2014 07:30 PM      No results for input(s): PH, PCO2, PO2 in the last 72 hours. No results for input(s): CPK, CKNDX, TROIQ in the last 72 hours.     No lab exists for component: CPKMB  Lab Results   Component Value Date/Time    Triglyceride 87 02/24/2014 02:30 AM     Lab Results   Component Value Date/Time    Glucose (POC) 138 (H) 02/18/2018 11:17 AM    Glucose (POC) 113 (H) 02/18/2018 07:06 AM    Glucose (POC) 136 (H) 02/17/2018 09:28 PM    Glucose (POC) 112 (H) 02/17/2018 04:33 PM    Glucose (POC) 161 (H) 02/16/2018 06:43 AM     Lab Results   Component Value Date/Time    Color YELLOW/STRAW 06/11/2017 09:56 PM    Appearance CLEAR 06/11/2017 09:56 PM    Specific gravity 1.010 06/11/2017 09:56 PM    pH (UA) 5.0 06/11/2017 09:56 PM    Protein NEGATIVE  06/11/2017 09:56 PM    Glucose NEGATIVE  06/11/2017 09:56 PM    Ketone NEGATIVE  06/11/2017 09:56 PM    Bilirubin NEGATIVE  06/11/2017 09:56 PM    Urobilinogen 0.2 06/11/2017 09:56 PM    Nitrites NEGATIVE  06/11/2017 09:56 PM    Leukocyte Esterase SMALL (A) 06/11/2017 09:56 PM    Epithelial cells FEW 06/11/2017 09:56 PM    Bacteria 1+ (A) 06/11/2017 09:56 PM    WBC 10-20 06/11/2017 09:56 PM    RBC 0-5 06/11/2017 09:56 PM         Medications Reviewed:     Current Facility-Administered Medications   Medication Dose Route Frequency    albuterol (PROVENTIL VENTOLIN) nebulizer solution 2.5 mg  2.5 mg Nebulization Q6H PRN    sucralfate (CARAFATE) 100 mg/mL oral suspension 1 g  1 g Oral AC&HS    iron sucrose (VENOFER) 200 mg in 0.9% sodium chloride 100 mL IVPB  200 mg IntraVENous X62R    folic acid (FOLVITE) tablet 1 mg  1 mg Oral DAILY    cyanocobalamin (VITAMIN B12) tablet 250 mcg  250 mcg Oral DAILY    epoetin gigi (EPOGEN;PROCRIT) 24,000 Units  24,000 Units SubCUTAneous Q48H    acetaminophen (TYLENOL) tablet 650 mg  650 mg Oral Q6H PRN    pantoprazole (PROTONIX) 40 mg in sodium chloride 10 mL injection  40 mg IntraVENous Q12H    amLODIPine (NORVASC) tablet 2.5 mg  2.5 mg Oral DAILY    donepezil (ARICEPT) tablet 5 mg  5 mg Oral QHS    metoprolol succinate (TOPROL-XL) XL tablet 50 mg  50 mg Oral DAILY    sertraline (ZOLOFT) tablet 100 mg  100 mg Oral QHS    sodium chloride (NS) flush 5-10 mL  5-10 mL IntraVENous Q8H    sodium chloride (NS) flush 5-10 mL  5-10 mL IntraVENous PRN    ondansetron (ZOFRAN) injection 4 mg  4 mg IntraVENous Q4H PRN    influenza vaccine 2017-18 (3 yrs+)(PF) (FLUZONE QUAD/FLUARIX QUAD) injection 0.5 mL  0.5 mL IntraMUSCular PRIOR TO DISCHARGE     ______________________________________________________________________  EXPECTED LENGTH OF STAY: 2d 4h  ACTUAL LENGTH OF STAY:          3                 Una Cordero V NP

## 2018-02-18 NOTE — PROGRESS NOTES
Bedside shift change report given to Demetrio [place] (oncoming nurse) by Jp Herrera RN (offgoing nurse). Report included the following information SBAR, Kardex, ED Summary, [facility] and Recent Results.

## 2018-02-18 NOTE — PROGRESS NOTES
Problem: Falls - Risk of  Goal: *Absence of Falls  Document Mayuri Fall Risk and appropriate interventions in the flowsheet.    Outcome: Progressing Towards Goal  Fall Risk Interventions:  Mobility Interventions: Patient to call before getting OOB         Medication Interventions: Patient to call before getting OOB    Elimination Interventions: Call light in reach, Patient to call for help with toileting needs, Toileting schedule/hourly rounds    History of Falls Interventions: Door open when patient unattended

## 2018-02-18 NOTE — PROGRESS NOTES
Oncology Progress Note     Admit Date: 2/15/2018    Pt had a small black stool last night, much better than before, c/o some  Dysphagia; Interval History       Subjective:         Current Facility-Administered Medications   Medication Dose Route Frequency    albuterol (PROVENTIL VENTOLIN) nebulizer solution 2.5 mg  2.5 mg Nebulization Q6H PRN    sucralfate (CARAFATE) 100 mg/mL oral suspension 1 g  1 g Oral AC&HS    iron sucrose (VENOFER) 200 mg in 0.9% sodium chloride 100 mL IVPB  200 mg IntraVENous K05O    folic acid (FOLVITE) tablet 1 mg  1 mg Oral DAILY    cyanocobalamin (VITAMIN B12) tablet 250 mcg  250 mcg Oral DAILY    epoetin gigi (EPOGEN;PROCRIT) 24,000 Units  24,000 Units SubCUTAneous Q48H    acetaminophen (TYLENOL) tablet 650 mg  650 mg Oral Q6H PRN    pantoprazole (PROTONIX) 40 mg in sodium chloride 10 mL injection  40 mg IntraVENous Q12H    amLODIPine (NORVASC) tablet 2.5 mg  2.5 mg Oral DAILY    donepezil (ARICEPT) tablet 5 mg  5 mg Oral QHS    metoprolol succinate (TOPROL-XL) XL tablet 50 mg  50 mg Oral DAILY    sertraline (ZOLOFT) tablet 100 mg  100 mg Oral QHS    sodium chloride (NS) flush 5-10 mL  5-10 mL IntraVENous Q8H    sodium chloride (NS) flush 5-10 mL  5-10 mL IntraVENous PRN    ondansetron (ZOFRAN) injection 4 mg  4 mg IntraVENous Q4H PRN    influenza vaccine - (3 yrs+)(PF) (FLUZONE QUAD/FLUARIX QUAD) injection 0.5 mL  0.5 mL IntraMUSCular PRIOR TO DISCHARGE        Review of Systems:  Pertinent items are noted in HPI.     Objective:     Patient Vitals for the past 8 hrs:   BP Temp Pulse Resp SpO2   18 0718 133/65 98.9 °F (37.2 °C) 73 16 94 %   18 0331 127/58 98.8 °F (37.1 °C) 99 16 97 %       Temp (24hrs), Av °F (37.2 °C), Min:98.7 °F (37.1 °C), Max:99.4 °F (37.4 °C)           Physical Exam:  Alert  No icterus  Lungs cta  Heart regular  Abdomen soft      Labs:    Recent Results (from the past 24 hour(s))   GLUCOSE, POC    Collection Time: 18 4:33 PM   Result Value Ref Range    Glucose (POC) 112 (H) 65 - 100 mg/dL    Performed by Temo Kaiser    GLUCOSE, POC    Collection Time: 02/17/18  9:28 PM   Result Value Ref Range    Glucose (POC) 136 (H) 65 - 100 mg/dL    Performed by [facility] AGUSTIN HOPE    GLUCOSE, POC    Collection Time: 02/18/18  7:06 AM   Result Value Ref Range    Glucose (POC) 113 (H) 65 - 100 mg/dL    Performed by [facility] AGUSTIN HOPE        Assessment:     Principal Problem/plan:  Anemia 2 to bleeding , [employer]; refuses blood transfusion, on procrit, D72, folic acid , s/p venofer; minimize blood draws;    GI bleeding  On PPI and sucralfate; another event last night but much less than before; GI following

## 2018-02-19 LAB
GLUCOSE BLD STRIP.AUTO-MCNC: 119 MG/DL (ref 65–100)
GLUCOSE BLD STRIP.AUTO-MCNC: 122 MG/DL (ref 65–100)
GLUCOSE BLD STRIP.AUTO-MCNC: 133 MG/DL (ref 65–100)
GLUCOSE BLD STRIP.AUTO-MCNC: 147 MG/DL (ref 65–100)
HCT VFR BLD AUTO: 19.9 % (ref 35–47)
HGB BLD-MCNC: 6.1 G/DL (ref 11.5–16)
SERVICE CMNT-IMP: ABNORMAL

## 2018-02-19 PROCEDURE — 74011250637 HC RX REV CODE- 250/637: Performed by: SPECIALIST

## 2018-02-19 PROCEDURE — 82962 GLUCOSE BLOOD TEST: CPT

## 2018-02-19 PROCEDURE — 36415 COLL VENOUS BLD VENIPUNCTURE: CPT

## 2018-02-19 PROCEDURE — 74011000258 HC RX REV CODE- 258: Performed by: INTERNAL MEDICINE

## 2018-02-19 PROCEDURE — 74011250637 HC RX REV CODE- 250/637: Performed by: FAMILY MEDICINE

## 2018-02-19 PROCEDURE — 65270000029 HC RM PRIVATE

## 2018-02-19 PROCEDURE — 74011250636 HC RX REV CODE- 250/636: Performed by: INTERNAL MEDICINE

## 2018-02-19 PROCEDURE — 74011250636 HC RX REV CODE- 250/636: Performed by: NURSE PRACTITIONER

## 2018-02-19 PROCEDURE — 74011250637 HC RX REV CODE- 250/637: Performed by: INTERNAL MEDICINE

## 2018-02-19 PROCEDURE — 85018 HEMOGLOBIN: CPT

## 2018-02-19 PROCEDURE — 74011000250 HC RX REV CODE- 250: Performed by: NURSE PRACTITIONER

## 2018-02-19 PROCEDURE — C9113 INJ PANTOPRAZOLE SODIUM, VIA: HCPCS | Performed by: NURSE PRACTITIONER

## 2018-02-19 RX ADMIN — SUCRALFATE 1 G: 1 SUSPENSION ORAL at 22:11

## 2018-02-19 RX ADMIN — SODIUM CHLORIDE 40 MG: 9 INJECTION INTRAMUSCULAR; INTRAVENOUS; SUBCUTANEOUS at 22:11

## 2018-02-19 RX ADMIN — SODIUM CHLORIDE 40 MG: 9 INJECTION INTRAMUSCULAR; INTRAVENOUS; SUBCUTANEOUS at 09:31

## 2018-02-19 RX ADMIN — ACETAMINOPHEN 650 MG: 325 TABLET, FILM COATED ORAL at 17:05

## 2018-02-19 RX ADMIN — SERTRALINE HYDROCHLORIDE 100 MG: 50 TABLET ORAL at 22:10

## 2018-02-19 RX ADMIN — Medication 10 ML: at 14:08

## 2018-02-19 RX ADMIN — SUCRALFATE 1 G: 1 SUSPENSION ORAL at 11:34

## 2018-02-19 RX ADMIN — DONEPEZIL HYDROCHLORIDE 5 MG: 5 TABLET, FILM COATED ORAL at 22:07

## 2018-02-19 RX ADMIN — AMLODIPINE BESYLATE 2.5 MG: 5 TABLET ORAL at 09:33

## 2018-02-19 RX ADMIN — FOLIC ACID 1 MG: 1 TABLET ORAL at 09:32

## 2018-02-19 RX ADMIN — IRON SUCROSE 200 MG: 20 INJECTION, SOLUTION INTRAVENOUS at 11:34

## 2018-02-19 RX ADMIN — Medication 10 ML: at 06:55

## 2018-02-19 RX ADMIN — Medication 10 ML: at 22:11

## 2018-02-19 RX ADMIN — SUCRALFATE 1 G: 1 SUSPENSION ORAL at 06:54

## 2018-02-19 RX ADMIN — SUCRALFATE 1 G: 1 SUSPENSION ORAL at 17:07

## 2018-02-19 RX ADMIN — CYANOCOBALAMIN TAB 500 MCG 250 MCG: 500 TAB at 09:32

## 2018-02-19 RX ADMIN — METOPROLOL SUCCINATE 50 MG: 50 TABLET, EXTENDED RELEASE ORAL at 09:32

## 2018-02-19 NOTE — PROGRESS NOTES
[address]  [address]   [phone]                GI PROGRESS NOTE  [facility]  Work Cell: [phone]      NAME:   Nalini Richardson   :    1935   MRN:    [id number]     Assessment/Plan   1. Upper GI bleed - s/p repeat EGD demonstrating esophagitis, tiny ulcer at antrum, small moderately deep ulcer with non-bleeding visible vessel in fundus treated with cautery and clipped. Pathology unremarkable. Hgb 6.1 today. No further melena. - Diet as tolerated  - Continue PPI and Carafate  2. Anemia - related to the above  - Hematology following - on Procrit and iron infusions   - Monitor H&H  3. HTN  4. CKD stage III     Patient Active Problem List   Diagnosis Code    Sinus node dysfunction (HCC) I49.5    Localized primary osteoarthritis of left lower leg M17.12    Primary localized osteoarthritis of left knee M17.12    GI bleed K92.2       Subjective:     Feeling better. Per family at bedside, she is stronger - asking to walk and participating in ADLs. Denies any pain. States stool was dark but then lightened up yesterday. Hgb 6.1 today. Review of Systems    Constitutional: negative fever, negative chills, negative weight loss  Eyes:   negative visual changes  ENT:   negative sore throat, tongue or lip swelling  Respiratory:  negative cough, negative dyspnea  Cards:  negative for chest pain, palpitations, lower extremity edema  GI:   See HPI  :  negative for frequency, dysuria  Integument:  negative for rash and pruritus  Heme:  negative for easy bruising and gum/nose bleeding  Musculoskel: negative for myalgias, back pain and muscle weakness  Neuro: negative for headaches, dizziness, vertigo  Psych:  negative for feelings of anxiety, depression      Objective:     VITALS:   Last 24hrs VS reviewed since prior hospitalist progress note. Most recent are:  Visit Vitals    /69 (BP 1 Location: Right arm, BP Patient Position:  At rest)    Pulse 68    Temp 98.4 °F (36.9 °C)    Resp 18    Ht 5' 5\" (1.651 m)    Wt 90.1 kg (198 lb 10.2 oz)    SpO2 99%    Breastfeeding No    BMI 33.05 kg/m2       Intake/Output Summary (Last 24 hours) at 02/19/18 1116  Last data filed at 02/18/18 1747   Gross per 24 hour   Intake              463 ml   Output                0 ml   Net              463 ml        PHYSICAL EXAM:  General   well developed, alert, in no acute distress  EENT  Normocephalic, Atraumatic, PERRLA, EOMI, sclera clear  Respiratory   Clear To Auscultation bilaterally - no wheezes, rales, rhonchi, or crackles  Cardiology  Regular Rate and Rythmn  - no murmurs, rubs or gallops  Abdominal  Soft, non-tender, non-distended, positive bowel sounds, no hepatosplenomegaly, no palpable mass  Extremities  No clubbing, cyanosis, or edema. Pulses intact. Neurological  No focal neurological deficits noted  Psychological  Oriented x 3. Normal affect.        Lab Data   Recent Results (from the past 12 hour(s))   GLUCOSE, POC    Collection Time: 02/19/18  6:59 AM   Result Value Ref Range    Glucose (POC) 119 (H) 65 - 100 mg/dL    Performed by FARRAH AVELAR    HGB & HCT    Collection Time: 02/19/18  9:15 AM   Result Value Ref Range    HGB 6.1 (L) 11.5 - 16.0 g/dL    HCT 19.9 (L) 35.0 - 47.0 %         Medications: Reviewed    PMH/SH reviewed - no change compared to H&P  Mid-Level Provider: Jacqueline Smith NP   Date/Time:  2/19/2018

## 2018-02-19 NOTE — PROGRESS NOTES
Hospitalist Progress Note  Alicia Corcoran NP  Answering service: [phone] from in house phone  Cell: [phone]      Date of Service:  2018  NAME:  Simran Webb  :  1935  MRN:  [id number]      Admission Summary:   Simran Webb is a [de-identified] y.o. female who presents with HTN, asthma, anemia, arrhythmia S/P pace maker, PUD, arthritis, DVT, GERD, who was sent to the ED today form her GI office. Patient had EGD on  for GERD by Dr Ora Jain and no acute pathology was detected. Patient had mild abdominal pain and had black color stool last night. Patient had black color stool this AM as well. Patient went to her GI MD today form where she was referred here. Patient feels fatigue and and lightheadedness. The patient denies any fever, chills, chest pain, cough, congestion, recent illness, palpitations, or dysuria. She denies any nausea and vomiting. She denies any abdominal pain now. Interval history / Subjective:   No more dark stools  Hgb 6.1, tolerating Iron  Walked in hallways yesterday, denied any dizziness, headaches improved  Minimize blood draws, recheck on Wednesday. Pt would like to know the \"magic number\" for discharge     Assessment & Plan:     GI bleed: Resolved  2/2 gastic ulcer, probably from bx site earlier this week  Negative bleeding scan  S/p egd with Dr. Ora Jain  Monitor H&H  Ppi, carafate, clears  Hold asa    Acute on chronic anemia: NO BLOOD PRODUCTS Marthenia Saver  Hx of ckd likely contributing to chronicity  Iv Venofer,. Procrit, R04, folic acid  Hematology consult  Fall precuations     Hypertension: normotensive. C/w home med's     CKD stage III - stable    Mild Hyperkalemia: Resolved   Probably due to GI bleed. Will do D5 and monitor.  NO kayexalate 2/2 gib     Asthma: Compensated, continue her home medicines.        Code status: full  DVT prophylaxis: SCD's  Care Plan discussed with: patient, rosaura, attending MD  Disposition: lives with her       Hospital Problems  Date Reviewed: 8/20/2017          Codes Class Noted POA    * (Principal)GI bleed ICD-10-CM: K92.2  ICD-9-CM: 578.9  2/15/2018 Yes                Review of Systems:   Denies abd pain n/v/d  Denies cp/sob  Denies dizziness/HA's    Vital Signs:    Last 24hrs VS reviewed since prior progress note. Most recent are:  Visit Vitals    /69 (BP 1 Location: Right arm, BP Patient Position: At rest)    Pulse 68    Temp 98.4 °F (36.9 °C)    Resp 18    Ht 5' 5\" (1.651 m)    Wt 90.1 kg (198 lb 10.2 oz)    SpO2 99%    Breastfeeding No    BMI 33.05 kg/m2         Intake/Output Summary (Last 24 hours) at 02/19/18 1220  Last data filed at 02/18/18 1747   Gross per 24 hour   Intake              463 ml   Output                0 ml   Net              463 ml        Physical Examination:             Constitutional:  No acute distress, cooperative, pleasant    ENT:  Oral mucous moist, oropharynx benign. Neck supple   Resp:  CTA bilaterally. No wheezing/rhonchi/rales. No accessory muscle use   CV:  Regular rhythm, normal rate, no murmurs, gallops, rubs    GI:  Soft, non distended, non tender. normoactive bowel sounds, no hepatosplenomegaly     Musculoskeletal:  No edema, warm, 2+ pulses throughout    Neurologic:  Moves all extremities. AAOx3, CN II-XII reviewed     Psych:  Good insight, Not anxious nor agitated.   Skin:  Good turgor, no rashes or ulcers       Data Review:    Review and/or order of clinical lab test  Review and/or order of tests in the radiology section of CPT  Review and/or order of tests in the medicine section of CPT      Labs:     Recent Labs      02/19/18   0915  02/17/18   0105   HGB  6.1*  5.7*   HCT  19.9*   --      Recent Labs      02/17/18   0110   NA  142   K  4.1   CL  114*   CO2  23   BUN  55*   CREA  1.28*   GLU  89   CA  9.2     No results for input(s): SGOT, GPT, ALT, AP, TBIL, TBILI, TP, ALB, GLOB, GGT, AML, LPSE in the last 72 hours. No lab exists for component: AMYP, HLPSE  No results for input(s): INR, PTP, APTT in the last 72 hours. No lab exists for component: INREXT, INREXT   No results for input(s): FE, TIBC, PSAT, FERR in the last 72 hours. Lab Results   Component Value Date/Time    Folate 15.1 02/25/2014 07:30 PM      No results for input(s): PH, PCO2, PO2 in the last 72 hours. No results for input(s): CPK, CKNDX, TROIQ in the last 72 hours.     No lab exists for component: CPKMB  Lab Results   Component Value Date/Time    Triglyceride 87 02/24/2014 02:30 AM     Lab Results   Component Value Date/Time    Glucose (POC) 147 (H) 02/19/2018 11:41 AM    Glucose (POC) 119 (H) 02/19/2018 06:59 AM    Glucose (POC) 153 (H) 02/18/2018 09:41 PM    Glucose (POC) 110 (H) 02/18/2018 04:35 PM    Glucose (POC) 138 (H) 02/18/2018 11:17 AM     Lab Results   Component Value Date/Time    Color YELLOW/STRAW 06/11/2017 09:56 PM    Appearance CLEAR 06/11/2017 09:56 PM    Specific gravity 1.010 06/11/2017 09:56 PM    pH (UA) 5.0 06/11/2017 09:56 PM    Protein NEGATIVE  06/11/2017 09:56 PM    Glucose NEGATIVE  06/11/2017 09:56 PM    Ketone NEGATIVE  06/11/2017 09:56 PM    Bilirubin NEGATIVE  06/11/2017 09:56 PM    Urobilinogen 0.2 06/11/2017 09:56 PM    Nitrites NEGATIVE  06/11/2017 09:56 PM    Leukocyte Esterase SMALL (A) 06/11/2017 09:56 PM    Epithelial cells FEW 06/11/2017 09:56 PM    Bacteria 1+ (A) 06/11/2017 09:56 PM    WBC 10-20 06/11/2017 09:56 PM    RBC 0-5 06/11/2017 09:56 PM         Medications Reviewed:     Current Facility-Administered Medications   Medication Dose Route Frequency    albuterol (PROVENTIL VENTOLIN) nebulizer solution 2.5 mg  2.5 mg Nebulization Q6H PRN    sucralfate (CARAFATE) 100 mg/mL oral suspension 1 g  1 g Oral AC&HS    folic acid (FOLVITE) tablet 1 mg  1 mg Oral DAILY    cyanocobalamin (VITAMIN B12) tablet 250 mcg  250 mcg Oral DAILY    epoetin gigi (EPOGEN;PROCRIT) 24,000 Units  24,000 Units SubCUTAneous Q48H    acetaminophen (TYLENOL) tablet 650 mg  650 mg Oral Q6H PRN    pantoprazole (PROTONIX) 40 mg in sodium chloride 10 mL injection  40 mg IntraVENous Q12H    amLODIPine (NORVASC) tablet 2.5 mg  2.5 mg Oral DAILY    donepezil (ARICEPT) tablet 5 mg  5 mg Oral QHS    metoprolol succinate (TOPROL-XL) XL tablet 50 mg  50 mg Oral DAILY    sertraline (ZOLOFT) tablet 100 mg  100 mg Oral QHS    sodium chloride (NS) flush 5-10 mL  5-10 mL IntraVENous Q8H    sodium chloride (NS) flush 5-10 mL  5-10 mL IntraVENous PRN    ondansetron (ZOFRAN) injection 4 mg  4 mg IntraVENous Q4H PRN    influenza vaccine 2017-18 (3 yrs+)(PF) (FLUZONE QUAD/FLUARIX QUAD) injection 0.5 mL  0.5 mL IntraMUSCular PRIOR TO DISCHARGE     ______________________________________________________________________  EXPECTED LENGTH OF STAY: 2d 4h  ACTUAL LENGTH OF STAY:          4                 Una Cordero V NP

## 2018-02-19 NOTE — PROGRESS NOTES
Bedside and Verbal shift change report given to Aaron Geiger (oncoming nurse) by Enmanuel Titus (offgoing nurse). Report included the following information SBAR, Kardex and MAR.

## 2018-02-19 NOTE — PROGRESS NOTES
Note entered on 2.19.18:    Spiritual Care Partner Volunteer visited patient in [facility] on 2.18.18. Documented by:  Rev. Zabrina Matthews M.Div, [facility]

## 2018-02-19 NOTE — PROGRESS NOTES
Oncology Progress Note     Admit Date: 2/15/2018    Pt feels stronger, tolerated iron nicely,     Interval History       Subjective:         Current Facility-Administered Medications   Medication Dose Route Frequency    iron sucrose (VENOFER) 200 mg in 0.9% sodium chloride 100 mL IVPB  200 mg IntraVENous ONCE    albuterol (PROVENTIL VENTOLIN) nebulizer solution 2.5 mg  2.5 mg Nebulization Q6H PRN    sucralfate (CARAFATE) 100 mg/mL oral suspension 1 g  1 g Oral AC&HS    folic acid (FOLVITE) tablet 1 mg  1 mg Oral DAILY    cyanocobalamin (VITAMIN B12) tablet 250 mcg  250 mcg Oral DAILY    epoetin gigi (EPOGEN;PROCRIT) 24,000 Units  24,000 Units SubCUTAneous Q48H    acetaminophen (TYLENOL) tablet 650 mg  650 mg Oral Q6H PRN    pantoprazole (PROTONIX) 40 mg in sodium chloride 10 mL injection  40 mg IntraVENous Q12H    amLODIPine (NORVASC) tablet 2.5 mg  2.5 mg Oral DAILY    donepezil (ARICEPT) tablet 5 mg  5 mg Oral QHS    metoprolol succinate (TOPROL-XL) XL tablet 50 mg  50 mg Oral DAILY    sertraline (ZOLOFT) tablet 100 mg  100 mg Oral QHS    sodium chloride (NS) flush 5-10 mL  5-10 mL IntraVENous Q8H    sodium chloride (NS) flush 5-10 mL  5-10 mL IntraVENous PRN    ondansetron (ZOFRAN) injection 4 mg  4 mg IntraVENous Q4H PRN    influenza vaccine - (3 yrs+)(PF) (FLUZONE QUAD/FLUARIX QUAD) injection 0.5 mL  0.5 mL IntraMUSCular PRIOR TO DISCHARGE        Review of Systems:  Pertinent items are noted in HPI.     Objective:     Patient Vitals for the past 8 hrs:   BP Temp Pulse Resp SpO2   18 0927 142/69 98.4 °F (36.9 °C) - 18 99 %   18 0200 147/46 98.7 °F (37.1 °C) 68 18 94 %       Temp (24hrs), Av.8 °F (37.1 °C), Min:98.2 °F (36.8 °C), Max:100.1 °F (37.8 °C)           Physical Exam:  Alert  No icterus  Lungs cta  Heart regular  Abdomen soft  Ext, no edema      Labs:    Recent Results (from the past 24 hour(s))   GLUCOSE, POC    Collection Time: 18 11:17 AM   Result Value Ref Range    Glucose (POC) 138 (H) 65 - 100 mg/dL    Performed by [address] [place] [address] St, POC    Collection Time: 02/18/18  4:35 PM   Result Value Ref Range    Glucose (POC) 110 (H) 65 - 100 mg/dL    Performed by Erica Sandoval    GLUCOSE, POC    Collection Time: 02/18/18  9:41 PM   Result Value Ref Range    Glucose (POC) 153 (H) 65 - 100 mg/dL    Performed by Erica Sandoval    GLUCOSE, POC    Collection Time: 02/19/18  6:59 AM   Result Value Ref Range    Glucose (POC) 119 (H) 65 - 100 mg/dL    Performed by Arnold Weaver        Assessment:     Principal Problem/plan:  Anemia 2 to bleeding , Christianity; refuses blood transfusion, on procrit q 19BIVEM., Q29, folic acid , venofir daily, minimize blood draws;    GI bleeding  On PPI and sucralfate; another event last night but much less than before; GI following    Aye Velazco MD

## 2018-02-19 NOTE — PROGRESS NOTES
Physical Therapy Screening:    An [facility] screening referral was triggered for physical therapy based on results obtained during the nursing admission assessment. The patients chart was reviewed and the patient is appropriate for a skilled therapy evaluation if there is a decline in functional mobility from baseline. Please order a consult for physical therapy if you are in agreement and would like an evaluation to be completed. Thank you.     Job Stacy, PT

## 2018-02-19 NOTE — PROGRESS NOTES
Care Management Interventions  PCP Verified by CM: Yes  Last Visit to PCP: 02/06/18  Palliative Care Criteria Met (RRAT>21 & CHF Dx)?: No  Mode of Transport at Discharge:  (tbd family)  Transition of Care Consult (CM Consult):  (family takes care of patient at home. see notes )  MyChart Signup: No  Discharge Durable Medical Equipment: No  Physical Therapy Consult: No  Occupational Therapy Consult: No  Speech Therapy Consult: No  Current Support Network: Lives with Spouse (spouse  son sami in law [address])  Confirm Follow Up Transport: Family (tbd)    met with patient's dgt Yane phone [phone]. Patient lives in [place] and her spouse is still employed at a [employer] distribution center close by. He plans to cut back work in the next 2 weeks. PATIENT PER DGT  HAS AN AMD.  She uses the [facility] in [address] [place] [address] for her prescriptions. Per dgt Yane patient sleeps late in am and then son and his wife come over and help her with a late lunch.

## 2018-02-20 LAB
GLUCOSE BLD STRIP.AUTO-MCNC: 115 MG/DL (ref 65–100)
GLUCOSE BLD STRIP.AUTO-MCNC: 115 MG/DL (ref 65–100)
GLUCOSE BLD STRIP.AUTO-MCNC: 119 MG/DL (ref 65–100)
GLUCOSE BLD STRIP.AUTO-MCNC: 133 MG/DL (ref 65–100)
SERVICE CMNT-IMP: ABNORMAL

## 2018-02-20 PROCEDURE — 65270000029 HC RM PRIVATE

## 2018-02-20 PROCEDURE — 74011250637 HC RX REV CODE- 250/637: Performed by: INTERNAL MEDICINE

## 2018-02-20 PROCEDURE — 74011250636 HC RX REV CODE- 250/636: Performed by: INTERNAL MEDICINE

## 2018-02-20 PROCEDURE — 74011000250 HC RX REV CODE- 250: Performed by: NURSE PRACTITIONER

## 2018-02-20 PROCEDURE — C9113 INJ PANTOPRAZOLE SODIUM, VIA: HCPCS | Performed by: NURSE PRACTITIONER

## 2018-02-20 PROCEDURE — 74011250637 HC RX REV CODE- 250/637: Performed by: FAMILY MEDICINE

## 2018-02-20 PROCEDURE — 74011250636 HC RX REV CODE- 250/636: Performed by: NURSE PRACTITIONER

## 2018-02-20 PROCEDURE — 82962 GLUCOSE BLOOD TEST: CPT

## 2018-02-20 PROCEDURE — 74011250637 HC RX REV CODE- 250/637: Performed by: SPECIALIST

## 2018-02-20 RX ORDER — PANTOPRAZOLE SODIUM 40 MG/1
40 TABLET, DELAYED RELEASE ORAL DAILY
Status: DISCONTINUED | OUTPATIENT
Start: 2018-02-21 | End: 2018-02-22 | Stop reason: HOSPADM

## 2018-02-20 RX ORDER — FAMOTIDINE 20 MG/1
20 TABLET, FILM COATED ORAL
Status: DISCONTINUED | OUTPATIENT
Start: 2018-02-20 | End: 2018-02-22 | Stop reason: HOSPADM

## 2018-02-20 RX ADMIN — ACETAMINOPHEN 650 MG: 325 TABLET, FILM COATED ORAL at 23:12

## 2018-02-20 RX ADMIN — CYANOCOBALAMIN TAB 500 MCG 250 MCG: 500 TAB at 09:43

## 2018-02-20 RX ADMIN — METOPROLOL SUCCINATE 50 MG: 50 TABLET, EXTENDED RELEASE ORAL at 09:43

## 2018-02-20 RX ADMIN — SUCRALFATE 1 G: 1 SUSPENSION ORAL at 17:40

## 2018-02-20 RX ADMIN — FAMOTIDINE 20 MG: 20 TABLET, FILM COATED ORAL at 19:44

## 2018-02-20 RX ADMIN — ACETAMINOPHEN 650 MG: 325 TABLET, FILM COATED ORAL at 15:24

## 2018-02-20 RX ADMIN — Medication 10 ML: at 21:33

## 2018-02-20 RX ADMIN — SODIUM CHLORIDE 40 MG: 9 INJECTION INTRAMUSCULAR; INTRAVENOUS; SUBCUTANEOUS at 09:46

## 2018-02-20 RX ADMIN — SUCRALFATE 1 G: 1 SUSPENSION ORAL at 12:13

## 2018-02-20 RX ADMIN — SERTRALINE HYDROCHLORIDE 100 MG: 50 TABLET ORAL at 21:31

## 2018-02-20 RX ADMIN — EPOETIN ALFA 24000 UNITS: 20000 SOLUTION INTRAVENOUS; SUBCUTANEOUS at 18:37

## 2018-02-20 RX ADMIN — SUCRALFATE 1 G: 1 SUSPENSION ORAL at 21:29

## 2018-02-20 RX ADMIN — AMLODIPINE BESYLATE 2.5 MG: 5 TABLET ORAL at 09:44

## 2018-02-20 RX ADMIN — Medication 10 ML: at 06:37

## 2018-02-20 RX ADMIN — DONEPEZIL HYDROCHLORIDE 5 MG: 5 TABLET, FILM COATED ORAL at 21:32

## 2018-02-20 RX ADMIN — FOLIC ACID 1 MG: 1 TABLET ORAL at 09:44

## 2018-02-20 RX ADMIN — SUCRALFATE 1 G: 1 SUSPENSION ORAL at 06:37

## 2018-02-20 NOTE — PROGRESS NOTES
Hospitalist Progress Note  Andree Kanner, NP  Answering service: [phone] from in house phone  Cell: [phone]      Date of Service:  2018  NAME:  Gino Medina  :  1935  MRN:  [id number]      Admission Summary:   Gino Medina is a [de-identified] y.o. female who presents with HTN, asthma, anemia, arrhythmia S/P pace maker, PUD, arthritis, DVT, GERD, who was sent to the ED today form her GI office. Patient had EGD on  for GERD by Dr Ivana Nuñez and no acute pathology was detected. Patient had mild abdominal pain and had black color stool last night. Patient had black color stool this AM as well. Patient went to her GI MD today form where she was referred here. Patient feels fatigue and and lightheadedness. The patient denies any fever, chills, chest pain, cough, congestion, recent illness, palpitations, or dysuria. She denies any nausea and vomiting. She denies any abdominal pain now. Interval history / Subjective:   No more dark stools  Dizziness this am. Discussed with hematology this am, patient still requiring IV iron. As long as patient is on IV Iron daily would like patient to remain in the hospital. Goal hgb is >7.0,      Assessment & Plan:     GI bleed: Resolved  2/2 gastic ulcer, probably from bx site earlier this week  Negative bleeding scan  S/p egd with Dr. Ivana Nuñez  Monitor H&H  Ppi, carafate, clears  Hold asa    Acute on chronic anemia: NO BLOOD PRODUCTS Guyann Britney  Hx of ckd likely contributing to chronicity  Iv Venofer daily,. Procrit, R09, folic acid  Hematology following, if hgb >7.0 should be able to f/u in infusion center for procrit if still required at discharge  Fall precuations     Hypertension: normotensive.  C/w home med's     CKD stage III - stable    Mild Hyperkalemia: Resolved   Probably due to GI bleed.      Asthma: Compensated, continue her home medicines.        Code status: full  DVT prophylaxis: SCD's  Care Plan discussed with: patient, daughter, Dr Thao Crockett, RN, Hematology  Disposition: lives with her , hopefully home soon     Hospital Problems  Date Reviewed: 8/20/2017          Codes Class Noted POA    * (Principal)GI bleed ICD-10-CM: K92.2  ICD-9-CM: 578.9  2/15/2018 Yes                Review of Systems:   Denies abd pain n/v/d  Denies cp/sob  + dizziness. No blood in stool. Vital Signs:    Last 24hrs VS reviewed since prior progress note. Most recent are:  Visit Vitals    /67    Pulse (!) 59    Temp 98.2 °F (36.8 °C)    Resp 18    Ht 5' 5\" (1.651 m)    Wt 90.1 kg (198 lb 10.2 oz)    SpO2 99%    Breastfeeding No    BMI 33.05 kg/m2       No intake or output data in the 24 hours ending 02/20/18 0902     Physical Examination:             Constitutional:  No acute distress, cooperative, pleasant    ENT:  Oral mucous moist, oropharynx benign. Resp:  CTA bilaterally. No wheezing/rhonchi/rales. No accessory muscle use   CV:  Regular rhythm, normal rate, no murmurs, gallops, rubs    GI:  Soft, non distended, non tender. normoactive bowel sounds,    Musculoskeletal:  No edema, warm, 2+ pulses throughout    Neurologic:  Moves all extremities. AAOx3, CN II-XII reviewed     Psych:  Good insight, Not anxious nor agitated. Skin:  Good turgor, no rashes or ulcers       Data Review:    Review and/or order of clinical lab test  Review and/or order of tests in the radiology section of CPT  Review and/or order of tests in the medicine section of CPT      Labs:     Recent Labs      02/19/18   0915   HGB  6.1*   HCT  19.9*     No results for input(s): NA, K, CL, CO2, BUN, CREA, GLU, CA, MG, PHOS, URICA in the last 72 hours. No results for input(s): SGOT, GPT, ALT, AP, TBIL, TBILI, TP, ALB, GLOB, GGT, AML, LPSE in the last 72 hours. No lab exists for component: AMYP, HLPSE  No results for input(s): INR, PTP, APTT in the last 72 hours.     No lab exists for component: INREXT, INREXT   No results for input(s): FE, TIBC, PSAT, FERR in the last 72 hours. Lab Results   Component Value Date/Time    Folate 15.1 02/25/2014 07:30 PM      No results for input(s): PH, PCO2, PO2 in the last 72 hours. No results for input(s): CPK, CKNDX, TROIQ in the last 72 hours.     No lab exists for component: CPKMB  Lab Results   Component Value Date/Time    Triglyceride 87 02/24/2014 02:30 AM     Lab Results   Component Value Date/Time    Glucose (POC) 115 (H) 02/20/2018 06:36 AM    Glucose (POC) 133 (H) 02/19/2018 09:23 PM    Glucose (POC) 122 (H) 02/19/2018 04:23 PM    Glucose (POC) 147 (H) 02/19/2018 11:41 AM    Glucose (POC) 119 (H) 02/19/2018 06:59 AM     Lab Results   Component Value Date/Time    Color YELLOW/STRAW 06/11/2017 09:56 PM    Appearance CLEAR 06/11/2017 09:56 PM    Specific gravity 1.010 06/11/2017 09:56 PM    pH (UA) 5.0 06/11/2017 09:56 PM    Protein NEGATIVE  06/11/2017 09:56 PM    Glucose NEGATIVE  06/11/2017 09:56 PM    Ketone NEGATIVE  06/11/2017 09:56 PM    Bilirubin NEGATIVE  06/11/2017 09:56 PM    Urobilinogen 0.2 06/11/2017 09:56 PM    Nitrites NEGATIVE  06/11/2017 09:56 PM    Leukocyte Esterase SMALL (A) 06/11/2017 09:56 PM    Epithelial cells FEW 06/11/2017 09:56 PM    Bacteria 1+ (A) 06/11/2017 09:56 PM    WBC 10-20 06/11/2017 09:56 PM    RBC 0-5 06/11/2017 09:56 PM         Medications Reviewed:     Current Facility-Administered Medications   Medication Dose Route Frequency    albuterol (PROVENTIL VENTOLIN) nebulizer solution 2.5 mg  2.5 mg Nebulization Q6H PRN    sucralfate (CARAFATE) 100 mg/mL oral suspension 1 g  1 g Oral AC&HS    folic acid (FOLVITE) tablet 1 mg  1 mg Oral DAILY    cyanocobalamin (VITAMIN B12) tablet 250 mcg  250 mcg Oral DAILY    epoetin gigi (EPOGEN;PROCRIT) 24,000 Units  24,000 Units SubCUTAneous Q48H    acetaminophen (TYLENOL) tablet 650 mg  650 mg Oral Q6H PRN    pantoprazole (PROTONIX) 40 mg in sodium chloride 10 mL injection  40 mg IntraVENous Q12H    amLODIPine (NORVASC) tablet 2.5 mg  2.5 mg Oral DAILY    donepezil (ARICEPT) tablet 5 mg  5 mg Oral QHS    metoprolol succinate (TOPROL-XL) XL tablet 50 mg  50 mg Oral DAILY    sertraline (ZOLOFT) tablet 100 mg  100 mg Oral QHS    sodium chloride (NS) flush 5-10 mL  5-10 mL IntraVENous Q8H    sodium chloride (NS) flush 5-10 mL  5-10 mL IntraVENous PRN    ondansetron (ZOFRAN) injection 4 mg  4 mg IntraVENous Q4H PRN    influenza vaccine 2017-18 (3 yrs+)(PF) (FLUZONE QUAD/FLUARIX QUAD) injection 0.5 mL  0.5 mL IntraMUSCular PRIOR TO DISCHARGE     ______________________________________________________________________  EXPECTED LENGTH OF STAY: 2d 4h  ACTUAL LENGTH OF STAY:          Jaydon Taveras 75, NP

## 2018-02-20 NOTE — PROGRESS NOTES
Oncology Progress Note     Admit Date: 2/15/2018    Pt feels stronger, tolerated iron nicely, says she is stronger today    Interval History       Subjective:         Current Facility-Administered Medications   Medication Dose Route Frequency    albuterol (PROVENTIL VENTOLIN) nebulizer solution 2.5 mg  2.5 mg Nebulization Q6H PRN    sucralfate (CARAFATE) 100 mg/mL oral suspension 1 g  1 g Oral AC&HS    folic acid (FOLVITE) tablet 1 mg  1 mg Oral DAILY    cyanocobalamin (VITAMIN B12) tablet 250 mcg  250 mcg Oral DAILY    epoetin gigi (EPOGEN;PROCRIT) 24,000 Units  24,000 Units SubCUTAneous Q48H    acetaminophen (TYLENOL) tablet 650 mg  650 mg Oral Q6H PRN    pantoprazole (PROTONIX) 40 mg in sodium chloride 10 mL injection  40 mg IntraVENous Q12H    amLODIPine (NORVASC) tablet 2.5 mg  2.5 mg Oral DAILY    donepezil (ARICEPT) tablet 5 mg  5 mg Oral QHS    metoprolol succinate (TOPROL-XL) XL tablet 50 mg  50 mg Oral DAILY    sertraline (ZOLOFT) tablet 100 mg  100 mg Oral QHS    sodium chloride (NS) flush 5-10 mL  5-10 mL IntraVENous Q8H    sodium chloride (NS) flush 5-10 mL  5-10 mL IntraVENous PRN    ondansetron (ZOFRAN) injection 4 mg  4 mg IntraVENous Q4H PRN    influenza vaccine - (3 yrs+)(PF) (FLUZONE QUAD/FLUARIX QUAD) injection 0.5 mL  0.5 mL IntraMUSCular PRIOR TO DISCHARGE        Review of Systems:  Pertinent items are noted in HPI.     Objective:     Patient Vitals for the past 8 hrs:   BP Temp Pulse Resp SpO2   18 0827 138/67 98.2 °F (36.8 °C) (!) 59 18 99 %   18 0127 118/58 97.9 °F (36.6 °C) 60 18 97 %       Temp (24hrs), Av.3 °F (36.8 °C), Min:97.9 °F (36.6 °C), Max:98.8 °F (37.1 °C)           Physical Exam:  Alert  No icterus  Lungs cta  Heart regular  Abdomen soft  Ext, no edema      Labs:    Recent Results (from the past 24 hour(s))   HGB & HCT    Collection Time: 18  9:15 AM   Result Value Ref Range    HGB 6.1 (L) 11.5 - 16.0 g/dL    HCT 19.9 (L) 35.0 - 47.0 % GLUCOSE, POC    Collection Time: 02/19/18 11:41 AM   Result Value Ref Range    Glucose (POC) 147 (H) 65 - 100 mg/dL    Performed by [address], POC    Collection Time: 02/19/18  4:23 PM   Result Value Ref Range    Glucose (POC) 122 (H) 65 - 100 mg/dL    Performed by [address] [place], [place] [id number] [place], POC    Collection Time: 02/19/18  9:23 PM   Result Value Ref Range    Glucose (POC) 133 (H) 65 - 100 mg/dL    Performed by [facility] AGUSTIN HOPE    GLUCOSE, POC    Collection Time: 02/20/18  6:36 AM   Result Value Ref Range    Glucose (POC) 115 (H) 65 - 100 mg/dL    Performed by Tabitha Hernandez        Assessment:     Principal Problem/plan:  Anemia 2 to bleeding , Bahai; refuses blood transfusion, on procrit q 91HXNLX., A25, folic acid , venofir daily, would do finger sticks for cbc,,,, will give another dose of venofir today, would wait for hgb to rise above 7.5 before letting her go    GI bleeding  On PPI and sucralfate; another event last night but much less than before; GI following    Melissa Preston MD

## 2018-02-20 NOTE — PROGRESS NOTES
Bedside and Verbal shift change report given to German Gottlieb (oncoming nurse) by Duncan Sosa (offgoing nurse). Report included the following information SBAR, Kardex and MAR.

## 2018-02-20 NOTE — ADT AUTH CERT NOTES
Utilization Review           Gastrointestinal Bleeding, Upper - Care Day 6 (2/20/2018) by Doe Lopez RN        Review Status Review Entered       Completed 2/20/2018       Details              Care Day: 6 Care Date: 2/20/2018 Level of Care: Step Down       Guideline Day 3        Clinical Status       (X) * Hemodynamic stability       2/20/2018 12:43 PM EST by Fozia Alaniz         97.9; 60; 18; 118.58; 97%              (X) * Stable Hgb/Hct       2/20/2018 12:43 PM EST by Fozia Alaniz         None drawn for 2/20              (X) * Bleeding absent       2/20/2018 12:43 PM EST by Fozia Alaniz         None noted              (X) * Mental status at baseline       2/20/2018 12:43 PM EST by Fozia Alaniz         WDL              (X) * Surgical and other acute interventions not needed       2/20/2018 12:43 PM EST by Fozia Alaniz         None indicated at this time.              (X) * Pain absent or managed       2/20/2018 12:43 PM EST by Fozia Alaniz         0/10              ( ) * Discharge plans and education understood              Activity       (X) * Ambulatory       2/20/2018 12:43 PM EST by Fozia Alaniz         up with assist                     Routes       (X) * Oral hydration, medications, and diet       2/20/2018 12:43 PM EST by Fozia Alaniz         Norvasc po; epogen sc; metoprolol po; protonix iv                     Medications       (X) Proton pump inhibitor       2/20/2018 12:43 PM EST by Fozia Alaniz         Protonix                                          * Milestone              Additional Notes       Hem/Onc:       Assessment:               Principal Problem/plan:       Anemia 2 to bleeding , [employer]; refuses blood transfusion, on procrit q 91MBMNM., Q58, folic acid , venofir daily, would do finger sticks for cbc,,,, will give another dose of venofir today, would wait for hgb to rise above 7.5 before letting her go               GI bleeding  On PPI and sucralfate; another event last night but much less than before; GI following              Hospitalist:       Interval history / Subjective:       No more dark stools       Dizziness this am. Discussed with hematology this am, patient still requiring IV iron. As long as patient is on IV Iron daily would like patient to remain in the hospital. Goal hgb is >7.0,                Assessment & Plan:               GI bleed: Resolved       2/2 gastic ulcer, probably from bx site earlier this week       Negative bleeding scan       S/p egd with Dr. Faby Freeman       Monitor H&H       Ppi, carafate, clears       Hold asa               Acute on chronic anemia: NO BLOOD PRODUCTS JEHOVAH WITNESS       Hx of ckd likely contributing to chronicity       Iv Venofer daily,. Procrit, G38, folic acid       Hematology following, if hgb >7.0 should be able to f/u in infusion center for procrit if still required at discharge       Fall precuations                Hypertension: normotensive. C/w home med's                CKD stage III - stable               Mild Hyperkalemia: Resolved        Probably due to GI bleed.                 Asthma: Compensated, continue her home medicines.                         Code status: full       DVT prophylaxis: SCD's       Care Plan discussed with: patient, daughter, Dr Flavia Titus, RN, Hematology       Disposition: lives with her , hopefully home soon               GI:       Assessment/Plan        1. Upper GI bleed - s/p repeat EGD demonstrating esophagitis, tiny ulcer at antrum, small moderately deep ulcer with non-bleeding visible vessel in fundus treated with cautery and clipped. Pathology unremarkable. Hgb 6.1 yesterday. No further melena.        - Diet as tolerated       - Continue PPI and Carafate       2. Anemia - related to the above       - Hematology following - on Procrit and iron infusions        - Monitor H&H - awaiting Hgb to rise above 7.5 prior to discharge        3. HTN       4.  CKD stage III           Gastrointestinal Bleeding, Upper - Care Day 5 (2/19/2018) by Selvin Campuzano RN        Review Status Review Entered       Completed 2/19/2018       Details              Care Day: 5 Care Date: 2/19/2018 Level of Care: Step Down       Guideline Day 3        Clinical Status       (X) * Hemodynamic stability       2/19/2018 4:41 PM EST by Luis Majano         VS: 60; 18; 98.3; 142/74; 96 %              ( ) * Stable Hgb/Hct       2/19/2018 4:41 PM EST by Luis Majano         hgb 6.1              (X) * Bleeding absent       2/19/2018 4:41 PM EST by Luis Majano         no active bleeding              ( ) * Mental status at baseline       (X) * Surgical and other acute interventions not needed       2/19/2018 4:41 PM EST by Luis Majano         NA              (X) * Pain absent or managed       2/19/2018 4:41 PM EST by Luis Majano         Managed              ( ) * Discharge plans and education understood              Activity       (X) * Ambulatory       2/19/2018 4:41 PM EST by Luis Majano         up with assist                     Routes       (X) * Oral hydration, medications, and diet       2/19/2018 4:41 PM EST by Luis Majano         Meds: Metoprolol po; Protonix po; Iron IVPB;                     Interventions       (X) Hgb/Hct       2/19/2018 4:41 PM EST by Luis Majano         Labs: hgb 6.1; hct 19.9                     Medications       (X) Proton pump inhibitor                                   * Milestone              Additional Notes       Internal Med:       Interval history / Subjective:       No more dark stools       Hgb 6.1, tolerating Iron       Walked in hallways yesterday, denied any dizziness, headaches improved       Minimize blood draws, recheck on Wednesday.  Pt would like to know the \"magic number\" for discharge               Assessment & Plan:               GI bleed: Resolved       2/2 gastic ulcer, probably from bx site earlier this week       Negative bleeding scan       S/p egd with Dr. Betina Gil Monitor H&H       Ppi, carafate, clears       Hold asa               Acute on chronic anemia: NO BLOOD PRODUCTS JEHOVAH WITNESS       Hx of ckd likely contributing to chronicity       Iv Venofer,. Procrit, I00, folic acid       Hematology consult       Fall precuations                Hypertension: normotensive. C/w home med's                CKD stage III - stable               Mild Hyperkalemia: Resolved        Probably due to GI bleed. Will do D5 and monitor. NO kayexalate 2/2 gib                Asthma: Compensated, continue her home medicines.                         Code status: full       DVT prophylaxis: SCD's       Care Plan discussed with: patient, daughters, attending MD       Disposition: lives with her                GI:       Assessment/Plan        1. Upper GI bleed - s/p repeat EGD demonstrating esophagitis, tiny ulcer at antrum, small moderately deep ulcer with non-bleeding visible vessel in fundus treated with cautery and clipped. Pathology unremarkable. Hgb 6.1 today. No further melena.        - Diet as tolerated       - Continue PPI and Carafate       2. Anemia - related to the above       - Hematology following - on Procrit and iron infusions        - Monitor H&H       3. HTN       4.  CKD stage III                     Hematology:       Assessment:               Principal Problem/plan:       Anemia 2 to bleeding , [employer]; refuses blood transfusion, on procrit q 85YEMLR., B39, folic acid , venofir daily, minimize blood draws;               GI bleeding  On PPI and sucralfate; another event last night but much less than before; GI following           Gastrointestinal Bleeding, Upper - Care Day 4 (2/18/2018) by Eual Schlatter, RN        Review Status Review Entered       Completed 2/19/2018       Details              Care Day: 4 Care Date: 2/18/2018 Level of Care: Step Down       Guideline Day 2        Clinical Status       (X) * Hypotension absent       2/19/2018 4:39 PM EST by Bella Chand Evonne         VS: 76; 18; 100.1; 156/81; 90%              (X) * Bleeding absent or reduced       2/19/2018 4:39 PM EST by Rose Petit         None noted                     Routes       (X) * Oral hydration, and tolerating diet       2/19/2018 4:39 PM EST by Rose Petit         GI LItsophia diet                     Interventions       (X) * NG tube absent       2/19/2018 4:39 PM EST by Rose Petit         None noted              ( ) Hgb/Hct       2/19/2018 4:39 PM EST by Rose Petit         None drawn                     Medications       (X) Proton pump inhibitor       2/19/2018 4:39 PM EST by Rose Petit         protonix po                     2/19/2018 4:39 PM EST by Rose Petit       Subject: Additional Clinical Information       2/18:         Meds: Epogen SC; Protonix po; Iron IVPB; Tylenol po q6h prn x1                                          * Milestone              Additional Notes       Internal Med:       Interval history / Subjective:       Brown smear on toilet paper, not dark at all       Denies dizziness, but had a headache this morning with some photophobia, subsided with tylenol       Hgb in am               Assessment & Plan:               GI bleed:        2/2 gastic ulcer, probably from bx site earlier this week       Negative bleeding scan       S/p egd with Dr. Maliha Arreaga       Monitor H&H       Ppi, carafate, clears       Hold asa               Acute on chronic anemia: NO BLOOD PRODUCTS JEHOVAH WITNESS       Hx of ckd likely contributing to chronicity       Iv Venofer,. Procrit, O14, folic acid       Hematology consult       Fall precuations                Hypertension: normotensive. C/w home med's                CKD stage III - stable               Mild Hyperkalemia: Resolved        Probably due to GI bleed. Will do D5 and monitor.  NO kayexalate 2/2 gib                Asthma: Compensated, continue her home medicines.                         Code status: full       DVT prophylaxis: SCD's       Care Plan discussed with: patient, daughters, attending MD       Disposition: lives with her                Hematology:       Assessment:               Principal Problem/plan:       Anemia 2 to bleeding , [employer] orth[employer]; refuses blood transfusion, on procrit, F97, folic acid , s/p venofer; minimize blood draws;               GI bleeding  On PPI and sucralfate; another event last night but much less than before; GI following           Gastrointestinal Bleeding, Upper - Care Day 2 (2/16/2018) by Rodríguez Bowman RN        Review Status Review Entered       Completed 2/19/2018       Details              Care Day: 2 Care Date: 2/16/2018 Level of Care: Step Down       Guideline Day 1        Level Of Care       (X) ICU [D] or floor after emergency treatment       2/19/2018 4:37 PM EST by John Ames         Stepdown                     Clinical Status       (X) * Clinical Indications met [E]              Activity       (X) Bed rest or up to chair       2/19/2018 4:37 PM EST by John Ames         up with assist                     Routes       (X) IV fluids, medications       2/19/2018 4:37 PM EST by John Ames         Meds: Epogen SC; Protonix po; Iron IVPB; Ultratag IV;  Tylenol po q6h prn x1; Zofran IV q4h prn x1                     Interventions       (X) CBC       2/19/2018 4:37 PM EST by John Ames         Labs: wbc 7.6; hgb 6.8; hct 22.8; na 142; k 5.4; bun 69; cr 1.28              (X) UGI endoscopy       2/19/2018 4:37 PM EST by John Ames         Post-procedure Diagnoses: Gastric ulcer, unspecified chronicity, unspecified whether gastric ulcer hemorrhage or perforation present              (X) GI consultation              2/19/2018 4:37 PM EST by John Ames       Subject: Additional Clinical Information       VS: 64; 18; 98.5; 124/36; 94 %         Labs: wbc 7.6; hgb 6.8; hct 22.8; na 142; k 5.4; bun 69; cr 1.28         Nuclear Med: IMPRESSION: No evidence of active GI bleed.                                         * Milestone              Additional Notes       Internal Med:       Interval history / Subjective:       Pending bleeding scan       Ordered IV venofer. Patient and family are anxious to get hgb up       Hematology consult requested by GI               Assessment & Plan:               GI bleed:        2/2 gastic ulcer, probably from bx site earlier this week       S/p egd with Dr. Mera Knapp       Monitor H&H       Ppi, carafate, clears       Hold asa               Acute on chronic anemia: NO BLOOD PRODUCTS JEHOVAH WITNESS       Iv Venofer       Hematology consult                Hypertension: normotensive. C/w home med's                Mild Hyperkalemia: Probably due to GI bleed. Will do D5 and monitor.  NO kayexalate 2/2 gib                Asthma: Compensated, continue her home medicines.                         Code status: full       DVT prophylaxis: SCD's       Care Plan discussed with: patient, daughters, attending MD       Disposition: lives with her                       Hematology:       -Assessment + Plan-          *) anemia, acute on chronic, secondary to GI blood loss after EGD w bx 2/14       ---- refuses blood transfusions for [employer] reasons       ---- getting venofer 200 mg IV daily x 3, 2/16->       ---- start epo, and oral supplements (b12, folate)               UGI:       Pre-procedure Diagnoses:         Melena [K92.1]         Iron deficiency anemia due to chronic blood loss [D50.0]                 Post-procedure Diagnoses:         Gastric ulcer, unspecified chronicity, unspecified whether gastric ulcer hemorrhage or perforation present [K25.9]                 Procedures:         UPPER GI ENDOSCOPY,CTRL BLEED [[id number]]

## 2018-02-20 NOTE — PROGRESS NOTES
[address]  [address]   [phone]                GI PROGRESS NOTE  Philip Cruz AGACNP-BC  Work Cell: [phone]      NAME:   Annel Darby   :    1935   MRN:    [id number]     Assessment/Plan   1. Upper GI bleed - s/p repeat EGD demonstrating esophagitis, tiny ulcer at antrum, small moderately deep ulcer with non-bleeding visible vessel in fundus treated with cautery and clipped. Pathology unremarkable. Hgb 6.1 yesterday. No further melena. - Diet as tolerated  - Continue PPI and Carafate  2. Anemia - related to the above  - Hematology following - on Procrit and iron infusions   - Monitor H&H - awaiting Hgb to rise above 7.5 prior to discharge   3. HTN  4. CKD stage III     Patient Active Problem List   Diagnosis Code    Sinus node dysfunction (HCC) I49.5    Localized primary osteoarthritis of left lower leg M17.12    Primary localized osteoarthritis of left knee M17.12    GI bleed K92.2       Subjective:     Feeling well and stronger today. OOB in chair. Tolerating diet. No further melena. Review of Systems    Constitutional: negative fever, negative chills, negative weight loss  Eyes:   negative visual changes  ENT:   negative sore throat, tongue or lip swelling  Respiratory:  negative cough, negative dyspnea  Cards:  negative for chest pain, palpitations, lower extremity edema  GI:   See HPI  :  negative for frequency, dysuria  Integument:  negative for rash and pruritus  Heme:  negative for easy bruising and gum/nose bleeding  Musculoskel: negative for myalgias, back pain and muscle weakness  Neuro: negative for headaches, dizziness, vertigo  Psych:  negative for feelings of anxiety, depression     Objective:     VITALS:   Last 24hrs VS reviewed since prior hospitalist progress note.  Most recent are:  Visit Vitals    /67    Pulse (!) 59    Temp 98.2 °F (36.8 °C)    Resp 18    Ht 5' 5\" (1.651 m)    Wt 90.1 kg (198 lb 10.2 oz)    SpO2 99%    Breastfeeding No    BMI 33.05 kg/m2       Intake/Output Summary (Last 24 hours) at 02/20/18 1108  Last data filed at 02/20/18 0953   Gross per 24 hour   Intake                0 ml   Output                1 ml   Net               -1 ml        PHYSICAL EXAM:  General   well developed, alert, in no acute distress  EENT  Normocephalic, Atraumatic, PERRLA, EOMI, sclera clear  Respiratory   Clear To Auscultation bilaterally - no wheezes, rales, rhonchi, or crackles  Cardiology  Regular Rate and Rythmn  - no murmurs, rubs or gallops  Abdominal  Soft, non-tender, non-distended, positive bowel sounds, no hepatosplenomegaly, no palpable mass  Extremities  No clubbing, cyanosis, or edema. Pulses intact. Neurological  No focal neurological deficits noted  Psychological  Oriented x 3. Normal affect.        Lab Data   Recent Results (from the past 12 hour(s))   GLUCOSE, POC    Collection Time: 02/20/18  6:36 AM   Result Value Ref Range    Glucose (POC) 115 (H) 65 - 100 mg/dL    Performed by Oxana Bueno          Medications: Reviewed    PMH/ reviewed - no change compared to H&P  Mid-Level Provider: Chery Velasco NP   Date/Time:  2/20/2018

## 2018-02-20 NOTE — PROGRESS NOTES
9:42 AM  Patient's chart reviewed with attending. CM informed that discharge anticipated for tomorrow if patient is not symptomatic. There are no current CM consults or needs. Patient to discharge home with family assistance at discharge. Mode of transport TBD at time of discharge. CM will continue to follow and assist with disposition needs as they arise.     HANNY Contreras/TRISTIAN

## 2018-02-21 LAB
GLUCOSE BLD STRIP.AUTO-MCNC: 102 MG/DL (ref 65–100)
GLUCOSE BLD STRIP.AUTO-MCNC: 102 MG/DL (ref 65–100)
GLUCOSE BLD STRIP.AUTO-MCNC: 116 MG/DL (ref 65–100)
GLUCOSE BLD STRIP.AUTO-MCNC: 124 MG/DL (ref 65–100)
HCT VFR BLD AUTO: 21.8 % (ref 35–47)
HGB BLD-MCNC: 6.6 G/DL (ref 11.5–16)
SERVICE CMNT-IMP: ABNORMAL

## 2018-02-21 PROCEDURE — 74011250637 HC RX REV CODE- 250/637: Performed by: FAMILY MEDICINE

## 2018-02-21 PROCEDURE — 65270000029 HC RM PRIVATE

## 2018-02-21 PROCEDURE — 74011250637 HC RX REV CODE- 250/637: Performed by: HOSPITALIST

## 2018-02-21 PROCEDURE — 85018 HEMOGLOBIN: CPT | Performed by: NURSE PRACTITIONER

## 2018-02-21 PROCEDURE — 36415 COLL VENOUS BLD VENIPUNCTURE: CPT | Performed by: NURSE PRACTITIONER

## 2018-02-21 PROCEDURE — 74011250637 HC RX REV CODE- 250/637: Performed by: SPECIALIST

## 2018-02-21 PROCEDURE — 74011250637 HC RX REV CODE- 250/637: Performed by: INTERNAL MEDICINE

## 2018-02-21 PROCEDURE — 82962 GLUCOSE BLOOD TEST: CPT

## 2018-02-21 RX ADMIN — FAMOTIDINE 20 MG: 20 TABLET, FILM COATED ORAL at 21:40

## 2018-02-21 RX ADMIN — ACETAMINOPHEN 650 MG: 325 TABLET, FILM COATED ORAL at 15:55

## 2018-02-21 RX ADMIN — SUCRALFATE 1 G: 1 SUSPENSION ORAL at 21:40

## 2018-02-21 RX ADMIN — Medication 10 ML: at 21:40

## 2018-02-21 RX ADMIN — METOPROLOL SUCCINATE 50 MG: 50 TABLET, EXTENDED RELEASE ORAL at 09:41

## 2018-02-21 RX ADMIN — PANTOPRAZOLE SODIUM 40 MG: 40 TABLET, DELAYED RELEASE ORAL at 09:42

## 2018-02-21 RX ADMIN — AMLODIPINE BESYLATE 2.5 MG: 5 TABLET ORAL at 09:41

## 2018-02-21 RX ADMIN — SUCRALFATE 1 G: 1 SUSPENSION ORAL at 06:32

## 2018-02-21 RX ADMIN — SERTRALINE HYDROCHLORIDE 100 MG: 50 TABLET ORAL at 21:40

## 2018-02-21 RX ADMIN — CYANOCOBALAMIN TAB 500 MCG 250 MCG: 500 TAB at 09:41

## 2018-02-21 RX ADMIN — Medication 10 ML: at 14:45

## 2018-02-21 RX ADMIN — SUCRALFATE 1 G: 1 SUSPENSION ORAL at 16:25

## 2018-02-21 RX ADMIN — SUCRALFATE 1 G: 1 SUSPENSION ORAL at 11:57

## 2018-02-21 RX ADMIN — DONEPEZIL HYDROCHLORIDE 5 MG: 5 TABLET, FILM COATED ORAL at 21:40

## 2018-02-21 RX ADMIN — FOLIC ACID 1 MG: 1 TABLET ORAL at 09:41

## 2018-02-21 RX ADMIN — Medication 10 ML: at 06:06

## 2018-02-21 NOTE — PROGRESS NOTES
[address]  [address]   [phone]                GI PROGRESS NOTE  Isha Lozoya, AGACN-BC  Work Cell: [phone]      NAME:   Nalini Richardson   :    1935   MRN:    [id number]     Assessment/Plan   1. Upper GI bleed - s/p repeat EGD demonstrating esophagitis, tiny ulcer at antrum, small moderately deep ulcer with non-bleeding visible vessel in fundus treated with cautery and clipped. Pathology unremarkable. Hgb 6.6 today. No further melena. - Diet as tolerated  - Continue PPI, Pepcid and Carafate  2. Anemia - related to the above  - Hematology following - on Procrit and iron infusions   - Monitor H&H - awaiting Hgb to rise above 7.5 prior to discharge   3. HTN  4. CKD stage III     Patient Active Problem List   Diagnosis Code    Sinus node dysfunction (HCC) I49.5    Localized primary osteoarthritis of left lower leg M17.12    Primary localized osteoarthritis of left knee M17.12    GI bleed K92.2       Subjective:     Feeling well. Reports increase in indigestion yesterday evening, improved this AM. Denies any pain. No melena or hematochezia. Review of Systems    Constitutional: negative fever, negative chills, negative weight loss  Eyes:   negative visual changes  ENT:   negative sore throat, tongue or lip swelling  Respiratory:  negative cough, negative dyspnea  Cards:  negative for chest pain, palpitations, lower extremity edema  GI:   See HPI  :  negative for frequency, dysuria  Integument:  negative for rash and pruritus  Heme:  negative for easy bruising and gum/nose bleeding  Musculoskel: negative for myalgias, back pain and muscle weakness  Neuro: negative for headaches, dizziness, vertigo  Psych:  negative for feelings of anxiety, depression     Objective:     VITALS:   Last 24hrs VS reviewed since prior hospitalist progress note.  Most recent are:  Visit Vitals    /62 (BP 1 Location: Right arm, BP Patient Position: At rest)    Pulse 64    Temp 97.9 °F (36.6 °C)    Resp 18    Ht 5' 5\" (1.651 m)    Wt 90.1 kg (198 lb 10.2 oz)    SpO2 97%    Breastfeeding No    BMI 33.05 kg/m2     No intake or output data in the 24 hours ending 02/21/18 1056     PHYSICAL EXAM:  General   well developed, alert, in no acute distress  EENT  Normocephalic, Atraumatic, PERRLA, EOMI, sclera clear  Respiratory   Clear To Auscultation bilaterally - no wheezes, rales, rhonchi, or crackles  Cardiology  Regular Rate and Rythmn  - no murmurs, rubs or gallops  Abdominal  Soft, non-tender, non-distended, positive bowel sounds, no hepatosplenomegaly, no palpable mass  Extremities  No clubbing, cyanosis, or edema. Pulses intact. Neurological  No focal neurological deficits noted  Psychological  Oriented x 3.  Normal affect.        Lab Data   Recent Results (from the past 12 hour(s))   GLUCOSE, POC    Collection Time: 02/21/18  6:20 AM   Result Value Ref Range    Glucose (POC) 102 (H) 65 - 100 mg/dL    Performed by FARRAH AVELAR    HGB & HCT    Collection Time: 02/21/18  8:44 AM   Result Value Ref Range    HGB 6.6 (L) 11.5 - 16.0 g/dL    HCT 21.8 (L) 35.0 - 47.0 %         Medications: Reviewed    PMH/SH reviewed - no change compared to H&P  Mid-Level Provider: Lizzy Cordero NP   Date/Time:  2/21/2018

## 2018-02-21 NOTE — PROGRESS NOTES
NUTRITION- DIETETIC tECHnICIAN    Pt seen for:       [x]                  Rescreen  []                  Food preferences/tolerances  []                  Food Allergies  []                  PO intake check  []                  Supplements  []                  Diet order clarification  []                  Education  []                  Other     Rescreen:    [x]                  Not at Nutrition Risk, rescreen per screening protocol  []                  At Nutrition Risk- RD referral         SUBJECTIVE/OBJECTIVE:     Information obtained from:  patient      Diet:  GI Lite    Intake: Good    Patient Vitals for the past 100 hrs:   % Diet Eaten   02/18/18 1747 100 %   02/18/18 1332 50 %   02/18/18 0855 75 %   02/17/18 1900 75 %     Weight Changes: Wt Readings from Last 3 Encounters:   02/17/18 90.1 kg (198 lb 10.2 oz)   02/14/18 92.5 kg (204 lb)   11/25/17 92.7 kg (204 lb 5.9 oz)       Problems Identified:      [x]                  Patient admitted with GI bleed. Complains of severe reflux with the following foods: caffeine, tomato, orange juice, sweet potatoes, green peppers, bananas and mushrooms. Will have problem foods removed from available options. Patient aware of what she is able to tolerate and orders accordingly. Making a good effort to eat.    []                  Specified food preferences   []                  Dislikes supplements              []                  Allergies:   []                  Difficulty chewing      []                  Dentition    []                  Nausea/Vomiting   []                  Constipation   []                  Diarrhea    PLAN:     [x]                   Continue current diet and encourage intake  [x]                   Remove above mentioned foods from menu options   []                   Dislikes supplements will try a substitution  []                   Modify diet for food allergies  []                   Adjust texture due to difficulty chewing   [] Educated patient  []                   RD Referral  [x]                   Rescreen per screening protocol          Gwyn Kendrick DTR

## 2018-02-21 NOTE — PROGRESS NOTES
Bedside shift change report given to Hunter Gresham (oncoming nurse) by Oswaldo Jordan (offgoing nurse). Report included the following information SBAR.

## 2018-02-21 NOTE — ADT AUTH CERT NOTES
Utilization Review           Gastrointestinal Bleeding, Upper - Care Day 7 (2/21/2018) by Sammie Edwards RN        Review Status Review Entered       Completed 2/21/2018       Details              Care Day: 7 Care Date: 2/21/2018 Level of Care: Inpatient Floor       Guideline Day 3        Clinical Status       (X) * Hemodynamic stability       2/21/2018 1:26 PM EST by Lavinia Bell         VS: 97.9; 64; 18; 135/62; 97%              ( ) * Stable Hgb/Hct       (X) * Bleeding absent       2/21/2018 1:26 PM EST by Lavinia Bell         No active bleeding noted              (X) * Mental status at baseline       2/21/2018 1:26 PM EST by Lavinia Bell         WDL              (X) * Surgical and other acute interventions not needed       2/21/2018 1:26 PM EST by Lavinia Bell         None needed at this time              (X) * Pain absent or managed       2/21/2018 1:26 PM EST by Lavinia Bell         0/10              ( ) * Discharge plans and education understood              Activity       (X) * Ambulatory       2/21/2018 1:26 PM EST by Lavinia Bell         up ad [facility]asma                     Routes       (X) * Oral hydration, medications, and diet       2/21/2018 1:26 PM EST by Lavinia Bell         norvasc po; metoprolol po; protonix po  GI Lite diet                     Medications       (X) Proton pump inhibitor              2/21/2018 1:26 PM EST by Lavinia Bell       Subject: Additional Clinical Information       Labs: hgb 6.6; hct 21.8                                   * Milestone              Additional Notes       Internal Med:       Interval history / Subjective:       No more dark stools       Hgb 6.6 today. Episode of shortness of breath after ambulating today however denies any shortness of breath or dizziness today. Notes chest pain under pacemaker site that is relieved with burping.  Discussed this sounds like reflux and to continue to with PPI and Pepcid.                Assessment & Plan:               GI bleed: Resolved       2/2 gastic ulcer, probably from bx site earlier this week       Negative bleeding scan       S/p egd with Dr. Igor Wilson       Monitor H&H       Ppi, carafate,  clears       Hold asa               GERD       -PPI, Carafate, Pepcid add by GI                Acute on chronic anemia: NO BLOOD PRODUCTS JEHOVAH WITNESS       Hx of ckd likely contributing to chronicity       Iv Venofer daily,. Procrit, H07, folic acid       Hematology following, if hgb >7.5 should be able to f/u in infusion center for procrit if still required at discharge       Fall precautions       Will wait until Friday to repeat H&H. Spoke with lab can draw only 2 mL of blood to obtain an accurate blood sample.                 Hypertension: normotensive. C/w home med's                CKD stage III - stable               Mild Hyperkalemia: Resolved        Probably due to GI bleed.                 Asthma: Compensated, continue her home medicines.                         Code status: full       DVT prophylaxis: SCD's       Care Plan discussed with: patient, family, Dr Avani Hare, RN       Disposition: lives with her , hopefully home soon                     GI:       Assessment/Plan        1. Upper GI bleed - s/p repeat EGD demonstrating esophagitis, tiny ulcer at antrum, small moderately deep ulcer with non-bleeding visible vessel in fundus treated with cautery and clipped. Pathology unremarkable. Hgb 6.6 today. No further melena.        - Diet as tolerated       - Continue PPI, Pepcid and Carafate       2. Anemia - related to the above       - Hematology following - on Procrit and iron infusions        - Monitor H&H - awaiting Hgb to rise above 7.5 prior to discharge        3.  HTN                     Hematology:       Principal Problem/plan:       Anemia 2 to bleeding , [employer]; refuses blood transfusion, on procrit q 77AKZFT., K89, folic acid , venofir daily, would do finger sticks for cbc,,,,  would wait for hgb to rise above 7.5 before letting her go               GI bleeding  On PPI and sucralfate;denies further issues at present; GI following           Gastrointestinal Bleeding, Upper - Care Day 6 (2/20/2018) by Cathie Maxwell RN        Review Status Review Entered       Completed 2/20/2018       Details              Care Day: 6 Care Date: 2/20/2018 Level of Care: Step Down       Guideline Day 3        Clinical Status       (X) * Hemodynamic stability       2/20/2018 12:43 PM EST by Miroslava Beyer         97.9; 60; 18; 118.58; 97%              (X) * Stable Hgb/Hct       2/20/2018 12:43 PM EST by Miroslava Beyer         None drawn for 2/20              (X) * Bleeding absent       2/20/2018 12:43 PM EST by Miroslava Beyer         None noted              (X) * Mental status at baseline       2/20/2018 12:43 PM EST by Miroslava Beyer         WDL              (X) * Surgical and other acute interventions not needed       2/20/2018 12:43 PM EST by Miroslava Beyer         None indicated at this time.              (X) * Pain absent or managed       2/20/2018 12:43 PM EST by Miroslava Beyer         0/10              ( ) * Discharge plans and education understood              Activity       (X) * Ambulatory       2/20/2018 12:43 PM EST by Miroslava Beyer         up with assist                     Routes       (X) * Oral hydration, medications, and diet       2/20/2018 12:43 PM EST by Miroslava Beyer         Norvasc po; epogen sc; metoprolol po; protonix iv                     Medications       (X) Proton pump inhibitor       2/20/2018 12:43 PM EST by Miroslava Beyer         Protonix                                          * Milestone              Additional Notes       Hem/Onc:       Assessment:               Principal Problem/plan:       Anemia 2 to bleeding , [employer]; refuses blood transfusion, on procrit q 76MAAHP., B97, folic acid , venofir daily, would do finger sticks for cbc,,,, will give another dose of venofir today, would wait for hgb to rise above 7.5 before letting her go               GI bleeding  On PPI and sucralfate; another event last night but much less than before; GI following              Hospitalist:       Interval history / Subjective:       No more dark stools       Dizziness this am. Discussed with hematology this am, patient still requiring IV iron. As long as patient is on IV Iron daily would like patient to remain in the hospital. Goal hgb is >7.0,                Assessment & Plan:               GI bleed: Resolved       2/2 gastic ulcer, probably from bx site earlier this week       Negative bleeding scan       S/p egd with Dr. Aguilar Friends       Monitor H&H       Ppi, carafate, clears       Hold asa               Acute on chronic anemia: NO BLOOD PRODUCTS JEHOVAH WITNESS       Hx of ckd likely contributing to chronicity       Iv Venofer daily,. Procrit, D98, folic acid       Hematology following, if hgb >7.0 should be able to f/u in infusion center for procrit if still required at discharge       Fall precuations                Hypertension: normotensive. C/w home med's                CKD stage III - stable               Mild Hyperkalemia: Resolved        Probably due to GI bleed.                 Asthma: Compensated, continue her home medicines.                         Code status: full       DVT prophylaxis: SCD's       Care Plan discussed with: patient, daughter, Dr Bella Brambila, RN, Hematology       Disposition: lives with her , hopefully home soon               GI:       Assessment/Plan        1. Upper GI bleed - s/p repeat EGD demonstrating esophagitis, tiny ulcer at antrum, small moderately deep ulcer with non-bleeding visible vessel in fundus treated with cautery and clipped. Pathology unremarkable. Hgb 6.1 yesterday. No further melena.        - Diet as tolerated       - Continue PPI and Carafate       2.  Anemia - related to the above       - Hematology following - on Procrit and iron infusions        - Monitor H&H - awaiting Hgb to rise above 7.5 prior to discharge        3. HTN       4.  CKD stage III

## 2018-02-21 NOTE — PROGRESS NOTES
Problem: Falls - Risk of  Goal: *Absence of Falls  Document Mayuri Fall Risk and appropriate interventions in the flowsheet.    Outcome: Progressing Towards Goal  Fall Risk Interventions:  Mobility Interventions: Patient to call before getting OOB         Medication Interventions: Patient to call before getting OOB    Elimination Interventions: Call light in reach    History of Falls Interventions: Door open when patient unattended

## 2018-02-21 NOTE — PROGRESS NOTES
10:36 AM  Patient reviewed in IDR rounds. CM informed that patient is not medically stable for discharge at this time. There are no CM consults or needs. CM will continue to follow and assist with disposition needs as they arise.     HANNY Mari/TRISTIAN

## 2018-02-21 NOTE — PROGRESS NOTES
Bedside shift change report given to [address] (oncoming nurse) by Marty Damon (offgoing nurse). Report included the following information SBAR, Kardex, Intake/Output, MAR and Recent Results.

## 2018-02-21 NOTE — PROGRESS NOTES
Bedside and Verbal shift change report given to La Owens (oncoming nurse) by Paulino Erickson (offgoing nurse). Report included the following information SBAR, Kardex and MAR.

## 2018-02-21 NOTE — PROGRESS NOTES
Oncology Progress Note     Admit Date: 2/15/2018    Pt feels stronger, tolerated iron nicely, no new complaints    Interval History       Subjective:         Current Facility-Administered Medications   Medication Dose Route Frequency    pantoprazole (PROTONIX) tablet 40 mg  40 mg Oral DAILY    famotidine (PEPCID) tablet 20 mg  20 mg Oral QHS    albuterol (PROVENTIL VENTOLIN) nebulizer solution 2.5 mg  2.5 mg Nebulization Q6H PRN    sucralfate (CARAFATE) 100 mg/mL oral suspension 1 g  1 g Oral AC&HS    folic acid (FOLVITE) tablet 1 mg  1 mg Oral DAILY    cyanocobalamin (VITAMIN B12) tablet 250 mcg  250 mcg Oral DAILY    epoetin gigi (EPOGEN;PROCRIT) 24,000 Units  24,000 Units SubCUTAneous Q48H    acetaminophen (TYLENOL) tablet 650 mg  650 mg Oral Q6H PRN    amLODIPine (NORVASC) tablet 2.5 mg  2.5 mg Oral DAILY    donepezil (ARICEPT) tablet 5 mg  5 mg Oral QHS    metoprolol succinate (TOPROL-XL) XL tablet 50 mg  50 mg Oral DAILY    sertraline (ZOLOFT) tablet 100 mg  100 mg Oral QHS    sodium chloride (NS) flush 5-10 mL  5-10 mL IntraVENous Q8H    sodium chloride (NS) flush 5-10 mL  5-10 mL IntraVENous PRN    ondansetron (ZOFRAN) injection 4 mg  4 mg IntraVENous Q4H PRN    influenza vaccine - (3 yrs+)(PF) (FLUZONE QUAD/FLUARIX QUAD) injection 0.5 mL  0.5 mL IntraMUSCular PRIOR TO DISCHARGE        Review of Systems:  Pertinent items are noted in HPI.     Objective:     Patient Vitals for the past 8 hrs:   BP Temp Pulse Resp SpO2   18 0819 135/62 97.9 °F (36.6 °C) 64 18 97 %       Temp (24hrs), Av °F (36.7 °C), Min:97.8 °F (36.6 °C), Max:98.3 °F (36.8 °C)           Physical Exam:  Alert  No icterus  Lungs cta  Heart regular  Abdomen soft  Ext, no edema      Labs:    Recent Results (from the past 24 hour(s))   GLUCOSE, POC    Collection Time: 18 11:16 AM   Result Value Ref Range    Glucose (POC) 115 (H) 65 - 100 mg/dL    Performed by Kiko Arana    GLUCOSE, POC    Collection Time: 02/20/18  4:54 PM   Result Value Ref Range    Glucose (POC) 119 (H) 65 - 100 mg/dL    Performed by [facility], POC    Collection Time: 02/20/18  9:19 PM   Result Value Ref Range    Glucose (POC) 133 (H) 65 - 100 mg/dL    Performed by [facility], POC    Collection Time: 02/21/18  6:20 AM   Result Value Ref Range    Glucose (POC) 102 (H) 65 - 100 mg/dL    Performed by FARRAH AVELAR    HGB & HCT    Collection Time: 02/21/18  8:44 AM   Result Value Ref Range    HGB 6.6 (L) 11.5 - 16.0 g/dL    HCT 21.8 (L) 35.0 - 47.0 %       Assessment:     Principal Problem/plan:  Anemia 2 to bleeding , [employer]; refuses blood transfusion, on procrit q 85SYLWB., C85, folic acid , venofir daily, would do finger sticks for cbc,,,,  would wait for hgb to rise above 7.5 before letting her go    GI bleeding  On PPI and sucralfate;denies further issues at present; GI following    Chuy De Leon MD

## 2018-02-21 NOTE — PROGRESS NOTES
Hospitalist Progress Note  Neri Nicholas NP  Answering service: [phone] from in house phone  Cell: [phone]      Date of Service:  2018  NAME:  Jake Amado  :  1935  MRN:  [id number]      Admission Summary:   Jake Amado is a [de-identified] y.o. female who presents with HTN, asthma, anemia, arrhythmia S/P pace maker, PUD, arthritis, DVT, GERD, who was sent to the ED today form her GI office. Patient had EGD on  for GERD by Dr Maliha Arreaga and no acute pathology was detected. Patient had mild abdominal pain and had black color stool last night. Patient had black color stool this AM as well. Patient went to her GI MD today form where she was referred here. Patient feels fatigue and and lightheadedness. The patient denies any fever, chills, chest pain, cough, congestion, recent illness, palpitations, or dysuria. She denies any nausea and vomiting. She denies any abdominal pain now. Interval history / Subjective:   No more dark stools  Hgb 6.6 today. Episode of shortness of breath after ambulating today however denies any shortness of breath or dizziness today. Notes chest pain under pacemaker site that is relieved with burping. Discussed this sounds like reflux and to continue to with PPI and Pepcid. Assessment & Plan:     GI bleed: Resolved  2/2 gastic ulcer, probably from bx site earlier this week  Negative bleeding scan  S/p egd with Dr. Maliha Arreaga  Monitor H&H  Ppi, carafate,  clears  Hold asa    GERD  -PPI, Carafate, Pepcid add by GI     Acute on chronic anemia: NO BLOOD PRODUCTS Lenn Quant  Hx of ckd likely contributing to chronicity  Iv Venofer daily,. Procrit, L74, folic acid  Hematology following, if hgb >7.5 should be able to f/u in infusion center for procrit if still required at discharge  Fall precautions  Will wait until Friday to repeat H&H.  Spoke with lab can draw only 2 mL of blood to obtain an accurate blood sample.      Hypertension: normotensive. C/w home med's     CKD stage III - stable    Mild Hyperkalemia: Resolved   Probably due to GI bleed.      Asthma: Compensated, continue her home medicines.        Code status: full  DVT prophylaxis: [address] discussed with: patient, family, Dr Lupe Garcia, RN  Disposition: lives with her , hopefully home soon     Hospital Problems  Date Reviewed: 8/20/2017          Codes Class Noted POA    * (Principal)GI bleed ICD-10-CM: K92.2  ICD-9-CM: 578.9  2/15/2018 Yes                Review of Systems:   Denies abd pain n/v/d  Denies cp/sob  + dizziness. No blood in stool. Vital Signs:    Last 24hrs VS reviewed since prior progress note. Most recent are:  Visit Vitals    /62 (BP 1 Location: Right arm, BP Patient Position: At rest)    Pulse 64    Temp 97.9 °F (36.6 °C)    Resp 18    Ht 5' 5\" (1.651 m)    Wt 90.1 kg (198 lb 10.2 oz)    SpO2 97%    Breastfeeding No    BMI 33.05 kg/m2       No intake or output data in the 24 hours ending 02/21/18 1148     Physical Examination:             Constitutional:  No acute distress, cooperative, pleasant    ENT:  Oral mucous moist, oropharynx benign. Resp:  CTA bilaterally. No wheezing/rhonchi/rales. No accessory muscle use   CV:  Regular rhythm, normal rate, no murmurs, gallops, rubs    GI:  Soft, non distended, non tender. normoactive bowel sounds,    Musculoskeletal:  No edema, warm, 2+ pulses throughout    Neurologic:  Moves all extremities. AAOx3, CN II-XII reviewed     Psych:  Good insight, Not anxious nor agitated. Skin:  Good turgor, no rashes or ulcers       Data Review:    Review and/or order of clinical lab test  Review and/or order of tests in the medicine section of CPT      Labs:     Recent Labs      02/21/18   0844  02/19/18   0915   HGB  6.6*  6.1*   HCT  21.8*  19.9*     No results for input(s): NA, K, CL, CO2, BUN, CREA, GLU, CA, MG, PHOS, URICA in the last 72 hours.   No results for input(s): SGOT, GPT, ALT, AP, TBIL, TBILI, TP, ALB, GLOB, GGT, AML, LPSE in the last 72 hours. No lab exists for component: AMYP, HLPSE  No results for input(s): INR, PTP, APTT in the last 72 hours. No lab exists for component: INREXT, INREXT   No results for input(s): FE, TIBC, PSAT, FERR in the last 72 hours. Lab Results   Component Value Date/Time    Folate 15.1 02/25/2014 07:30 PM      No results for input(s): PH, PCO2, PO2 in the last 72 hours. No results for input(s): CPK, CKNDX, TROIQ in the last 72 hours.     No lab exists for component: CPKMB  Lab Results   Component Value Date/Time    Triglyceride 87 02/24/2014 02:30 AM     Lab Results   Component Value Date/Time    Glucose (POC) 124 (H) 02/21/2018 11:04 AM    Glucose (POC) 102 (H) 02/21/2018 06:20 AM    Glucose (POC) 133 (H) 02/20/2018 09:19 PM    Glucose (POC) 119 (H) 02/20/2018 04:54 PM    Glucose (POC) 115 (H) 02/20/2018 11:16 AM     Lab Results   Component Value Date/Time    Color YELLOW/STRAW 06/11/2017 09:56 PM    Appearance CLEAR 06/11/2017 09:56 PM    Specific gravity 1.010 06/11/2017 09:56 PM    pH (UA) 5.0 06/11/2017 09:56 PM    Protein NEGATIVE  06/11/2017 09:56 PM    Glucose NEGATIVE  06/11/2017 09:56 PM    Ketone NEGATIVE  06/11/2017 09:56 PM    Bilirubin NEGATIVE  06/11/2017 09:56 PM    Urobilinogen 0.2 06/11/2017 09:56 PM    Nitrites NEGATIVE  06/11/2017 09:56 PM    Leukocyte Esterase SMALL (A) 06/11/2017 09:56 PM    Epithelial cells FEW 06/11/2017 09:56 PM    Bacteria 1+ (A) 06/11/2017 09:56 PM    WBC 10-20 06/11/2017 09:56 PM    RBC 0-5 06/11/2017 09:56 PM         Medications Reviewed:     Current Facility-Administered Medications   Medication Dose Route Frequency    pantoprazole (PROTONIX) tablet 40 mg  40 mg Oral DAILY    famotidine (PEPCID) tablet 20 mg  20 mg Oral QHS    albuterol (PROVENTIL VENTOLIN) nebulizer solution 2.5 mg  2.5 mg Nebulization Q6H PRN    sucralfate (CARAFATE) 100 mg/mL oral suspension 1 g  1 g Oral AC&HS    folic acid (FOLVITE) tablet 1 mg  1 mg Oral DAILY    cyanocobalamin (VITAMIN B12) tablet 250 mcg  250 mcg Oral DAILY    epoetin gigi (EPOGEN;PROCRIT) 24,000 Units  24,000 Units SubCUTAneous Q48H    acetaminophen (TYLENOL) tablet 650 mg  650 mg Oral Q6H PRN    amLODIPine (NORVASC) tablet 2.5 mg  2.5 mg Oral DAILY    donepezil (ARICEPT) tablet 5 mg  5 mg Oral QHS    metoprolol succinate (TOPROL-XL) XL tablet 50 mg  50 mg Oral DAILY    sertraline (ZOLOFT) tablet 100 mg  100 mg Oral QHS    sodium chloride (NS) flush 5-10 mL  5-10 mL IntraVENous Q8H    sodium chloride (NS) flush 5-10 mL  5-10 mL IntraVENous PRN    ondansetron (ZOFRAN) injection 4 mg  4 mg IntraVENous Q4H PRN    influenza vaccine 2017-18 (3 yrs+)(PF) (FLUZONE QUAD/FLUARIX QUAD) injection 0.5 mL  0.5 mL IntraMUSCular PRIOR TO DISCHARGE     ______________________________________________________________________  EXPECTED LENGTH OF STAY: 2d 4h  ACTUAL LENGTH OF STAY:          [id number], NP

## 2018-02-22 VITALS
SYSTOLIC BLOOD PRESSURE: 127 MMHG | HEIGHT: 65 IN | WEIGHT: 198.63 LBS | OXYGEN SATURATION: 98 % | HEART RATE: 60 BPM | RESPIRATION RATE: 16 BRPM | BODY MASS INDEX: 33.09 KG/M2 | TEMPERATURE: 98.3 F | DIASTOLIC BLOOD PRESSURE: 61 MMHG

## 2018-02-22 PROBLEM — N18.30 STAGE 3 CHRONIC KIDNEY DISEASE (HCC): Chronic | Status: ACTIVE | Noted: 2018-02-22

## 2018-02-22 PROBLEM — D64.9 ANEMIA: Chronic | Status: ACTIVE | Noted: 2018-02-22

## 2018-02-22 PROBLEM — E87.6 HYPOKALEMIA: Status: ACTIVE | Noted: 2018-02-22

## 2018-02-22 PROBLEM — K21.9 GERD (GASTROESOPHAGEAL REFLUX DISEASE): Status: ACTIVE | Noted: 2018-02-22

## 2018-02-22 PROBLEM — K25.3 ACUTE GASTRIC ULCER: Status: ACTIVE | Noted: 2018-02-22

## 2018-02-22 PROBLEM — J45.909 ASTHMA: Status: ACTIVE | Noted: 2018-02-22

## 2018-02-22 PROBLEM — I10 HTN (HYPERTENSION): Chronic | Status: ACTIVE | Noted: 2018-02-22

## 2018-02-22 LAB
GLUCOSE BLD STRIP.AUTO-MCNC: 105 MG/DL (ref 65–100)
GLUCOSE BLD STRIP.AUTO-MCNC: 112 MG/DL (ref 65–100)
SERVICE CMNT-IMP: ABNORMAL
SERVICE CMNT-IMP: ABNORMAL

## 2018-02-22 PROCEDURE — 74011250636 HC RX REV CODE- 250/636: Performed by: INTERNAL MEDICINE

## 2018-02-22 PROCEDURE — 82962 GLUCOSE BLOOD TEST: CPT

## 2018-02-22 PROCEDURE — 74011250637 HC RX REV CODE- 250/637: Performed by: SPECIALIST

## 2018-02-22 PROCEDURE — 74011250637 HC RX REV CODE- 250/637: Performed by: FAMILY MEDICINE

## 2018-02-22 PROCEDURE — 74011250637 HC RX REV CODE- 250/637: Performed by: HOSPITALIST

## 2018-02-22 PROCEDURE — 74011000258 HC RX REV CODE- 258: Performed by: INTERNAL MEDICINE

## 2018-02-22 PROCEDURE — 74011250637 HC RX REV CODE- 250/637: Performed by: INTERNAL MEDICINE

## 2018-02-22 RX ORDER — FAMOTIDINE 20 MG/1
20 TABLET, FILM COATED ORAL
Qty: 30 TAB | Refills: 0 | Status: SHIPPED | OUTPATIENT
Start: 2018-02-22 | End: 2022-05-27

## 2018-02-22 RX ORDER — SUCRALFATE 1 G/10ML
1 SUSPENSION ORAL
Qty: 560 ML | Refills: 0 | Status: SHIPPED | OUTPATIENT
Start: 2018-02-22 | End: 2018-03-08

## 2018-02-22 RX ADMIN — AMLODIPINE BESYLATE 2.5 MG: 5 TABLET ORAL at 09:46

## 2018-02-22 RX ADMIN — CYANOCOBALAMIN TAB 500 MCG 250 MCG: 500 TAB at 09:46

## 2018-02-22 RX ADMIN — PANTOPRAZOLE SODIUM 40 MG: 40 TABLET, DELAYED RELEASE ORAL at 09:45

## 2018-02-22 RX ADMIN — SUCRALFATE 1 G: 1 SUSPENSION ORAL at 06:45

## 2018-02-22 RX ADMIN — SUCRALFATE 1 G: 1 SUSPENSION ORAL at 12:08

## 2018-02-22 RX ADMIN — Medication 10 ML: at 06:46

## 2018-02-22 RX ADMIN — IRON SUCROSE 200 MG: 20 INJECTION, SOLUTION INTRAVENOUS at 11:56

## 2018-02-22 RX ADMIN — FOLIC ACID 1 MG: 1 TABLET ORAL at 09:47

## 2018-02-22 RX ADMIN — METOPROLOL SUCCINATE 50 MG: 50 TABLET, EXTENDED RELEASE ORAL at 09:47

## 2018-02-22 NOTE — PROGRESS NOTES
Problem: Upper and Lower GI Bleed:  Discharge Outcomes  Goal: *Verbalizes understanding and describes prescribed diet  Outcome: Progressing Towards Goal  Nutrition consult  Goal: *Describes available resources and support systems  Outcome: Progressing Towards Goal  family    Problem: Falls - Risk of  Goal: *Absence of Falls  Document Mayuri Fall Risk and appropriate interventions in the flowsheet.    Fall Risk Interventions:  Mobility Interventions: Communicate number of staff needed for ambulation/transfer         Medication Interventions: Evaluate medications/consider consulting pharmacy    Elimination Interventions: Call light in reach, Patient to call for help with toileting needs    History of Falls Interventions: Door open when patient unattended

## 2018-02-22 NOTE — PROGRESS NOTES
NUTRITION       Brief note. Chart reviewed, discussed with NP. Pt avoiding specific foods secondary to reflux and preferences. She avoids caffeine, tomato, OJ, sweet potato, green peppers, banana, mushrooms. Now would like to remove grapes, applesauce, peaches. Pt was seen yesterday and specified food traits removed from the kitchen system. Spoke with kitchen staff to verify. Pt admits that she doesn't know what she wants to eat, but is focused on the kitchen offering foods she \"can't have\". During visit, plans for discharge today determined. Will verify preferences so that these traits will be carried over through any future admissions.     [address]

## 2018-02-22 NOTE — PROGRESS NOTES
[address] [facility] [address]  217 [facility] [address]   [phone]                GI PROGRESS NOTE  [facility]  Work Cell: [phone]      NAME:   Jake Amado   :    1935   MRN:    [id number]     Assessment/Plan   1. Upper GI bleed - s/p repeat EGD demonstrating esophagitis, tiny ulcer at antrum, small moderately deep ulcer with non-bleeding visible vessel in fundus treated with cautery and clipped. Pathology unremarkable. Hgb 6.6 yesterday. No further melena. - Diet as tolerated  - Continue PPI, Pepcid and Carafate  2. Anemia - related to the above  - Hematology following - on Procrit and iron infusions   - Monitor H&H - awaiting Hgb to rise above 7.5 prior to discharge   3. HTN  4. CKD stage III     Patient Active Problem List   Diagnosis Code    Sinus node dysfunction (HCC) I49.5    Localized primary osteoarthritis of left lower leg M17.12    Primary localized osteoarthritis of left knee M17.12    GI bleed K92.2       Subjective:     Did not get much sleep due to bed issues. Denies any pain. Reports indigestion is better. Tolerating diet. Hgb slowly improving. No further GI bleeding. Review of Systems    Constitutional: negative fever, negative chills, negative weight loss  Eyes:   negative visual changes  ENT:   negative sore throat, tongue or lip swelling  Respiratory:  negative cough, negative dyspnea  Cards:  negative for chest pain, palpitations, lower extremity edema  GI:   See HPI  :  negative for frequency, dysuria  Integument:  negative for rash and pruritus  Heme:  negative for easy bruising and gum/nose bleeding  Musculoskel: negative for myalgias, back pain and muscle weakness  Neuro: negative for headaches, dizziness, vertigo  Psych:  negative for feelings of anxiety, depression     Objective:     VITALS:   Last 24hrs VS reviewed since prior hospitalist progress note.  Most recent are:  Visit Vitals    /65 (BP 1 Location: Right arm, BP Patient Position: At rest)    Pulse 60    Temp 98.5 °F (36.9 °C)    Resp 17    Ht 5' 5\" (1.651 m)    Wt 90.1 kg (198 lb 10.2 oz)    SpO2 97%    Breastfeeding No    BMI 33.05 kg/m2     No intake or output data in the 24 hours ending 02/22/18 0932     PHYSICAL EXAM:  General   well developed, alert, in no acute distress  EENT  Normocephalic, Atraumatic, PERRLA, EOMI, sclera clear  Respiratory   Clear To Auscultation bilaterally - no wheezes, rales, rhonchi, or crackles  Cardiology  Regular Rate and Rythmn  - no murmurs, rubs or gallops  Abdominal  Soft, non-tender, non-distended, positive bowel sounds, no hepatosplenomegaly, no palpable mass  Extremities  No clubbing, cyanosis, or edema. Pulses intact. Neurological  No focal neurological deficits noted  Psychological  Oriented x 3.  Normal affect.        Lab Data   Recent Results (from the past 12 hour(s))   GLUCOSE, POC    Collection Time: 02/22/18  6:49 AM   Result Value Ref Range    Glucose (POC) 105 (H) 65 - 100 mg/dL    Performed by Gerry Winn          Medications: Reviewed    PMH/SH reviewed - no change compared to H&P  Mid-Level Provider: Rosendo Riuz NP   Date/Time:  2/22/2018

## 2018-02-22 NOTE — DISCHARGE SUMMARY
Discharge Summary       PATIENT ID: Martin Figueora  MRN: [id number]   YOB: 1935    DATE OF ADMISSION: 2/15/2018  1:19 PM    DATE OF DISCHARGE: 2/22/2018   PRIMARY CARE PROVIDER: Annelise Inman MD     ATTENDING PHYSICIAN: Nico Choi  DISCHARGING PROVIDER: Kashif Sequeira NP    To contact this individual call [phone] and ask the  to page. If unavailable ask to be transferred the Adult Hospitalist Department. CONSULTATIONS: IP CONSULT TO GASTROENTEROLOGY  IP CONSULT TO HEMATOLOGY  IP CONSULT TO HOSPITALIST    PROCEDURES/SURGERIES: Procedure(s):  ESOPHAGOGASTRODUODENOSCOPY (EGD)  BICAP  RESOLUTION CLIP    ADMITTING DIAGNOSES & HOSPITAL COURSE:   Dx: Gi bleed, Gastric Ulcer, Anemia    Carlos Velez a [de-identified] y.o. female who presents with HTN, asthma, anemia, arrhythmia S/P pace maker, PUD, arthritis, DVT, GERD, who was sent to the ED today form her GI office. Patient had EGD on 02/14 for GERD by Dr Leyda Recinos and no acute pathology was detected. Patient had mild abdominal pain and had black color stool last night. Patient had black color stool this AM as well. Patient went to her GI MD today form where she was referred here. Patient feels fatigue and and lightheadedness.    The patient denies any fever, chills, chest pain, cough, congestion, recent illness, palpitations, or dysuria. She denies any nausea and vomiting. She denies any abdominal pain now. 2/16 EGD showed gastric ulcer. Hgb low (down to 5.7). Dr Seven Owusu with hematology following. Patient declined blood products. Received multiple doses of IV iron and procrit. Hgb now 6.6. Patient asymptomatic. Discussed with Dr Seven Owusu, patient has underlying anemia with baseline hgb of 7.3. Patient stable for discharge from his standpoint with OP f/u with pcp. Dr Leyda Recinos ok with discharge recommending holding ASA x 2 weeks then continue PPI, Pepcid, and Carafate with OP f/u. VSS. No blood in stool or acute bleeding. Patient stable for discharge home. DISCHARGE DIAGNOSES / PLAN:      GI bleed: Resolved  2/2 gastic ulcer, probably from bx site earlier this week  Negative bleeding scan  S/p egd with Dr. Kacie Maciel  Monitor H&H  Ppi, carafate,  clears  Hold asa x 2 weeks     GERD  -PPI, Carafate, Pepcid add by GI      Acute on chronic anemia: NO BLOOD PRODUCTS JEHOVAH WITNESS  Hx of ckd likely contributing to chronicity  Iv Venofer daily,. Procrit, M41, folic acid  Hematology following  Repeat labs with pcp on Monday      Hypertension: normotensive. C/w home med's      CKD stage III - stable     Mild Hyperkalemia: Resolved   Probably due to GI bleed.       Asthma: Compensated, continue her home medicines. PENDING TEST RESULTS:   At the time of discharge the following test results are still pending: none    FOLLOW UP APPOINTMENTS:    Follow-up Information     Follow up With Details Comments Contact Info    Bridget Tomlinson MD  follow up Monday [address]  [address] [facility]      Neal Bustillos MD  follow up with GI in 2-3 weeks  [place] [id number] [phone]             ADDITIONAL CARE RECOMMENDATIONS:   -Follow up with your primary care provider on Monday to have your hemoglobin rechecked. It was 6.6 at discharge.   -Follow up with Dr Kacie Maciel in 2-3 weeks.  -Hold you baby Aspirin for 2 wks then restart. Start taking Pepcid and Carafate for your stomach ulcer     DIET: Gi lite     ACTIVITY: as tolerated    WOUND CARE: none    EQUIPMENT needed: none      DISCHARGE MEDICATIONS:  Current Discharge Medication List      START taking these medications    Details   famotidine (PEPCID) 20 mg tablet Take 1 Tab by mouth nightly. Indications: ZOLLINGER-ROBERSON SYNDROME  Qty: 30 Tab, Refills: 0      sucralfate (CARAFATE) 100 mg/mL suspension Take 10 mL by mouth Before breakfast, lunch, dinner and at bedtime for 14 days.  Indications: Duodenal Ulcer  Qty: 560 mL, Refills: 0         CONTINUE these medications which have NOT CHANGED    Details   pantoprazole (PROTONIX) 40 mg tablet Take 40 mg by mouth daily. triamcinolone (ARISTOCORT) 0.5 % topical cream Apply  to affected area three (3) times daily. use thin layer      b complex vitamins tablet Take 1 Tab by mouth daily. LACTOBACILLUS ACIDOPHILUS (PROBIOTIC PO) Take 1 Cap by mouth daily. FERROUS SULFATE PO Take 325 mg by mouth daily. amLODIPine (NORVASC) 2.5 mg tablet Take 2.5 mg by mouth daily. gabapentin (NEURONTIN) 100 mg capsule Take 200 mg by mouth two (2) times daily as needed. metoprolol succinate (TOPROL-XL) 50 mg XL tablet Take 1 Tab by mouth daily. Qty: 30 Tab, Refills: 11      pravastatin (PRAVACHOL) 20 mg tablet Take 1 Tab by mouth nightly. Qty: 30 Tab, Refills: 11      allopurinol (ZYLOPRIM) 300 mg tablet Take 300 mg by mouth daily. donepezil (ARICEPT) 5 mg tablet Take 5 mg by mouth nightly. Refills: 1      PROAIR HFA 90 mcg/actuation inhaler Take 2 Puffs by inhalation four (4) times daily as needed. furosemide (LASIX) 40 mg tablet Take 40 mg by mouth daily as needed. montelukast (SINGULAIR) 10 mg tablet Take 10 mg by mouth nightly. sertraline (ZOLOFT) 100 mg tablet Take 100 mg by mouth nightly. losartan (COZAAR) 100 mg tablet Take 100 mg by mouth nightly. STOP taking these medications       aspirin delayed-release 81 mg tablet Comments:   Reason for Stopping:                 NOTIFY YOUR PHYSICIAN FOR ANY OF THE FOLLOWING:   Fever over 101 degrees for 24 hours. Chest pain, shortness of breath, fever, chills, nausea, vomiting, diarrhea, change in mentation, falling, weakness, bleeding. Severe pain or pain not relieved by medications. Or, any other signs or symptoms that you may have questions about.     DISPOSITION:   X Home With:   OT  PT  HH  RN       Long term SNF/Inpatient Rehab    Independent/assisted living    Hospice    Other: PATIENT CONDITION AT DISCHARGE:     Functional status    Poor     Deconditioned    X Independent      Cognition   X  Lucid     Forgetful     Dementia      Catheters/lines (plus indication)    Andrade     PICC     PEG    X None      Code status   X  Full code     DNR      PHYSICAL EXAMINATION AT DISCHARGE:   Refer to Progress Note      CHRONIC MEDICAL DIAGNOSES:  Problem List as of 2/22/2018  Date Reviewed: 2/22/2018          Codes Class Noted - Resolved    Anemia (Chronic) ICD-10-CM: D64.9  ICD-9-CM: 285.9  2/22/2018 - Present        GERD (gastroesophageal reflux disease) ICD-10-CM: K21.9  ICD-9-CM: 530.81  2/22/2018 - Present        Stage 3 chronic kidney disease (Chronic) ICD-10-CM: N18.3  ICD-9-CM: 585.3  2/22/2018 - Present        Asthma ICD-10-CM: J45.909  ICD-9-CM: 493.90  2/22/2018 - Present        HTN (hypertension) (Chronic) ICD-10-CM: I10  ICD-9-CM: 401.9  2/22/2018 - Present        Acute gastric ulcer ICD-10-CM: K25.3  ICD-9-CM: 531.30  2/22/2018 - Present        * (Principal)GI bleed ICD-10-CM: K92.2  ICD-9-CM: 578.9  2/15/2018 - Present        Localized primary osteoarthritis of left lower leg ICD-10-CM: M17.12  ICD-9-CM: 715.16  6/27/2016 - Present        Primary localized osteoarthritis of left knee ICD-10-CM: M17.12  ICD-9-CM: 715.16  6/27/2016 - Present        Sinus node dysfunction ([facility] ICD-10-CM: I49.5  ICD-9-CM: 427.81  8/13/2013 - Present        RESOLVED: Nausea and vomiting ICD-10-CM: R11.2  ICD-9-CM: 787.01  2/23/2014 - 3/1/2014        RESOLVED: BENSON (acute kidney injury) ([facility] ICD-10-CM: N17.9  ICD-9-CM: 584.9  2/23/2014 - 3/1/2014              Greater than 45 minutes were spent with the patient on counseling and coordination of care    Signed:   Seble Disla NP  2/22/2018  12:10 PM

## 2018-02-22 NOTE — PROGRESS NOTES
Oncology Progress Note     Admit Date: 2/15/2018    Pt feels stronger, tolerated iron nicely, no new complaints    Interval History       Subjective:         Current Facility-Administered Medications   Medication Dose Route Frequency    pantoprazole (PROTONIX) tablet 40 mg  40 mg Oral DAILY    famotidine (PEPCID) tablet 20 mg  20 mg Oral QHS    albuterol (PROVENTIL VENTOLIN) nebulizer solution 2.5 mg  2.5 mg Nebulization Q6H PRN    sucralfate (CARAFATE) 100 mg/mL oral suspension 1 g  1 g Oral AC&HS    folic acid (FOLVITE) tablet 1 mg  1 mg Oral DAILY    cyanocobalamin (VITAMIN B12) tablet 250 mcg  250 mcg Oral DAILY    epoetin gigi (EPOGEN;PROCRIT) 24,000 Units  24,000 Units SubCUTAneous Q48H    acetaminophen (TYLENOL) tablet 650 mg  650 mg Oral Q6H PRN    amLODIPine (NORVASC) tablet 2.5 mg  2.5 mg Oral DAILY    donepezil (ARICEPT) tablet 5 mg  5 mg Oral QHS    metoprolol succinate (TOPROL-XL) XL tablet 50 mg  50 mg Oral DAILY    sertraline (ZOLOFT) tablet 100 mg  100 mg Oral QHS    sodium chloride (NS) flush 5-10 mL  5-10 mL IntraVENous Q8H    sodium chloride (NS) flush 5-10 mL  5-10 mL IntraVENous PRN    ondansetron (ZOFRAN) injection 4 mg  4 mg IntraVENous Q4H PRN    influenza vaccine - (3 yrs+)(PF) (FLUZONE QUAD/FLUARIX QUAD) injection 0.5 mL  0.5 mL IntraMUSCular PRIOR TO DISCHARGE        Review of Systems:  Pertinent items are noted in HPI.     Objective:     Patient Vitals for the past 8 hrs:   BP Temp Pulse Resp SpO2   18 0944 127/61 98.3 °F (36.8 °C) 60 16 98 %       Temp (24hrs), Av.1 °F (36.7 °C), Min:97.5 °F (36.4 °C), Max:98.5 °F (36.9 °C)           Physical Exam:  Alert  No icterus  Lungs cta  Heart regular  Abdomen soft  Ext, no edema      Labs:    Recent Results (from the past 24 hour(s))   GLUCOSE, POC    Collection Time: 18  4:40 PM   Result Value Ref Range    Glucose (POC) 116 (H) 65 - 100 mg/dL    Performed by [address], POC    Collection Time: 02/21/18  9:06 PM   Result Value Ref Range    Glucose (POC) 102 (H) 65 - 100 mg/dL    Performed by [facility] AGUSTIN HOPE    GLUCOSE, POC    Collection Time: 02/22/18  6:49 AM   Result Value Ref Range    Glucose (POC) 105 (H) 65 - 100 mg/dL    Performed by Billie Ordonez        Assessment:     Principal Problem/plan:  Anemia 2 to bleeding (antral ulcer), Mandaen; refuses blood transfusion, on procrit q 15VWALS., I42, folic acid , venofir daily, would do finger sticks for cbc,,,,  would wait for hgb to rise above 7.5 before letting her go    GI bleeding  On PPI and sucralfate;denies further issues at present; GI following    Im not sure what her baseline hgb is,,, I have a phone call in to Dr. Kleber Peter in [facility] regarding this info    Donnell Roque MD

## 2018-02-22 NOTE — DISCHARGE INSTRUCTIONS
Discharge Instructions       PATIENT ID: Keily Morales  MRN: [id number]   YOB: 1935    DATE OF ADMISSION: 2/15/2018  1:19 PM    DATE OF DISCHARGE: 2/22/2018    PRIMARY CARE PROVIDER: Aida Devlin MD     ATTENDING PHYSICIAN: Joaquín Guzman MD  DISCHARGING PROVIDER: Melissa Garcia NP    To contact this individual call [phone] and ask the  to page. If unavailable ask to be transferred the Adult Hospitalist Department. DISCHARGE DIAGNOSES Gastric Ulcer, Acute on Chronic Anemia, GERD    CONSULTATIONS: IP CONSULT TO GASTROENTEROLOGY  IP CONSULT TO HEMATOLOGY  IP CONSULT TO HOSPITALIST    PROCEDURES/SURGERIES: Procedure(s):  ESOPHAGOGASTRODUODENOSCOPY (EGD)  BICAP  RESOLUTION CLIP    PENDING TEST RESULTS:   At the time of discharge the following test results are still pending: none    FOLLOW UP APPOINTMENTS:   Follow-up Information     Follow up With Details Comments Contact Info    Aida Devlin MD  follow up Monday [address]  [address]      Ludy Gleason MD  follow up with GI in 2-3 weeks  [place] [address]  [phone]           ADDITIONAL CARE RECOMMENDATIONS:   -Follow up with your primary care provider on Monday to have your hemoglobin rechecked. It was 6.6 at discharge.   -Follow up with Dr Genoveva Severance in 2-3 weeks.  -Hold you baby Aspirin for 2 weeks then restart. Start taking Pepcid and Carafate for your stomach ulcer     DIET: Gi lite     ACTIVITY: as tolerated    WOUND CARE: none    EQUIPMENT needed: none    DISCHARGE MEDICATIONS:   See Medication Reconciliation Form    · It is important that you take the medication exactly as they are prescribed. · Keep your medication in the bottles provided by the pharmacist and keep a list of the medication names, dosages, and times to be taken in your wallet. · Do not take other medications without consulting your doctor.        NOTIFY YOUR PHYSICIAN FOR ANY OF THE FOLLOWING:   Fever over 101 degrees for 24 hours. Chest pain, shortness of breath, fever, chills, nausea, vomiting, diarrhea, change in mentation, falling, weakness, bleeding. Severe pain or pain not relieved by medications. Or, any other signs or symptoms that you may have questions about. DISPOSITION:  X  Home With:   OT  PT  HH  RN       SNF/Inpatient Rehab/LTAC    Independent/assisted living    Hospice    Other:       PROBLEM LIST Updated:   Yes X       Signed:   Delbert Fox NP  2/22/2018  12:09 PM

## 2018-02-22 NOTE — PROGRESS NOTES
Bedside shift change report given to [facility], [address] [facility] (oncoming nurse) by Robbie Solis RN (offgoing nurse). Report included the following information SBAR, Kardex, ED Summary, [facility] and Recent Results.

## 2018-02-22 NOTE — PROGRESS NOTES
Hospitalist Progress Note  Nirmal Elena NP  Answering service: [phone] from in house phone  Cell: [phone]      Date of Service:  2018  NAME:  Queen Selina  :  1935  MRN:  [id number]      Admission Summary:   Queen Selina is a [de-identified] y.o. female who presents with HTN, asthma, anemia, arrhythmia S/P pace maker, PUD, arthritis, DVT, GERD, who was sent to the ED today form her GI office. Patient had EGD on  for GERD by Dr Robert Dalal and no acute pathology was detected. Patient had mild abdominal pain and had black color stool last night. Patient had black color stool this AM as well. Patient went to her GI MD today form where she was referred here. Patient feels fatigue and and lightheadedness. The patient denies any fever, chills, chest pain, cough, congestion, recent illness, palpitations, or dysuria. She denies any nausea and vomiting. She denies any abdominal pain now. Interval history / Subjective:   No more dark stools  Last Hgb 6.6 . No shortness of breath or dizziness. Reports occasional reflux. Plan to repeat labs tomorrow if hgb >7.0 hopefully can go discharge home. Assessment & Plan:     GI bleed: Resolved  2/2 gastic ulcer, probably from bx site earlier this week  Negative bleeding scan  S/p egd with Dr. Robert Dalal  Monitor H&H  Ppi, carafate,  clears  Hold asa    GERD  -PPI, Carafate, Pepcid add by GI     Acute on chronic anemia: NO BLOOD PRODUCTS Laura Burciaga  Hx of ckd likely contributing to chronicity  Iv Venofer daily,. Procrit, Q10, folic acid  Discussed with Dr Marilin Bolden with hematology today, if pt asymptomatic should be able to discharge patient home if hgb >7.0, will give IV iron again today. Fall precautions  -repeat H&H. Spoke with lab can draw only 2 mL of blood to obtain an accurate blood sample.      Hypertension: normotensive.  C/w home med's     CKD stage III - stable    Mild Hyperkalemia: Resolved   Probably due to GI bleed.      Asthma: Compensated, continue her home medicines.      Code status: full  DVT prophylaxis: [address] discussed with: patient, family, Dr Jeb Abernathy, Dr Marichuy Jean, RN  Disposition: lives with her , hopefully home soon     Hospital Problems  Date Reviewed: 8/20/2017          Codes Class Noted POA    * (Principal)GI bleed ICD-10-CM: K92.2  ICD-9-CM: 578.9  2/15/2018 Yes                Review of Systems:   Denies abd pain n/v/d  Denies cp/sob  No dizzness or blood in stool    Vital Signs:    Last 24hrs VS reviewed since prior progress note. Most recent are:  Visit Vitals    /61 (BP 1 Location: Right arm, BP Patient Position: At rest)    Pulse 60    Temp 98.3 °F (36.8 °C)    Resp 16    Ht 5' 5\" (1.651 m)    Wt 90.1 kg (198 lb 10.2 oz)    SpO2 98%    Breastfeeding No    BMI 33.05 kg/m2       No intake or output data in the 24 hours ending 02/22/18 1143     Physical Examination:             Constitutional:  No acute distress, cooperative, pleasant    ENT:  Oral mucous moist, oropharynx benign. Resp:  CTA bilaterally. No wheezing/rhonchi/rales. No accessory muscle use   CV:  Regular rhythm, normal rate, no murmurs, gallops, rubs    GI:  Soft, non distended, non tender. normoactive bowel sounds,    Musculoskeletal:  No edema, warm, 2+ pulses throughout    Neurologic:  Moves all extremities. AAOx3, CN II-XII reviewed     Psych:  Good insight, Not anxious nor agitated. Skin:  Good turgor, no rashes or ulcers       Data Review:    Review and/or order of clinical lab test  Review and/or order of tests in the medicine section of CPT      Labs:     Recent Labs      02/21/18   0844   HGB  6.6*   HCT  21.8*     No results for input(s): NA, K, CL, CO2, BUN, CREA, GLU, CA, MG, PHOS, URICA in the last 72 hours. No results for input(s): SGOT, GPT, ALT, AP, TBIL, TBILI, TP, ALB, GLOB, GGT, AML, LPSE in the last 72 hours.     No lab exists for component: AMYP, HLPSE  No results for input(s): INR, PTP, APTT in the last 72 hours. No lab exists for component: INREXT, INREXT   No results for input(s): FE, TIBC, PSAT, FERR in the last 72 hours. Lab Results   Component Value Date/Time    Folate 15.1 02/25/2014 07:30 PM      No results for input(s): PH, PCO2, PO2 in the last 72 hours. No results for input(s): CPK, CKNDX, TROIQ in the last 72 hours.     No lab exists for component: CPKMB  Lab Results   Component Value Date/Time    Triglyceride 87 02/24/2014 02:30 AM     Lab Results   Component Value Date/Time    Glucose (POC) 112 (H) 02/22/2018 11:41 AM    Glucose (POC) 105 (H) 02/22/2018 06:49 AM    Glucose (POC) 102 (H) 02/21/2018 09:06 PM    Glucose (POC) 116 (H) 02/21/2018 04:40 PM    Glucose (POC) 124 (H) 02/21/2018 11:04 AM     Lab Results   Component Value Date/Time    Color YELLOW/STRAW 06/11/2017 09:56 PM    Appearance CLEAR 06/11/2017 09:56 PM    Specific gravity 1.010 06/11/2017 09:56 PM    pH (UA) 5.0 06/11/2017 09:56 PM    Protein NEGATIVE  06/11/2017 09:56 PM    Glucose NEGATIVE  06/11/2017 09:56 PM    Ketone NEGATIVE  06/11/2017 09:56 PM    Bilirubin NEGATIVE  06/11/2017 09:56 PM    Urobilinogen 0.2 06/11/2017 09:56 PM    Nitrites NEGATIVE  06/11/2017 09:56 PM    Leukocyte Esterase SMALL (A) 06/11/2017 09:56 PM    Epithelial cells FEW 06/11/2017 09:56 PM    Bacteria 1+ (A) 06/11/2017 09:56 PM    WBC 10-20 06/11/2017 09:56 PM    RBC 0-5 06/11/2017 09:56 PM         Medications Reviewed:     Current Facility-Administered Medications   Medication Dose Route Frequency    iron sucrose (VENOFER) 200 mg in 0.9% sodium chloride 100 mL IVPB  200 mg IntraVENous ONCE    pantoprazole (PROTONIX) tablet 40 mg  40 mg Oral DAILY    famotidine (PEPCID) tablet 20 mg  20 mg Oral QHS    albuterol (PROVENTIL VENTOLIN) nebulizer solution 2.5 mg  2.5 mg Nebulization Q6H PRN    sucralfate (CARAFATE) 100 mg/mL oral suspension 1 g  1 g Oral AC&HS    folic acid (FOLVITE) tablet 1 mg  1 mg Oral DAILY    cyanocobalamin (VITAMIN B12) tablet 250 mcg  250 mcg Oral DAILY    epoetin gigi (EPOGEN;PROCRIT) 24,000 Units  24,000 Units SubCUTAneous Q48H    acetaminophen (TYLENOL) tablet 650 mg  650 mg Oral Q6H PRN    amLODIPine (NORVASC) tablet 2.5 mg  2.5 mg Oral DAILY    donepezil (ARICEPT) tablet 5 mg  5 mg Oral QHS    metoprolol succinate (TOPROL-XL) XL tablet 50 mg  50 mg Oral DAILY    sertraline (ZOLOFT) tablet 100 mg  100 mg Oral QHS    sodium chloride (NS) flush 5-10 mL  5-10 mL IntraVENous Q8H    sodium chloride (NS) flush 5-10 mL  5-10 mL IntraVENous PRN    ondansetron (ZOFRAN) injection 4 mg  4 mg IntraVENous Q4H PRN    influenza vaccine 2017-18 (3 yrs+)(PF) (FLUZONE QUAD/FLUARIX QUAD) injection 0.5 mL  0.5 mL IntraMUSCular PRIOR TO DISCHARGE     ______________________________________________________________________  EXPECTED LENGTH OF STAY: 2d 4h  ACTUAL LENGTH OF STAY:          [address], NP

## 2018-02-22 NOTE — PROGRESS NOTES
Problem: Falls - Risk of  Goal: *Absence of Falls  Document Mayuri Fall Risk and appropriate interventions in the flowsheet.    Outcome: Progressing Towards Goal  Fall Risk Interventions:  Mobility Interventions: Communicate number of staff needed for ambulation/transfer, Patient to call before getting OOB         Medication Interventions: Patient to call before getting OOB, Teach patient to arise slowly    Elimination Interventions: Call light in reach, Patient to call for help with toileting needs, Toileting schedule/hourly rounds    History of Falls Interventions: Door open when patient unattended

## 2018-02-22 NOTE — PROGRESS NOTES
11:00 AM  Patient reviewed in IDR rounds. Patient currently awaiting for hemoglobin to be stabilized. There are no CM consults or needs at this time. CM will continue to follow and assist with disposition needs as they arise. 12:10 PM  CM notified that patient has orders for discharge. There are no CM consults or needs. Patient to discharge home with family assistance. Mode of transport at time of discharge to be provided by patient's family.     Anais Torres MSW/TRISTIAN

## 2018-02-22 NOTE — PROGRESS NOTES
Patient discharged home with daughter. Instructions discussed with opportunity for questions. Iron infusion completed prior to discharge.

## 2018-04-25 ENCOUNTER — HOSPITAL ENCOUNTER (OUTPATIENT)
Age: 83
Setting detail: OUTPATIENT SURGERY
Discharge: HOME | End: 2018-04-25
Attending: SPECIALIST | Admitting: SPECIALIST
Payer: COMMERCIAL

## 2018-04-25 ENCOUNTER — ANESTHESIA EVENT (OUTPATIENT)
Dept: ENDOSCOPY | Age: 83
End: 2018-04-25
Payer: COMMERCIAL

## 2018-04-25 ENCOUNTER — ANESTHESIA (OUTPATIENT)
Dept: ENDOSCOPY | Age: 83
End: 2018-04-25
Payer: COMMERCIAL

## 2018-04-25 VITALS
HEIGHT: 65 IN | WEIGHT: 198 LBS | SYSTOLIC BLOOD PRESSURE: 144 MMHG | BODY MASS INDEX: 32.99 KG/M2 | HEART RATE: 60 BPM | OXYGEN SATURATION: 94 % | DIASTOLIC BLOOD PRESSURE: 65 MMHG | RESPIRATION RATE: 29 BRPM | TEMPERATURE: 97.8 F

## 2018-04-25 PROCEDURE — 76060000031 HC ANESTHESIA FIRST 0.5 HR: Performed by: SPECIALIST

## 2018-04-25 PROCEDURE — 76040000019: Performed by: SPECIALIST

## 2018-04-25 PROCEDURE — 74011250636 HC RX REV CODE- 250/636

## 2018-04-25 RX ORDER — SODIUM CHLORIDE 9 MG/ML
INJECTION, SOLUTION INTRAVENOUS
Status: DISCONTINUED | OUTPATIENT
Start: 2018-04-25 | End: 2018-04-25 | Stop reason: HOSPADM

## 2018-04-25 RX ORDER — SODIUM CHLORIDE 0.9 % (FLUSH) 0.9 %
5-10 SYRINGE (ML) INJECTION EVERY 8 HOURS
Status: DISCONTINUED | OUTPATIENT
Start: 2018-04-25 | End: 2018-04-25 | Stop reason: HOSPADM

## 2018-04-25 RX ORDER — EPINEPHRINE 0.1 MG/ML
1 INJECTION INTRACARDIAC; INTRAVENOUS
Status: DISCONTINUED | OUTPATIENT
Start: 2018-04-25 | End: 2018-04-25 | Stop reason: HOSPADM

## 2018-04-25 RX ORDER — PROPOFOL 10 MG/ML
INJECTION, EMULSION INTRAVENOUS AS NEEDED
Status: DISCONTINUED | OUTPATIENT
Start: 2018-04-25 | End: 2018-04-25 | Stop reason: HOSPADM

## 2018-04-25 RX ORDER — SODIUM CHLORIDE 9 MG/ML
50 INJECTION, SOLUTION INTRAVENOUS CONTINUOUS
Status: DISCONTINUED | OUTPATIENT
Start: 2018-04-25 | End: 2018-04-25 | Stop reason: HOSPADM

## 2018-04-25 RX ORDER — SODIUM CHLORIDE 0.9 % (FLUSH) 0.9 %
5-10 SYRINGE (ML) INJECTION AS NEEDED
Status: DISCONTINUED | OUTPATIENT
Start: 2018-04-25 | End: 2018-04-25 | Stop reason: HOSPADM

## 2018-04-25 RX ORDER — ATROPINE SULFATE 0.1 MG/ML
0.5 INJECTION INTRAVENOUS
Status: DISCONTINUED | OUTPATIENT
Start: 2018-04-25 | End: 2018-04-25 | Stop reason: HOSPADM

## 2018-04-25 RX ORDER — DEXTROMETHORPHAN/PSEUDOEPHED 2.5-7.5/.8
1.2 DROPS ORAL
Status: DISCONTINUED | OUTPATIENT
Start: 2018-04-25 | End: 2018-04-25 | Stop reason: HOSPADM

## 2018-04-25 RX ORDER — FENTANYL CITRATE 50 UG/ML
200 INJECTION, SOLUTION INTRAMUSCULAR; INTRAVENOUS
Status: DISCONTINUED | OUTPATIENT
Start: 2018-04-25 | End: 2018-04-25 | Stop reason: HOSPADM

## 2018-04-25 RX ORDER — LORAZEPAM 2 MG/ML
2 INJECTION INTRAMUSCULAR AS NEEDED
Status: DISCONTINUED | OUTPATIENT
Start: 2018-04-25 | End: 2018-04-25 | Stop reason: HOSPADM

## 2018-04-25 RX ORDER — MIDAZOLAM HYDROCHLORIDE 1 MG/ML
.25-1 INJECTION, SOLUTION INTRAMUSCULAR; INTRAVENOUS
Status: DISCONTINUED | OUTPATIENT
Start: 2018-04-25 | End: 2018-04-25 | Stop reason: HOSPADM

## 2018-04-25 RX ORDER — NALOXONE HYDROCHLORIDE 0.4 MG/ML
0.4 INJECTION, SOLUTION INTRAMUSCULAR; INTRAVENOUS; SUBCUTANEOUS
Status: DISCONTINUED | OUTPATIENT
Start: 2018-04-25 | End: 2018-04-25 | Stop reason: HOSPADM

## 2018-04-25 RX ORDER — FLUMAZENIL 0.1 MG/ML
0.2 INJECTION INTRAVENOUS
Status: DISCONTINUED | OUTPATIENT
Start: 2018-04-25 | End: 2018-04-25 | Stop reason: HOSPADM

## 2018-04-25 RX ADMIN — PROPOFOL 70 MG: 10 INJECTION, EMULSION INTRAVENOUS at 09:34

## 2018-04-25 RX ADMIN — SODIUM CHLORIDE: 9 INJECTION, SOLUTION INTRAVENOUS at 09:30

## 2018-04-25 RX ADMIN — PROPOFOL 30 MG: 10 INJECTION, EMULSION INTRAVENOUS at 09:35

## 2018-04-25 RX ADMIN — PROPOFOL 50 MG: 10 INJECTION, EMULSION INTRAVENOUS at 09:38

## 2018-04-25 NOTE — H&P
Pre-endoscopy H and P    The patient was seen and examined. The airway was assessed and documented. The problem list, past medical history, and medications were reviewed.      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      Social History Main Topics    Smoking status: Former Smoker     Packs/day: 0.25     Years: 20.00     Quit date: 8/13/1971    Smokeless tobacco: Never Used    Alcohol use No    Drug use: No    Sexual activity: No     Other Topics Concern    Not on file     Social History Narrative     Past Medical History:   Diagnosis Date    Adverse effect of anesthesia     passed out during EGD/stopped breathing    Arrhythmia     has pacemaker for low HR    Arthritis     Asthma     Cancer ([facility]     right kidney - surgery    Chronic pain     right side    Deep vein thrombosis (DVT) during current hospitalization ([facility]     history of DVT was on coumadin in the past    Diabetes ([facility]     diet controlled    GERD (gastroesophageal reflux disease)     H/O acute pancreatitis     Hiatal hernia     Hypertension     MARLEN (obstructive sleep apnea)     does not use CPAP/pt states she has not used since a pacemaker inserted    Other ill-defined conditions(799.89)     lymph node enlargement - left chest - benign bx - watching    Other ill-defined conditions(799.89)     wheezes    Psychiatric disorder     depression    PUD (peptic ulcer disease)     hx of    Thromboembolus ([address]     Unspecified adverse effect of anesthesia     passed out during EGD per pt - stopped breathing per pt per MD     The patient has a family history of na    Prior to Admission Medications   Prescriptions Last Dose Informant Patient Reported? Taking? FERROUS SULFATE PO 4/24/2018 at Unknown time  Yes Yes   Sig: Take 325 mg by mouth daily. LACTOBACILLUS ACIDOPHILUS (PROBIOTIC PO) 4/24/2018 at Unknown time  Yes Yes   Sig: Take 1 Cap by mouth daily. PROAIR HFA 90 mcg/actuation inhaler Unknown at Unknown time  Yes No   Sig: Take 2 Puffs by inhalation four (4) times daily as needed. allopurinol (ZYLOPRIM) 300 mg tablet 4/24/2018 at Unknown time  Yes Yes   Sig: Take 300 mg by mouth daily. amLODIPine (NORVASC) 2.5 mg tablet 4/24/2018 at Unknown time  Yes Yes   Sig: Take 2.5 mg by mouth daily. b complex vitamins tablet 4/24/2018 at Unknown time  Yes Yes   Sig: Take 1 Tab by mouth daily. donepezil (ARICEPT) 5 mg tablet 4/24/2018 at Unknown time  Yes Yes   Sig: Take 5 mg by mouth nightly. famotidine (PEPCID) 20 mg tablet 4/24/2018 at Unknown time  No Yes   Sig: Take 1 Tab by mouth nightly. Indications: ZOLLINGER-ROBERSON SYNDROME   furosemide (LASIX) 40 mg tablet 4/24/2018 at Unknown time  Yes Yes   Sig: Take 40 mg by mouth daily as needed. gabapentin (NEURONTIN) 100 mg capsule 4/24/2018 at Unknown time  Yes Yes   Sig: Take 200 mg by mouth two (2) times daily as needed. losartan (COZAAR) 100 mg tablet 4/24/2018 at Unknown time  Yes Yes   Sig: Take 100 mg by mouth nightly. metoprolol succinate (TOPROL-XL) 50 mg XL tablet 4/24/2018 at Unknown time  No Yes   Sig: Take 1 Tab by mouth daily. montelukast (SINGULAIR) 10 mg tablet 4/24/2018 at Unknown time  Yes Yes   Sig: Take 10 mg by mouth nightly. pantoprazole (PROTONIX) 40 mg tablet 4/24/2018 at Unknown time  Yes Yes   Sig: Take 40 mg by mouth daily.    pravastatin (PRAVACHOL) 20 mg tablet 4/24/2018 at Unknown time  No Yes   Sig: Take 1 Tab by mouth nightly. sertraline (ZOLOFT) 100 mg tablet 4/24/2018 at Unknown time  Yes Yes   Sig: Take 100 mg by mouth nightly. triamcinolone (ARISTOCORT) 0.5 % topical cream 4/24/2018 at Unknown time  Yes Yes   Sig: Apply  to affected area three (3) times daily. use thin layer      Facility-Administered Medications: None         The review of systems is:  negative for shortness of breath or chest pain      The heart, lungs and mental status were satisfactory for the administration of MAC sedation and for the procedure. Mallampati score: See Anesthesia. I discussed with the patient the objectives, risks, consequences and alternatives to the procedure. Plan: Endoscopic procedure with MAC sedation.     Je Bolivar MD  4/25/2018  9:32 AM

## 2018-04-25 NOTE — DISCHARGE INSTRUCTIONS
Gino Medina  [id number]  1935    EGD DISCHARGE INSTRUCTIONS  Discomfort:  Sore throat- warm salt water gargle  redness at IV site- apply warm compress to area; if redness or soreness persist- contact your physician  Gaseous discomfort- walking, belching will help relieve any discomfort  You may not operate a vehicle for 12 hours  You may not engage in an occupation involving machinery or appliances for rest of today. You may not drink alcoholic beverages for at least 12 hours  Avoid making any critical decisions for at least 24 hour  DIET  You may resume your regular diet - however -  remember your colon is empty and a heavy meal will produce gas. Avoid these foods:  vegetables, fried / greasy foods, carbonated drinks  ACTIVITY  You may resume your normal daily activities. Spend the remainder of the day resting -  avoid any strenuous activity. CALL M.D. ANY SIGN OF   Increasing pain, nausea, vomiting  Abdominal distension (swelling)  New increased bleeding (oral or rectal)  Fever (chills)  Pain in chest area    Shortness of breath    You may not take any Advil, Aspirin, Ibuprofen, Motrin, Aleve, or Goodys ONLY  Tylenol as needed for pain. Follow-up Instructions:   Call Dr. Yahaira Wayne office to make appointment  Office telephone for problems or questions [phone]  -Acid suppression with a proton pump inhibitor. , -No NSAIDS, -will speak to cardiologist/PCP regarding need to restart aspirin

## 2018-04-25 NOTE — ROUTINE PROCESS
Reginaldo Ralph  1935  [id number]    Situation:  Verbal report received from: Max Soto RN  Procedure: Procedure(s):  ESOPHAGOGASTRODUODENOSCOPY (EGD)    Background:    Preoperative diagnosis: GASTRIC ULCER  IRON DEFICIENCY ANEMIA   Postoperative diagnosis: Atrophic Gastris  Erosive gastritis    :  Dr. Leigh Ann Jason  Assistant(s): Endoscopy Technician-1: Laisha Sheikh  Endoscopy RN-1: Yovana West RN    Specimens: * No specimens in log *  H. Pylori  no     Assessment:  Intra-procedure medications   Anesthesia gave intra-procedure sedation and medications, see anesthesia flow sheet yes    Intravenous fluids: NS@ KVO     Vital signs stable     Abdominal assessment: round and soft     Recommendation:  Discharge patient per MD order.     Family or Friend   Permission to share finding with family or friend yes

## 2018-04-25 NOTE — IP AVS SNAPSHOT
[address] [facility] [address] [place] [address] 
[phone] Patient: Gino Medina MRN: [id number] 
QQF:[birth year] About your hospitalization You were admitted on:  April 25, 2018 You last received care in the:  [facility] ENDOSCOPY You were discharged on:  April 25, 2018 Why you were hospitalized Your primary diagnosis was:  Not on File Follow-up Information None Discharge Orders None A check noe indicates which time of day the medication should be taken. My Medications CONTINUE taking these medications Instructions Each Dose to Equal  
 Morning Noon Evening Bedtime  
 allopurinol 300 mg tablet Commonly known as:  Ree Abide Your last dose was: Your next dose is: Take 300 mg by mouth daily. 300 mg  
    
   
   
   
  
 amLODIPine 2.5 mg tablet Commonly known as:  Fei Rock Your last dose was: Your next dose is: Take 2.5 mg by mouth daily. 2.5 mg  
    
   
   
   
  
 b complex vitamins tablet Your last dose was: Your next dose is: Take 1 Tab by mouth daily. 1 Tab  
    
   
   
   
  
 donepezil 5 mg tablet Commonly known as:  ARICEPT Your last dose was: Your next dose is: Take 5 mg by mouth nightly. 5 mg  
    
   
   
   
  
 famotidine 20 mg tablet Commonly known as:  PEPCID Your last dose was: Your next dose is: Take 1 Tab by mouth nightly. Indications: ZOLLINGER-ROBERSON SYNDROME  
 20 mg FERROUS SULFATE PO Your last dose was: Your next dose is: Take 325 mg by mouth daily. 325 mg  
    
   
   
   
  
 furosemide 40 mg tablet Commonly known as:  LASIX Your last dose was: Your next dose is: Take 40 mg by mouth daily as needed.   
 40 mg  
    
   
   
   
  
 gabapentin 100 mg capsule Commonly known as:  NEURONTIN Your last dose was: Your next dose is: Take 200 mg by mouth two (2) times daily as needed. 200 mg  
    
   
   
   
  
 losartan 100 mg tablet Commonly known as:  COZAAR Your last dose was: Your next dose is: Take 100 mg by mouth nightly. 100 mg  
    
   
   
   
  
 metoprolol succinate 50 mg XL tablet Commonly known as:  TOPROL-XL Your last dose was: Your next dose is: Take 1 Tab by mouth daily. 50 mg  
    
   
   
   
  
 montelukast 10 mg tablet Commonly known as:  SINGULAIR Your last dose was: Your next dose is: Take 10 mg by mouth nightly. 10 mg  
    
   
   
   
  
 pantoprazole 40 mg tablet Commonly known as:  PROTONIX Your last dose was: Your next dose is: Take 40 mg by mouth daily. 40 mg  
    
   
   
   
  
 pravastatin 20 mg tablet Commonly known as:  PRAVACHOL Your last dose was: Your next dose is: Take 1 Tab by mouth nightly. 20 mg  
    
   
   
   
  
 PROAIR HFA 90 mcg/actuation inhaler Generic drug:  albuterol Your last dose was: Your next dose is: Take 2 Puffs by inhalation four (4) times daily as needed. 2 Puff PROBIOTIC PO Your last dose was: Your next dose is: Take 1 Cap by mouth daily. 1 Cap  
    
   
   
   
  
 sertraline 100 mg tablet Commonly known as:  ZOLOFT Your last dose was: Your next dose is: Take 100 mg by mouth nightly. 100 mg  
    
   
   
   
  
 triamcinolone 0.5 % topical cream  
Commonly known as:  ARISTOCORT Your last dose was: Your next dose is:    
   
   
 Apply  to affected area three (3) times daily. use thin layer Discharge Instructions Julian Garrison [id number] 
1935 EGD DISCHARGE INSTRUCTIONS Discomfort: 
Sore throat- warm salt water gargle 
redness at IV site- apply warm compress to area; if redness or soreness persist- contact your physician Gaseous discomfort- walking, belching will help relieve any discomfort You may not operate a vehicle for 12 hours You may not engage in an occupation involving machinery or appliances for rest of today. You may not drink alcoholic beverages for at least 12 hours Avoid making any critical decisions for at least 24 hour DIET You may resume your regular diet  however -  remember your colon is empty and a heavy meal will produce gas. Avoid these foods:  vegetables, fried / greasy foods, carbonated drinks ACTIVITY You may resume your normal daily activities. Spend the remainder of the day resting -  avoid any strenuous activity. CALL M.D. ANY SIGN OF Increasing pain, nausea, vomiting Abdominal distension (swelling) New increased bleeding (oral or rectal) Fever (chills) Pain in chest area Shortness of breath You may not take any Advil, Aspirin, Ibuprofen, Motrin, Aleve, or Goodys ONLY  Tylenol as needed for pain. Follow-up Instructions: 
 Call Dr. Carolyn Chua office to make appointment Office telephone for problems or questions [phone] 
-Acid suppression with a proton pump inhibitor. , -No NSAIDS, -will speak to cardiologist/PCP regarding need to restart aspirin Introducing hospitals & HEALTH SERVICES! [employer] introduces [employer] patient portal. Now you can access parts of your medical record, email your doctor's office, and request medication refills online. 1. In your internet browser, go to [email or web address] 2. Click on the First Time User? Click Here link in the Sign In box. You will see the New Member Sign Up page. 3. Enter your [employer] Access Code exactly as it appears below.  You will not need to use this code after youve completed the sign-up process. If you do not sign up before the expiration date, you must request a new code. · [employer] Access Code: [email or web address] Expires: 7/24/2018  9:58 AM 
 
4. Enter the last four digits of your Social Security Number (xxxx) and Date of Birth (mm/dd/yyyy) as indicated and click Submit. You will be taken to the next sign-up page. 5. Create a Love[employer]ice ID. This will be your [employer] login ID and cannot be changed, so think of one that is secure and easy to remember. 6. Create a [employer] password. You can change your password at any time. 7. Enter your Password Reset Question and Answer. This can be used at a later time if you forget your password. 8. Enter your e-mail address. You will receive e-mail notification when new information is available in [address] 9. Click Sign Up. You can now view and download portions of your medical record. 10. Click the Download Summary menu link to download a portable copy of your medical information. If you have questions, please visit the Frequently Asked Questions section of the [employer] website. Remember, [employer] is NOT to be used for urgent needs. For medical emergencies, dial 911. Now available from your iPhone and Android! Introducing Benito Matta As a [facility] patient, I wanted to make you aware of our electronic visit tool called Benito Sigrid. [facility] 24[facility]7 allows you to connect within minutes with a medical provider 24 hours a day, seven days a week via a mobile device or tablet or logging into a secure website from your computer. You can access Benito Matta from anywhere in the [place].  
 
A virtual visit might be right for you when you have a simple condition and feel like you just dont want to get out of bed, or cant get away from work for an appointment, when your regular [facility] provider is not available (evenings, weekends or holidays), or when youre out of town and need minor care. Electronic visits cost only $49 and if the [facility] 24/7 provider determines a prescription is needed to treat your condition, one can be electronically transmitted to a nearby pharmacy*. Please take a moment to enroll today if you have not already done so. The enrollment process is free and takes just a few minutes. To enroll, please download the [facility] 24/7 nusrat to your tablet or phone, or visit [email or web address] to enroll on your computer. And, as an [address] [place] [address] patient with a [employer] account, the results of your visits will be scanned into your electronic medical record and your primary care provider will be able to view the scanned results. We urge you to continue to see your regular [facility] provider for your ongoing medical care. And while your primary care provider may not be the one available when you seek a [employer] virtual visit, the peace of mind you get from getting a real diagnosis real time can be priceless. For more information on [employer], view our Frequently Asked Questions (FAQs) at [email or web address]. Sincerely, 
 
Callum Kaur MD 
Chief Medical Officer [address] [place] [address] *:  certain medications cannot be prescribed via [employer] Providers Seen During Your Hospitalization Provider Specialty Primary office phone Ludy Gleason MD Gastroenterology [phone] Your Primary Care Physician (PCP) Primary Care Physician Office Phone Office Fax Meet Mcgowan [phone] You are allergic to the following Allergen Reactions Pcn (Penicillins) Rash But can take cephalosporins. Allergic to all \"cillins\". Codeine Other (comments) Hallucinations, takes hydrocodone at home for pain Contrast Dye (Iodine) Other (comments) Not to take due to kidney function Prednisone Other (comments) \"makes my pancreas numbers go way up\" Recent Documentation Height Weight BMI OB Status Smoking Status 1.651 m 89.8 kg 32.95 kg/m2 Postmenopausal Former Smoker Emergency Contacts Name Discharge Info Relation Home Work Mobile Wilman Coley DISCHARGE CAREGIVER [3] Son [22] [phone] [facility] DISCHARGE CAREGIVER [3] Daughter [21] [phone] Patient Belongings The following personal items are in your possession at time of discharge: 
  Dental Appliances: None  Visual Aid: None Please provide this summary of care documentation to your next provider. Signatures-by signing, you are acknowledging that this After Visit Summary has been reviewed with you and you have received a copy. Patient Signature:  ____________________________________________________________ Date:  ____________________________________________________________  
  
Marisol Oliver Provider Signature:  ____________________________________________________________ Date:  ____________________________________________________________

## 2018-04-25 NOTE — PROCEDURES
EGD Procedure Note    Indications:  Iron deficiency anemia, Hx of UGI bleeds/p EGD and Bx while taking ASA   Referring Physician: Adelina Sanchez MD   Anesthesia/Sedation:MAC  Endoscopist:  Dr. Beryl Choudhury  Assistant:  Endoscopy Technician-1: Ottie Sandifer  Endoscopy RN-1: Landon Houser RN    Preoperative diagnosis: GASTRIC ULCER  IRON DEFICIENCY ANEMIA     Postoperative diagnosis: Atrophic Gastris  Erosive gastritis      Procedure in Detail:  Informed consent was obtained for the procedure, including sedation. Risks of perforation, hemorrhage, adverse drug reaction, and aspiration were discussed. The patient was placed in the left lateral decubitus position. Based on the pre-procedure assessment, including review of the patient's medical history, medications, allergies, and review of systems, she had been deemed to be an appropriate candidate for moderate sedation; she was therefore sedated with the medications listed above. The patient was monitored continuously with ECG tracing, pulse oximetry, blood pressure monitoring, and direct observations. An Olympus video endoscope was inserted into the mouth and advanced under direct vision to into the esophagus, then stomach and duodenum. A careful inspection was made as the gastroscope was withdrawn, including a retroflexed view of the proximal stomach; findings and interventions are described below. Findings:   Esophagus:irregular Z-line prior Bx (-) for Laird's; prominent upper esophageal ectasia  Stomach: moderate atrophic gastritis with single small erosion of the antrum(denies NSAID use), 2 clips noted in proximal fundus lesser curvature placed for bleeding   Duodenum/jejunum: normal    Therapies:  See above    Specimens: none           EBL: None    Complications:   None; patient tolerated the procedure well. Recommendations:  -Acid suppression with a proton pump inhibitor. , -No NSAIDS, -will speak to cardiologist/PCP regarding need to restart aspirin    Kamala Huerta MD

## 2018-04-25 NOTE — ANESTHESIA POSTPROCEDURE EVALUATION
Post-Anesthesia Evaluation and Assessment    Patient: Julian Garrison MRN: [id number]  SSN: [id number]    YOB: 1935  Age: [de-identified] y.o. Sex: female       Cardiovascular Function/Vital Signs  Visit Vitals    /65    Pulse 60    Temp 36.6 °C (97.8 °F)    Resp 29    Ht 5' 5\" (1.651 m)    Wt 89.8 kg (198 lb)    SpO2 94%    BMI 32.95 kg/m2       Patient is status post MAC anesthesia for Procedure(s):  ESOPHAGOGASTRODUODENOSCOPY (EGD). Nausea/Vomiting: None    Postoperative hydration reviewed and adequate. Pain:  Pain Scale 1: Numeric (0 - 10) (04/25/18 0957)  Pain Intensity 1: 0 (04/25/18 0957)   Managed    Neurological Status: At baseline    Mental Status and Level of Consciousness: Arousable    Pulmonary Status:   O2 Device: Room air (04/25/18 0957)   Adequate oxygenation and airway patent    Complications related to anesthesia: None    Post-anesthesia assessment completed.  No concerns    Signed By: Khanh Sandy DO     April 25, 2018

## 2018-04-25 NOTE — ROUTINE PROCESS
Memo Montes De Oca  1935  [id number]    Situation:  Verbal report received from: Eloisa Adam RN  Procedure: Procedure(s):  ESOPHAGOGASTRODUODENOSCOPY (EGD)    Background:    Preoperative diagnosis: GASTRIC ULCER  IRON DEFICIENCY ANEMIA   Postoperative diagnosis: Atrophic Gastris  Erosive gastritis    :  Dr. Veronica Curry  Assistant(s): Endoscopy Technician-1: Rachel Alarcon  Endoscopy RN-1: Nunu Roberson RN    Specimens: * No specimens in log *  H. Pylori  no    Assessment:  Intra-procedure medications   Anesthesia gave intra-procedure sedation and medications, see anesthesia flow sheet yes    Intravenous fluids: NS@ KVO     Vital signs stable     Abdominal assessment: round and soft     Recommendation:  Discharge patient per MD order.     Family or Friend   Permission to share finding with family or friend yes

## 2018-04-25 NOTE — ANESTHESIA PREPROCEDURE EVALUATION
Anesthetic History               Review of Systems / Medical History  Patient summary reviewed, nursing notes reviewed and pertinent labs reviewed    Pulmonary        Sleep apnea    Asthma        Neuro/Psych         Psychiatric history     Cardiovascular    Hypertension          Pacemaker    Exercise tolerance: >4 METS     GI/Hepatic/Renal     GERD      PUD    Comments: Pancreatic cyst Endo/Other    Diabetes    Arthritis     Other Findings              Physical Exam    Airway  Mallampati: I  TM Distance: > 6 cm  Neck ROM: normal range of motion   Mouth opening: Normal     Cardiovascular    Rhythm: regular  Rate: normal         Dental  No notable dental hx       Pulmonary  Breath sounds clear to auscultation               Abdominal         Other Findings            Anesthetic Plan    ASA: 3  Anesthesia type: MAC          Induction: Intravenous  Anesthetic plan and risks discussed with: Patient

## 2018-08-27 ENCOUNTER — OFFICE VISIT (OUTPATIENT)
Dept: URGENT CARE | Age: 83
End: 2018-08-27

## 2018-08-27 VITALS
WEIGHT: 190 LBS | SYSTOLIC BLOOD PRESSURE: 143 MMHG | HEIGHT: 66 IN | DIASTOLIC BLOOD PRESSURE: 73 MMHG | TEMPERATURE: 99 F | RESPIRATION RATE: 16 BRPM | HEART RATE: 66 BPM | OXYGEN SATURATION: 98 % | BODY MASS INDEX: 30.53 KG/M2

## 2018-08-27 DIAGNOSIS — R21 RASH OF UNKNOWN CAUSE: Primary | ICD-10-CM

## 2018-08-27 RX ORDER — HYDROXYZINE PAMOATE 25 MG/1
25 CAPSULE ORAL
Qty: 15 CAP | Refills: 0 | Status: SHIPPED | OUTPATIENT
Start: 2018-08-27 | End: 2022-05-27

## 2018-08-27 RX ORDER — BETAMETHASONE VALERATE 1.2 MG/G
CREAM TOPICAL 2 TIMES DAILY
Qty: 45 G | Refills: 0 | Status: SHIPPED | OUTPATIENT
Start: 2018-08-27 | End: 2022-01-20

## 2018-08-27 NOTE — PROGRESS NOTES
Patient is a [de-identified] y.o. female presenting with rash. Rash    The history is provided by the patient. This is a new problem. The current episode started more than 1 week ago. The problem has not changed since onset. The problem is associated with an unknown factor. There has been no fever. Affected Location: upper extremities- rt > left. Associated symptoms include itching and hives. Treatments tried: triamcenalone - long time before. The treatment provided no relief.         Past Medical History:   Diagnosis Date    Adverse effect of anesthesia     passed out during EGD/stopped breathing    Arrhythmia     has pacemaker for low HR    Arthritis     Asthma     Cancer ([address]     right kidney - surgery    Chronic pain     right side    Deep vein thrombosis (DVT) during current hospitalization ([address]     history of DVT was on coumadin in the past    Diabetes ([address]     diet controlled    GERD (gastroesophageal reflux disease)     H/O acute pancreatitis     Hiatal hernia     Hypertension     MARLEN (obstructive sleep apnea)     does not use CPAP/pt states she has not used since a pacemaker inserted    Other ill-defined conditions(799.89)     lymph node enlargement - left chest - benign bx - watching    Other ill-defined conditions(799.89)     wheezes    Psychiatric disorder     depression    PUD (peptic ulcer disease)     hx of    Thromboembolus ([address]     Unspecified adverse effect of anesthesia     passed out during EGD per pt - stopped breathing per pt per MD        Past Surgical History:   Procedure Laterality Date    HX APPENDECTOMY      HX CHOLECYSTECTOMY      HX GYN          HX HEENT Right     laser eye surgery to stop bleeding in back of eye - multiple laser surgeries    HX KNEE ARTHROSCOPY      left knee; cartilage repair    HX ORTHOPAEDIC      right hip replacement    HX ORTHOPAEDIC      lower back surgery    HX ORTHOPAEDIC      straighten toe - 2nd toe on right    HX ORTHOPAEDIC Bilateral     carpal tunnel release    HX PACEMAKER      not defibrillator    HX UROLOGICAL      implant in hip to control urine and then removed    HX UROLOGICAL      Right kidney partial nephrectomy         Family History   Problem Relation Age of Onset    Stroke Mother      brain aneurysm    Hypertension Father     Heart Disease Father     Cancer Sister      breast    Cancer Brother      lung    Cancer Sister      breast    SLE Other      half siblings (5+) - lupus    Hypertension Daughter     Diabetes Daughter         Social History     Social History    Marital status:      Spouse name: N/A    Number of children: N/A    Years of education: N/A     Occupational History    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 All other systems reviewed and are negative. Vitals:    08/27/18 1539   BP: 143/73   Pulse: 66   Resp: 16   Temp: 99 °F (37.2 °C)   SpO2: 98%   Weight: 190 lb (86.2 kg)   Height: 5' 6\" (1.676 m)       Physical Exam   Skin: Rash (non specific on upper extremities- rt > left ) noted. Rash is maculopapular and urticarial.   Nursing note and vitals reviewed. MDM    Procedures      ICD-10-CM ICD-9-CM    1. Rash of unknown cause R21 782.1     on upper extremities-x 3 weeks       Refer to dermatologist if rash not resolved    Medications Ordered Today   Medications    hydrOXYzine pamoate (VISTARIL) 25 mg capsule     Sig: Take 1 Cap by mouth nightly as needed for Itching.      Dispense:  15 Cap     Refill:  0    betamethasone valerate (VALISONE) 0.1 % topical cream     Sig: Apply  to affected area two (2) times a day. Dispense:  45 g     Refill:  0     No results found for any visits on 08/27/18. The patients condition was discussed with the patient and they understand. The patient is to follow up with primary care doctor. If signs and symptoms become worse the pt is to go to the ER. The patient is to take medications as prescribed.

## 2018-08-27 NOTE — PATIENT INSTRUCTIONS

## 2018-08-27 NOTE — MR AVS SNAPSHOT
[facility] [id number] 
[phone] Patient: Lara James MRN: [id number] 
LNO:[birth year] Visit Information Date & Time Provider Department Dept. Phone Encounter #  
 8/27/2018  3:30 PM [address] [facility] [phone] [id number] Your Appointments 11/1/2018 11:00 AM  
New Patient with Juan Pablo Noriega, MD [facility]) Appt Note: NP EST PCP TH 8/27/18  
 [address] [id number] [phone]  
  
   
 [address] [facility] Upcoming Health Maintenance Date Due DTaP/Tdap/Td series (1 - Tdap) 11/9/1956 ZOSTER VACCINE AGE 60> 9/9/1997 GLAUCOMA SCREENING Q2Y 11/9/2002 Bone Densitometry (Dexa) Screening 11/9/2002 Pneumococcal 65+ Low/Medium Risk (2 of 2 - PPSV23) 1/1/2018 Influenza Age 5 to Adult 8/1/2018 Allergies as of 8/27/2018  Review Complete On: 8/27/2018 By: Tracey Patel RN Severity Noted Reaction Type Reactions Pcn [Penicillins] Medium 03/20/2011   Systemic Rash But can take cephalosporins. Allergic to all \"cillins\". Codeine  03/20/2011   Side Effect Other (comments) Hallucinations, takes hydrocodone at home for pain Contrast Dye [Iodine]  06/14/2016    Other (comments) Not to take due to kidney function Prednisone  06/27/2016    Other (comments) \"makes my pancreas numbers go way up\" Current Immunizations  Reviewed on 2/28/2014 Name Date Pneumococcal Vaccine (Unspecified Type) 1/1/2013 Not reviewed this visit You Were Diagnosed With   
  
 Codes Comments Rash of unknown cause    -  Primary ICD-10-CM: R21 
ICD-9-CM: 782.1 on upper extremities-x 3 weeks Vitals BP Pulse Temp Resp Height(growth percentile) Weight(growth percentile) 143/73 66 99 °F (37.2 °C) 16 5' 6\" (1.676 m) 190 lb (86.2 kg) SpO2 BMI OB Status Smoking Status 98% 30.67 kg/m2 Postmenopausal Former Smoker BMI and BSA Data Body Mass Index Body Surface Area  
 30.67 kg/m 2 2 m 2 Preferred Pharmacy Pharmacy Name Phone [id number] [phone] Your Updated Medication List  
  
   
This list is accurate as of 8/27/18  4:11 PM.  Always use your most recent med list.  
  
  
  
  
 allopurinol 300 mg tablet Commonly known as:  Barber Bless Take 300 mg by mouth daily. amLODIPine 2.5 mg tablet Commonly known as:  Cain [place] Take 2.5 mg by mouth daily. b complex vitamins tablet Take 1 Tab by mouth daily. betamethasone valerate 0.1 % topical cream  
Commonly known as:  [place] Apply  to affected area two (2) times a day. donepezil 5 mg tablet Commonly known as:  ARICEPT Take 5 mg by mouth nightly. famotidine 20 mg tablet Commonly known as:  PEPCID Take 1 Tab by mouth nightly. Indications: ZOLLINGER-ROBERSON SYNDROME FERROUS SULFATE PO Take 325 mg by mouth daily. furosemide 40 mg tablet Commonly known as:  LASIX Take 40 mg by mouth daily as needed. gabapentin 100 mg capsule Commonly known as:  NEURONTIN Take 200 mg by mouth two (2) times daily as needed. hydrOXYzine pamoate 25 mg capsule Commonly known as:  VISTARIL Take 1 Cap by mouth nightly as needed for Itching. losartan 100 mg tablet Commonly known as:  COZAAR Take 100 mg by mouth nightly. metoprolol succinate 50 mg XL tablet Commonly known as:  TOPROL-XL Take 1 Tab by mouth daily. montelukast 10 mg tablet Commonly known as:  SINGULAIR Take 10 mg by mouth nightly. pantoprazole 40 mg tablet Commonly known as:  PROTONIX Take 40 mg by mouth daily. pravastatin 20 mg tablet Commonly known as:  PRAVACHOL Take 1 Tab by mouth nightly. PROAIR HFA 90 mcg/actuation inhaler Generic drug:  albuterol Take 2 Puffs by inhalation four (4) times daily as needed. PROBIOTIC PO Take 1 Cap by mouth daily. sertraline 100 mg tablet Commonly known as:  ZOLOFT Take 100 mg by mouth nightly. Prescriptions Sent to Pharmacy Refills  
 hydrOXYzine pamoate (VISTARIL) 25 mg capsule 0 Sig: Take 1 Cap by mouth nightly as needed for Itching. Class: Normal  
 Pharmacy: [address] #: [phone] Route: Oral  
 betamethasone valerate (VALISONE) 0.1 % topical cream 0 Sig: Apply  to affected area two (2) times a day. Class: Normal  
 Pharmacy: [address] #: [phone] Route: Topical  
  
Patient Instructions Atopic Dermatitis: Care Instructions Your Care Instructions Atopic dermatitis (also called eczema) is a skin problem that causes intense itching and a red, raised rash. In severe cases, the rash develops clear fluid-filled blisters. The rash is not contagious. People with this condition seem to have very sensitive immune systems that are likely to react to things that cause allergies. The immune system is the body's way of fighting infection. There is no cure for atopic dermatitis, but you may be able to control it with care at home. Follow-up care is a key part of your treatment and safety. Be sure to make and go to all appointments, and call your doctor if you are having problems. It's also a good idea to know your test results and keep a list of the medicines you take. How can you care for yourself at home? · Use moisturizer at least twice a day. · If your doctor prescribes a cream, use it as directed. If your doctor prescribes other medicine, take it exactly as directed. · Wash the affected area with water only.  Soap can make dryness and itching worse. Pat dry. · Apply a moisturizer after bathing. Use a cream such as Lubriderm, Moisturel, or Cetaphil that does not irritate the skin or cause a rash. Apply the cream while your skin is still damp after lightly drying with a towel. · Use cold, wet cloths to reduce itching. · Keep cool, and stay out of the sun. · If itching affects your normal activities, an over-the-counter antihistamine, such as diphenhydramine (Benadryl) or loratadine (Claritin) may help. Read and follow all instructions on the label. When should you call for help? Call your doctor now or seek immediate medical care if: 
  · Your rash gets worse and you have a fever.  
  · You have new blisters or bruises, or the rash spreads and looks like a sunburn.  
  · You have signs of infection, such as: 
¨ Increased pain, swelling, warmth, or redness. ¨ Red streaks leading from the rash. ¨ Pus draining from the rash. ¨ A fever.  
  · You have crusting or oozing sores.  
  · You have joint aches or body aches along with your rash.  
 Watch closely for changes in your health, and be sure to contact your doctor if: 
  · Your rash does not clear up after 2 to 3 weeks of home treatment.  
  · Itching interferes with your sleep or daily activities. Where can you learn more? Go to http://constantino-aziza.info/. Enter O803 in the search box to learn more about \"Atopic Dermatitis: Care Instructions. \" Current as of: October 5, 2017 Content Version: 11.7 © 6578-8715 [employer]. Care instructions adapted under license by [employer] (which disclaims liability or warranty for this information). If you have questions about a medical condition or this instruction, always ask your healthcare professional. [address] [place] [address] any warranty or liability for your use of this information. Introducing [address]!    
 [facility] [employer] introduces [employer] patient portal. Now you can access parts of your medical record, email your doctor's office, and request medication refills online. 1. In your internet browser, go to [email or web address] 2. Click on the First Time User? Click Here link in the Sign In box. You will see the New Member Sign Up page. 3. Enter your [employer] Access Code exactly as it appears below. You will not need to use this code after youve completed the sign-up process. If you do not sign up before the expiration date, you must request a new code. · [employer] Access Code: [email or web address] Expires: 11/25/2018  3:42 PM 
 
4. Enter the last four digits of your Social Security Number (xxxx) and Date of Birth (mm/dd/yyyy) as indicated and click Submit. You will be taken to the next sign-up page. 5. Create a [employer] ID. This will be your [employer] login ID and cannot be changed, so think of one that is secure and easy to remember. 6. Create a [employer] password. You can change your password at any time. 7. Enter your Password Reset Question and Answer. This can be used at a later time if you forget your password. 8. Enter your e-mail address. You will receive e-mail notification when new information is available in [address] 9. Click Sign Up. You can now view and download portions of your medical record. 10. Click the Download Summary menu link to download a portable copy of your medical information. If you have questions, please visit the Frequently Asked Questions section of the [employer] website. Remember, [employer] is NOT to be used for urgent needs. For medical emergencies, dial 911. Now available from your iPhone and Android! Please provide this summary of care documentation to your next provider. Your primary care clinician is listed as Rio Rapp. If you have any questions after today's visit, please call [phone].

## 2018-09-06 ENCOUNTER — OFFICE VISIT (OUTPATIENT)
Dept: URGENT CARE | Age: 83
End: 2018-09-06

## 2018-09-06 VITALS
SYSTOLIC BLOOD PRESSURE: 132 MMHG | HEIGHT: 64 IN | BODY MASS INDEX: 32.61 KG/M2 | TEMPERATURE: 97.1 F | OXYGEN SATURATION: 95 % | WEIGHT: 191 LBS | RESPIRATION RATE: 16 BRPM | DIASTOLIC BLOOD PRESSURE: 79 MMHG | HEART RATE: 65 BPM

## 2018-09-06 DIAGNOSIS — R21 RASH: Primary | ICD-10-CM

## 2018-09-06 RX ORDER — CHLORPHENIRAMINE MALEATE 4 MG
TABLET ORAL 2 TIMES DAILY
Qty: 30 G | Refills: 0 | Status: SHIPPED | OUTPATIENT
Start: 2018-09-06 | End: 2018-09-21

## 2018-09-06 NOTE — PATIENT INSTRUCTIONS
[facility]  4 Facebook reviews   [address] [facility] · [phone]   Open 8:00 AM - 5:00 PM      Call today after leaving clinic to be seen for rash. May try anti-fungal topical.  You should be seen within 1 week         Rash: Care Instructions  Your Care Instructions  A rash is any irritation or inflammation of the skin. Rashes have many possible causes, including allergy, infection, illness, heat, and emotional stress. Follow-up care is a key part of your treatment and safety. Be sure to make and go to all appointments, and call your doctor if you are having problems. It's also a good idea to know your test results and keep a list of the medicines you take. How can you care for yourself at home? · Wash the area with water only. Soap can make dryness and itching worse. Pat dry. · Put cold, wet cloths on the rash to reduce itching. · Keep cool, and stay out of the sun. · Leave the rash open to the air as much of the time as possible. · Sometimes petroleum jelly (Vaseline) can help relieve the discomfort caused by a rash. A moisturizing lotion, such as Cetaphil, also may help. Calamine lotion may help for rashes caused by contact with something (such as a plant or soap) that irritated the skin. Use it 3 or 4 times a day. · If your doctor prescribed a cream, use it as directed. If your doctor prescribed medicine, take it exactly as directed. · If your rash itches so badly that it interferes with your normal activities, take an over-the-counter antihistamine, such as diphenhydramine (Benadryl) or loratadine (Claritin). Read and follow all instructions on the label. When should you call for help? Call your doctor now or seek immediate medical care if:    · You have signs of infection, such as:  ¨ Increased pain, swelling, warmth, or redness. ¨ Red streaks leading from the area. ¨ Pus draining from the area.   ¨ A fever.     · You have joint pain along with the rash.    Watch closely for changes in your health, and be sure to contact your doctor if:    · Your rash is changing or getting worse. For example, call if you have pain along with the rash, the rash is spreading, or you have new blisters.     · You do not get better after 1 week. Where can you learn more? Go to http://constantino-aziza.info/. Enter R307 in the search box to learn more about \"Rash: Care Instructions. \"  Current as of: October 5, 2017  Content Version: 11.7  © 6481-8361 [employer]. Care instructions adapted under license by [employer] (which disclaims liability or warranty for this information). If you have questions about a medical condition or this instruction, always ask your healthcare professional. [address] any warranty or liability for your use of this information.

## 2018-09-06 NOTE — MR AVS SNAPSHOT
[address] [address] 
[phone] Patient: Destiny Hernandez MRN: [id number] 
JBX:[id number] Visit Information Date & Time Provider Department Dept. Phone Encounter #  
 9/6/2018  3:00 PM [facility] [phone] [id number] Your Appointments 11/1/2018 11:00 AM  
New Patient with Rose Herman MD  
[facility]) Appt Note: NP EST PCP TH 8/27/18  
 [address] [facility] [address] [phone]  
  
   
 [address] [facility] Upcoming Health Maintenance Date Due DTaP/Tdap/Td series (1 - Tdap) 11/9/1956 ZOSTER VACCINE AGE 60> 9/9/1997 GLAUCOMA SCREENING Q2Y 11/9/2002 Bone Densitometry (Dexa) Screening 11/9/2002 Pneumococcal 65+ Low/Medium Risk (2 of 2 - PPSV23) 1/1/2018 Influenza Age 5 to Adult 8/1/2018 Allergies as of 9/6/2018  Review Complete On: 9/6/2018 By: Marie Romo RN Severity Noted Reaction Type Reactions Pcn [Penicillins] Medium 03/20/2011   Systemic Rash But can take cephalosporins. Allergic to all \"cillins\". Codeine  03/20/2011   Side Effect Other (comments) Hallucinations, takes hydrocodone at home for pain Contrast Dye [Iodine]  06/14/2016    Other (comments) Not to take due to kidney function Prednisone  06/27/2016    Other (comments) \"makes my pancreas numbers go way up\" Current Immunizations  Reviewed on 2/28/2014 Name Date Pneumococcal Vaccine (Unspecified Type) 1/1/2013 Not reviewed this visit You Were Diagnosed With   
  
 Codes Comments Rash    -  Primary ICD-10-CM: R21 
ICD-9-CM: 782.1 Vitals BP Pulse Temp Resp Height(growth percentile) Weight(growth percentile) 132/79 65 97.1 °F (36.2 °C) 16 5' 4\" (1.626 m) 191 lb (86.6 kg) SpO2 BMI OB Status Smoking Status 95% 32.79 kg/m2 Postmenopausal Former Smoker BMI and BSA Data Body Mass Index Body Surface Area 32.79 kg/m 2 1.98 m 2 Preferred Pharmacy Pharmacy Name Phone [facility] [address] [place] [phone] Your Updated Medication List  
  
   
This list is accurate as of 9/6/18  3:59 PM.  Always use your most recent med list.  
  
  
  
  
 allopurinol 300 mg tablet Commonly known as:  Angelica Dakins Take 300 mg by mouth daily. amLODIPine 2.5 mg tablet Commonly known as:  Bales Henrrylet Take 2.5 mg by mouth daily. b complex vitamins tablet Take 1 Tab by mouth daily. betamethasone valerate 0.1 % topical cream  
Commonly known as:  Deronda [place] Apply  to affected area two (2) times a day. clotrimazole 1 % topical cream  
Commonly known as:  Towana Jason Apply  to affected area two (2) times a day for 15 days. donepezil 5 mg tablet Commonly known as:  ARICEPT Take 5 mg by mouth nightly. famotidine 20 mg tablet Commonly known as:  PEPCID Take 1 Tab by mouth nightly. Indications: ZOLLINGER-ROBERSON SYNDROME FERROUS SULFATE PO Take 325 mg by mouth daily. furosemide 40 mg tablet Commonly known as:  LASIX Take 40 mg by mouth daily as needed. gabapentin 100 mg capsule Commonly known as:  NEURONTIN Take 200 mg by mouth two (2) times daily as needed. hydrOXYzine pamoate 25 mg capsule Commonly known as:  VISTARIL Take 1 Cap by mouth nightly as needed for Itching. losartan 100 mg tablet Commonly known as:  COZAAR Take 100 mg by mouth nightly. metoprolol succinate 50 mg XL tablet Commonly known as:  TOPROL-XL Take 1 Tab by mouth daily. montelukast 10 mg tablet Commonly known as:  SINGULAIR Take 10 mg by mouth nightly. pantoprazole 40 mg tablet Commonly known as:  PROTONIX Take 40 mg by mouth daily. pravastatin 20 mg tablet Commonly known as:  PRAVACHOL Take 1 Tab by mouth nightly. PROAIR HFA 90 mcg/actuation inhaler Generic drug:  albuterol Take 2 Puffs by inhalation four (4) times daily as needed. PROBIOTIC PO Take 1 Cap by mouth daily. sertraline 100 mg tablet Commonly known as:  ZOLOFT Take 100 mg by mouth nightly. Prescriptions Sent to Pharmacy Refills  
 clotrimazole (LOTRIMIN) 1 % topical cream 0 Sig: Apply  to affected area two (2) times a day for 15 days. Class: Normal  
 Pharmacy: [facility] [address] [place] [address] Ph #: [phone] Route: Topical  
  
Patient Instructions [facility] - [place] [address] Facebook reviews [address] [place] [address] · [phone] Open 8:00 AM - 5:00 PM 
 
Call today after leaving clinic to be seen for rash. May try anti-fungal topical. 
You should be seen within 1 week. Rash: Care Instructions Your Care Instructions A rash is any irritation or inflammation of the skin. Rashes have many possible causes, including allergy, infection, illness, heat, and emotional stress. Follow-up care is a key part of your treatment and safety. Be sure to make and go to all appointments, and call your doctor if you are having problems. It's also a good idea to know your test results and keep a list of the medicines you take. How can you care for yourself at home? · Wash the area with water only. Soap can make dryness and itching worse. Pat dry. · Put cold, wet cloths on the rash to reduce itching. · Keep cool, and stay out of the sun. · Leave the rash open to the air as much of the time as possible. · Sometimes petroleum jelly (Vaseline) can help relieve the discomfort caused by a rash. A moisturizing lotion, such as Cetaphil, also may help.  Calamine lotion may help for rashes caused by contact with something (such as a plant or soap) that irritated the skin. Use it 3 or 4 times a day. · If your doctor prescribed a cream, use it as directed. If your doctor prescribed medicine, take it exactly as directed. · If your rash itches so badly that it interferes with your normal activities, take an over-the-counter antihistamine, such as diphenhydramine (Benadryl) or loratadine (Claritin). Read and follow all instructions on the label. When should you call for help? Call your doctor now or seek immediate medical care if: 
  · You have signs of infection, such as: 
¨ Increased pain, swelling, warmth, or redness. ¨ Red streaks leading from the area. ¨ Pus draining from the area. ¨ A fever.  
  · You have joint pain along with the rash.  
 Watch closely for changes in your health, and be sure to contact your doctor if: 
  · Your rash is changing or getting worse. For example, call if you have pain along with the rash, the rash is spreading, or you have new blisters.  
  · You do not get better after 1 week. Where can you learn more? Go to http://constantino-aziza.info/. Enter B580 in the search box to learn more about \"Rash: Care Instructions. \" Current as of: October 5, 2017 Content Version: 11.7 © 5862-2079 [employer]. Care instructions adapted under license by [employer] (which disclaims liability or warranty for this information). If you have questions about a medical condition or this instruction, always ask your healthcare professional. [place] [address] any warranty or liability for your use of this information. Introducing \[facility]\"" & HEALTH SERVICES! [facility] introduces [employer] patient portal. Now you can access parts of your medical record, email your doctor's office, and request medication refills online. 1. In your internet browser, go to [email or web address] 2. Click on the First Time User? Click Here link in the Sign In box.  You will see the New Member Sign Up page. 3. Enter your [employer] Access Code exactly as it appears below. You will not need to use this code after youve completed the sign-up process. If you do not sign up before the expiration date, you must request a new code. · [employer] Access Code: [email or web address] Expires: 11/25/2018  3:42 PM 
 
4. Enter the last four digits of your Social Security Number (xxxx) and Date of Birth (mm/dd/yyyy) as indicated and click Submit. You will be taken to the next sign-up page. 5. Create a Insportantt ID. This will be your [employer] login ID and cannot be changed, so think of one that is secure and easy to remember. 6. Create a [employer] password. You can change your password at any time. 7. Enter your Password Reset Question and Answer. This can be used at a later time if you forget your password. 8. Enter your e-mail address. You will receive e-mail notification when new information is available in [address]. 9. Click Sign Up. You can now view and download portions of your medical record. 10. Click the Download Summary menu link to download a portable copy of your medical information. If you have questions, please visit the Frequently Asked Questions section of the [employer] website. Remember, [employer] is NOT to be used for urgent needs. For medical emergencies, dial 911. Now available from your iPhone and Android! Please provide this summary of care documentation to your next provider. Your primary care clinician is listed as Taylor Hauser. If you have any questions after today's visit, please call [phone].

## 2018-09-06 NOTE — PROGRESS NOTES
HPI Comments:     Seen 2018 for rash. Rx'd topical steroid and advised to see dermatologist if not improving. It has not improved. It is also not worse. Rash location on arms R and L. Not painful. Feels well otherwise without fever, chills, nausea, dizziness. Patient is a [de-identified] y.o. female presenting with rash.    Rash           Past Medical History:   Diagnosis Date    Adverse effect of anesthesia     passed out during EGD/stopped breathing    Arrhythmia     has pacemaker for low HR    Arthritis     Asthma     Cancer ([address]     right kidney - surgery    Chronic pain     right side    Deep vein thrombosis (DVT) during current hospitalization ([address]     history of DVT was on coumadin in the past    Diabetes ([address]     diet controlled    GERD (gastroesophageal reflux disease)     H/O acute pancreatitis     Hiatal hernia     Hypertension     MARLEN (obstructive sleep apnea)     does not use CPAP/pt states she has not used since a pacemaker inserted    Other ill-defined conditions(799.89)     lymph node enlargement - left chest - benign bx - watching    Other ill-defined conditions(799.89)     wheezes    Psychiatric disorder     depression    PUD (peptic ulcer disease)     hx of    Thromboembolus ([address]     Unspecified adverse effect of anesthesia     passed out during EGD per pt - stopped breathing per pt per MD        Past Surgical History:   Procedure Laterality Date    HX APPENDECTOMY      HX CHOLECYSTECTOMY      HX GYN          HX HEENT Right     laser eye surgery to stop bleeding in back of eye - multiple laser surgeries    HX KNEE ARTHROSCOPY      left knee; cartilage repair    HX ORTHOPAEDIC      right hip replacement    HX ORTHOPAEDIC      lower back surgery    HX ORTHOPAEDIC      straighten toe - 2nd toe on right    HX ORTHOPAEDIC Bilateral     carpal tunnel release    HX PACEMAKER      not defibrillator    HX UROLOGICAL      implant in hip to control urine and then removed    HX UROLOGICAL      Right kidney partial nephrectomy         Family History   Problem Relation Age of Onset    Stroke Mother      brain aneurysm    Hypertension Father     Heart Disease Father     Cancer Sister      breast    Cancer Brother      lung    Cancer Sister      breast    SLE Other      half siblings (5+) - lupus    Hypertension Daughter     Diabetes Daughter         Social History     Social History    Marital status:      Spouse name: N/A    Number of children: N/A    Years of education: N/A     Occupational History    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 All other systems reviewed and are negative. Vitals:    09/06/18 1519   BP: 132/79   Pulse: 65   Resp: 16   Temp: 97.1 °F (36.2 °C)   SpO2: 95%   Weight: 191 lb (86.6 kg)   Height: 5' 4\" (1.626 m)       Physical Exam   Constitutional: She is oriented to person, place, and time. Appears well   HENT:   Mouth/Throat: Oropharynx is clear and moist.   Eyes: EOM are normal.   Cardiovascular: Normal rate, regular rhythm and normal heart sounds. Pulmonary/Chest: Effort normal and breath sounds normal. No respiratory distress. She has no wheezes. She has no rales. Neurological: She is alert and oriented to person, place, and time. Skin: Skin is warm and dry. Dry hypopigmented scattered macular rash scattered on arms only R and L. It is non painful. No streaking. Psychiatric: She has a normal mood and affect.  Her behavior is normal.       MDM     Differential Diagnosis; Clinical Impression; Plan:       CLINICAL IMPRESSION:  (R21) Rash  (primary encounter diagnosis)    Orders Placed This Encounter      clotrimazole (LOTRIMIN) 1 % topical cream      Plan:  1. undertermined rash today. 2. May try topical antifungal  3. Per prior recommendation, I have advised that she needs to see a dermatologist; info given. We have reviewed concerning signs/symptoms, normal vs abnormal progression of medical condition and when to seek immediate medical attention.         Procedures

## 2018-09-23 ENCOUNTER — OFFICE VISIT (OUTPATIENT)
Dept: URGENT CARE | Age: 83
End: 2018-09-23

## 2018-09-23 VITALS
TEMPERATURE: 98 F | RESPIRATION RATE: 16 BRPM | SYSTOLIC BLOOD PRESSURE: 173 MMHG | HEIGHT: 64 IN | DIASTOLIC BLOOD PRESSURE: 83 MMHG | HEART RATE: 74 BPM | BODY MASS INDEX: 32.27 KG/M2 | OXYGEN SATURATION: 96 % | WEIGHT: 189 LBS

## 2018-09-23 DIAGNOSIS — H10.31 ACUTE BACTERIAL CONJUNCTIVITIS OF RIGHT EYE: Primary | ICD-10-CM

## 2018-09-23 DIAGNOSIS — R09.82 POST-NASAL DRIP: ICD-10-CM

## 2018-09-23 DIAGNOSIS — R05.9 COUGH: ICD-10-CM

## 2018-09-23 RX ORDER — POLYMYXIN B SULFATE AND TRIMETHOPRIM 1; 10000 MG/ML; [USP'U]/ML
1 SOLUTION OPHTHALMIC
Qty: 1 BOTTLE | Refills: 0 | Status: SHIPPED | OUTPATIENT
Start: 2018-09-23 | End: 2018-10-03

## 2018-09-23 RX ORDER — IPRATROPIUM BROMIDE 21 UG/1
2 SPRAY, METERED NASAL EVERY 12 HOURS
Qty: 30 ML | Refills: 0 | Status: SHIPPED | OUTPATIENT
Start: 2018-09-23 | End: 2022-01-20

## 2018-09-23 NOTE — PROGRESS NOTES
HPI Comments:   Here accompanied by her daughter  She is here for right pink eye  Her great granddaughter just got diagnosed with pink eye. For past 2 days has been red and goopy. Woke up this morning with \"spider web\" like discharge with it sealed shut. Additionally she mentions she has had nasal congestion and runny nose for a couple of weeks with cough. Denies any SOB, labored breathing or getting winded with daily activities, fever, chills, or dizziness. Otherwise has normal energy. Has not tried any medications. Overall not improving. Patient is a [de-identified] y.o. female presenting with eye pain. Eye Pain   Pertinent negatives include no chest pain and no shortness of breath.         Past Medical History:   Diagnosis Date    Adverse effect of anesthesia     passed out during EGD/stopped breathing    Arrhythmia     has pacemaker for low HR    Arthritis     Asthma     Cancer ([address]     right kidney - surgery    Chronic pain     right side    Deep vein thrombosis (DVT) during current hospitalization ([address]     history of DVT was on coumadin in the past    Diabetes ([address]     diet controlled    GERD (gastroesophageal reflux disease)     H/O acute pancreatitis     Hiatal hernia     Hypertension     MARLEN (obstructive sleep apnea)     does not use CPAP/pt states she has not used since a pacemaker inserted    Other ill-defined conditions(799.89)     lymph node enlargement - left chest - benign bx - watching    Other ill-defined conditions(799.89)     wheezes    Psychiatric disorder     depression    PUD (peptic ulcer disease)     hx of    Thromboembolus ([address]     Unspecified adverse effect of anesthesia     passed out during EGD per pt - stopped breathing per pt per MD        Past Surgical History:   Procedure Laterality Date    HX APPENDECTOMY      HX CHOLECYSTECTOMY      HX GYN          HX HEENT Right     laser eye surgery to stop bleeding in back of eye - multiple laser surgeries    HX KNEE ARTHROSCOPY      left knee; cartilage repair    HX ORTHOPAEDIC      right hip replacement    HX ORTHOPAEDIC      lower back surgery    HX ORTHOPAEDIC      straighten toe - 2nd toe on right    HX ORTHOPAEDIC Bilateral     carpal tunnel release    HX PACEMAKER      not defibrillator    HX UROLOGICAL      implant in hip to control urine and then removed    HX UROLOGICAL      Right kidney partial nephrectomy         Family History   Problem Relation Age of Onset    Stroke Mother      brain aneurysm    Hypertension Father     Heart Disease Father     Cancer Sister      breast    Cancer Brother      lung    Cancer Sister      breast    SLE Other      half siblings (5+) - lupus    Hypertension Daughter     Diabetes Daughter         Social History     Social History    Marital status:      Spouse name: N/A    Number of children: N/A    Years of education: N/A     Occupational History    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 Eyes: Positive for discharge. Respiratory: Positive for cough. Negative for choking, shortness of breath and wheezing. Cardiovascular: Negative for chest pain and palpitations. Gastrointestinal: Negative for nausea and vomiting. Neurological: Negative for dizziness and weakness. All other systems reviewed and are negative.       Vitals:    09/23/18 1357   BP: 173/83   Pulse: 74   Resp: 16   Temp: 98 °F (36.7 °C)   SpO2: 96%   Weight: 189 lb (85.7 kg)   Height: 5' 4\" (1.626 m) Physical Exam   Constitutional: She is oriented to person, place, and time. No distress. Non-toxic appearing, well hydrated   HENT:   Clear rhinorrhea. No purulent nasal discharge. TMs normal. OP with post nasal drip on posterior pharynx wall otherwise moist without swelling, exudate or lesion. Eyes: EOM are normal. Pupils are equal, round, and reactive to light. No scleral icterus. Right conjunctival irritation with greenish discharge accumulating in corner of eye. Left eye normal. No periorbital swelling. Neck: Normal range of motion. Neck supple. Cardiovascular: Normal rate, regular rhythm and normal heart sounds. Exam reveals no gallop and no friction rub. No murmur heard. Pulmonary/Chest: Effort normal and breath sounds normal. No respiratory distress. She has no wheezes. She has no rales. Good air movement throughout   Musculoskeletal:   No LE edema   Lymphadenopathy:     She has no cervical adenopathy. Neurological: She is alert and oriented to person, place, and time. No cranial nerve deficit. Skin: Skin is warm and dry. No rash noted. She is not diaphoretic. No erythema. No pallor. Psychiatric: She has a normal mood and affect. Her behavior is normal. Thought content normal.   Nursing note and vitals reviewed. MDM     Differential Diagnosis; Clinical Impression; Plan:       CLINICAL IMPRESSION:  (H10.31) Acute bacterial conjunctivitis of right eye  (primary encounter diagnosis)  (R05) Cough  (R09.82) Post-nasal drip    Orders Placed This Encounter      trimethoprim-polymyxin b (POLYTRIM) ophthalmic solution      ipratropium (ATROVENT) 0.03 % nasal spray      Plan:  1. Will treat for what appears to be bacterial pink eye  2. Cough appears to be from post nasal drip. Lungs are clear, VS stable and no SOB, fatigue or change in energy levels; will hold off on antibiotic/CXR at this time. 3. Will start ipratropium for Post nasal drip/rhinorrhea; may help cough  4.  Maintain adequate fluid intake  5. Advised PCP re eval in 4-5 days, sooner/immediate re eval for new, worsening or changing. We have reviewed concerning signs/symptoms, normal vs abnormal progression of medical condition and when to seek immediate medical attention. Schedule with PCP or Urgent Care immediately for worsening or new symptoms.           Procedures

## 2018-09-23 NOTE — MR AVS SNAPSHOT
[address] [place] [address] 
[phone] Patient: Gil Castelan MRN: [id number] 
ACO:[id number] Visit Information Date & Time Provider Department Dept. Phone Encounter #  
 9/23/2018  1:30 PM [address] [phone] [id number] Your Appointments 11/1/2018 11:00 AM  
New Patient with Michelle Perez MD  
[facility]) Appt Note: NP EST PCP TH 8/27/18  
 [address] [id number] [phone]  
  
   
 [address] [facility] Upcoming Health Maintenance Date Due DTaP/Tdap/Td series (1 - Tdap) 11/9/1958 ZOSTER VACCINE AGE 60> 9/9/1997 GLAUCOMA SCREENING Q2Y 11/9/2002 Bone Densitometry (Dexa) Screening 11/9/2002 Pneumococcal 65+ Low/Medium Risk (2 of 2 - PPSV23) 1/1/2018 Influenza Age 5 to Adult 8/1/2018 Allergies as of 9/23/2018  Review Complete On: 9/23/2018 By: Gutierrez Frost RN Severity Noted Reaction Type Reactions Pcn [Penicillins] Medium 03/20/2011   Systemic Rash But can take cephalosporins. Allergic to all \"cillins\". Codeine  03/20/2011   Side Effect Other (comments) Hallucinations, takes hydrocodone at home for pain Contrast Dye [Iodine]  06/14/2016    Other (comments) Not to take due to kidney function Prednisone  06/27/2016    Other (comments) \"makes my pancreas numbers go way up\" Current Immunizations  Reviewed on 2/28/2014 Name Date Pneumococcal Vaccine (Unspecified Type) 1/1/2013 Not reviewed this visit You Were Diagnosed With   
  
 Codes Comments Acute bacterial conjunctivitis of right eye    -  Primary ICD-10-CM: H10.31 ICD-9-CM: 372.03 Cough     ICD-10-CM: R05 ICD-9-CM: 786.2 Post-nasal drip     ICD-10-CM: R09.82 ICD-9-CM: 784.91 Vitals BP Pulse Temp Resp Height(growth percentile) Weight(growth percentile) 173/83 74 98 °F (36.7 °C) 16 5' 4\" (1.626 m) 189 lb (85.7 kg) SpO2 BMI OB Status Smoking Status 96% 32.44 kg/m2 Postmenopausal Former Smoker BMI and BSA Data Body Mass Index Body Surface Area  
 32.44 kg/m 2 1.97 m 2 Preferred Pharmacy Pharmacy Name Phone [facility] [address] [phone] Your Updated Medication List  
  
   
This list is accurate as of 9/23/18  2:15 PM.  Always use your most recent med list.  
  
  
  
  
 allopurinol 300 mg tablet Commonly known as:  Orpha Conquest Take 300 mg by mouth daily. amLODIPine 2.5 mg tablet Commonly known as:  Brannon Mend Take 2.5 mg by mouth daily. b complex vitamins tablet Take 1 Tab by mouth daily. betamethasone valerate 0.1 % topical cream  
Commonly known as:  Vanessa Sloop Apply  to affected area two (2) times a day. donepezil 5 mg tablet Commonly known as:  ARICEPT Take 5 mg by mouth nightly. famotidine 20 mg tablet Commonly known as:  PEPCID Take 1 Tab by mouth nightly. Indications: ZOLLINGER-ROBERSON SYNDROME FERROUS SULFATE PO Take 325 mg by mouth daily. furosemide 40 mg tablet Commonly known as:  LASIX Take 40 mg by mouth daily as needed. gabapentin 100 mg capsule Commonly known as:  NEURONTIN Take 200 mg by mouth two (2) times daily as needed. hydrOXYzine pamoate 25 mg capsule Commonly known as:  VISTARIL Take 1 Cap by mouth nightly as needed for Itching. ipratropium 0.03 % nasal spray Commonly known as:  ATROVENT  
2 Sprays by Both Nostrils route every twelve (12) hours. losartan 100 mg tablet Commonly known as:  COZAAR Take 100 mg by mouth nightly. metoprolol succinate 50 mg XL tablet Commonly known as:  TOPROL-XL Take 1 Tab by mouth daily. montelukast 10 mg tablet Commonly known as:  SINGULAIR Take 10 mg by mouth nightly. pantoprazole 40 mg tablet Commonly known as:  PROTONIX Take 40 mg by mouth daily. pravastatin 20 mg tablet Commonly known as:  PRAVACHOL Take 1 Tab by mouth nightly. PROAIR HFA 90 mcg/actuation inhaler Generic drug:  albuterol Take 2 Puffs by inhalation four (4) times daily as needed. PROBIOTIC PO Take 1 Cap by mouth daily. sertraline 100 mg tablet Commonly known as:  ZOLOFT Take 100 mg by mouth nightly. trimethoprim-polymyxin b ophthalmic solution Commonly known as:  POLYTRIM Administer 1 Drop to right eye five (5) times daily for 7 days. Prescriptions Sent to Pharmacy Refills  
 trimethoprim-polymyxin b (POLYTRIM) ophthalmic solution 0 Sig: Administer 1 Drop to right eye five (5) times daily for 7 days. Class: Normal  
 Pharmacy: [address] Ph #: [phone] Route: Right Eye  
 ipratropium (ATROVENT) 0.03 % nasal spray 0 Si Sprays by Both Nostrils route every twelve (12) hours. Class: Normal  
 Pharmacy: [address] Ph #: [phone] Route: Both Nostrils Patient Instructions Follow up with PCP without improvement over next 4-5 days sooner/immediately for new or worsening Pinkeye: Care Instructions Your Care Instructions Pinkeye is redness and swelling of the eye surface and the conjunctiva (the lining of the eyelid and the covering of the white part of the eye). Pinkeye is also called conjunctivitis. Pinkeye is often caused by infection with bacteria or a virus. Dry air, allergies, smoke, and chemicals are other common causes. Pinkeye often clears on its own in 7 to 10 days. Antibiotics only help if the pinkeye is caused by bacteria.  Pinkeye caused by infection spreads easily. If an allergy or chemical is causing pinkeye, it will not go away unless you can avoid whatever is causing it. Follow-up care is a key part of your treatment and safety. Be sure to make and go to all appointments, and call your doctor if you are having problems. It's also a good idea to know your test results and keep a list of the medicines you take. How can you care for yourself at home? · Wash your hands often. Always wash them before and after you treat pinkeye or touch your eyes or face. · Use moist cotton or a clean, wet cloth to remove crust. Wipe from the inside corner of the eye to the outside. Use a clean part of the cloth for each wipe. · Put cold or warm wet cloths on your eye a few times a day if the eye hurts. · Do not wear contact lenses or eye makeup until the pinkeye is gone. Throw away any eye makeup you were using when you got pinkeye. Clean your contacts and storage case. If you wear disposable contacts, use a new pair when your eye has cleared and it is safe to wear contacts again. · If the doctor gave you antibiotic ointment or eyedrops, use them as directed. Use the medicine for as long as instructed, even if your eye starts looking better soon. Keep the bottle tip clean, and do not let it touch the eye area. · To put in eyedrops or ointment: ¨ Tilt your head back, and pull your lower eyelid down with one finger. ¨ Drop or squirt the medicine inside the lower lid. ¨ Close your eye for 30 to 60 seconds to let the drops or ointment move around. ¨ Do not touch the ointment or dropper tip to your eyelashes or any other surface. · Do not share towels, pillows, or washcloths while you have pinkeye. When should you call for help? Call your doctor now or seek immediate medical care if: 
  · You have pain in your eye, not just irritation on the surface.  
  · You have a change in vision or loss of vision.  
  · You have an increase in discharge from the eye.   · Your eye has not started to improve or begins to get worse within 48 hours after you start using antibiotics.  
  · Pinkeye lasts longer than 7 days.  
 Watch closely for changes in your health, and be sure to contact your doctor if you have any problems. Where can you learn more? Go to http://constantino-aziza.info/. Enter Y392 in the search box to learn more about \"Pinkeye: Care Instructions. \" Current as of: November 20, 2017 Content Version: 11.7 © 6095-4216 [employer]. Care instructions adapted under license by [employer] (which disclaims liability or warranty for this information). If you have questions about a medical condition or this instruction, always ask your healthcare professional. [address] any warranty or liability for your use of this information. Introducing [facility] & HEALTH SERVICES! [address] introduces onefinestay patient portal. Now you can access parts of your medical record, email your doctor's office, and request medication refills online. 1. In your internet browser, go to [email or web address] 2. Click on the First Time User? Click Here link in the Sign In box. You will see the New Member Sign Up page. 3. Enter your onefinestay Access Code exactly as it appears below. You will not need to use this code after youve completed the sign-up process. If you do not sign up before the expiration date, you must request a new code. · onefinestay Access Code: [email or web address] Expires: 11/25/2018  3:42 PM 
 
4. Enter the last four digits of your Social Security Number (xxxx) and Date of Birth (mm/dd/yyyy) as indicated and click Submit. You will be taken to the next sign-up page. 5. Create a onefinestay ID. This will be your onefinestay login ID and cannot be changed, so think of one that is secure and easy to remember. 6. Create a [place] password. You can change your password at any time. 7. Enter your Password Reset Question and Answer. This can be used at a later time if you forget your password. 8. Enter your e-mail address. You will receive e-mail notification when new information is available in [address] 9. Click Sign Up. You can now view and download portions of your medical record. 10. Click the Download Summary menu link to download a portable copy of your medical information. If you have questions, please visit the Frequently Asked Questions section of the [employer]) website. Remember, [employer] is NOT to be used for urgent needs. For medical emergencies, dial 911. Now available from your iPhone and Android! Please provide this summary of care documentation to your next provider. Your primary care clinician is listed as Cheo Juarez. If you have any questions after today's visit, please call [phone].

## 2018-09-23 NOTE — PATIENT INSTRUCTIONS
Follow up with PCP without improvement over next 4-5 days sooner/immediately for new or worsening       Pinkeye: Care Instructions  Your Care Instructions    Pinkeye is redness and swelling of the eye surface and the conjunctiva (the lining of the eyelid and the covering of the white part of the eye). Pinkeye is also called conjunctivitis. Pinkeye is often caused by infection with bacteria or a virus. Dry air, allergies, smoke, and chemicals are other common causes. Pinkeye often clears on its own in 7 to 10 days. Antibiotics only help if the pinkeye is caused by bacteria. Pinkeye caused by infection spreads easily. If an allergy or chemical is causing pinkeye, it will not go away unless you can avoid whatever is causing it. Follow-up care is a key part of your treatment and safety. Be sure to make and go to all appointments, and call your doctor if you are having problems. It's also a good idea to know your test results and keep a list of the medicines you take. How can you care for yourself at home? · Wash your hands often. Always wash them before and after you treat pinkeye or touch your eyes or face. · Use moist cotton or a clean, wet cloth to remove crust. Wipe from the inside corner of the eye to the outside. Use a clean part of the cloth for each wipe. · Put cold or warm wet cloths on your eye a few times a day if the eye hurts. · Do not wear contact lenses or eye makeup until the pinkeye is gone. Throw away any eye makeup you were using when you got pinkeye. Clean your contacts and storage case. If you wear disposable contacts, use a new pair when your eye has cleared and it is safe to wear contacts again. · If the doctor gave you antibiotic ointment or eyedrops, use them as directed. Use the medicine for as long as instructed, even if your eye starts looking better soon. Keep the bottle tip clean, and do not let it touch the eye area.   · To put in eyedrops or ointment:  ¨ Tilt your head back, and pull your lower eyelid down with one finger. ¨ Drop or squirt the medicine inside the lower lid. ¨ Close your eye for 30 to 60 seconds to let the drops or ointment move around. ¨ Do not touch the ointment or dropper tip to your eyelashes or any other surface. · Do not share towels, pillows, or washcloths while you have pinkeye. When should you call for help? Call your doctor now or seek immediate medical care if:    · You have pain in your eye, not just irritation on the surface.     · You have a change in vision or loss of vision.     · You have an increase in discharge from the eye.     · Your eye has not started to improve or begins to get worse within 48 hours after you start using antibiotics.     · Pinkeye lasts longer than 7 days.    Watch closely for changes in your health, and be sure to contact your doctor if you have any problems. Where can you learn more? Go to http://constantino-aziza.info/. Enter Y392 in the search box to learn more about \"Pinkeye: Care Instructions. \"  Current as of: November 20, 2017  Content Version: 11.7  © 8793-9604 [employer]. Care instructions adapted under license by [employer] (which disclaims liability or warranty for this information). If you have questions about a medical condition or this instruction, always ask your healthcare professional. [address] any warranty or liability for your use of this information.

## 2018-09-28 ENCOUNTER — HOSPITAL ENCOUNTER (INPATIENT)
Age: 83
LOS: 4 days | Discharge: HOME | DRG: 547 | End: 2018-10-03
Attending: EMERGENCY MEDICINE | Admitting: INTERNAL MEDICINE
Payer: MEDICARE

## 2018-09-28 ENCOUNTER — OFFICE VISIT (OUTPATIENT)
Dept: URGENT CARE | Age: 83
End: 2018-09-28

## 2018-09-28 ENCOUNTER — APPOINTMENT (OUTPATIENT)
Dept: CT IMAGING | Age: 83
DRG: 547 | End: 2018-09-28
Attending: EMERGENCY MEDICINE
Payer: MEDICARE

## 2018-09-28 VITALS
RESPIRATION RATE: 16 BRPM | WEIGHT: 189 LBS | HEIGHT: 64 IN | TEMPERATURE: 97.7 F | OXYGEN SATURATION: 97 % | HEART RATE: 71 BPM | BODY MASS INDEX: 32.27 KG/M2 | DIASTOLIC BLOOD PRESSURE: 87 MMHG | SYSTOLIC BLOOD PRESSURE: 134 MMHG

## 2018-09-28 DIAGNOSIS — H54.61 DECREASED VISION OF RIGHT EYE: ICD-10-CM

## 2018-09-28 DIAGNOSIS — R42 DIZZINESS: Primary | ICD-10-CM

## 2018-09-28 DIAGNOSIS — H54.61 VISION LOSS OF RIGHT EYE: ICD-10-CM

## 2018-09-28 DIAGNOSIS — H54.61 VISION LOSS, RIGHT EYE: ICD-10-CM

## 2018-09-28 DIAGNOSIS — M31.6 TEMPORAL ARTERITIS (HCC): Primary | ICD-10-CM

## 2018-09-28 LAB
ALBUMIN SERPL-MCNC: 3.4 G/DL (ref 3.5–5)
ALBUMIN/GLOB SERPL: 0.8 {RATIO} (ref 1.1–2.2)
ALP SERPL-CCNC: 90 U/L (ref 45–117)
ALT SERPL-CCNC: 23 U/L (ref 12–78)
ANION GAP SERPL CALC-SCNC: 7 MMOL/L (ref 5–15)
AST SERPL-CCNC: 26 U/L (ref 15–37)
BASOPHILS # BLD: 0 K/UL (ref 0–0.1)
BASOPHILS NFR BLD: 0 % (ref 0–1)
BILIRUB SERPL-MCNC: 0.3 MG/DL (ref 0.2–1)
BUN SERPL-MCNC: 33 MG/DL (ref 6–20)
BUN/CREAT SERPL: 19 (ref 12–20)
CALCIUM SERPL-MCNC: 10.2 MG/DL (ref 8.5–10.1)
CHLORIDE SERPL-SCNC: 107 MMOL/L (ref 97–108)
CO2 SERPL-SCNC: 25 MMOL/L (ref 21–32)
CREAT SERPL-MCNC: 1.78 MG/DL (ref 0.55–1.02)
DIFFERENTIAL METHOD BLD: ABNORMAL
EOSINOPHIL # BLD: 0.2 K/UL (ref 0–0.4)
EOSINOPHIL NFR BLD: 2 % (ref 0–7)
ERYTHROCYTE [DISTWIDTH] IN BLOOD BY AUTOMATED COUNT: 14.8 % (ref 11.5–14.5)
GLOBULIN SER CALC-MCNC: 4.1 G/DL (ref 2–4)
GLUCOSE SERPL-MCNC: 160 MG/DL (ref 65–100)
HCT VFR BLD AUTO: 34 % (ref 35–47)
HGB BLD-MCNC: 10.3 G/DL (ref 11.5–16)
IMM GRANULOCYTES # BLD: 0 K/UL (ref 0–0.04)
IMM GRANULOCYTES NFR BLD AUTO: 1 % (ref 0–0.5)
LYMPHOCYTES # BLD: 1.8 K/UL (ref 0.8–3.5)
LYMPHOCYTES NFR BLD: 20 % (ref 12–49)
MCH RBC QN AUTO: 26.8 PG (ref 26–34)
MCHC RBC AUTO-ENTMCNC: 30.3 G/DL (ref 30–36.5)
MCV RBC AUTO: 88.5 FL (ref 80–99)
MONOCYTES # BLD: 1.2 K/UL (ref 0–1)
MONOCYTES NFR BLD: 13 % (ref 5–13)
NEUTS SEG # BLD: 5.6 K/UL (ref 1.8–8)
NEUTS SEG NFR BLD: 63 % (ref 32–75)
NRBC # BLD: 0 K/UL (ref 0–0.01)
NRBC BLD-RTO: 0 PER 100 WBC
PLATELET # BLD AUTO: 147 K/UL (ref 150–400)
PMV BLD AUTO: 10.1 FL (ref 8.9–12.9)
POTASSIUM SERPL-SCNC: 4.1 MMOL/L (ref 3.5–5.1)
PROT SERPL-MCNC: 7.5 G/DL (ref 6.4–8.2)
RBC # BLD AUTO: 3.84 M/UL (ref 3.8–5.2)
SODIUM SERPL-SCNC: 139 MMOL/L (ref 136–145)
WBC # BLD AUTO: 8.8 K/UL (ref 3.6–11)

## 2018-09-28 PROCEDURE — 86141 C-REACTIVE PROTEIN HS: CPT | Performed by: EMERGENCY MEDICINE

## 2018-09-28 PROCEDURE — 36415 COLL VENOUS BLD VENIPUNCTURE: CPT | Performed by: EMERGENCY MEDICINE

## 2018-09-28 PROCEDURE — 85652 RBC SED RATE AUTOMATED: CPT | Performed by: EMERGENCY MEDICINE

## 2018-09-28 PROCEDURE — 80053 COMPREHEN METABOLIC PANEL: CPT | Performed by: EMERGENCY MEDICINE

## 2018-09-28 PROCEDURE — 99284 EMERGENCY DEPT VISIT MOD MDM: CPT

## 2018-09-28 PROCEDURE — 70450 CT HEAD/BRAIN W/O DYE: CPT

## 2018-09-28 PROCEDURE — 85025 COMPLETE CBC W/AUTO DIFF WBC: CPT | Performed by: EMERGENCY MEDICINE

## 2018-09-28 RX ORDER — TETRACAINE HYDROCHLORIDE 5 MG/ML
1 SOLUTION OPHTHALMIC
Status: COMPLETED | OUTPATIENT
Start: 2018-09-28 | End: 2018-09-29

## 2018-09-28 RX ORDER — ACETAMINOPHEN 500 MG
1000 TABLET ORAL
Status: COMPLETED | OUTPATIENT
Start: 2018-09-28 | End: 2018-09-29

## 2018-09-28 RX ORDER — PHENYLEPHRINE HYDROCHLORIDE 25 MG/ML
1 SOLUTION/ DROPS OPHTHALMIC
Status: COMPLETED | OUTPATIENT
Start: 2018-09-28 | End: 2018-09-29

## 2018-09-28 NOTE — MR AVS SNAPSHOT
[address] [place] [address] 
[phone] Patient: Mansoor Noriega MRN: [id number] 
KGJ:56/[id number]/[birth year]995 Visit Information Date & Time Provider Department Dept. Phone Encounter #  
 9/28/2018  7:15 PM [address] [facility] [phone] [id number] Your Appointments 11/1/2018 11:00 AM  
New Patient with MD Sheri Salazar [facility]) Appt Note: NP EST PCP TH 8/27/18  
 [address] [facility] [address] [phone]  
  
   
 [address] [facility] Upcoming Health Maintenance Date Due DTaP/Tdap/Td series (1 - Tdap) 11[birth year]9/1956 Shingrix Vaccine Age 50> (1 of 2) 11/9/1987 GLAUCOMA SCREENING Q2Y 11/9/2002 Bone Densitometry (Dexa) Screening 11/9/2002 Pneumococcal 65+ Low/Medium Risk (2 of 2 - PPSV23) 1/1/2018 Influenza Age 5 to Adult 8/1/2018 Allergies as of 9/28/2018  Review Complete On: 9/23/2018 By: Evonne Fuentes NP Severity Noted Reaction Type Reactions Pcn [Penicillins] Medium 03/20/2011   Systemic Rash But can take cephalosporins. Allergic to all \"cillins\". Codeine  03/20/2011   Side Effect Other (comments) Hallucinations, takes hydrocodone at home for pain Contrast Dye [Iodine]  06/14/2016    Other (comments) Not to take due to kidney function Prednisone  06/27/2016    Other (comments) \"makes my pancreas numbers go way up\" Current Immunizations  Reviewed on 2/28/2014 Name Date Pneumococcal Vaccine (Unspecified Type) 1/1/2013 Not reviewed this visit You Were Diagnosed With   
  
 Codes Comments Dizziness    -  Primary ICD-10-CM: N35 ICD-9-CM: 780.4 Decreased vision of right eye     ICD-10-CM: H54.7 ICD-9-CM: 369.9 Vitals BP Pulse Temp Resp Height(growth percentile) Weight(growth percentile) 134/87 71 97.7 °F (36.5 °C) 16 5' 4\" (1.626 m) 189 lb (85.7 kg) SpO2 BMI OB Status Smoking Status 97% 32.44 kg/m2 Postmenopausal Former Smoker BMI and BSA Data Body Mass Index Body Surface Area  
 32.44 kg/m 2 1.97 m 2 Preferred Pharmacy Pharmacy Name Phone [phone] [phone] Your Updated Medication List  
  
   
This list is accurate as of 9/28/18  8:20 PM.  Always use your most recent med list.  
  
  
  
  
 allopurinol 300 mg tablet Commonly known as:  Brenda Late Take 300 mg by mouth daily. amLODIPine 2.5 mg tablet Commonly known as:  Adelaon Salle Take 2.5 mg by mouth daily. b complex vitamins tablet Take 1 Tab by mouth daily. betamethasone valerate 0.1 % topical cream  
Commonly known as:  Wendy Nina Apply  to affected area two (2) times a day. donepezil 5 mg tablet Commonly known as:  ARICEPT Take 5 mg by mouth nightly. famotidine 20 mg tablet Commonly known as:  PEPCID Take 1 Tab by mouth nightly. Indications: ZOLLINGER-ROBERSON SYNDROME FERROUS SULFATE PO Take 325 mg by mouth daily. furosemide 40 mg tablet Commonly known as:  LASIX Take 40 mg by mouth daily as needed. gabapentin 100 mg capsule Commonly known as:  NEURONTIN Take 200 mg by mouth two (2) times daily as needed. hydrOXYzine pamoate 25 mg capsule Commonly known as:  VISTARIL Take 1 Cap by mouth nightly as needed for Itching. ipratropium 0.03 % nasal spray Commonly known as:  ATROVENT  
2 Sprays by Both Nostrils route every twelve (12) hours. losartan 100 mg tablet Commonly known as:  COZAAR Take 100 mg by mouth nightly. metoprolol succinate 50 mg XL tablet Commonly known as:  TOPROL-XL Take 1 Tab by mouth daily. montelukast 10 mg tablet Commonly known as:  SINGULAIR Take 10 mg by mouth nightly. pantoprazole 40 mg tablet Commonly known as:  PROTONIX Take 40 mg by mouth daily. pravastatin 20 mg tablet Commonly known as:  PRAVACHOL Take 1 Tab by mouth nightly. PROAIR HFA 90 mcg/actuation inhaler Generic drug:  albuterol Take 2 Puffs by inhalation four (4) times daily as needed. PROBIOTIC PO Take 1 Cap by mouth daily. sertraline 100 mg tablet Commonly known as:  ZOLOFT Take 100 mg by mouth nightly. trimethoprim-polymyxin b ophthalmic solution Commonly known as:  POLYTRIM Administer 1 Drop to right eye five (5) times daily for 7 days. Patient Instructions It is important that you are seen in the Emergency Department tonight to evaluate the dizziness, headache that does not change with head movement and the coinciding vision loss as this could indicate stroke Introducing [facility] SERVICES! [employer] introduces [employer]: online [employer] club patient portal. Now you can access parts of your medical record, email your doctor's office, and request medication refills online. 1. In your internet browser, go to [email or web address] 2. Click on the First Time User? Click Here link in the Sign In box. You will see the New Member Sign Up page. 3. Enter your Sinbad: online travellers club Access Code exactly as it appears below. You will not need to use this code after youve completed the sign-up process. If you do not sign up before the expiration date, you must request a new code. · [employer]: online travellers club Access Code: [email or web address] Expires: 11/25/2018  3:42 PM 
 
4. Enter the last four digits of your Social Security Number (xxxx) and Date of Birth (mm/dd/yyyy) as indicated and click Submit. You will be taken to the next sign-up page. 5. Create a Sinbad: online travellers club ID. This will be your [employer]: online travellers club login ID and cannot be changed, so think of one that is secure and easy to remember. 6. Create a Sinbad: online travellers club password. You can change your password at any time. 7. Enter your Password Reset Question and Answer. This can be used at a later time if you forget your password. 8. Enter your e-mail address. You will receive e-mail notification when new information is available in [address] 9. Click Sign Up. You can now view and download portions of your medical record. 10. Click the Download Summary menu link to download a portable copy of your medical information. If you have questions, please visit the Frequently Asked Questions section of the [employer] website. Remember, [employer] is NOT to be used for urgent needs. For medical emergencies, dial 911. Now available from your iPhone and Android! Please provide this summary of care documentation to your next provider. Your primary care clinician is listed as Renetta Rivas. If you have any questions after today's visit, please call [phone].

## 2018-09-28 NOTE — IP AVS SNAPSHOT
[address] [facility] 
[phone] Patient: Sabas Allred MRN: [id number] 
ZVN:[id number] About your hospitalization You were admitted on:  September 29, 2018 You last received care in the:  [facility] 3 NEUROSCIENCE TELEMETRY You were discharged on:  October 3, 2018 Why you were hospitalized Your primary diagnosis was:  Not on File Your diagnoses also included:  Vision Loss, Right Eye Follow-up Information None Discharge Orders None A check noe indicates which time of day the medication should be taken. My Medications START taking these medications Instructions Each Dose to Equal  
 Morning Noon Evening Bedtime  
 predniSONE 20 mg tablet Commonly known as:  Parkerella Jose Alfredo Start taking on:  10/4/2018 Your last dose was: Your next dose is: Take 3 Tabs by mouth daily (with breakfast). 60 mg CHANGE how you take these medications Instructions Each Dose to Equal  
 Morning Noon Evening Bedtime  
 amLODIPine 10 mg tablet Commonly known as:  Torres Jin Start taking on:  10/4/2018 What changed:   
- medication strength 
- how much to take Your last dose was: Your next dose is: Take 1 Tab by mouth daily. 10 mg CONTINUE taking these medications Instructions Each Dose to Equal  
 Morning Noon Evening Bedtime  
 allopurinol 300 mg tablet Commonly known as:  Chayito Blount Your last dose was: Your next dose is: Take 300 mg by mouth daily. 300 mg  
    
   
   
   
  
 b complex vitamins tablet Your last dose was: Your next dose is: Take 1 Tab by mouth daily. 1 Tab  
    
   
   
   
  
 betamethasone valerate 0.1 % topical cream  
Commonly known as:  Curtis Roth Your last dose was: Your next dose is: Apply  to affected area two (2) times a day. donepezil 5 mg tablet Commonly known as:  ARICEPT Your last dose was: Your next dose is: Take 5 mg by mouth nightly. 5 mg  
    
   
   
   
  
 famotidine 20 mg tablet Commonly known as:  PEPCID Your last dose was: Your next dose is: Take 1 Tab by mouth nightly. Indications: ZOLLINGER-ROBERSON SYNDROME  
 20 mg FERROUS SULFATE PO Your last dose was: Your next dose is: Take 325 mg by mouth daily. 325 mg  
    
   
   
   
  
 furosemide 40 mg tablet Commonly known as:  LASIX Your last dose was: Your next dose is: Take 40 mg by mouth daily as needed. 40 mg  
    
   
   
   
  
 gabapentin 100 mg capsule Commonly known as:  NEURONTIN Your last dose was: Your next dose is: Take 200 mg by mouth two (2) times daily as needed. 200 mg  
    
   
   
   
  
 hydrOXYzine pamoate 25 mg capsule Commonly known as:  VISTARIL Your last dose was: Your next dose is: Take 1 Cap by mouth nightly as needed for Itching. 25 mg  
    
   
   
   
  
 ipratropium 0.03 % nasal spray Commonly known as:  ATROVENT Your last dose was: Your next dose is: 2 Sprays by Both Nostrils route every twelve (12) hours. 2 Spray  
    
   
   
   
  
 losartan 100 mg tablet Commonly known as:  COZAAR Your last dose was: Your next dose is: Take 100 mg by mouth nightly. 100 mg  
    
   
   
   
  
 metoprolol succinate 50 mg XL tablet Commonly known as:  TOPROL-XL Your last dose was: Your next dose is: Take 1 Tab by mouth daily. 50 mg  
    
   
   
   
  
 montelukast 10 mg tablet Commonly known as:  SINGULAIR Your last dose was: Your next dose is: Take 10 mg by mouth nightly. 10 mg  
    
   
   
   
  
 pantoprazole 40 mg tablet Commonly known as:  PROTONIX Your last dose was: Your next dose is: Take 40 mg by mouth daily. 40 mg  
    
   
   
   
  
 pravastatin 20 mg tablet Commonly known as:  PRAVACHOL Your last dose was: Your next dose is: Take 1 Tab by mouth nightly. 20 mg  
    
   
   
   
  
 PROAIR HFA 90 mcg/actuation inhaler Generic drug:  albuterol Your last dose was: Your next dose is: Take 2 Puffs by inhalation four (4) times daily as needed. 2 Puff PROBIOTIC PO Your last dose was: Your next dose is: Take 1 Cap by mouth daily. 1 Cap  
    
   
   
   
  
 sertraline 100 mg tablet Commonly known as:  ZOLOFT Your last dose was: Your next dose is: Take 100 mg by mouth nightly. 100 mg  
    
   
   
   
  
  
STOP taking these medications   
 trimethoprim-polymyxin b ophthalmic solution Commonly known as:  POLYTRIM Where to Get Your Medications Information on where to get these meds will be given to you by the nurse or doctor. ! Ask your nurse or doctor about these medications  
  amLODIPine 10 mg tablet  
 predniSONE 20 mg tablet Discharge Instructions None Introducing \[facility]\"" HEALTH SERVICES! [facility] introduces [employer] patient portal. Now you can access parts of your medical record, email your doctor's office, and request medication refills online. 1. In your internet browser, go to [email or web address] - how to [place]. [email or web address] - how to [place] 2. Click on the First Time User? Click Here link in the Sign In box. You will see the New Member Sign Up page. 3. Enter your [employer] Access Code exactly as it appears below. You will not need to use this code after youve completed the sign-up process.  If you do not sign up before the expiration date, you must request a new code. · Tier 3 Access Code: [email or web address] Expires: 11/25/2018  3:42 PM 
 
4. Enter the last four digits of your Social Security Number (xxxx) and Date of Birth (mm/dd/yyyy) as indicated and click Submit. You will be taken to the next sign-up page. 5. Create a Tier 3 ID. This will be your Tier 3 login ID and cannot be changed, so think of one that is secure and easy to remember. 6. Create a Tier 3 password. You can change your password at any time. 7. Enter your Password Reset Question and Answer. This can be used at a later time if you forget your password. 8. Enter your e-mail address. You will receive e-mail notification when new information is available in [address] 9. Click Sign Up. You can now view and download portions of your medical record. 10. Click the Download Summary menu link to download a portable copy of your medical information. If you have questions, please visit the Frequently Asked Questions section of the Tier 3 website. Remember, Tier 3 is NOT to be used for urgent needs. For medical emergencies, dial 911. Now available from your iPhone and Android! Introducing Benito Matta As a [facility] patient, I wanted to make you aware of our electronic visit tool called Benito Matta. [facility] 24/7 allows you to connect within minutes with a medical provider 24 hours a day, seven days a week via a mobile device or tablet or logging into a secure website from your computer. You can access Benito Matta from anywhere in the [place].  
 
A virtual visit might be right for you when you have a simple condition and feel like you just dont want to get out of bed, or cant get away from work for an appointment, when your regular [facility] provider is not available (evenings, weekends or holidays), or when youre out of town and need minor care. Electronic visits cost only $49 and if the [employer] 24/7 provider determines a prescription is needed to treat your condition, one can be electronically transmitted to a nearby pharmacy*. Please take a moment to enroll today if you have not already done so. The enrollment process is free and takes just a few minutes. To enroll, please download the iTwin nusrat to your tablet or phone, or visit [email or web address] to enroll on your computer. And, as an [address] [place] [address] patient with a [employer] account, the results of your visits will be scanned into your electronic medical record and your primary care provider will be able to view the scanned results. We urge you to continue to see your regular [employer] [facility] provider for your ongoing medical care. And while your primary care provider may not be the one available when you seek a [employer] virtual visit, the peace of mind you get from getting a real diagnosis real time can be priceless. For more information on [email or web address], view our Frequently Asked Questions (FAQs) at [email or web address]. Sincerely, 
 
Yovany Ordaz MD 
Chief Medical Officer [address] *:  certain medications cannot be prescribed via [email or web address] Unresulted Labs-Please follow up with your PCP about these lab tests Order Current Status FECAL FAT, QL In process WBC, STOOL In process CULTURE, BLOOD, PAIRED Preliminary result CULTURE, STOOL Preliminary result Providers Seen During Your Hospitalization Provider Specialty Primary office phone Rigoberto Peterson DO Emergency Medicine [phone] Brad Rasmussen MD Internal Medicine [phone] Thanh Thomas MD Internal Medicine [phone] Your Primary Care Physician (PCP) Primary Care Physician Office Phone Office Fax Geovanna Grace [phone] You are allergic to the following Allergen Reactions Pcn (Penicillins) Rash But can take cephalosporins. Allergic to all \"cillins\". Codeine Other (comments) Hallucinations, takes hydrocodone at home for pain Contrast Dye (Iodine) Other (comments) Not to take due to kidney function Prednisone Other (comments) \"makes my pancreas numbers go way up\" Recent Documentation Height Weight Breastfeeding? BMI OB Status Smoking Status 1.626 m 84.4 kg No 31.94 kg/m2 Postmenopausal Former Smoker Emergency Contacts Name Discharge Info Relation Home Work Mobile Wilman Coley DISCHARGE CAREGIVER [3] Son [22] [phone] [facility] DISCHARGE CAREGIVER [3] Daughter [21] [phone] Mauricio Lora  Child [2] [phone] Patient Belongings The following personal items are in your possession at time of discharge: 
  Dental Appliances: None  Visual Aid: None      Home Medications: None      Clothing: None    Other Valuables: None  Personal Items Sent to Safe: n Please provide this summary of care documentation to your next provider. Signatures-by signing, you are acknowledging that this After Visit Summary has been reviewed with you and you have received a copy. Patient Signature:  ____________________________________________________________ Date:  ____________________________________________________________  
  
[facility] Provider Signature:  ____________________________________________________________ Date:  ____________________________________________________________

## 2018-09-29 PROBLEM — H54.61 VISION LOSS, RIGHT EYE: Status: ACTIVE | Noted: 2018-09-29

## 2018-09-29 LAB
APPEARANCE UR: ABNORMAL
ATRIAL RATE: 60 BPM
BACTERIA URNS QL MICRO: ABNORMAL /HPF
BILIRUB UR QL: NEGATIVE
CALCULATED P AXIS, ECG09: 23 DEGREES
CALCULATED R AXIS, ECG10: -17 DEGREES
CALCULATED T AXIS, ECG11: 128 DEGREES
COLOR UR: ABNORMAL
CRP SERPL HS-MCNC: >9.5 MG/L
CRP SERPL-MCNC: 3.71 MG/DL (ref 0–0.6)
DIAGNOSIS, 93000: NORMAL
EPITH CASTS URNS QL MICRO: ABNORMAL /LPF
ERYTHROCYTE [SEDIMENTATION RATE] IN BLOOD: 84 MM/HR (ref 0–30)
GLUCOSE BLD STRIP.AUTO-MCNC: 171 MG/DL (ref 65–100)
GLUCOSE BLD STRIP.AUTO-MCNC: 179 MG/DL (ref 65–100)
GLUCOSE BLD STRIP.AUTO-MCNC: 204 MG/DL (ref 65–100)
GLUCOSE BLD STRIP.AUTO-MCNC: 279 MG/DL (ref 65–100)
GLUCOSE BLD STRIP.AUTO-MCNC: 298 MG/DL (ref 65–100)
GLUCOSE UR STRIP.AUTO-MCNC: NEGATIVE MG/DL
HGB UR QL STRIP: NEGATIVE
HYALINE CASTS URNS QL MICRO: ABNORMAL /LPF (ref 0–5)
KETONES UR QL STRIP.AUTO: NEGATIVE MG/DL
LEUKOCYTE ESTERASE UR QL STRIP.AUTO: ABNORMAL
NITRITE UR QL STRIP.AUTO: NEGATIVE
P-R INTERVAL, ECG05: 204 MS
PH UR STRIP: 5.5 [PH] (ref 5–8)
PROT UR STRIP-MCNC: ABNORMAL MG/DL
Q-T INTERVAL, ECG07: 426 MS
QRS DURATION, ECG06: 92 MS
QTC CALCULATION (BEZET), ECG08: 426 MS
RBC #/AREA URNS HPF: ABNORMAL /HPF (ref 0–5)
SERVICE CMNT-IMP: ABNORMAL
SP GR UR REFRACTOMETRY: 1.02 (ref 1–1.03)
UA: UC IF INDICATED,UAUC: ABNORMAL
UROBILINOGEN UR QL STRIP.AUTO: 1 EU/DL (ref 0.2–1)
VENTRICULAR RATE, ECG03: 60 BPM
WBC URNS QL MICRO: ABNORMAL /HPF (ref 0–4)

## 2018-09-29 PROCEDURE — 87077 CULTURE AEROBIC IDENTIFY: CPT | Performed by: EMERGENCY MEDICINE

## 2018-09-29 PROCEDURE — 74011250637 HC RX REV CODE- 250/637: Performed by: EMERGENCY MEDICINE

## 2018-09-29 PROCEDURE — 74011636637 HC RX REV CODE- 636/637: Performed by: INTERNAL MEDICINE

## 2018-09-29 PROCEDURE — 74011000258 HC RX REV CODE- 258: Performed by: INTERNAL MEDICINE

## 2018-09-29 PROCEDURE — 74011000258 HC RX REV CODE- 258: Performed by: EMERGENCY MEDICINE

## 2018-09-29 PROCEDURE — G8980 MOBILITY D/C STATUS: HCPCS | Performed by: PHYSICAL THERAPIST

## 2018-09-29 PROCEDURE — 74011250637 HC RX REV CODE- 250/637: Performed by: INTERNAL MEDICINE

## 2018-09-29 PROCEDURE — 74011250636 HC RX REV CODE- 250/636: Performed by: EMERGENCY MEDICINE

## 2018-09-29 PROCEDURE — 65660000000 HC RM CCU STEPDOWN

## 2018-09-29 PROCEDURE — 97161 PT EVAL LOW COMPLEX 20 MIN: CPT | Performed by: PHYSICAL THERAPIST

## 2018-09-29 PROCEDURE — 74011250637 HC RX REV CODE- 250/637: Performed by: HOSPITALIST

## 2018-09-29 PROCEDURE — 74011250636 HC RX REV CODE- 250/636: Performed by: INTERNAL MEDICINE

## 2018-09-29 PROCEDURE — 97116 GAIT TRAINING THERAPY: CPT | Performed by: PHYSICAL THERAPIST

## 2018-09-29 PROCEDURE — 86140 C-REACTIVE PROTEIN: CPT | Performed by: INTERNAL MEDICINE

## 2018-09-29 PROCEDURE — 36415 COLL VENOUS BLD VENIPUNCTURE: CPT | Performed by: INTERNAL MEDICINE

## 2018-09-29 PROCEDURE — G8978 MOBILITY CURRENT STATUS: HCPCS | Performed by: PHYSICAL THERAPIST

## 2018-09-29 PROCEDURE — 93005 ELECTROCARDIOGRAM TRACING: CPT

## 2018-09-29 PROCEDURE — G8979 MOBILITY GOAL STATUS: HCPCS | Performed by: PHYSICAL THERAPIST

## 2018-09-29 PROCEDURE — 77030033269 HC SLV COMPR SCD KNE2 CARD -B

## 2018-09-29 PROCEDURE — 87186 SC STD MICRODIL/AGAR DIL: CPT | Performed by: EMERGENCY MEDICINE

## 2018-09-29 PROCEDURE — 87086 URINE CULTURE/COLONY COUNT: CPT | Performed by: EMERGENCY MEDICINE

## 2018-09-29 PROCEDURE — 82962 GLUCOSE BLOOD TEST: CPT

## 2018-09-29 PROCEDURE — 96374 THER/PROPH/DIAG INJ IV PUSH: CPT

## 2018-09-29 PROCEDURE — 74011000250 HC RX REV CODE- 250: Performed by: EMERGENCY MEDICINE

## 2018-09-29 PROCEDURE — 81001 URINALYSIS AUTO W/SCOPE: CPT | Performed by: EMERGENCY MEDICINE

## 2018-09-29 RX ORDER — SODIUM CHLORIDE 0.9 % (FLUSH) 0.9 %
5-10 SYRINGE (ML) INJECTION EVERY 8 HOURS
Status: DISCONTINUED | OUTPATIENT
Start: 2018-09-29 | End: 2018-10-03 | Stop reason: HOSPADM

## 2018-09-29 RX ORDER — METOPROLOL SUCCINATE 50 MG/1
50 TABLET, EXTENDED RELEASE ORAL DAILY
Status: DISCONTINUED | OUTPATIENT
Start: 2018-09-29 | End: 2018-10-03 | Stop reason: HOSPADM

## 2018-09-29 RX ORDER — PRAVASTATIN SODIUM 40 MG/1
20 TABLET ORAL
Status: DISCONTINUED | OUTPATIENT
Start: 2018-09-29 | End: 2018-10-03 | Stop reason: HOSPADM

## 2018-09-29 RX ORDER — ACETAMINOPHEN 325 MG/1
650 TABLET ORAL
Status: DISCONTINUED | OUTPATIENT
Start: 2018-09-29 | End: 2018-10-03 | Stop reason: HOSPADM

## 2018-09-29 RX ORDER — DEXTROSE 50 % IN WATER (D50W) INTRAVENOUS SYRINGE
12.5-25 AS NEEDED
Status: DISCONTINUED | OUTPATIENT
Start: 2018-09-29 | End: 2018-10-03 | Stop reason: HOSPADM

## 2018-09-29 RX ORDER — ALBUTEROL SULFATE 90 UG/1
2 AEROSOL, METERED RESPIRATORY (INHALATION)
Status: DISCONTINUED | OUTPATIENT
Start: 2018-09-29 | End: 2018-10-03 | Stop reason: HOSPADM

## 2018-09-29 RX ORDER — MONTELUKAST SODIUM 10 MG/1
10 TABLET ORAL
Status: DISCONTINUED | OUTPATIENT
Start: 2018-09-29 | End: 2018-10-03 | Stop reason: HOSPADM

## 2018-09-29 RX ORDER — FAMOTIDINE 20 MG/1
20 TABLET, FILM COATED ORAL EVERY EVENING
Status: DISCONTINUED | OUTPATIENT
Start: 2018-09-29 | End: 2018-10-03 | Stop reason: HOSPADM

## 2018-09-29 RX ORDER — PANTOPRAZOLE SODIUM 40 MG/1
40 TABLET, DELAYED RELEASE ORAL DAILY
Status: DISCONTINUED | OUTPATIENT
Start: 2018-09-29 | End: 2018-10-03 | Stop reason: HOSPADM

## 2018-09-29 RX ORDER — ALLOPURINOL 300 MG/1
300 TABLET ORAL DAILY
Status: DISCONTINUED | OUTPATIENT
Start: 2018-09-29 | End: 2018-09-29

## 2018-09-29 RX ORDER — LANOLIN ALCOHOL/MO/W.PET/CERES
325 CREAM (GRAM) TOPICAL DAILY
Status: DISCONTINUED | OUTPATIENT
Start: 2018-09-29 | End: 2018-10-03 | Stop reason: HOSPADM

## 2018-09-29 RX ORDER — HYDROXYZINE 25 MG/1
25 TABLET, FILM COATED ORAL
Status: DISCONTINUED | OUTPATIENT
Start: 2018-09-29 | End: 2018-10-03 | Stop reason: HOSPADM

## 2018-09-29 RX ORDER — FUROSEMIDE 40 MG/1
40 TABLET ORAL
Status: DISCONTINUED | OUTPATIENT
Start: 2018-09-29 | End: 2018-10-03 | Stop reason: HOSPADM

## 2018-09-29 RX ORDER — AMLODIPINE BESYLATE 2.5 MG/1
2.5 TABLET ORAL DAILY
Status: DISCONTINUED | OUTPATIENT
Start: 2018-09-29 | End: 2018-10-02

## 2018-09-29 RX ORDER — DONEPEZIL HYDROCHLORIDE 5 MG/1
5 TABLET, FILM COATED ORAL
Status: DISCONTINUED | OUTPATIENT
Start: 2018-09-29 | End: 2018-10-03 | Stop reason: HOSPADM

## 2018-09-29 RX ORDER — SODIUM CHLORIDE 0.9 % (FLUSH) 0.9 %
5-10 SYRINGE (ML) INJECTION AS NEEDED
Status: DISCONTINUED | OUTPATIENT
Start: 2018-09-29 | End: 2018-10-03 | Stop reason: HOSPADM

## 2018-09-29 RX ORDER — SERTRALINE HYDROCHLORIDE 50 MG/1
100 TABLET, FILM COATED ORAL
Status: DISCONTINUED | OUTPATIENT
Start: 2018-09-29 | End: 2018-10-03 | Stop reason: HOSPADM

## 2018-09-29 RX ORDER — MAGNESIUM SULFATE 100 %
4 CRYSTALS MISCELLANEOUS AS NEEDED
Status: DISCONTINUED | OUTPATIENT
Start: 2018-09-29 | End: 2018-10-03 | Stop reason: HOSPADM

## 2018-09-29 RX ORDER — LOSARTAN POTASSIUM 100 MG/1
100 TABLET ORAL
Status: DISCONTINUED | OUTPATIENT
Start: 2018-09-29 | End: 2018-10-03 | Stop reason: HOSPADM

## 2018-09-29 RX ORDER — INSULIN LISPRO 100 [IU]/ML
INJECTION, SOLUTION INTRAVENOUS; SUBCUTANEOUS
Status: DISCONTINUED | OUTPATIENT
Start: 2018-09-29 | End: 2018-10-03 | Stop reason: HOSPADM

## 2018-09-29 RX ORDER — GABAPENTIN 100 MG/1
200 CAPSULE ORAL
Status: DISCONTINUED | OUTPATIENT
Start: 2018-09-29 | End: 2018-10-03 | Stop reason: HOSPADM

## 2018-09-29 RX ORDER — HYDRALAZINE HYDROCHLORIDE 25 MG/1
25 TABLET, FILM COATED ORAL 3 TIMES DAILY
Status: DISCONTINUED | OUTPATIENT
Start: 2018-09-29 | End: 2018-10-03 | Stop reason: HOSPADM

## 2018-09-29 RX ORDER — ONDANSETRON 2 MG/ML
4 INJECTION INTRAMUSCULAR; INTRAVENOUS
Status: DISCONTINUED | OUTPATIENT
Start: 2018-09-29 | End: 2018-10-03 | Stop reason: HOSPADM

## 2018-09-29 RX ORDER — IPRATROPIUM BROMIDE 21 UG/1
2 SPRAY, METERED NASAL EVERY 12 HOURS
Status: DISCONTINUED | OUTPATIENT
Start: 2018-09-29 | End: 2018-10-03 | Stop reason: HOSPADM

## 2018-09-29 RX ADMIN — SODIUM CHLORIDE 1000 MG: 900 INJECTION, SOLUTION INTRAVENOUS at 23:03

## 2018-09-29 RX ADMIN — LOSARTAN POTASSIUM 100 MG: 100 TABLET, FILM COATED ORAL at 21:16

## 2018-09-29 RX ADMIN — Medication 10 ML: at 05:23

## 2018-09-29 RX ADMIN — PANTOPRAZOLE SODIUM 40 MG: 40 TABLET, DELAYED RELEASE ORAL at 09:22

## 2018-09-29 RX ADMIN — Medication 10 ML: at 15:11

## 2018-09-29 RX ADMIN — METOPROLOL SUCCINATE 50 MG: 50 TABLET, EXTENDED RELEASE ORAL at 09:21

## 2018-09-29 RX ADMIN — INSULIN LISPRO 3 UNITS: 100 INJECTION, SOLUTION INTRAVENOUS; SUBCUTANEOUS at 17:02

## 2018-09-29 RX ADMIN — PHENYLEPHRINE HYDROCHLORIDE 1 DROP: 25 SOLUTION/ DROPS OPHTHALMIC at 02:28

## 2018-09-29 RX ADMIN — INSULIN LISPRO 3 UNITS: 100 INJECTION, SOLUTION INTRAVENOUS; SUBCUTANEOUS at 22:00

## 2018-09-29 RX ADMIN — LOSARTAN POTASSIUM 100 MG: 100 TABLET, FILM COATED ORAL at 05:23

## 2018-09-29 RX ADMIN — MONTELUKAST SODIUM 10 MG: 10 TABLET, FILM COATED ORAL at 21:44

## 2018-09-29 RX ADMIN — TETRACAINE HYDROCHLORIDE 1 DROP: 5 SOLUTION OPHTHALMIC at 02:28

## 2018-09-29 RX ADMIN — SERTRALINE HYDROCHLORIDE 100 MG: 50 TABLET ORAL at 21:15

## 2018-09-29 RX ADMIN — FERROUS SULFATE TAB 325 MG (65 MG ELEMENTAL FE) 325 MG: 325 (65 FE) TAB at 09:22

## 2018-09-29 RX ADMIN — MONTELUKAST SODIUM 10 MG: 10 TABLET, FILM COATED ORAL at 05:23

## 2018-09-29 RX ADMIN — AMLODIPINE BESYLATE 2.5 MG: 2.5 TABLET ORAL at 09:22

## 2018-09-29 RX ADMIN — FAMOTIDINE 20 MG: 20 TABLET ORAL at 17:02

## 2018-09-29 RX ADMIN — DONEPEZIL HYDROCHLORIDE 5 MG: 5 TABLET, FILM COATED ORAL at 21:15

## 2018-09-29 RX ADMIN — PRAVASTATIN SODIUM 20 MG: 40 TABLET ORAL at 21:44

## 2018-09-29 RX ADMIN — ACETAMINOPHEN 650 MG: 325 TABLET ORAL at 16:27

## 2018-09-29 RX ADMIN — INSULIN LISPRO 2 UNITS: 100 INJECTION, SOLUTION INTRAVENOUS; SUBCUTANEOUS at 09:22

## 2018-09-29 RX ADMIN — NEPHROCAP 1 CAPSULE: 1 CAP ORAL at 09:22

## 2018-09-29 RX ADMIN — Medication 10 ML: at 23:04

## 2018-09-29 RX ADMIN — ACETAMINOPHEN 650 MG: 325 TABLET ORAL at 21:44

## 2018-09-29 RX ADMIN — HYDRALAZINE HYDROCHLORIDE 25 MG: 25 TABLET, FILM COATED ORAL at 17:02

## 2018-09-29 RX ADMIN — INSULIN LISPRO 5 UNITS: 100 INJECTION, SOLUTION INTRAVENOUS; SUBCUTANEOUS at 12:07

## 2018-09-29 RX ADMIN — IPRATROPIUM BROMIDE 2 SPRAY: 21 SPRAY, METERED NASAL at 11:06

## 2018-09-29 RX ADMIN — SODIUM CHLORIDE 1000 MG: 900 INJECTION, SOLUTION INTRAVENOUS at 02:28

## 2018-09-29 RX ADMIN — HYDRALAZINE HYDROCHLORIDE 25 MG: 25 TABLET, FILM COATED ORAL at 21:16

## 2018-09-29 RX ADMIN — ACETAMINOPHEN 1000 MG: 500 TABLET ORAL at 02:28

## 2018-09-29 NOTE — ED PROVIDER NOTES
EMERGENCY DEPARTMENT HISTORY AND PHYSICAL EXAM 
 
 
Date: 9/28/2018 Patient Name: Tyree Casey History of Presenting Illness Chief Complaint Patient presents with  Eye Problem Diagnosed with pink eye Sunday. Since then, has lost sight in right eye. Referred by eye doctor today  Headache  Dizziness History Provided By: Patient HPI: Tyree Casey, [de-identified] y.o. female with PMHx significant for HTN, DM, arthritis, GERD, PUD, arrhythmia, asthma, CA, presents ambulatory to the ED with cc of constant dizziness x 1 day. Pt reports exacerbation with ambulating. She notes she usually walks with a walker but has been \"weaning herself off of it. \" She is also c/o intermittent HA. Pt reports worsening blurry vision in her R eye x 3 days. She denies associated pain. She states she was evaluated at [facility] a few days ago and treated for R sided conjunctivitis. She notes she went back to  today for her dizziness and they referred her to ED for further evaluation. Pt specifically denies any fever, chills, CP, SOB, NVD, abd pain, weakness. There are no other complaints, changes, or physical findings at this time. PCP: Braydon Koch MD 
 
Current Facility-Administered Medications Medication Dose Route Frequency Provider Last Rate Last Dose  amLODIPine (NORVASC) tablet 2.5 mg  2.5 mg Oral DAILY Oralia Curry MD      
 B complex-vitaminC-folic acid (NEPHROCAP) cap  1 Cap Oral DAILY Oralia Curry MD      
 donepezil (ARICEPT) tablet 5 mg  5 mg Oral QHS Oralia Curry MD      
 famotidine (PEPCID) tablet 20 mg  20 mg Oral QPM Oralia Curry MD      
 ferrous sulfate tablet 325 mg  325 mg Oral DAILY Oralia Curry MD      
 furosemide (LASIX) tablet 40 mg  40 mg Oral DAILY PRN Oralia Curry MD      
 gabapentin (NEURONTIN) capsule 200 mg  200 mg Oral BID PRN Oralia Curry MD      
 hydrOXYzine HCl (ATARAX) tablet 25 mg  25 mg Oral QHS PRN Oralia Curry MD      
  ipratropium (ATROVENT) 0.03 % nasal spray 2 Spray  2 Spray Both Nostrils Q12H Emy So MD      
 losartan (COZAAR) tablet 100 mg  100 mg Oral QHS Emy So MD      
 metoprolol succinate (TOPROL-XL) XL tablet 50 mg  50 mg Oral DAILY Emy So MD      
 montelukast (SINGULAIR) tablet 10 mg  10 mg Oral QHS Emy So MD      
 pantoprazole (PROTONIX) tablet 40 mg  40 mg Oral DAILY Emy So MD      
 pravastatin (PRAVACHOL) tablet 20 mg  20 mg Oral QHS Emy So MD      
 albuterol (PROVENTIL HFA, VENTOLIN HFA, PROAIR HFA) inhaler 2 Puff  2 Puff Inhalation QID PRN Emy So MD      
 sertraline (ZOLOFT) tablet 100 mg  100 mg Oral QHS Emy So MD      
 sodium chloride (NS) flush 5-10 mL  5-10 mL IntraVENous Q8H Emy So MD      
 sodium chloride (NS) flush 5-10 mL  5-10 mL IntraVENous PRN Emy So MD      
 acetaminophen (TYLENOL) tablet 650 mg  650 mg Oral Q4H PRN Emy So MD      
 ondansetron (ZOFRAN) injection 4 mg  4 mg IntraVENous Q4H PRN Emy So MD      
 insulin lispro (HUMALOG) injection   SubCUTAneous AC&HS Emy So MD      
 glucose chewable tablet 16 g  4 Tab Oral PRN Emy So MD      
 dextrose (D50W) injection syrg 12.5-25 g  12.5-25 g IntraVENous PRN Emy So MD      
 glucagon (GLUCAGEN) injection 1 mg  1 mg IntraMUSCular PRN Emy So MD      
 methylPREDNISolone ((Solu-MEDROL) 1,000 mg in 0.9% sodium chloride (MBP/ADV) 100 mL  1 g IntraVENous Q24H Emy So MD      
 
Current Outpatient Prescriptions Medication Sig Dispense Refill  trimethoprim-polymyxin b (POLYTRIM) ophthalmic solution Administer 1 Drop to right eye five (5) times daily for 7 days. 1 Bottle 0  
 ipratropium (ATROVENT) 0.03 % nasal spray 2 Sprays by Both Nostrils route every twelve (12) hours.  30 mL 0  
 hydrOXYzine pamoate (VISTARIL) 25 mg capsule Take 1 Cap by mouth nightly as needed for Itching. 15 Cap 0  
 betamethasone valerate (VALISONE) 0.1 % topical cream Apply  to affected area two (2) times a day. 45 g 0  
 famotidine (PEPCID) 20 mg tablet Take 1 Tab by mouth nightly. Indications: ZOLLINGER-ROBERSON SYNDROME 30 Tab 0  
 pantoprazole (PROTONIX) 40 mg tablet Take 40 mg by mouth daily.  b complex vitamins tablet Take 1 Tab by mouth daily.  LACTOBACILLUS ACIDOPHILUS (PROBIOTIC PO) Take 1 Cap by mouth daily.  FERROUS SULFATE PO Take 325 mg by mouth daily.  amLODIPine (NORVASC) 2.5 mg tablet Take 2.5 mg by mouth daily.  gabapentin (NEURONTIN) 100 mg capsule Take 200 mg by mouth two (2) times daily as needed.  metoprolol succinate (TOPROL-XL) 50 mg XL tablet Take 1 Tab by mouth daily. 30 Tab 11  
 pravastatin (PRAVACHOL) 20 mg tablet Take 1 Tab by mouth nightly. 30 Tab 11  
 allopurinol (ZYLOPRIM) 300 mg tablet Take 300 mg by mouth daily.  donepezil (ARICEPT) 5 mg tablet Take 5 mg by mouth nightly. 1  
 PROAIR HFA 90 mcg/actuation inhaler Take 2 Puffs by inhalation four (4) times daily as needed.  furosemide (LASIX) 40 mg tablet Take 40 mg by mouth daily as needed.  montelukast (SINGULAIR) 10 mg tablet Take 10 mg by mouth nightly.  sertraline (ZOLOFT) 100 mg tablet Take 100 mg by mouth nightly.  losartan (COZAAR) 100 mg tablet Take 100 mg by mouth nightly. Past History Past Medical History: 
Past Medical History:  
Diagnosis Date  Adverse effect of anesthesia   
 passed out during EGD/stopped breathing  Arrhythmia   
 has pacemaker for low HR  
 Arthritis  Asthma  Cancer ([facility] [address] right kidney - surgery  Chronic pain   
 right side  Deep vein thrombosis (DVT) during current hospitalization ([facility] [address]   
 history of DVT was on coumadin in the past  
 Diabetes ([facility] [address] diet controlled  GERD (gastroesophageal reflux disease)  H/O acute pancreatitis  Hiatal hernia  Hypertension  MARLEN (obstructive sleep apnea) does not use CPAP/pt states she has not used since a pacemaker inserted  Other ill-defined conditions(799.89)   
 lymph node enlargement - left chest - benign bx - watching  Other ill-defined conditions(799.89)   
 wheezes  Psychiatric disorder   
 depression  PUD (peptic ulcer disease)   
 hx of  Thromboembolus ([facility] [address]  Unspecified adverse effect of anesthesia   
 passed out during EGD per pt - stopped breathing per pt per MD  
 
 
Past Surgical History: 
Past Surgical History:  
Procedure Laterality Date  HX APPENDECTOMY  HX CHOLECYSTECTOMY  HX GYN    
   HX HEENT Right   
 laser eye surgery to stop bleeding in back of eye - multiple laser surgeries  HX KNEE ARTHROSCOPY    
 left knee; cartilage repair  HX ORTHOPAEDIC    
 right hip replacement  HX ORTHOPAEDIC    
 lower back surgery  HX ORTHOPAEDIC    
 straighten toe - 2nd toe on right  HX ORTHOPAEDIC Bilateral   
 carpal tunnel release  HX PACEMAKER    
 not defibrillator  HX UROLOGICAL    
 implant in hip to control urine and then removed  HX UROLOGICAL Right kidney partial nephrectomy Family History: 
Family History Problem Relation Age of Onset  Stroke Mother   
  brain aneurysm  Hypertension Father  Heart Disease Father  Cancer Sister   
  breast  
 Cancer Brother   
  lung  Cancer Sister   
  breast  
 SLE Other   
  half siblings (5+) - lupus  Hypertension Daughter  Diabetes Daughter Social History: 
Social History Substance Use Topics  Smoking status: Former Smoker Packs/day: 0.25 Years: 20.00 Quit date: 1971  Smokeless tobacco: Never Used  Alcohol use No  
 
 
Allergies: Allergies Allergen Reactions  Pcn [Penicillins] Rash But can take cephalosporins. Allergic to all \"cillins\".  Codeine Other (comments) Hallucinations, takes hydrocodone at home for pain  Contrast Dye [Iodine] Other (comments) Not to take due to kidney function  Prednisone Other (comments) \"makes my pancreas numbers go way up\" Review of Systems Review of Systems Constitutional: Negative for chills and fever. HENT: Negative for congestion and sore throat. Eyes: Positive for visual disturbance (R). Negative for pain. Respiratory: Negative for cough and shortness of breath. Cardiovascular: Negative for chest pain and leg swelling. Gastrointestinal: Negative for abdominal pain, blood in stool, diarrhea and nausea. Endocrine: Negative for polyuria. Genitourinary: Negative for dysuria, flank pain, vaginal bleeding and vaginal discharge. Musculoskeletal: Negative for myalgias. Skin: Negative for rash. Allergic/Immunologic: Negative for immunocompromised state. Neurological: Positive for dizziness and headaches. Negative for weakness. Psychiatric/Behavioral: Negative for confusion. Physical Exam  
Physical Exam  
Constitutional: She is oriented to person, place, and time. She appears well-developed and well-nourished. HENT:  
Head: Normocephalic and atraumatic. Mouth/Throat: Oropharynx is clear and moist.  
No ttp of scalp/temporal region Eyes: Conjunctivae and EOM are normal. Pupils are equal, round, and reactive to light. Unable to read name on badge but able to read fingers. IOP R eye 14,15,15. No visual field deficit Neck: Normal range of motion. Neck supple. No tracheal deviation present. Cardiovascular: Normal rate, regular rhythm, normal heart sounds and intact distal pulses. No murmur heard. Pulmonary/Chest: Effort normal and breath sounds normal. No respiratory distress. She has no wheezes. She has no rales. Abdominal: Soft. Bowel sounds are normal. There is no tenderness. There is no rebound and no guarding. Musculoskeletal: She exhibits no edema or deformity. Neurological: She is alert and oriented to person, place, and time. GCS eye subscore is 4. GCS verbal subscore is 5. GCS motor subscore is 6. EOMI intact, no facial droop or asymmetry, normal/equal sensation in face. Uvula elevates at midline, no tongue deviation. Normal strength with head rotation and shoulder shrug. 5/5 Strength in the bilateral upper and lower extremities, no pronotor drift, normal finger to nose. No truncal ataxia. Normal speech: no dysarthria or aphasia. Skin: Skin is warm and dry. Psychiatric: She has a normal mood and affect. Her speech is normal.  
Nursing note and vitals reviewed. Diagnostic Study Results Labs - Recent Results (from the past 12 hour(s)) CBC WITH AUTOMATED DIFF Collection Time: 09/28/18 10:12 PM  
Result Value Ref Range WBC 8.8 3.6 - 11.0 K/uL  
 RBC 3.84 3.80 - 5.20 M/uL  
 HGB 10.3 (L) 11.5 - 16.0 g/dL HCT 34.0 (L) 35.0 - 47.0 % MCV 88.5 80.0 - 99.0 FL  
 MCH 26.8 26.0 - 34.0 PG  
 MCHC 30.3 30.0 - 36.5 g/dL  
 RDW 14.8 (H) 11.5 - 14.5 % PLATELET 580 (L) 780 - 400 K/uL MPV 10.1 8.9 - 12.9 FL  
 NRBC 0.0 0  WBC ABSOLUTE NRBC 0.00 0.00 - 0.01 K/uL NEUTROPHILS 63 32 - 75 % LYMPHOCYTES 20 12 - 49 % MONOCYTES 13 5 - 13 % EOSINOPHILS 2 0 - 7 % BASOPHILS 0 0 - 1 % IMMATURE GRANULOCYTES 1 (H) 0.0 - 0.5 % ABS. NEUTROPHILS 5.6 1.8 - 8.0 K/UL  
 ABS. LYMPHOCYTES 1.8 0.8 - 3.5 K/UL  
 ABS. MONOCYTES 1.2 (H) 0.0 - 1.0 K/UL  
 ABS. EOSINOPHILS 0.2 0.0 - 0.4 K/UL  
 ABS. BASOPHILS 0.0 0.0 - 0.1 K/UL  
 ABS. IMM. GRANS. 0.0 0.00 - 0.04 K/UL  
 DF AUTOMATED    
SED RATE (ESR) Collection Time: 09/28/18 10:12 PM  
Result Value Ref Range Sed rate, automated 84 (H) 0 - 30 mm/hr METABOLIC PANEL, COMPREHENSIVE Collection Time: 09/28/18 10:12 PM  
Result Value Ref Range Sodium 139 136 - 145 mmol/L Potassium 4.1 3.5 - 5.1 mmol/L  Chloride 107 97 - 108 mmol/L  
 CO2 25 21 - 32 mmol/L  
 Anion gap 7 5 - 15 mmol/L Glucose 160 (H) 65 - 100 mg/dL BUN 33 (H) 6 - 20 MG/DL Creatinine 1.78 (H) 0.55 - 1.02 MG/DL  
 BUN/Creatinine ratio 19 12 - 20 GFR est AA 33 (L) >60 ml/min/1.73m2 GFR est non-AA 27 (L) >60 ml/min/1.73m2 Calcium 10.2 (H) 8.5 - 10.1 MG/DL Bilirubin, total 0.3 0.2 - 1.0 MG/DL  
 ALT (SGPT) 23 12 - 78 U/L  
 AST (SGOT) 26 15 - 37 U/L Alk. phosphatase 90 45 - 117 U/L Protein, total 7.5 6.4 - 8.2 g/dL Albumin 3.4 (L) 3.5 - 5.0 g/dL Globulin 4.1 (H) 2.0 - 4.0 g/dL A-G Ratio 0.8 (L) 1.1 - 2.2 EKG, 12 LEAD, INITIAL Collection Time: 09/29/18 12:07 AM  
Result Value Ref Range Ventricular Rate 60 BPM  
 Atrial Rate 60 BPM  
 P-R Interval 204 ms QRS Duration 92 ms Q-T Interval 426 ms  
 QTC Calculation (Bezet) 426 ms Calculated P Axis 23 degrees Calculated R Axis -17 degrees Calculated T Axis 128 degrees Diagnosis Atrial-paced rhythm Voltage criteria for left ventricular hypertrophy Nonspecific T wave abnormality When compared with ECG of 15-FEB-2018 13:34, No significant change was found Radiologic Studies -  
CT HEAD WO CONT Final Result CT Results  (Last 48 hours) 09/28/18 2350  CT HEAD WO CONT Final result Impression:  IMPRESSION:   
No acute intracranial abnormality. Narrative:  EXAM:  CT head without contrast  
   
INDICATION: Ataxia COMPARISON: CT 12/29/2016 TECHNIQUE: Axial noncontrast head CT from foramen magnum to vertex. Coronal and  
sagittal reformatted images were obtained. CT dose reduction was achieved  
through use of a standardized protocol tailored for this examination and  
automatic exposure control for dose modulation. FINDINGS:  There is diffuse age-related parenchymal volume loss. The ventricles  
and sulci are age-appropriate without hydrocephalus. There is no mass effect or midline shift. There is no intracranial hemorrhage or extra-axial fluid  
collection. Areas of low attenuation in the periventricular white matter  
represent stable chronic microvascular ischemic changes. The gray-white matter  
differentiation is maintained. The basal cisterns are patent. The osseous structures are intact. There is opacification in the left frontal,  
maxillary, and ethmoid sinuses. The remaining visualized paranasal sinuses and  
mastoid air cells are clear. CXR Results  (Last 48 hours) None Medical Decision Making I am the first provider for this patient. I reviewed the vital signs, available nursing notes, past medical history, past surgical history, family history and social history. Vital Signs-Reviewed the patient's vital signs. Patient Vitals for the past 12 hrs: 
 Temp Pulse Resp BP SpO2  
09/29/18 0200 - - - 144/68 94 % 09/29/18 0100 - - - 139/56 93 % 09/29/18 0049 - 60 - 133/67 -  
09/29/18 0047 - 60 - 140/65 -  
09/29/18 0046 - 65 - 130/56 -  
09/28/18 2112 98.4 °F (36.9 °C) 68 18 181/75 98 % Pulse Oximetry Analysis - 100% on RA Cardiac Monitor:  
Rate: 60 bpm 
Rhythm: atrial paced EKG interpretation: (Preliminary) 0007 Rhythm: atrial paced; and regular . Rate (approx.): 60; Axis: normal; P wave: normal; QRS interval: normal ; ST/T wave: normal;  
QRS 92 ms,  ms, QTc 426 ms. Written by Elaine Ravi ED Scribe, as dictated by Mariajose Rubio DO 
 
Records Reviewed: Nursing Notes, Old Medical Records, Previous Radiology Studies and Previous Laboratory Studies Provider Notes (Medical Decision Making): DDx: Retinal vein occlusion, retinal artery occlusion, temporal arteritis, lens detachment ED Course:  
Initial assessment performed.  The patients presenting problems have been discussed, and they are in agreement with the care plan formulated and outlined with them. I have encouraged them to ask questions as they arise throughout their visit. 11:44 PM 
Notified by tech that pt was 20/20 in the L eye and had severe R sided deficits. Pt also states that she got a new eye glass prescription a few days ago. Written by LYNN Ludwig, as dictated by Tabitha Jeff DO. 
 
CONSULT NOTE:  
1:23 AM 
Tabitha Jeff DO spoke with Dr. Mesha Palafox, Specialty: Hospitalist 
Discussed pt's hx, disposition, and available diagnostic and imaging results. Reviewed care plans. Consultant will evaluate pt for admission. Written by LYNN Ludwig, as dictated by Tabitha Jeff DO. 
 
2:00 AM 
Pt reevaluated. Pt and family updated on admission. They are in agreement with care plan. Written by LYNN Ludwig, as dictated by Tabitha Jeff DO. Critical Care Time:  
none Disposition: 
3:59 AM 
Patient is being admitted to the hospital. The results of their tests and reasons for their admission have been discussed with them and/or available family. They convey agreement and understanding for the need to be admitted and for their admission diagnosis. Consultation has been made with the inpatient physician specialist for hospitalization. PLAN: 
1. Admit to hospitalist.  
 
Jennifer Gregg to ED if worse Diagnosis Clinical Impression: 1. Temporal arteritis ([address] 2. Vision loss, right eye 3. Vision loss of right eye Attestations: This note is prepared by Andie Bedolla, acting as Scribe for Tabitha Jeff DO. Tabitha Jeff DO: The scribe's documentation has been prepared under my direction and personally reviewed by me in its entirety. I confirm that the note above accurately reflects all work, treatment, procedures, and medical decision making performed by me.

## 2018-09-29 NOTE — PROGRESS NOTES
physical Therapy EVALUATION/DISCHARGE Patient: Josef Du (89 y.o. female) Date: 9/29/2018 Primary Diagnosis: Vision loss, right eye Precautions:     
ASSESSMENT : 
Based on the objective data described below, the patient presents with blurry vision in right eye, but functional/baseline level of functional mobility including bed, transfers, gait and 1 stair. She ambulates 300 feet with contact guard and initially hand-held assist (patient states that she does not really need the hand-hold and is able to walk well without it). When asked how this gait trial compares with her usual, she states \"I don't usually walk this much\"; she uses no device, cane, or rolling walker at home based on how she's feeling that day. Patient demonstrates good standing balance/tolerance. She agrees that she does not need physical therapy. No follow up will be needed. Skilled physical therapy is not indicated at this time. PLAN : 
Discharge Recommendations: home with intermittent use of ambulatory devices Further Equipment Recommendations for Discharge: has cane/rolling walker SUBJECTIVE:  
Patient stated I didn't sleep well at all, they brought me up here at 4am. She is in right sidelying at start of evaluation. OBJECTIVE DATA SUMMARY:  
HISTORY:   
Past Medical History:  
Diagnosis Date  Adverse effect of anesthesia   
 passed out during EGD/stopped breathing  Arrhythmia   
 has pacemaker for low HR  
 Arthritis  Asthma  Cancer ([facility] [address] right kidney - surgery  Chronic pain   
 right side  Deep vein thrombosis (DVT) during current hospitalization ([facility] [address]   
 history of DVT was on coumadin in the past  
 Diabetes ([facility] [address] diet controlled  GERD (gastroesophageal reflux disease)  H/O acute pancreatitis  Hiatal hernia  Hypertension  MARLEN (obstructive sleep apnea) does not use CPAP/pt states she has not used since a pacemaker inserted  Other ill-defined conditions(799.89)   
 lymph node enlargement - left chest - benign bx - watching  Other ill-defined conditions(799.89)   
 wheezes  Psychiatric disorder   
 depression  PUD (peptic ulcer disease)   
 hx of  Thromboembolus ([facility] [address]  Unspecified adverse effect of anesthesia   
 passed out during EGD per pt - stopped breathing per pt per MD  
 
Past Surgical History:  
Procedure Laterality Date  HX APPENDECTOMY  HX CHOLECYSTECTOMY  HX GYN    
   HX HEENT Right   
 laser eye surgery to stop bleeding in back of eye - multiple laser surgeries  HX KNEE ARTHROSCOPY    
 left knee; cartilage repair  HX ORTHOPAEDIC    
 right hip replacement  HX ORTHOPAEDIC    
 lower back surgery  HX ORTHOPAEDIC    
 straighten toe - 2nd toe on right  HX ORTHOPAEDIC Bilateral   
 carpal tunnel release  HX PACEMAKER    
 not defibrillator  HX UROLOGICAL    
 implant in hip to control urine and then removed  HX UROLOGICAL Right kidney partial nephrectomy Prior Level of Function/Home Situation: Lives with , drives, but does not leave the house much. She stays up late at night watching TV and sleeps during the day. She is an independent ambulator, but uses cane or rolling walker as needed. She is independent with ADLs, and she cooks for herself/. Personal factors and/or comorbidities impacting plan of care:  
 
Home Situation Home Environment: Private residence # Steps to Enter: 1 Rails to Enter: No (there is a pillar she can hold to) One/Two Story Residence: One story Living Alone: No 
Support Systems: Spouse/Significant Other/Partner Patient Expects to be Discharged to[de-identified] Private residence Current DME Used/Available at Home: Linzie Lipoma, straight, Walker, rolling, Shower chair Tub or Shower Type: Tub/Shower combination EXAMINATION/PRESENTATION/DECISION MAKING:  
Critical Behavior: Neurologic State: Alert (tired, she hasn't slept well, came to unit at 4am) Orientation Level: Appropriate for age, Oriented X4 Cognition: Follows commands, Appropriate safety awareness Safety/Judgement: Awareness of environment, Fall prevention Hearing: Auditory Auditory Impairment: None Skin:  + telemetry, grossly intact Edema: none noted Range Of Motion: 
AROM: Within functional limits Strength:   
Strength: Within functional limits Tone & Sensation:  
Tone: Normal 
  
  
  
  
Sensation: Intact Coordination: 
Coordination: Generally decreased, functional (heel-to-shin, bilateral toe tap) Vision:  
  
Functional Mobility: 
Bed Mobility: 
  
Supine to Sit: Independent Sit to Supine: Independent Transfers: 
Sit to Stand: Independent Stand to Sit: Independent Bed to Chair: Contact guard assistance (for safety in the hospital environment) Balance:  
Sitting: Intact Standing: Intact Ambulation/Gait Training: 
Distance (ft): 300 Feet (ft) Assistive Device: Gait belt (occasional hand-hold assist, she states she does not need hand-hold) Ambulation - Level of Assistance: Contact guard assistance (for safety in the hospital environment) Gait Description (WDL): Exceptions to [facility] Gait Abnormalities: Decreased step clearance Speed/Francie: Pace decreased (<100 feet/min) Stairs: 
Number of Stairs Trained: 1 Stairs - Level of Assistance: Contact guard assistance (for safety in hospital environment) Rail Use: Right Therapeutic Exercises: AROM testing of both UE/LEs while sitting at edge of bed Functional Measure: 
 
Elder Mobility Scale 
 
17/20 EMS and G-code impairment scale: 
Percentage of impairment CH 
0% CI 
1-19% CJ 
20-39% CK 
40-59% CL 
60-79% CM 
80-99% CN 
100% EMS Score 0-20 20 17-19 13-16 9-12 5-8 1-4 0 Scores under 10  generally these patients are dependent in mobility maneuvers; require help with 
basic ADL, such as transfers, toileting and dressing. Scores between 10  13  generally these patients are borderline in terms of safe mobility and 
independence in ADL i.e. they require some help with some mobility maneuvers. Scores over 14  Generally these patients are able to perform mobility maneuvers alone and safely 
and are independent in basic ADL. G codes: In compliance with CMSs Claims Based Outcome Reporting, the following G-code set was chosen for this patient based on their primary functional limitation being treated: The outcome measure chosen to determine the severity of the functional limitation was the Elderly Mobility Scale with a score of 17/20 which was correlated with the impairment scale. ? Mobility - Walking and Moving Around:  
  - CURRENT STATUS: CI - 1%-19% impaired, limited or restricted  - GOAL STATUS: CI - 1%-19% impaired, limited or restricted  - D/C STATUS:  CI - 1%-19% impaired, limited or restricted Physical Therapy Evaluation Charge Determination History Examination Presentation Decision-Making MEDIUM  Complexity : 1-2 comorbidities / personal factors will impact the outcome/ POC  LOW Complexity : 1-2 Standardized tests and measures addressing body structure, function, activity limitation and / or participation in recreation  LOW Complexity : Stable, uncomplicated  LOW Complexity : FOTO score of  Based on the above components, the patient evaluation is determined to be of the following complexity level: LOW Pain: 
Pain Scale 1: Numeric (0 - 10) Pain Intensity 1: 0 Activity Tolerance:  
Very good. No shortness of breath with 300 foot gait trial. She states that this walk was much farther than she usually goes. Please refer to the flowsheet for vital signs taken during this treatment. After treatment: []   Patient left in no apparent distress sitting up in chair 
[x]   Patient left in no apparent distress in bed (due to not sleeping well) [x]   Call bell left within reach [x]   Nursing notified 
[]   Caregiver present [x]   Bed alarm activated COMMUNICATION/EDUCATION:  
Communication/Collaboration: 
[x]   Fall prevention education was provided and the patient/caregiver indicated understanding. [x]   Patient/family have participated as able and agree with findings and recommendations. []   Patient is unable to participate in plan of care at this time. Findings and recommendations were discussed with: Occupational Therapist and Registered Nurse Thank you for this referral. 
Skye Khan, PT Time Calculation: 24 mins

## 2018-09-29 NOTE — CONSULTS
Vascular Surgery  Consult Note    Impression:  Clinical scenario highly c/w Temporal Arteritis  Given severe visual loss in Rt eye, patient will be treated even if Bx negative  Patient and family given option of Bx but declined due to above    Plan:  Would recommend medical management of TA  Can reconsider TA Bx if Sx's recur after weaning immunosupression  Reconsult RENETTA Antonio MD

## 2018-09-29 NOTE — PROGRESS NOTES
Patient is a [de-identified] y.o. female presenting with other event. The history is provided by the patient and a relative (daughter). Other   Chronicity: new decreased vision loss for several days thinks it may be related to recent conjunctivits but those sx are better. Also some dizzienss at rest and unrelated to her head movement. Also some mild frontlal headache. The current episode started 2 days ago. The problem occurs constantly. The problem has not changed since onset. Associated symptoms include headaches. Pertinent negatives include no chest pain, no abdominal pain and no shortness of breath. Nothing aggravates the symptoms. Nothing relieves the symptoms. Treatments tried: polytrim Rx.         Past Medical History:   Diagnosis Date    Adverse effect of anesthesia     passed out during EGD/stopped breathing    Arrhythmia     has pacemaker for low HR    Arthritis     Asthma     Cancer ([address]     right kidney - surgery    Chronic pain     right side    Deep vein thrombosis (DVT) during current hospitalization ([address]     history of DVT was on coumadin in the past    Diabetes ([address]     diet controlled    GERD (gastroesophageal reflux disease)     H/O acute pancreatitis     Hiatal hernia     Hypertension     MARLEN (obstructive sleep apnea)     does not use CPAP/pt states she has not used since a pacemaker inserted    Other ill-defined conditions(799.89)     lymph node enlargement - left chest - benign bx - watching    Other ill-defined conditions(799.89)     wheezes    Psychiatric disorder     depression    PUD (peptic ulcer disease)     hx of    Thromboembolus ([address]     Unspecified adverse effect of anesthesia     passed out during EGD per pt - stopped breathing per pt per MD        Past Surgical History:   Procedure Laterality Date    HX APPENDECTOMY      HX CHOLECYSTECTOMY      HX GYN          HX HEENT Right     laser eye surgery to stop bleeding in back of eye - multiple laser surgeries    HX KNEE ARTHROSCOPY      left knee; cartilage repair    HX ORTHOPAEDIC      right hip replacement    HX ORTHOPAEDIC      lower back surgery    HX ORTHOPAEDIC      straighten toe - 2nd toe on right    HX ORTHOPAEDIC Bilateral     carpal tunnel release    HX PACEMAKER      not defibrillator    HX UROLOGICAL      implant in hip to control urine and then removed    HX UROLOGICAL      Right kidney partial nephrectomy         Family History   Problem Relation Age of Onset    Stroke Mother      brain aneurysm    Hypertension Father     Heart Disease Father     Cancer Sister      breast    Cancer Brother      lung    Cancer Sister      breast    SLE Other      half siblings (5+) - lupus    Hypertension Daughter     Diabetes Daughter         Social History     Social History    Marital status:      Spouse name: N/A    Number of children: N/A    Years of education: N/A     Occupational History    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 Negative for fever. HENT: Positive for congestion (but seems to be getting some better). Negative for ear pain and facial swelling. Eyes: Positive for visual disturbance. Negative for photophobia, pain, discharge, redness and itching. Respiratory: Negative for chest tightness and shortness of breath. Cardiovascular: Negative. Negative for chest pain. Gastrointestinal: Negative for abdominal pain. Musculoskeletal: Negative. Neurological: Positive for dizziness and headaches.  Negative for speech difficulty, weakness and numbness. Vitals:    09/28/18 1944   BP: 134/87   Pulse: 71   Resp: 16   Temp: 97.7 °F (36.5 °C)   SpO2: 97%   Weight: 189 lb (85.7 kg)   Height: 5' 4\" (1.626 m)       Physical Exam   Constitutional: She is oriented to person, place, and time. She appears well-developed and well-nourished. HENT:   Head: Normocephalic. Nose: Nose normal.   Mouth/Throat: Oropharynx is clear and moist. No oropharyngeal exudate. Mild nasal congestion, No sinus pain, BL small amount of fluid behind the TM's without erythema     Eyes: Conjunctivae and EOM are normal. Pupils are equal, round, and reactive to light. Right eye exhibits no discharge. Left eye exhibits no discharge. No scleral icterus. No nystagmus appreciated, No visual field loss but visual acuity with 20/100 in the rt eye    Neck: Normal range of motion. Neck supple. No tracheal deviation present. No thyromegaly present. No bruits   Cardiovascular: Normal rate, regular rhythm and normal heart sounds. No murmur heard. Pulmonary/Chest: Effort normal and breath sounds normal. No respiratory distress. She has no wheezes. She has no rales. Abdominal: There is no tenderness. Lymphadenopathy:     She has no cervical adenopathy. Neurological: She is alert and oriented to person, place, and time. She has normal reflexes. She displays normal reflexes. No cranial nerve deficit. Coordination normal.   Slow steady gait with mild assistance    Psychiatric: She has a normal mood and affect. Her behavior is normal.   Nursing note and vitals reviewed.       MDM    Procedures                       Case called to ED [facility] and discussed with Dr Buck Linn at [id number] of pt coming to ED by POV and that sx have been present for 2-3 days

## 2018-09-29 NOTE — CONSULTS
NEUROLOGY NOTE     Chief Complaint   Patient presents with    Eye Problem     Diagnosed with pink eye Sunday. Since then, has lost sight in right eye. Referred by eye doctor today    Headache    Dizziness       Reason for Consult  I have been asked by Bernice Palmer MD to see the patient in neurological consultation to render advice and opinion regarding temporal arteritis    HPI  Chas Waters is a [de-identified] y.o. female who presents to the hospital because of right vision loss. Pt states that she has been having right temporal headaches for the last 2 wks and noticed painless vision loss around a wk ago, which was sudden in onset. [facility] and was diagnosed with conjunctivitis with no improvement. Symptoms did not get better and hence pt was sent to ER. Pt's ESR and CRP are elevated and cannot get MRI brain. Pt continues to have right temporal pain.       ROS  A ten system review of constitutional, cardiovascular, respiratory, musculoskeletal, endocrine, skin, SHEENT, genitourinary, psychiatric and neurologic systems was obtained and is unremarkable except as stated in HPI     PMH  Past Medical History:   Diagnosis Date    Adverse effect of anesthesia     passed out during EGD/stopped breathing    Arrhythmia     has pacemaker for low HR    Arthritis     Asthma     Cancer ([address]     right kidney - surgery    Chronic pain     right side    Deep vein thrombosis (DVT) during current hospitalization ([address]     history of DVT was on coumadin in the past    Diabetes ([address]     diet controlled    GERD (gastroesophageal reflux disease)     H/O acute pancreatitis     Hiatal hernia     Hypertension     MARLEN (obstructive sleep apnea)     does not use CPAP/pt states she has not used since a pacemaker inserted    Other ill-defined conditions(799.89)     lymph node enlargement - left chest - benign bx - watching    Other ill-defined conditions(799.89)     wheezes    Psychiatric disorder depression    PUD (peptic ulcer disease)     hx of    Thromboembolus ([address]     Unspecified adverse effect of anesthesia     passed out during EGD per pt - stopped breathing per pt per MD       FH  Family History   Problem Relation Age of Onset    Stroke Mother      brain aneurysm    Hypertension Father     Heart Disease Father     Cancer Sister      breast    Cancer Brother      lung    Cancer Sister      breast    SLE Other      half siblings (5+) - lupus    Hypertension Daughter     Diabetes Daughter        SH  Social History     Social History    Marital status:      Spouse name: N/A    Number of children: N/A    Years of education: N/A     Occupational History    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 Allergic to all \"cillins\".     Codeine Other (comments)     Hallucinations, takes hydrocodone at home for pain    Contrast Dye [Iodine] Other (comments)     Not to take due to kidney function    Prednisone Other (comments)     \"makes my pancreas numbers go way up\"       PHYSICAL EXAMINATION:   Patient Vitals for the past 24 hrs:   Temp Pulse Resp BP SpO2   09/29/18 1106 97.5 °F (36.4 °C) 60 20 164/67 96 %   09/29/18 0834 - 62 - - -   09/29/18 0810 97.5 °F (36.4 °C) (!) 59 20 151/59 95 %   09/29/18 0427 97.9 °F (36.6 °C) 64 18 155/60 98 %   09/29/18 0200 - - - 144/68 94 %   09/29/18 0100 - - - 139/56 93 %   09/29/18 0049 - 60 - 133/67 -   09/29/18 0047 - 60 - 140/65 -   09/29/18 0046 - 65 - 130/56 -   09/28/18 2112 98.4 °F (36.9 °C) 68 18 181/75 98 %        General:   General appearance: Pt is in no acute distress   Distal pulses are preserved  Fundoscopic exam: attempted    Neurological Examination:   Mental Status:  AAO x3. Speech is fluent. Follows commands, has normal fund of knowledge, attention, short term recall, comprehension and insight. Cranial Nerves: Visual fields are full. PERRL, Visual acuity in the right eye is low. Extraocular movements are full. Facial sensation intact. Facial movement intact. Hearing intact to conversation. Palate elevates symmetrically. Shoulder shrug symmetric. Tongue midline. Motor: Strength is 5/5 in all 4 ext. Normal tone. No atrophy. Sensation: Normal to light touch    Reflexes: DTRs 2+ in UE and at knees. Absent in ankles. Plantar responses downgoing. Coordination/Cerebellar: Intact to finger-nose-finger     Gait: deferred    Skin: No significant bruising or lacerations. LAB DATA REVIEWED:    Recent Results (from the past 24 hour(s))   CBC WITH AUTOMATED DIFF    Collection Time: 09/28/18 10:12 PM   Result Value Ref Range    WBC 8.8 3.6 - 11.0 K/uL    RBC 3.84 3.80 - 5.20 M/uL    HGB 10.3 (L) 11.5 - 16.0 g/dL    HCT 34.0 (L) 35.0 - 47.0 %    MCV 88.5 80.0 - 99.0 FL    MCH 26.8 26.0 - 34.0 PG    MCHC 30.3 30.0 - 36.5 g/dL    RDW 14.8 (H) 11.5 - 14.5 %    PLATELET 212 (L) 240 - 400 K/uL    MPV 10.1 8.9 - 12.9 FL    NRBC 0.0 0  WBC    ABSOLUTE NRBC 0.00 0.00 - 0.01 K/uL    NEUTROPHILS 63 32 - 75 %    LYMPHOCYTES 20 12 - 49 %    MONOCYTES 13 5 - 13 %    EOSINOPHILS 2 0 - 7 %    BASOPHILS 0 0 - 1 %    IMMATURE GRANULOCYTES 1 (H) 0.0 - 0.5 %    ABS. NEUTROPHILS 5.6 1.8 - 8.0 K/UL    ABS. LYMPHOCYTES 1.8 0.8 - 3.5 K/UL    ABS. MONOCYTES 1.2 (H) 0.0 - 1.0 K/UL    ABS. EOSINOPHILS 0.2 0.0 - 0.4 K/UL    ABS. BASOPHILS 0.0 0.0 - 0.1 K/UL    ABS. IMM.  GRANS. 0.0 0.00 - 0.04 K/UL    DF AUTOMATED     SED RATE (ESR)    Collection Time: 09/28/18 10:12 PM   Result Value Ref Range    Sed rate, automated 84 (H) 0 - 30 mm/hr   METABOLIC PANEL, COMPREHENSIVE    Collection Time: 09/28/18 10:12 PM   Result Value Ref Range    Sodium 139 136 - 145 mmol/L    Potassium 4.1 3.5 - 5.1 mmol/L    Chloride 107 97 - 108 mmol/L    CO2 25 21 - 32 mmol/L    Anion gap 7 5 - 15 mmol/L    Glucose 160 (H) 65 - 100 mg/dL    BUN 33 (H) 6 - 20 MG/DL    Creatinine 1.78 (H) 0.55 - 1.02 MG/DL    BUN/Creatinine ratio 19 12 - 20      GFR est AA 33 (L) >60 ml/min/1.73m2    GFR est non-AA 27 (L) >60 ml/min/1.73m2    Calcium 10.2 (H) 8.5 - 10.1 MG/DL    Bilirubin, total 0.3 0.2 - 1.0 MG/DL    ALT (SGPT) 23 12 - 78 U/L    AST (SGOT) 26 15 - 37 U/L    Alk.  phosphatase 90 45 - 117 U/L    Protein, total 7.5 6.4 - 8.2 g/dL    Albumin 3.4 (L) 3.5 - 5.0 g/dL    Globulin 4.1 (H) 2.0 - 4.0 g/dL    A-G Ratio 0.8 (L) 1.1 - 2.2     CRP, HIGH SENSITIVITY    Collection Time: 09/28/18 10:12 PM   Result Value Ref Range    CRP, High sensitivity >9.5 mg/L   EKG, 12 LEAD, INITIAL    Collection Time: 09/29/18 12:07 AM   Result Value Ref Range    Ventricular Rate 60 BPM    Atrial Rate 60 BPM    P-R Interval 204 ms    QRS Duration 92 ms    Q-T Interval 426 ms    QTC Calculation (Bezet) 426 ms    Calculated P Axis 23 degrees    Calculated R Axis -17 degrees    Calculated T Axis 128 degrees    Diagnosis       Atrial-paced rhythm  Voltage criteria for left ventricular hypertrophy  Nonspecific T wave abnormality  When compared with ECG of 15-FEB-2018 13:34,  No significant change was found     C REACTIVE PROTEIN, QT    Collection Time: 09/29/18  5:28 AM   Result Value Ref Range    C-Reactive protein 3.71 (H) 0.00 - 0.60 mg/dL   GLUCOSE, POC    Collection Time: 09/29/18  6:44 AM   Result Value Ref Range    Glucose (POC) 171 (H) 65 - 100 mg/dL    Performed by Alfredito Ely (PCT)    GLUCOSE, POC    Collection Time: 09/29/18  7:45 AM   Result Value Ref Range    Glucose (POC) 179 (H) 65 - 100 mg/dL    Performed by Ralf Montano    GLUCOSE, POC    Collection Time: 09/29/18 11:31 AM   Result Value Ref Range    Glucose (POC) 298 (H) 65 - 100 mg/dL    Performed by Mimi Travis         Imaging review:  CT Head  Normal    HOME MEDS  Prior to Admission Medications   Prescriptions Last Dose Informant Patient Reported? Taking? FERROUS SULFATE PO   Yes No   Sig: Take 325 mg by mouth daily. LACTOBACILLUS ACIDOPHILUS (PROBIOTIC PO)   Yes No   Sig: Take 1 Cap by mouth daily. PROAIR HFA 90 mcg/actuation inhaler   Yes No   Sig: Take 2 Puffs by inhalation four (4) times daily as needed. allopurinol (ZYLOPRIM) 300 mg tablet   Yes No   Sig: Take 300 mg by mouth daily. amLODIPine (NORVASC) 2.5 mg tablet   Yes No   Sig: Take 2.5 mg by mouth daily. b complex vitamins tablet   Yes No   Sig: Take 1 Tab by mouth daily. betamethasone valerate (VALISONE) 0.1 % topical cream   No No   Sig: Apply  to affected area two (2) times a day. donepezil (ARICEPT) 5 mg tablet   Yes No   Sig: Take 5 mg by mouth nightly. famotidine (PEPCID) 20 mg tablet   No No   Sig: Take 1 Tab by mouth nightly. Indications: ZOLLINGER-ROBERSON SYNDROME   furosemide (LASIX) 40 mg tablet   Yes No   Sig: Take 40 mg by mouth daily as needed. gabapentin (NEURONTIN) 100 mg capsule   Yes No   Sig: Take 200 mg by mouth two (2) times daily as needed. hydrOXYzine pamoate (VISTARIL) 25 mg capsule   No No   Sig: Take 1 Cap by mouth nightly as needed for Itching. ipratropium (ATROVENT) 0.03 % nasal spray   No No   Si Sprays by Both Nostrils route every twelve (12) hours. losartan (COZAAR) 100 mg tablet   Yes No   Sig: Take 100 mg by mouth nightly. metoprolol succinate (TOPROL-XL) 50 mg XL tablet   No No   Sig: Take 1 Tab by mouth daily. montelukast (SINGULAIR) 10 mg tablet   Yes No   Sig: Take 10 mg by mouth nightly. pantoprazole (PROTONIX) 40 mg tablet   Yes No   Sig: Take 40 mg by mouth daily. pravastatin (PRAVACHOL) 20 mg tablet   No No   Sig: Take 1 Tab by mouth nightly. sertraline (ZOLOFT) 100 mg tablet   Yes No   Sig: Take 100 mg by mouth nightly. trimethoprim-polymyxin b (POLYTRIM) ophthalmic solution   No No   Sig: Administer 1 Drop to right eye five (5) times daily for 7 days. Facility-Administered Medications: None       CURRENT MEDS  Current Facility-Administered Medications   Medication Dose Route Frequency    amLODIPine (NORVASC) tablet 2.5 mg  2.5 mg Oral DAILY    B complex-vitaminC-folic acid (NEPHROCAP) cap  1 Cap Oral DAILY    donepezil (ARICEPT) tablet 5 mg  5 mg Oral QHS    famotidine (PEPCID) tablet 20 mg  20 mg Oral QPM    ferrous sulfate tablet 325 mg  325 mg Oral DAILY    ipratropium (ATROVENT) 0.03 % nasal spray 2 Spray  2 Spray Both Nostrils Q12H    losartan (COZAAR) tablet 100 mg  100 mg Oral QHS    metoprolol succinate (TOPROL-XL) XL tablet 50 mg  50 mg Oral DAILY    montelukast (SINGULAIR) tablet 10 mg  10 mg Oral QHS    pantoprazole (PROTONIX) tablet 40 mg  40 mg Oral DAILY    pravastatin (PRAVACHOL) tablet 20 mg  20 mg Oral QHS    sertraline (ZOLOFT) tablet 100 mg  100 mg Oral QHS    sodium chloride (NS) flush 5-10 mL  5-10 mL IntraVENous Q8H    insulin lispro (HUMALOG) injection   SubCUTAneous AC&HS    methylPREDNISolone ((Solu-MEDROL) 1,000 mg in 0.9% sodium chloride (MBP/ADV) 100 mL  1 g IntraVENous Q24H       IMPRESSION:  Rob Carrasco is a [de-identified] y.o. female who presents with acute painless right vision loss with elevated ESR/CRP along with right temporal pain. 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 RECOMMENDATIONS:  1. Right temporal art biopsy  2. Solumedrol 1 g IV daily for 3 days and then prednisone 60 mg daily    Thank you very much for this consultation.      Flori Medellin MD  Neurologist

## 2018-09-29 NOTE — PATIENT INSTRUCTIONS
It is important that you are seen in the Emergency Department tonight to evaluate the dizziness, headache that does not change with head movement and the coinciding vision loss as this could indicate stroke

## 2018-09-29 NOTE — PROGRESS NOTES
Hospitalist Progress Note Robbin Meckel, MD 
 
 
NAME:  Lawrence Schwarz :  1935 MRN:  [id number] PCP:   Quin Street MD 
Date of Service:  2018 Assessment & Plan:  
 
Monocular Vision Loss: Right Eye 
Possible Temporal Arteritis; other differential diagnosis considerations would also be stroke and Multiple Sclerosis; ESR only 84 in this [de-identified] yo woman,  
-Continue high dose IV solumedrol 
-Patient can not have MRI because of PPM 
-Patient can not have CTA Head and Neck because of CKD 
-Neuro on the case 
-Vascular consulted for biopsy SSS s/p PPM 
Hypertension 
-stable 
-continue home BB, ARB and Amlodipine 
  
Gout without flare 
-stop allopurinol as a rare side effect of this medication is vasculitis and macular retinitis 
  
Dementia Depression 
-continue home Aricept and Zoloft 
  
PUD 
-continue home PPI and H2B  
  
DM 2 with CKD stage 3: 
- Diabetic management with accuchecks with ISS coverage. 
  
Patient is a [employer] Body mass index is 31.94 kg/(m^2).: 30.0 - 39.9 Obese Code status: Full Code DVT prophylaxis:  []Enoxaparin  []Warfarin  []Hep SQ  []No AC d/t risk of bleeding  [x]SCDs Care Plan discussed with: Patient/Family and Nurse Disposition: Home w/Family and TBD Reason for visit:  
Recheck vision loss Admission Summary:  
Lawrence Schwarz is a [de-identified] y.o.  female who presents with loss of vision in her right eye over the last 5 days. Patient reprots that she was started on eye drops for \"pink eye\" last . She reports that she has a \"headache with dizziness. \" She denies tenderness over her temporal arteries. Patient denies other neurologic symptoms. Eye exam in the ED was unremarkable. Patient states that she does not have a MRI compatible PPM and can not have IV contrast because of her kideny function. CT Head in the ED was without acute findings. She was started on Solumedrol 1 gram for concern of temporal arteritis. Interval history / Subjective:  
Patient seen and examined. No change in condition. Has headache right temporal region. Review of Systems:  
 
Symptom Y/N Comments   Symptom Y/N Comments Fever/Chills n     Chest Pain n    
Poor Appetite       Edema      
Cough       Abdominal Pain n    
Sputum       Joint Pain      
SOB/LORENZO n     Pruritis/Rash      
Nausea/vomit       Tolerating PT/OT      
Diarrhea      Tolerating Diet      
Constipation       Other  y  headache vison loss  
  
NOT obtained      due to Physical Examination:  
Last 24hrs VS reviewed since prior progress note. Most recent are: 
Patient Vitals for the past 12 hrs: 
 Temp Pulse Resp BP SpO2  
09/29/18 1106 97.5 °F (36.4 °C) 60 20 164/67 96 %  
09/29/18 0834 - 62 - - -  
09/29/18 0810 97.5 °F (36.4 °C) (!) 59 20 151/59 95 % 09/29/18 0427 97.9 °F (36.6 °C) 64 18 155/60 98 % No intake or output data in the 24 hours ending 09/29/18 1529 General appearance: alert, cooperative, NAD Head: Normocephalic, atraumatic Eyes: right vision loss Neck: supple, no tenderness Lungs: CTA with good BS and normal effort Heart: S1-S2, RRR, No MGR Abdomen: SNTBS(+), No HSM Extremities: extremities normal, atraumatic, no cyanosis, no edema Skin: Skin color, texture, turgor normal. No rashes or lesions Neurologic: Alert and oriented X 4 Psychiatric: Not anxious, cooperative, normal affect Data Review:  
Review and/or order of clinical lab test 
 
Ct Head Wo Cont Result Date: 9/28/2018 IMPRESSION: No acute intracranial abnormality. Echo Results  (Last 48 hours) None Labs:  
 
Recent Labs  
   09/28/18 2212 WBC  8.8 HGB  10.3* HCT  34.0*  
PLT  147* Recent Labs  
   09/28/18 2212 NA  139  
K  4.1 CL  107 CO2  25 BUN  33* CREA  1.78* GLU  160* CA  10.2* Recent Labs  
   09/28/18 2212 SGOT  26 ALT  23 AP  90 TBILI  0.3 TP  7.5 ALB  3.4*  
GLOB  4.1*  
 
 No results for input(s): INR, PTP, APTT in the last 72 hours. No lab exists for component: INREXT No results for input(s): PH, PCO2, PO2 in the last 72 hours. Lab Results Component Value Date/Time Triglyceride 87 02/24/2014 02:30 AM  
 
Lab Results Component Value Date/Time Glucose (POC) 298 (H) 09/29/2018 11:31 AM  
 Glucose (POC) 179 (H) 09/29/2018 07:45 AM  
 Glucose (POC) 171 (H) 09/29/2018 06:44 AM  
 Glucose (POC) 112 (H) 02/22/2018 11:41 AM  
 Glucose (POC) 105 (H) 02/22/2018 06:49 AM  
 
Lab Results Component Value Date/Time Color YELLOW/STRAW 06/11/2017 09:56 PM  
 Appearance CLEAR 06/11/2017 09:56 PM  
 Specific gravity 1.010 06/11/2017 09:56 PM  
 pH (UA) 5.0 06/11/2017 09:56 PM  
 Protein NEGATIVE  06/11/2017 09:56 PM  
 Glucose NEGATIVE  06/11/2017 09:56 PM  
 Ketone NEGATIVE  06/11/2017 09:56 PM  
 Bilirubin NEGATIVE  06/11/2017 09:56 PM  
 Urobilinogen 0.2 06/11/2017 09:56 PM  
 Nitrites NEGATIVE  06/11/2017 09:56 PM  
 Leukocyte Esterase SMALL (A) 06/11/2017 09:56 PM  
 Epithelial cells FEW 06/11/2017 09:56 PM  
 Bacteria 1+ (A) 06/11/2017 09:56 PM  
 WBC 10-20 06/11/2017 09:56 PM  
 RBC 0-5 06/11/2017 09:56 PM  
 
All Micro Results None Medications Reviewed:  
 
Current Facility-Administered Medications:  
  amLODIPine (NORVASC) tablet 2.5 mg, 2.5 mg, Oral, DAILY, Tobi Ashley MD, 2.5 mg at 09/29/18 1062   B complex-vitaminC-folic acid (NEPHROCAP) cap, 1 Cap, Oral, DAILY, Tobi Ashley MD, 1 Cap at 09/29/18 2820 
  donepezil (ARICEPT) tablet 5 mg, 5 mg, Oral, QHS, Tobi Ashley MD 
  famotidine (PEPCID) tablet 20 mg, 20 mg, Oral, QPM, Ramesh Lay MD 
  ferrous sulfate tablet 325 mg, 325 mg, Oral, DAILY, Tobi Ashley MD, 325 mg at 09/29/18 8559   furosemide (LASIX) tablet 40 mg, 40 mg, Oral, DAILY PRN, Tobi Ashley MD 
  gabapentin (NEURONTIN) capsule 200 mg, 200 mg, Oral, BID PRN, Tobi Ashley MD 
   hydrOXYzine HCl (ATARAX) tablet 25 mg, 25 mg, Oral, QHS PRN, Corby Yang MD 
  ipratropium (ATROVENT) 0.03 % nasal spray 2 Spray, 2 Spray, Both Nostrils, Q12H, Corby Yang MD, 2 Spray at 09/29/18 1106   losartan (COZAAR) tablet 100 mg, 100 mg, Oral, QHS, Corby Yang MD, 100 mg at 09/29/18 0523 
  metoprolol succinate (TOPROL-XL) XL tablet 50 mg, 50 mg, Oral, DAILY, Corby Yang MD, 50 mg at 09/29/18 0921 
  montelukast (SINGULAIR) tablet 10 mg, 10 mg, Oral, QHS, Corby Yang MD, 10 mg at 09/29/18 8882   pantoprazole (PROTONIX) tablet 40 mg, 40 mg, Oral, DAILY, Corby Yang MD, 40 mg at 09/29/18 3942   pravastatin (PRAVACHOL) tablet 20 mg, 20 mg, Oral, QHS, Corby Yang MD 
  albuterol (PROVENTIL HFA, VENTOLIN HFA, PROAIR HFA) inhaler 2 Puff, 2 Puff, Inhalation, QID PRN, Corby Yang MD 
  sertraline (ZOLOFT) tablet 100 mg, 100 mg, Oral, QHS, Corby Yang MD 
  sodium chloride (NS) flush 5-10 mL, 5-10 mL, IntraVENous, Q8H, Corby Yang MD, 10 mL at 09/29/18 1511 
  sodium chloride (NS) flush 5-10 mL, 5-10 mL, IntraVENous, PRN, Corby Yang MD 
  acetaminophen (TYLENOL) tablet 650 mg, 650 mg, Oral, Q4H PRN, Corby Yang MD 
  ondansetron (ZOFRAN) injection 4 mg, 4 mg, IntraVENous, Q4H PRN, Corby Yang MD 
  insulin lispro (HUMALOG) injection, , SubCUTAneous, AC&HS, Corby Yang MD, 5 Units at 09/29/18 1207 
  glucose chewable tablet 16 g, 4 Tab, Oral, PRN, Corby Yang MD 
  dextrose (D50W) injection syrg 12.5-25 g, 12.5-25 g, IntraVENous, PRNCorby MD 
  glucagon (GLUCAGEN) injection 1 mg, 1 mg, IntraMUSCular, PRNCorby MD 
  methylPREDNISolone ((Solu-MEDROL) 1,000 mg in 0.9% sodium chloride (MBP/ADV) 100 mL, 1 g, IntraVENous, Q24H, Corby Yang MD 
 
______________________________________________________________________ EXPECTED LENGTH OF STAY: - - - 
ACTUAL LENGTH OF STAY:          0 
 
            
 Faizan Sanchez MD

## 2018-09-29 NOTE — PROGRESS NOTES
Occupational Therapy Note Orders received, chart reviewed, attempted eval X 2 this date. On approach, pt supine in bed. MD arrived to discuss care and family with numerous questions. Offered to return later for OT eval.  Upon return, pt with another MD in room. Noted pt cleared by PT earlier with minimal mobility deficits. Pt also noted to have - CT, possible temporal arteritis for decreased vision. Will hold OT eval and f/u Monday to address any functional deficits. Thank you.  
LAURA Santiago/L

## 2018-09-29 NOTE — H&P
Hospitalist Admission Note NAME: Mary eLmon :  1935 MRN:  [id number] Date/Time:  2018 3:12 AM 
 
Patient PCP: Allen Le MD 
______________________________________________________________________ Given the patient's current clinical presentation, I have a high level of concern for decompensation if discharged from the emergency department. Complex decision making was performed, which includes reviewing the patient's available past medical records, laboratory results, and x-ray films. My assessment of this patient's clinical condition and my plan of care is as follows. Assessment / Plan: 
Monocular Vision Loss: Right Eye 
Possible Temporal Arteritis; other differential diagnosis considerations would also be stroke and Multiple Sclerosis; ESR only 86 in this [de-identified] yo woman,  
-admit to inpatient, telemetry 
-continue IV solumedrol 
-Patient can not have MRI because of PPM 
-Patient can not have CTA Head and Neck because of CKD 
-Consult Neurology 
-Consult Vascular Surgery 
-PT/OT ordered 
-check CRP 
 
SSS s/p PPM 
Hypertension 
-continue home BB, ARB and Amlodipine CKD stage III: 
-monitor Gout without flare 
-stop allopurinol as a rare side effect of this medication is vasculitis and macular retinitis Dementia Depression 
-continue home Aricept and Zoloft PUD 
-continue home PPI and H2B Hyperglycemia: 
-SSI for now with Solumedrol, may need long acting insulin Patient is a Jehovah Witness Code Status: Full Surrogate Decision Maker: Daughter DVT Prophylaxis: SCDs GI Prophylaxis: home meds as above Baseline:   
  
Subjective: CHIEF COMPLAINT: loss of vision, right eye HISTORY OF PRESENT ILLNESS:    
Mary Lemon is a [de-identified] y.o.  female who presents with loss of vision in her right eye over the last 5 days. Patient reprots that she was started on eye drops for \"pink eye\" last .  She reports that she has a \"headache with dizziness. \" She denies tenderness over her temporal arteries. Patient denies other neurologic symptoms. Eye exam in the ED was unremarkable. Patient states that she does not have a MRI compatible PPM and can not have IV contrast because of her kideny function. CT Head in the ED was without acute findings. She was started on Solumedrol 1 gram for concern of temporal arteritis. We were asked to admit for work up and evaluation of the above problems. Past Medical History:  
Diagnosis Date  Adverse effect of anesthesia   
 passed out during EGD/stopped breathing  Arrhythmia   
 has pacemaker for low HR  
 Arthritis  Asthma  Cancer ([address] right kidney - surgery  Chronic pain   
 right side  Deep vein thrombosis (DVT) during current hospitalization ([address]   
 history of DVT was on coumadin in the past  
 Diabetes ([address] diet controlled  GERD (gastroesophageal reflux disease)  H/O acute pancreatitis  Hiatal hernia  Hypertension  MARLEN (obstructive sleep apnea) does not use CPAP/pt states she has not used since a pacemaker inserted  Other ill-defined conditions(799.89)   
 lymph node enlargement - left chest - benign bx - watching  Other ill-defined conditions(799.89)   
 wheezes  Psychiatric disorder   
 depression  PUD (peptic ulcer disease)   
 hx of  Thromboembolus ([address]  Unspecified adverse effect of anesthesia   
 passed out during EGD per pt - stopped breathing per pt per MD  
  
 
Past Surgical History:  
Procedure Laterality Date  HX APPENDECTOMY  HX CHOLECYSTECTOMY  HX GYN    
   HX HEENT Right   
 laser eye surgery to stop bleeding in back of eye - multiple laser surgeries  HX KNEE ARTHROSCOPY    
 left knee; cartilage repair  HX ORTHOPAEDIC    
 right hip replacement  HX ORTHOPAEDIC    
 lower back surgery  HX ORTHOPAEDIC    
 straighten toe - 2nd toe on right  HX ORTHOPAEDIC Bilateral   
 carpal tunnel release  HX PACEMAKER    
 not defibrillator  HX UROLOGICAL    
 implant in hip to control urine and then removed  HX UROLOGICAL Right kidney partial nephrectomy Social History Substance Use Topics  Smoking status: Former Smoker Packs/day: 0.25 Years: 20.00 Quit date: 8/13/1971  Smokeless tobacco: Never Used  Alcohol use No  
  
 
Family History Problem Relation Age of Onset  Stroke Mother   
  brain aneurysm  Hypertension Father  Heart Disease Father  Cancer Sister   
  breast  
 Cancer Brother   
  lung  Cancer Sister   
  breast  
 SLE Other   
  half siblings (5+) - lupus  Hypertension Daughter  Diabetes Daughter Allergies Allergen Reactions  Pcn [Penicillins] Rash But can take cephalosporins. Allergic to all \"cillins\".  Codeine Other (comments) Hallucinations, takes hydrocodone at home for pain  Contrast Dye [Iodine] Other (comments) Not to take due to kidney function  Prednisone Other (comments) \"makes my pancreas numbers go way up\" Prior to Admission medications Medication Sig Start Date End Date Taking? Authorizing Provider  
trimethoprim-polymyxin b (POLYTRIM) ophthalmic solution Administer 1 Drop to right eye five (5) times daily for 7 days. 9/23/18 9/30/18  Simba Miranda NP  
ipratropium (ATROVENT) 0.03 % nasal spray 2 Sprays by Both Nostrils route every twelve (12) hours. 9/23/18   Simba Miranda NP  
hydrOXYzine pamoate (VISTARIL) 25 mg capsule Take 1 Cap by mouth nightly as needed for Itching. 8/27/18   Keri Martinez MD  
betamethasone valerate (VALISONE) 0.1 % topical cream Apply  to affected area two (2) times a day. 8/27/18   Keri Martinez MD  
famotidine (PEPCID) 20 mg tablet Take 1 Tab by mouth nightly.  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  
 pantoprazole (PROTONIX) 40 mg tablet Take 40 mg by mouth daily. Historical Provider  
b complex vitamins tablet Take 1 Tab by mouth daily. Historical Provider LACTOBACILLUS ACIDOPHILUS (PROBIOTIC PO) Take 1 Cap by mouth daily. Historical Provider FERROUS SULFATE PO Take 325 mg by mouth daily. Historical Provider  
amLODIPine (NORVASC) 2.5 mg tablet Take 2.5 mg by mouth daily. Historical Provider  
gabapentin (NEURONTIN) 100 mg capsule Take 200 mg by mouth two (2) times daily as needed. Historical Provider  
metoprolol succinate (TOPROL-XL) 50 mg XL tablet Take 1 Tab by mouth daily. 10/23/17   Debi Maciel MD  
pravastatin (PRAVACHOL) 20 mg tablet Take 1 Tab by mouth nightly. 10/23/17   Chepe Livingston MD  
allopurinol (ZYLOPRIM) 300 mg tablet Take 300 mg by mouth daily. 10/25/16   Historical Provider  
donepezil (ARICEPT) 5 mg tablet Take 5 mg by mouth nightly. 12/12/16   Historical Provider PROAIR HFA 90 mcg/actuation inhaler Take 2 Puffs by inhalation four (4) times daily as needed. 10/4/16   Historical Provider  
furosemide (LASIX) 40 mg tablet Take 40 mg by mouth daily as needed. Historical Provider  
montelukast (SINGULAIR) 10 mg tablet Take 10 mg by mouth nightly. Historical Provider  
sertraline (ZOLOFT) 100 mg tablet Take 100 mg by mouth nightly. Historical Provider  
losartan (COZAAR) 100 mg tablet Take 100 mg by mouth nightly. Historical Provider REVIEW OF SYSTEMS:    
Total of 12 systems reviewed as follows:   
   POSITIVE= underlined text  Negative = text not underlined General:  fever, chills, sweats, generalized weakness, weight loss/gain,  
   loss of appetite Eyes:    blurred vision, eye pain, loss of vision, double vision ENT:    rhinorrhea, pharyngitis Respiratory:   cough, sputum production, SOB, LORENZO, wheezing, pleuritic pain  
Cardiology:   chest pain, palpitations, orthopnea, PND, edema, syncope Gastrointestinal:  abdominal pain (\"IBS\") , N/V, diarrhea (chronic), dysphagia, constipation, bleeding Genitourinary:  frequency, urgency, dysuria, hematuria, incontinence Muskuloskeletal :  arthralgia, myalgia, back pain Hematology:  easy bruising, nose or gum bleeding, lymphadenopathy Dermatological: rash, ulceration, pruritis, color change / jaundice Endocrine:   hot flashes or polydipsia Neurological:  headache, dizziness, confusion, focal weakness, paresthesia, Speech difficulties, memory loss, gait difficulty Psychological: Feelings of anxiety, depression, agitation Objective: VITALS:   
Visit Vitals  /67 (BP Patient Position: Standing)  Pulse 60  Temp 98.4 °F (36.9 °C)  Resp 18  Ht 5' 4\" (1.626 m)  Wt 84.4 kg (186 lb 1.1 oz)  SpO2 98%  BMI 31.94 kg/m2 PHYSICAL EXAM: 
 
General:    Alert, cooperative, no distress, appears stated age. HEENT: Atraumatic, anicteric sclerae, pink conjunctivae No oral ulcers, mucosa moist, o/p clear Neck:  Supple, symmetrical,  thyroid: non tender Lungs:   Clear to auscultation bilaterally. No Wheezing or Rhonchi. No rales. Chest wall:  No tenderness  No Accessory muscle use. Heart:   Regular  rhythm,  No  murmur   No edema Abdomen:   Soft, non-tender. Not distended. Bowel sounds normal 
Extremities: No cyanosis. No clubbing,   
  Skin turgor normal, Capillary refill normal 
Skin:     Not pale. Not Jaundiced  No rashes Psych:  Good insight. Not depressed. Not anxious or agitated. Neurologic: EOMs intact. No facial asymmetry. No aphasia or slurred speech. Symmetrical strength, Sensation grossly intact. Alert and oriented X 4. When looking at the clock with her left eye closed she can only see to \"1, 2 and 3\" 
 
_______________________________________________________________________ Care Plan discussed with: 
  Comments Patient x Family  x   
RN Care Manager Consultant: _______________________________________________________________________ Expected  Disposition:  
Home with Family x HH/PT/OT/RN   
SNF/LTC   
[facility]   
________________________________________________________________________ TOTAL TIME:  65 Minutes Critical Care Provided     Minutes non procedure based Comments  
 x Reviewed previous records  
>50% of visit spent in counseling and coordination of care x Discussion with patient and/or family and questions answered 
  
 
________________________________________________________________________ Signed: Bernie Otoole MD 
 
Procedures: see electronic medical records for all procedures/Xrays and details which were not copied into this note but were reviewed prior to creation of Plan. LAB DATA REVIEWED:   
Recent Results (from the past 24 hour(s)) CBC WITH AUTOMATED DIFF Collection Time: 09/28/18 10:12 PM  
Result Value Ref Range WBC 8.8 3.6 - 11.0 K/uL  
 RBC 3.84 3.80 - 5.20 M/uL  
 HGB 10.3 (L) 11.5 - 16.0 g/dL HCT 34.0 (L) 35.0 - 47.0 % MCV 88.5 80.0 - 99.0 FL  
 MCH 26.8 26.0 - 34.0 PG  
 MCHC 30.3 30.0 - 36.5 g/dL  
 RDW 14.8 (H) 11.5 - 14.5 % PLATELET 545 (L) 801 - 400 K/uL MPV 10.1 8.9 - 12.9 FL  
 NRBC 0.0 0  WBC ABSOLUTE NRBC 0.00 0.00 - 0.01 K/uL NEUTROPHILS 63 32 - 75 % LYMPHOCYTES 20 12 - 49 % MONOCYTES 13 5 - 13 % EOSINOPHILS 2 0 - 7 % BASOPHILS 0 0 - 1 % IMMATURE GRANULOCYTES 1 (H) 0.0 - 0.5 % ABS. NEUTROPHILS 5.6 1.8 - 8.0 K/UL  
 ABS. LYMPHOCYTES 1.8 0.8 - 3.5 K/UL  
 ABS. MONOCYTES 1.2 (H) 0.0 - 1.0 K/UL  
 ABS. EOSINOPHILS 0.2 0.0 - 0.4 K/UL  
 ABS. BASOPHILS 0.0 0.0 - 0.1 K/UL  
 ABS. IMM. GRANS. 0.0 0.00 - 0.04 K/UL  
 DF AUTOMATED    
SED RATE (ESR) Collection Time: 09/28/18 10:12 PM  
Result Value Ref Range Sed rate, automated 84 (H) 0 - 30 mm/hr METABOLIC PANEL, COMPREHENSIVE Collection Time: 09/28/18 10:12 PM  
Result Value Ref Range  Sodium 139 136 - 145 mmol/L  
 Potassium 4.1 3.5 - 5.1 mmol/L Chloride 107 97 - 108 mmol/L  
 CO2 25 21 - 32 mmol/L Anion gap 7 5 - 15 mmol/L Glucose 160 (H) 65 - 100 mg/dL BUN 33 (H) 6 - 20 MG/DL Creatinine 1.78 (H) 0.55 - 1.02 MG/DL  
 BUN/Creatinine ratio 19 12 - 20 GFR est AA 33 (L) >60 ml/min/1.73m2 GFR est non-AA 27 (L) >60 ml/min/1.73m2 Calcium 10.2 (H) 8.5 - 10.1 MG/DL Bilirubin, total 0.3 0.2 - 1.0 MG/DL  
 ALT (SGPT) 23 12 - 78 U/L  
 AST (SGOT) 26 15 - 37 U/L Alk. phosphatase 90 45 - 117 U/L Protein, total 7.5 6.4 - 8.2 g/dL Albumin 3.4 (L) 3.5 - 5.0 g/dL Globulin 4.1 (H) 2.0 - 4.0 g/dL A-G Ratio 0.8 (L) 1.1 - 2.2 EKG, 12 LEAD, INITIAL Collection Time: 09/29/18 12:07 AM  
Result Value Ref Range Ventricular Rate 60 BPM  
 Atrial Rate 60 BPM  
 P-R Interval 204 ms QRS Duration 92 ms Q-T Interval 426 ms  
 QTC Calculation (Bezet) 426 ms Calculated P Axis 23 degrees Calculated R Axis -17 degrees Calculated T Axis 128 degrees Diagnosis Atrial-paced rhythm Voltage criteria for left ventricular hypertrophy Nonspecific T wave abnormality When compared with ECG of 15-FEB-2018 13:34, No significant change was found

## 2018-09-29 NOTE — PROGRESS NOTES
* No surgery found * 
* No surgery found * Bedside shift change report given to Lou Douglass (oncoming nurse) by Taras Oneal (offgoing nurse). Report included the following information SBAR, Kardex, Intake/Output, MAR and Recent Results. Zone Phone:   [phone] Significant changes during shift:  None, consults came Patient Information Ronna Hemphill 
[de-identified] y.o. 
9/28/2018 10:59 PM by Surya Cueva MD. Ronna Hmephill was admitted from Home 
 
Problem List 
 
Patient Active Problem List  
 Diagnosis Date Noted  Vision loss, right eye 09/29/2018  Anemia 02/22/2018  GERD (gastroesophageal reflux disease) 02/22/2018  Stage 3 chronic kidney disease 02/22/2018  Asthma 02/22/2018  
 HTN (hypertension) 02/22/2018  Acute gastric ulcer 02/22/2018  GI bleed 02/15/2018  Localized primary osteoarthritis of left lower leg 06/27/2016  Primary localized osteoarthritis of left knee 06/27/2016  Sinus node dysfunction (HCC) 08/13/2013 Past Medical History:  
Diagnosis Date  Adverse effect of anesthesia   
 passed out during EGD/stopped breathing  Arrhythmia   
 has pacemaker for low HR  
 Arthritis  Asthma  Cancer ([address] right kidney - surgery  Chronic pain   
 right side  Deep vein thrombosis (DVT) during current hospitalization ([address]   
 history of DVT was on coumadin in the past  
 Diabetes ([address] diet controlled  GERD (gastroesophageal reflux disease)  H/O acute pancreatitis  Hiatal hernia  Hypertension  MARLEN (obstructive sleep apnea) does not use CPAP/pt states she has not used since a pacemaker inserted  Other ill-defined conditions(799.89)   
 lymph node enlargement - left chest - benign bx - watching  Other ill-defined conditions(799.89)   
 wheezes  Psychiatric disorder   
 depression  PUD (peptic ulcer disease)   
 hx of  Thromboembolus ([address]  Unspecified adverse effect of anesthesia passed out during EGD per pt - stopped breathing per pt per MD  
 
 
 
Core Measures: CVA: No No 
 
Activity Status: OOB to Chair No 
Ambulated this shift Yes Bed Rest No 
 
DVT prophylaxis: DVT prophylaxis Med- No 
DVT prophylaxis SCD or RASHEED- Yes Wounds: (If Applicable) Wounds- No 
 
Location-none Patient Safety: 
 
Falls Score Total Score: 3 Safety Level_______ Bed Alarm On? Yes Sitter? No 
 
Plan for upcoming shift: safety, neurochecks Discharge Plan: No  
 
Active Consults: 
IP CONSULT TO VASCULAR SURGERY 
IP CONSULT TO NEUROLOGY

## 2018-09-30 LAB
ALBUMIN SERPL-MCNC: 3.7 G/DL (ref 3.5–5)
ALBUMIN/GLOB SERPL: 1.1 {RATIO} (ref 1.1–2.2)
ALP SERPL-CCNC: 94 U/L (ref 45–117)
ALT SERPL-CCNC: 25 U/L (ref 12–78)
ANION GAP SERPL CALC-SCNC: 7 MMOL/L (ref 5–15)
AST SERPL-CCNC: 25 U/L (ref 15–37)
BASOPHILS # BLD: 0 K/UL (ref 0–0.1)
BASOPHILS NFR BLD: 0 % (ref 0–1)
BILIRUB SERPL-MCNC: 0.3 MG/DL (ref 0.2–1)
BUN SERPL-MCNC: 36 MG/DL (ref 6–20)
BUN/CREAT SERPL: 22 (ref 12–20)
CALCIUM SERPL-MCNC: 10 MG/DL (ref 8.5–10.1)
CHLORIDE SERPL-SCNC: 108 MMOL/L (ref 97–108)
CO2 SERPL-SCNC: 22 MMOL/L (ref 21–32)
CREAT SERPL-MCNC: 1.61 MG/DL (ref 0.55–1.02)
DIFFERENTIAL METHOD BLD: ABNORMAL
EOSINOPHIL # BLD: 0 K/UL (ref 0–0.4)
EOSINOPHIL NFR BLD: 0 % (ref 0–7)
ERYTHROCYTE [DISTWIDTH] IN BLOOD BY AUTOMATED COUNT: 14.6 % (ref 11.5–14.5)
GLOBULIN SER CALC-MCNC: 3.5 G/DL (ref 2–4)
GLUCOSE BLD STRIP.AUTO-MCNC: 166 MG/DL (ref 65–100)
GLUCOSE BLD STRIP.AUTO-MCNC: 209 MG/DL (ref 65–100)
GLUCOSE BLD STRIP.AUTO-MCNC: 218 MG/DL (ref 65–100)
GLUCOSE BLD STRIP.AUTO-MCNC: 271 MG/DL (ref 65–100)
GLUCOSE SERPL-MCNC: 212 MG/DL (ref 65–100)
HCT VFR BLD AUTO: 31.8 % (ref 35–47)
HGB BLD-MCNC: 10.2 G/DL (ref 11.5–16)
IMM GRANULOCYTES # BLD: 0.1 K/UL (ref 0–0.04)
IMM GRANULOCYTES NFR BLD AUTO: 1 % (ref 0–0.5)
LYMPHOCYTES # BLD: 1.6 K/UL (ref 0.8–3.5)
LYMPHOCYTES NFR BLD: 12 % (ref 12–49)
MCH RBC QN AUTO: 27.2 PG (ref 26–34)
MCHC RBC AUTO-ENTMCNC: 32.1 G/DL (ref 30–36.5)
MCV RBC AUTO: 84.8 FL (ref 80–99)
MONOCYTES # BLD: 0.6 K/UL (ref 0–1)
MONOCYTES NFR BLD: 5 % (ref 5–13)
NEUTS SEG # BLD: 10.9 K/UL (ref 1.8–8)
NEUTS SEG NFR BLD: 82 % (ref 32–75)
NRBC # BLD: 0 K/UL (ref 0–0.01)
NRBC BLD-RTO: 0 PER 100 WBC
PLATELET # BLD AUTO: 167 K/UL (ref 150–400)
PMV BLD AUTO: 12 FL (ref 8.9–12.9)
POTASSIUM SERPL-SCNC: 4.3 MMOL/L (ref 3.5–5.1)
PROT SERPL-MCNC: 7.2 G/DL (ref 6.4–8.2)
RBC # BLD AUTO: 3.75 M/UL (ref 3.8–5.2)
SERVICE CMNT-IMP: ABNORMAL
SODIUM SERPL-SCNC: 137 MMOL/L (ref 136–145)
WBC # BLD AUTO: 13.2 K/UL (ref 3.6–11)

## 2018-09-30 PROCEDURE — 74011250636 HC RX REV CODE- 250/636: Performed by: HOSPITALIST

## 2018-09-30 PROCEDURE — 74011636637 HC RX REV CODE- 636/637: Performed by: INTERNAL MEDICINE

## 2018-09-30 PROCEDURE — 65660000000 HC RM CCU STEPDOWN

## 2018-09-30 PROCEDURE — 36415 COLL VENOUS BLD VENIPUNCTURE: CPT | Performed by: INTERNAL MEDICINE

## 2018-09-30 PROCEDURE — 74011000258 HC RX REV CODE- 258: Performed by: INTERNAL MEDICINE

## 2018-09-30 PROCEDURE — 74011250636 HC RX REV CODE- 250/636: Performed by: INTERNAL MEDICINE

## 2018-09-30 PROCEDURE — 74011250637 HC RX REV CODE- 250/637: Performed by: INTERNAL MEDICINE

## 2018-09-30 PROCEDURE — 80053 COMPREHEN METABOLIC PANEL: CPT | Performed by: INTERNAL MEDICINE

## 2018-09-30 PROCEDURE — 74011000258 HC RX REV CODE- 258: Performed by: HOSPITALIST

## 2018-09-30 PROCEDURE — 82962 GLUCOSE BLOOD TEST: CPT

## 2018-09-30 PROCEDURE — 85025 COMPLETE CBC W/AUTO DIFF WBC: CPT | Performed by: INTERNAL MEDICINE

## 2018-09-30 PROCEDURE — 87040 BLOOD CULTURE FOR BACTERIA: CPT

## 2018-09-30 PROCEDURE — 97165 OT EVAL LOW COMPLEX 30 MIN: CPT

## 2018-09-30 PROCEDURE — 74011250637 HC RX REV CODE- 250/637: Performed by: HOSPITALIST

## 2018-09-30 PROCEDURE — 97535 SELF CARE MNGMENT TRAINING: CPT

## 2018-09-30 RX ORDER — HYDRALAZINE HYDROCHLORIDE 20 MG/ML
10 INJECTION INTRAMUSCULAR; INTRAVENOUS ONCE
Status: COMPLETED | OUTPATIENT
Start: 2018-09-30 | End: 2018-09-30

## 2018-09-30 RX ADMIN — METOPROLOL SUCCINATE 50 MG: 50 TABLET, EXTENDED RELEASE ORAL at 08:18

## 2018-09-30 RX ADMIN — LOSARTAN POTASSIUM 100 MG: 100 TABLET, FILM COATED ORAL at 22:04

## 2018-09-30 RX ADMIN — MONTELUKAST SODIUM 10 MG: 10 TABLET, FILM COATED ORAL at 22:03

## 2018-09-30 RX ADMIN — INSULIN LISPRO 5 UNITS: 100 INJECTION, SOLUTION INTRAVENOUS; SUBCUTANEOUS at 11:55

## 2018-09-30 RX ADMIN — NEPHROCAP 1 CAPSULE: 1 CAP ORAL at 08:18

## 2018-09-30 RX ADMIN — HYDRALAZINE HYDROCHLORIDE 25 MG: 25 TABLET, FILM COATED ORAL at 22:04

## 2018-09-30 RX ADMIN — SODIUM CHLORIDE 1000 MG: 900 INJECTION, SOLUTION INTRAVENOUS at 22:13

## 2018-09-30 RX ADMIN — INSULIN LISPRO 2 UNITS: 100 INJECTION, SOLUTION INTRAVENOUS; SUBCUTANEOUS at 22:12

## 2018-09-30 RX ADMIN — ACETAMINOPHEN 650 MG: 325 TABLET ORAL at 08:18

## 2018-09-30 RX ADMIN — FAMOTIDINE 20 MG: 20 TABLET ORAL at 17:33

## 2018-09-30 RX ADMIN — DONEPEZIL HYDROCHLORIDE 5 MG: 5 TABLET, FILM COATED ORAL at 22:03

## 2018-09-30 RX ADMIN — HYDRALAZINE HYDROCHLORIDE 25 MG: 25 TABLET, FILM COATED ORAL at 08:18

## 2018-09-30 RX ADMIN — CEFTRIAXONE 1 G: 1 INJECTION, POWDER, FOR SOLUTION INTRAMUSCULAR; INTRAVENOUS at 11:31

## 2018-09-30 RX ADMIN — IPRATROPIUM BROMIDE 2 SPRAY: 21 SPRAY, METERED NASAL at 08:18

## 2018-09-30 RX ADMIN — ACETAMINOPHEN 650 MG: 325 TABLET ORAL at 15:41

## 2018-09-30 RX ADMIN — PANTOPRAZOLE SODIUM 40 MG: 40 TABLET, DELAYED RELEASE ORAL at 08:18

## 2018-09-30 RX ADMIN — AMLODIPINE BESYLATE 2.5 MG: 2.5 TABLET ORAL at 08:19

## 2018-09-30 RX ADMIN — Medication 10 ML: at 05:35

## 2018-09-30 RX ADMIN — Medication 10 ML: at 22:07

## 2018-09-30 RX ADMIN — HYDRALAZINE HYDROCHLORIDE 10 MG: 20 INJECTION INTRAMUSCULAR; INTRAVENOUS at 03:34

## 2018-09-30 RX ADMIN — INSULIN LISPRO 3 UNITS: 100 INJECTION, SOLUTION INTRAVENOUS; SUBCUTANEOUS at 08:19

## 2018-09-30 RX ADMIN — Medication 10 ML: at 15:42

## 2018-09-30 RX ADMIN — PRAVASTATIN SODIUM 20 MG: 40 TABLET ORAL at 22:04

## 2018-09-30 RX ADMIN — INSULIN LISPRO 2 UNITS: 100 INJECTION, SOLUTION INTRAVENOUS; SUBCUTANEOUS at 17:33

## 2018-09-30 RX ADMIN — HYDRALAZINE HYDROCHLORIDE 25 MG: 25 TABLET, FILM COATED ORAL at 15:41

## 2018-09-30 RX ADMIN — SERTRALINE HYDROCHLORIDE 100 MG: 50 TABLET ORAL at 22:04

## 2018-09-30 RX ADMIN — FERROUS SULFATE TAB 325 MG (65 MG ELEMENTAL FE) 325 MG: 325 (65 FE) TAB at 08:18

## 2018-09-30 NOTE — PROGRESS NOTES
Hospitalist Progress Note Hortensia Cornelius MD 
 
 
NAME:  Tyree Casey :  1935 MRN:  [id number] PCP:   Braydon Koch MD 
Date of Service:  2018 Assessment & Plan:  
 
Monocular Vision Loss Right Eye: No change Possible Temporal Arteritis; other differential diagnosis considerations would also be stroke and Multiple Sclerosis; ESR only 84 in this [de-identified] yo woman,  
-Continue high dose IV solumedrol 
-Patient can not have MRI because of PPM 
-Patient can not have CTA Head and Neck because of CKD 
-Neuro on the case 
-Vascular consulted for biopsy but it will not be done for now. (see [facility] notes). Acute GNR UTI POA: 
-Continue rocephin IV 
-Culture growing GNR. .. Follow. SSS s/p PPM 
Hypertension 
-stable 
-continue home BB, ARB and Amlodipine 
  
Gout without flare 
-stopped allopurinol as a rare side effect of this medication is vasculitis and macular retinitis 
  
Dementia Depression 
-continue home Aricept and Zoloft 
  
PUD 
-continue home PPI and H2B  
  
DM 2 with CKD stage 3: 
- Diabetic management with accuchecks with ISS coverage. 
  
Patient is a Jehovah Witness Body mass index is 31.94 kg/(m^2).: 30.0 - 39.9 Obese Code status: Full Code DVT prophylaxis:  []Enoxaparin  []Warfarin  []Hep SQ  []No AC d/t risk of bleeding  [x]SCDs Care Plan discussed with: Patient/Family and Nurse Disposition: Home w/Family and TBD Reason for visit:  
Recheck vision loss / TA Admission Summary:  
Tyree Casey is a [de-identified] y.o.  female who presents with loss of vision in her right eye over the last 5 days. Patient reprots that she was started on eye drops for \"pink eye\" last . She reports that she has a \"headache with dizziness. \" She denies tenderness over her temporal arteries. Patient denies other neurologic symptoms. Eye exam in the ED was unremarkable.  Patient states that she does not have a MRI compatible PPM and can not have IV contrast because of her kideny function. CT Head in the ED was without acute findings. She was started on Solumedrol 1 gram for concern of temporal arteritis. Interval history / Subjective:  
9/30  No change in vision. No new complaints. GNR growing in [email or web address]  
 
9/29  Patient seen and examined. No change in condition. Has headache right temporal region. Review of Systems:  
 
Symptom Y/N Comments   Symptom Y/N Comments Fever/Chills n     Chest Pain n    
Poor Appetite       Edema      
Cough       Abdominal Pain n    
Sputum       Joint Pain      
SOB/LORENZO n     Pruritis/Rash      
Nausea/vomit       Tolerating PT/OT      
Diarrhea      Tolerating Diet      
Constipation       Other  y  headache vison loss  
  
NOT obtained      due to Physical Examination:  
Last 24hrs VS reviewed since prior progress note. Most recent are: 
Patient Vitals for the past 12 hrs: 
 Temp Pulse Resp BP SpO2  
09/30/18 1122 97.9 °F (36.6 °C) 60 18 142/58 96 % 09/30/18 0821 - 60 - - -  
09/30/18 0802 - - - 123/67 -  
09/30/18 0726 - - - 140/80 -  
09/30/18 0723 98 °F (36.7 °C) (!) 53 18 148/65 -  
09/30/18 0405 98 °F (36.7 °C) 60 18 138/60 95 % Intake/Output Summary (Last 24 hours) at 09/30/18 1245 Last data filed at 09/29/18 2303 Gross per 24 hour Intake              220 ml Output                0 ml Net              220 ml General appearance: alert, cooperative, NAD Head: mild right temporal tenderness but no swelling Eyes: right vision loss Neck: supple, no tenderness Lungs: CTA with good BS and normal effort Heart: S1-S2, RRR, No MGR Abdomen: SNTBS(+), No HSM Extremities: extremities normal, atraumatic, no cyanosis, no edema Neurologic: Alert and oriented Psychiatric: Not anxious, cooperative, normal affect Data Review:  
Review and/or order of clinical lab test 
 
No results found. Echo Results  (Last 48 hours) None Labs: Recent Labs  
   09/30/18 
 0000  09/28/18 
 2212 WBC  13.2*  8.8 HGB  10.2*  10.3* HCT  31.8*  34.0*  
PLT  167  147* Recent Labs  
   09/30/18 
 0000  09/28/18 
 2212 NA  137  139  
K  4.3  4.1 CL  108  107 CO2  22  25 BUN  36*  33* CREA  1.61*  1.78* GLU  212*  160* CA  10.0  10.2* Recent Labs  
   09/30/18 
 0000  09/28/18 
 2212 SGOT  25  26 ALT  25  23 AP  94  90 TBILI  0.3  0.3 TP  7.2  7.5 ALB  3.7  3.4*  
GLOB  3.5  4.1* No results for input(s): INR, PTP, APTT in the last 72 hours. No lab exists for component: INREXT, INREXT No results for input(s): PH, PCO2, PO2 in the last 72 hours. Lab Results Component Value Date/Time Triglyceride 87 02/24/2014 02:30 AM  
 
Lab Results Component Value Date/Time Glucose (POC) 271 (H) 09/30/2018 11:34 AM  
 Glucose (POC) 218 (H) 09/30/2018 06:31 AM  
 Glucose (POC) 279 (H) 09/29/2018 10:15 PM  
 Glucose (POC) 204 (H) 09/29/2018 04:22 PM  
 Glucose (POC) 298 (H) 09/29/2018 11:31 AM  
 
Lab Results Component Value Date/Time Color YELLOW/STRAW 09/29/2018 02:57 PM  
 Appearance CLOUDY (A) 09/29/2018 02:57 PM  
 Specific gravity 1.019 09/29/2018 02:57 PM  
 pH (UA) 5.5 09/29/2018 02:57 PM  
 Protein TRACE (A) 09/29/2018 02:57 PM  
 Glucose NEGATIVE  09/29/2018 02:57 PM  
 Ketone NEGATIVE  09/29/2018 02:57 PM  
 Bilirubin NEGATIVE  09/29/2018 02:57 PM  
 Urobilinogen 1.0 09/29/2018 02:57 PM  
 Nitrites NEGATIVE  09/29/2018 02:57 PM  
 Leukocyte Esterase MODERATE (A) 09/29/2018 02:57 PM  
 Epithelial cells FEW 09/29/2018 02:57 PM  
 Bacteria 4+ (A) 09/29/2018 02:57 PM  
 WBC 20-50 09/29/2018 02:57 PM  
 RBC 0-5 09/29/2018 02:57 PM  
 
All Micro Results Procedure Component Value Units Date/Time CULTURE, URINE [[id number]]  (Abnormal) Collected:  09/29/18 1457 Order Status:  Completed Specimen:  Urine Updated:  09/30/18 1119 Special Requests: --     
  NO SPECIAL REQUESTS Reflexed from [id number] [place] Count --     
  >100,000 COLONIES/mL Culture result: GRAM NEGATIVE RODS (A) CULTURE, BLOOD, PAIRED [[id number]] Collected:  09/30/18 1044 Order Status:  Completed Specimen:  Blood Updated:  09/30/18 1059 Medications Reviewed:  
 
Current Facility-Administered Medications:  
  cefTRIAXone (ROCEPHIN) 1 g in 0.9% sodium chloride (MBP/ADV) 50 mL, 1 g, IntraVENous, Q24H, Laura Jones MD, Last Rate: 100 mL/hr at 09/30/18 1131, 1 g at 09/30/18 1131 
  amLODIPine (NORVASC) tablet 2.5 mg, 2.5 mg, Oral, DAILY, Ying Thompson MD, 2.5 mg at 09/30/18 0436   B complex-vitaminC-folic acid (NEPHROCAP) cap, 1 Cap, Oral, DAILY, Ying Thompson MD, 1 Cap at 09/30/18 0818 
  donepezil (ARICEPT) tablet 5 mg, 5 mg, Oral, QHS, Ying Thompson MD, 5 mg at 09/29/18 2115   famotidine (PEPCID) tablet 20 mg, 20 mg, Oral, QPM, Ying Thompson MD, 20 mg at 09/29/18 1702   ferrous sulfate tablet 325 mg, 325 mg, Oral, DAILY, Ying Thompson MD, 325 mg at 09/30/18 [facility]   furosemide (LASIX) tablet 40 mg, 40 mg, Oral, DAILY PRN, Ying Thompson MD 
  gabapentin (NEURONTIN) capsule 200 mg, 200 mg, Oral, BID PRN, Ying Thompson MD 
  hydrOXYzine HCl (ATARAX) tablet 25 mg, 25 mg, Oral, QHS PRN, Ying Thompson MD 
  ipratropium (ATROVENT) 0.03 % nasal spray 2 Spray, 2 Spray, Both Nostrils, Q12H, Ying Thompson MD, 2 [place] at 09/30/18 7408   losartan (COZAAR) tablet 100 mg, 100 mg, Oral, QHS, Ying Thompson MD, 100 mg at 09/29/18 2116   metoprolol succinate (TOPROL-XL) XL tablet 50 mg, 50 mg, Oral, DAILY, Ying Thompson MD, 50 mg at 09/30/18 0818 
  montelukast (SINGULAIR) tablet 10 mg, 10 mg, Oral, QHS, Ying Thompson MD, 10 mg at 09/29/18 2144   pantoprazole (PROTONIX) tablet 40 mg, 40 mg, Oral, DAILY, Ying Thompson MD, 40 mg at 09/30/18 [facility]   pravastatin (PRAVACHOL) tablet 20 mg, 20 mg, Oral, QHS, Ying Thompson MD, 20 mg at 09/29/18 2144   albuterol (PROVENTIL HFA, VENTOLIN HFA, PROAIR HFA) inhaler 2 Puff, 2 Puff, Inhalation, QID PRN, Emilia Cardona MD 
  sertraline (ZOLOFT) tablet 100 mg, 100 mg, Oral, QHS, Emilia Cardona MD, 100 mg at 09/29/18 2115   sodium chloride (NS) flush 5-10 mL, 5-10 mL, IntraVENous, Q8H, Emilia Cardona MD, 10 mL at 09/30/18 0535   sodium chloride (NS) flush 5-10 mL, 5-10 mL, IntraVENous, PRN, Emilia Cardona MD 
  acetaminophen (TYLENOL) tablet 650 mg, 650 mg, Oral, Q4H PRN, Emilia Cardona MD, 650 mg at 09/30/18 4398   ondansetron (ZOFRAN) injection 4 mg, 4 mg, IntraVENous, Q4H PRN, Emilia Cardona MD 
  insulin lispro (HUMALOG) injection, , SubCUTAneous, AC&HS, Emilia Cardona MD, 5 Units at 09/30/18 1155 
  glucose chewable tablet 16 g, 4 Tab, Oral, PRN, Emilia Cardona MD 
  dextrose (D50W) injection syrg 12.5-25 g, 12.5-25 g, IntraVENous, PRN, Emilia Cardona MD 
  glucagon (GLUCAGEN) injection 1 mg, 1 mg, IntraMUSCular, PRN, Emilia Cardona MD 
  methylPREDNISolone ((Solu-MEDROL) 1,000 mg in 0.9% sodium chloride (MBP/ADV) 100 mL, 1 g, IntraVENous, Q24H, Emilia Cardona MD, Last Rate: 100 mL/hr at 09/29/18 2303, 1,000 mg at 09/29/18 2303   hydrALAZINE (APRESOLINE) tablet 25 mg, 25 mg, Oral, TID, Kamran Chan MD, 25 mg at 09/30/18 0818 
 
______________________________________________________________________ EXPECTED LENGTH OF STAY: - - - 
ACTUAL LENGTH OF STAY:          1 Kamran Chan MD

## 2018-09-30 NOTE — PROGRESS NOTES
Occupational Therapy EVALUATION/discharge Patient: Ilia Henderson (56 y.o. female) Date: 9/30/2018 Primary Diagnosis: Vision loss, right eye Precautions: Fall ASSESSMENT:  
Based on the objective data described below, the patient presents at baseline for ADL tasks besides R eye vision impairment and R sided headache. She lives at home with her  and indep with ADL tasks without AD. Denies falls. Recent dx from conjunctivitis and following starting eye drops she reports loss in R eye vision. Possible temporal arteritis causing decrase in vision. Completed LB dressing, toileting and grooming at sink with mod indep-supervision. Vision WDL for saccades and tracking. Mild impairment with R lower peripheral vision. Able to make out number of fingers with R eye with L eye occluded in all fields. Orthostatic BP not taken this session, denies dizziness. Denies changes in UE, BUE WDL for ROM, strength and coordination. Further skilled acute occupational therapy is not indicated at this time. Recommend supervision in hospital and initially with return home secondary to recent orthostatic hypotension with nursing. Discharge Recommendations: None---recommend discussion with MD regarding returning to driving Further Equipment Recommendations for Discharge: none for OT SUBJECTIVE:  
Patient stated I am ready to go home.  OBJECTIVE DATA SUMMARY:  
HISTORY:  
Past Medical History:  
Diagnosis Date  Adverse effect of anesthesia   
 passed out during EGD/stopped breathing  Arrhythmia   
 has pacemaker for low HR  
 Arthritis  Asthma  Cancer ([address] right kidney - surgery  Chronic pain   
 right side  Deep vein thrombosis (DVT) during current hospitalization ([facility] [address]   
 history of DVT was on coumadin in the past  
 Diabetes ([address] diet controlled  GERD (gastroesophageal reflux disease)  H/O acute pancreatitis  Hiatal hernia  Hypertension  MARLEN (obstructive sleep apnea) does not use CPAP/pt states she has not used since a pacemaker inserted  Other ill-defined conditions(799.89)   
 lymph node enlargement - left chest - benign bx - watching  Other ill-defined conditions(799.89)   
 wheezes  Psychiatric disorder   
 depression  PUD (peptic ulcer disease)   
 hx of  Thromboembolus ([facility] [address]  Unspecified adverse effect of anesthesia   
 passed out during EGD per pt - stopped breathing per pt per MD  
 
Past Surgical History:  
Procedure Laterality Date  HX APPENDECTOMY  HX CHOLECYSTECTOMY  HX GYN    
   HX HEENT Right   
 laser eye surgery to stop bleeding in back of eye - multiple laser surgeries  HX KNEE ARTHROSCOPY    
 left knee; cartilage repair  HX ORTHOPAEDIC    
 right hip replacement  HX ORTHOPAEDIC    
 lower back surgery  HX ORTHOPAEDIC    
 straighten toe - 2nd toe on right  HX ORTHOPAEDIC Bilateral   
 carpal tunnel release  HX PACEMAKER    
 not defibrillator  HX UROLOGICAL    
 implant in hip to control urine and then removed  HX UROLOGICAL Right kidney partial nephrectomy Prior Level of Function/Environment/Context: Home with . Supervision-mod indep with ADL tasks. Drives. Occupations in which the patient is/was successful, what are the barriers preventing that success:  
Performance Patterns (routines, roles, habits, and rituals):  
Personal Interests and/or values:  
Expanded or extensive additional review of patient history:  
 
Home Situation Home Environment: Private residence # Steps to Enter: 1 Rails to Enter: No (there is a pillar she can hold to) One/Two Story Residence: One story Living Alone: No 
Support Systems: Spouse/Significant Other/Partner Patient Expects to be Discharged to[de-identified] Private residence Current DME Used/Available at Home: [employer], straight, Walker, rolling, Shower chair Tub or Shower Type: Tub/Shower combination Hand dominance: Right EXAMINATION OF PERFORMANCE DEFICITS: 
Cognitive/Behavioral Status: 
Neurologic State: Alert;Eyes open spontaneously Orientation Level: Oriented X4 Cognition: Follows commands Hearing: Auditory Auditory Impairment: None Vision/Perceptual:   
Tracking: Able to track stimulus in all quadrants w/o difficulty Saccades: Within Defined Limits Visual Fields: Difficulty detecting stimulus in right lower quadrant Diplopia: No   
Acuity:  (mild impairment in R eye- blurried vision) Corrective Lenses: Reading glasses Range of Motion: 
AROM: Within functional limits Strength: 
Strength: Within functional limits Coordination: 
Coordination: Within functional limits Fine Motor Skills-Upper: Left Intact; Right Intact Gross Motor Skills-Upper: Left Intact; Right Intact Tone & Sensation: 
Tone: Normal 
Sensation: Intact Balance: 
Sitting: Intact Standing: Intact Functional Mobility and Transfers for ADLs: 
Bed Mobility: 
Rolling: Independent Supine to Sit: Independent Sit to Supine: Independent Scooting: Independent Transfers: 
Sit to Stand: Independent Stand to Sit: Independent Bathroom Mobility: Supervision/set up Toilet Transfer : Supervision ADL Assessment: 
Feeding: Independent Oral Facial Hygiene/Grooming: Independent Bathing: Supervision Upper Body Dressing: Independent Lower Body Dressing: Supervision Toileting: Independent ADL Intervention and task modifications: 
 Amb to bathroom without AD with mod indep, transfer to commode with mod indep, and hygiene with mod indep. Completed grooming standing tasks for hand hygiene with supervision. Denies dizziness with change in position. No LOB noted. Returned to EOB with daughter present to assist with bathing. Education on BE FAST education for stroke recognition with good understanding. Grooming Washing Hands: Modified independent Lower Body Dressing Assistance Socks: Supervision/set-up Functional Measure: 
Barthel Index: 
 
Bathin Bladder: 10 Bowels: 10 
Groomin Dressing: 10 Feeding: 10 Mobility: 10 Stairs: 5 Toilet Use: 10 Transfer (Bed to Chair and Back): 15 Total: 85 Barthel and G-code impairment scale: 
Percentage of impairment CH 
0% CI 
1-19% CJ 
20-39% CK 
40-59% CL 
60-79% CM 
80-99% CN 
100% Barthel Score 0-100 100 99-80 79-60 59-40 20-39 1-19 
 0 Barthel Score 0-20 20 17-19 13-16 9-12 5-8 1-4 0 The Barthel ADL Index: Guidelines 1. The index should be used as a record of what a patient does, not as a record of what a patient could do. 2. The main aim is to establish degree of independence from any help, physical or verbal, however minor and for whatever reason. 3. The need for supervision renders the patient not independent. 4. A patient's performance should be established using the best available evidence. Asking the patient, friends/relatives and nurses are the usual sources, but direct observation and common sense are also important. However direct testing is not needed. 5. Usually the patient's performance over the preceding 24-48 hours is important, but occasionally longer periods will be relevant. 6. Middle categories imply that the patient supplies over 50 per cent of the effort. 7. Use of aids to be independent is allowed. Sergey Montero., Barthel, D.W. (5208). Functional evaluation: the Barthel Index. [address] [facility] (14)2. Aleks Pedroza shad CHADD WilsonF, Ama Flores., Swetha Hull., Merlin Hippo, [address] [place] [address] (). Measuring the change indisability after inpatient rehabilitation; comparison of the responsiveness of the Barthel Index and Functional Door Measure. Journal of Neurology, Neurosurgery, and Psychiatry, 66(4), 278-375.  
Garry Armenta, N.J.A, ZAHRA VargasJ.ALLY, & Sejal Mcintyre, M.A. (2004.) Assessment of post-stroke quality of life in cost-effectiveness studies: The usefulness of the Barthel Index and the EuroQoL-5D. [facility], 44, 702-33 G codes: In compliance with CMSs Claims Based Outcome Reporting, the following G-code set was chosen for this patient based on their primary functional limitation being treated: The outcome measure chosen to determine the severity of the functional limitation was the Barthel Index with a score of 85/100 which was correlated with the impairment scale. ? Self Care:  
  - CURRENT STATUS: CI - 1%-19% impaired, limited or restricted  - GOAL STATUS: CI - 1%-19% impaired, limited or restricted  - D/C STATUS:  CI - 1%-19% impaired, limited or restricted Occupational Therapy Evaluation Charge Determination History Examination Decision-Making LOW Complexity : Brief history review  LOW Complexity : 1-3 performance deficits relating to physical, cognitive , or psychosocial skils that result in activity limitations and / or participation restrictions  LOW Complexity : No comorbidities that affect functional and no verbal or physical assistance needed to complete eval tasks Based on the above components, the patient evaluation is determined to be of the following complexity level: LOW Pain: 
Pain Scale 1: Numeric (0 - 10) Pain Intensity 1: 0 Activity Tolerance:  
Denies dizziness with change in position. After treatment:  
[]  Patient left in no apparent distress sitting up in chair 
[x]  Patient left in no apparent distress in bed 
[x]  Call bell left within reach [x]  Nursing notified 
[x]  Caregiver present 
[]  Bed alarm activated COMMUNICATION/EDUCATION:  
Communication/Collaboration: 
[x]      Home safety education was provided and the patient/caregiver indicated understanding. [x]      Patient/family have participated as able and agree with findings and recommendations. []      Patient is unable to participate in plan of care at this time. Findings and recommendations were discussed with: Registered Nurse Maria Eugenia Sahu, OT Time Calculation: 16 mins

## 2018-09-30 NOTE — PROGRESS NOTES
* No surgery found * Bedside shift change report given to Lorene Woodard (oncoming nurse) by Krunal Sandy (offgoing nurse). Report included the following information SBAR, Kardex, Intake/Output, MAR and Recent Results. 
  
Zone Phone:   [phone] 
  
  
Significant changes during shift:  None,intermittent headaches/eye pain, blood cultures drawn, started on ABX for possible UTI,  
  
  
  
Patient Information 
  
Tyree Casey 
[de-identified] y.o. 
9/28/2018 10:59 PM by Oralia Curry MD. Tyree Casey was admitted from Home 
  
Problem List 
  
    
Patient Active Problem List  
  Diagnosis Date Noted  Vision loss, right eye 09/29/2018  Anemia 02/22/2018  GERD (gastroesophageal reflux disease) 02/22/2018  Stage 3 chronic kidney disease 02/22/2018  Asthma 02/22/2018  
 HTN (hypertension) 02/22/2018  Acute gastric ulcer 02/22/2018  GI bleed 02/15/2018  Localized primary osteoarthritis of left lower leg 06/27/2016  Primary localized osteoarthritis of left knee 06/27/2016  Sinus node dysfunction ([facility] [address] 08/13/2013  
  
    
Past Medical History:  
Diagnosis Date  Adverse effect of anesthesia    
  passed out during EGD/stopped breathing  Arrhythmia    
  has pacemaker for low HR  
 Arthritis    
 Asthma    
 Cancer [facility])    
  right kidney - surgery  Chronic pain    
  right side  Deep vein thrombosis (DVT) during current hospitalization ([address]    
  history of DVT was on coumadin in the past  
 Diabetes ([facility] [address]    
  diet controlled  GERD (gastroesophageal reflux disease)    
 H/O acute pancreatitis    
 Hiatal hernia    
 Hypertension    
 MARLEN (obstructive sleep apnea)    
  does not use CPAP/pt states she has not used since a pacemaker inserted  Other ill-defined conditions(799.89)    
  lymph node enlargement - left chest - benign bx - watching  Other ill-defined conditions(799.89)    
  wheezes  Psychiatric disorder    
  depression  PUD (peptic ulcer disease)    
  hx of  
  Thromboembolus ([facility] [address]    
 Unspecified adverse effect of anesthesia    
  passed out during EGD per pt - stopped breathing per pt per MD  
  
  
  
Core Measures: 
  
CVA: No No 
  
Activity Status: 
  
OOB to Chair No 
Ambulated this shift Yes Bed Rest No 
  
DVT prophylaxis: 
  
DVT prophylaxis Med- No 
DVT prophylaxis SCD or RASHEED- Yes  
  
Wounds: (If Applicable) 
  
Wounds- No 
  
Location-none 
  
Patient Safety: 
  
Falls Score Total Score: 3 Safety Level_______ Bed Alarm On? Yes Sitter? No 
  
Plan for upcoming shift: safety, neurochecks  
  
  
  
Discharge Plan: home when stable  
  
Active Consults: 
IP CONSULT TO VASCULAR SURGERY 
IP CONSULT TO NEUROLOGY

## 2018-09-30 NOTE — PROGRESS NOTES
NEUROLOGY NOTE Chief Complaint Patient presents with  Eye Problem Diagnosed with pink eye Sunday. Since then, has lost sight in right eye. Referred by eye doctor today  Headache  Dizziness SUBJECTIVE: 
Vision loss is the same. HPI Vera Terry is a [de-identified] y.o. female who presents to the hospital because of right vision loss. Pt states that she has been having right temporal headaches for the last 2 wks and noticed painless vision loss around a wk ago, which was sudden in onset. [facility] and was diagnosed with conjunctivitis with no improvement. Symptoms did not get better and hence pt was sent to ER. Pt's ESR and CRP are elevated and cannot get MRI brain. Pt continues to have right temporal pain. ROS A ten system review of constitutional, cardiovascular, respiratory, musculoskeletal, endocrine, skin, SHEENT, genitourinary, psychiatric and neurologic systems was obtained and is unremarkable except as stated in HPI  
 
PMH Past Medical History:  
Diagnosis Date  Adverse effect of anesthesia   
 passed out during EGD/stopped breathing  Arrhythmia   
 has pacemaker for low HR  
 Arthritis  Asthma  Cancer ([address] right kidney - surgery  Chronic pain   
 right side  Deep vein thrombosis (DVT) during current hospitalization ([address]   
 history of DVT was on coumadin in the past  
 Diabetes ([address] diet controlled  GERD (gastroesophageal reflux disease)  H/O acute pancreatitis  Hiatal hernia  Hypertension  MARLEN (obstructive sleep apnea) does not use CPAP/pt states she has not used since a pacemaker inserted  Other ill-defined conditions(799.89)   
 lymph node enlargement - left chest - benign bx - watching  Other ill-defined conditions(799.89)   
 wheezes  Psychiatric disorder   
 depression  PUD (peptic ulcer disease)   
 hx of  Thromboembolus ([address]  Unspecified adverse effect of anesthesia passed out during EGD per pt - stopped breathing per pt per MD  
 
 
FH Family History Problem Relation Age of Onset  Stroke Mother   
  brain aneurysm  Hypertension Father  Heart Disease Father  Cancer Sister   
  breast  
 Cancer Brother   
  lung  Cancer Sister   
  breast  
 SLE Other   
  half siblings (5+) - lupus  Hypertension Daughter  Diabetes Daughter 31 Leora Michelle Social History Social History  Marital status:  Spouse name: N/A  
 Number of children: N/A  
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  
 Drug use: No  
 Sexual activity: No  
 
Other Topics Concern  None Social History Narrative ALLERGIES Allergies Allergen Reactions  Pcn [Penicillins] Rash But can take cephalosporins. Allergic to all \"cillins\".  Codeine Other (comments) Hallucinations, takes hydrocodone at home for pain  Contrast Dye [Iodine] Other (comments) Not to take due to kidney function  Prednisone Other (comments) \"makes my pancreas numbers go way up\" PHYSICAL EXAMINATION:  
Patient Vitals for the past 24 hrs: 
 Temp Pulse Resp BP SpO2  
09/30/18 0821 - 60 - - -  
09/30/18 0729 - - - 123/67 -  
09/30/18 0726 - - - 140/80 -  
09/30/18 0723 98 °F (36.7 °C) (!) 53 18 148/65 -  
09/30/18 0405 98 °F (36.7 °C) 60 18 138/60 95 % 09/29/18 2347 98.6 °F (37 °C) 60 18 (!) 216/102 98 %  
09/29/18 2004 - 68 - 120/75 -  
09/29/18 2002 - 61 - 160/77 -  
09/29/18 2000 - 61 - 190/80 -  
09/29/18 1613 - - - 200/90 -  
09/29/18 1539 97.9 °F (36.6 °C) 60 18 (!) 198/92 97 % 09/29/18 1106 97.5 °F (36.4 °C) 60 20 164/67 96 % General:  
General appearance: Pt is in no acute distress Distal pulses are preserved Neurological Examination:  
Mental Status:  AAO x3. Speech is fluent. Follows commands, has normal fund of knowledge, attention, short term recall, comprehension and insight. Cranial Nerves: Visual fields are full. PERRL, Visual acuity in the right eye is low. Extraocular movements are full. Facial sensation intact. Facial movement intact. Hearing intact to conversation. Palate elevates symmetrically. Shoulder shrug symmetric. Tongue midline. Motor: Strength is 5/5 in all 4 ext. Normal tone. No atrophy. Sensation: Normal to light touch Coordination/Cerebellar: Intact to finger-nose-finger Gait: deferred Skin: No significant bruising or lacerations. LAB DATA REVIEWED:   
Recent Results (from the past 24 hour(s)) GLUCOSE, POC Collection Time: 09/29/18 11:31 AM  
Result Value Ref Range Glucose (POC) 298 (H) 65 - 100 mg/dL Performed by Becki Conteh URINALYSIS W/ REFLEX CULTURE Collection Time: 09/29/18  2:57 PM  
Result Value Ref Range Color YELLOW/STRAW Appearance CLOUDY (A) CLEAR Specific gravity 1.019 1.003 - 1.030    
 pH (UA) 5.5 5.0 - 8.0 Protein TRACE (A) NEG mg/dL Glucose NEGATIVE  NEG mg/dL Ketone NEGATIVE  NEG mg/dL Bilirubin NEGATIVE  NEG Blood NEGATIVE  NEG Urobilinogen 1.0 0.2 - 1.0 EU/dL Nitrites NEGATIVE  NEG Leukocyte Esterase MODERATE (A) NEG    
 WBC 20-50 0 - 4 /hpf  
 RBC 0-5 0 - 5 /hpf Epithelial cells FEW FEW /lpf Bacteria 4+ (A) NEG /hpf  
 UA:UC IF INDICATED URINE CULTURE ORDERED (A) CNI Hyaline cast 0-2 0 - 5 /lpf  
GLUCOSE, POC Collection Time: 09/29/18  4:22 PM  
Result Value Ref Range Glucose (POC) 204 (H) 65 - 100 mg/dL Performed by Micheline Basilio (PCT) GLUCOSE, POC Collection Time: 09/29/18 10:15 PM  
Result Value Ref Range Glucose (POC) 279 (H) 65 - 100 mg/dL Performed by Noris Fine (PCT) METABOLIC PANEL, COMPREHENSIVE  
 Collection Time: 09/30/18 12:00 AM  
Result Value Ref Range Sodium 137 136 - 145 mmol/L Potassium 4.3 3.5 - 5.1 mmol/L Chloride 108 97 - 108 mmol/L  
 CO2 22 21 - 32 mmol/L Anion gap 7 5 - 15 mmol/L Glucose 212 (H) 65 - 100 mg/dL BUN 36 (H) 6 - 20 MG/DL Creatinine 1.61 (H) 0.55 - 1.02 MG/DL  
 BUN/Creatinine ratio 22 (H) 12 - 20 GFR est AA 37 (L) >60 ml/min/1.73m2 GFR est non-AA 31 (L) >60 ml/min/1.73m2 Calcium 10.0 8.5 - 10.1 MG/DL Bilirubin, total 0.3 0.2 - 1.0 MG/DL  
 ALT (SGPT) 25 12 - 78 U/L  
 AST (SGOT) 25 15 - 37 U/L Alk. phosphatase 94 45 - 117 U/L Protein, total 7.2 6.4 - 8.2 g/dL Albumin 3.7 3.5 - 5.0 g/dL Globulin 3.5 2.0 - 4.0 g/dL A-G Ratio 1.1 1.1 - 2.2    
CBC WITH AUTOMATED DIFF Collection Time: 09/30/18 12:00 AM  
Result Value Ref Range WBC 13.2 (H) 3.6 - 11.0 K/uL  
 RBC 3.75 (L) 3.80 - 5.20 M/uL  
 HGB 10.2 (L) 11.5 - 16.0 g/dL HCT 31.8 (L) 35.0 - 47.0 % MCV 84.8 80.0 - 99.0 FL  
 MCH 27.2 26.0 - 34.0 PG  
 MCHC 32.1 30.0 - 36.5 g/dL  
 RDW 14.6 (H) 11.5 - 14.5 % PLATELET 476 808 - 003 K/uL MPV 12.0 8.9 - 12.9 FL  
 NRBC 0.0 0  WBC ABSOLUTE NRBC 0.00 0.00 - 0.01 K/uL NEUTROPHILS 82 (H) 32 - 75 % LYMPHOCYTES 12 12 - 49 % MONOCYTES 5 5 - 13 % EOSINOPHILS 0 0 - 7 % BASOPHILS 0 0 - 1 % IMMATURE GRANULOCYTES 1 (H) 0.0 - 0.5 % ABS. NEUTROPHILS 10.9 (H) 1.8 - 8.0 K/UL  
 ABS. LYMPHOCYTES 1.6 0.8 - 3.5 K/UL  
 ABS. MONOCYTES 0.6 0.0 - 1.0 K/UL  
 ABS. EOSINOPHILS 0.0 0.0 - 0.4 K/UL  
 ABS. BASOPHILS 0.0 0.0 - 0.1 K/UL  
 ABS. IMM. GRANS. 0.1 (H) 0.00 - 0.04 K/UL  
 DF AUTOMATED    
GLUCOSE, POC Collection Time: 09/30/18  6:31 AM  
Result Value Ref Range Glucose (POC) 218 (H) 65 - 100 mg/dL Performed by Makayla Thomas Imaging review: 
CT Head Normal 
 
HOME MEDS Prior to Admission Medications Prescriptions Last Dose Informant Patient Reported? Taking?   
FERROUS SULFATE PO   Yes No  
 Sig: Take 325 mg by mouth daily. LACTOBACILLUS ACIDOPHILUS (PROBIOTIC PO)   Yes No  
Sig: Take 1 Cap by mouth daily. PROAIR HFA 90 mcg/actuation inhaler   Yes No  
Sig: Take 2 Puffs by inhalation four (4) times daily as needed. allopurinol (ZYLOPRIM) 300 mg tablet   Yes No  
Sig: Take 300 mg by mouth daily. amLODIPine (NORVASC) 2.5 mg tablet   Yes No  
Sig: Take 2.5 mg by mouth daily. b complex vitamins tablet   Yes No  
Sig: Take 1 Tab by mouth daily. betamethasone valerate (VALISONE) 0.1 % topical cream   No No  
Sig: Apply  to affected area two (2) times a day. donepezil (ARICEPT) 5 mg tablet   Yes No  
Sig: Take 5 mg by mouth nightly. famotidine (PEPCID) 20 mg tablet   No No  
Sig: Take 1 Tab by mouth nightly. Indications: ZOLLINGER-ROBERSON SYNDROME  
furosemide (LASIX) 40 mg tablet   Yes No  
Sig: Take 40 mg by mouth daily as needed. gabapentin (NEURONTIN) 100 mg capsule   Yes No  
Sig: Take 200 mg by mouth two (2) times daily as needed. hydrOXYzine pamoate (VISTARIL) 25 mg capsule   No No  
Sig: Take 1 Cap by mouth nightly as needed for Itching. ipratropium (ATROVENT) 0.03 % nasal spray   No No  
Si Sprays by Both Nostrils route every twelve (12) hours. losartan (COZAAR) 100 mg tablet   Yes No  
Sig: Take 100 mg by mouth nightly. metoprolol succinate (TOPROL-XL) 50 mg XL tablet   No No  
Sig: Take 1 Tab by mouth daily. montelukast (SINGULAIR) 10 mg tablet   Yes No  
Sig: Take 10 mg by mouth nightly. pantoprazole (PROTONIX) 40 mg tablet   Yes No  
Sig: Take 40 mg by mouth daily. pravastatin (PRAVACHOL) 20 mg tablet   No No  
Sig: Take 1 Tab by mouth nightly. sertraline (ZOLOFT) 100 mg tablet   Yes No  
Sig: Take 100 mg by mouth nightly. trimethoprim-polymyxin b (POLYTRIM) ophthalmic solution   No No  
Sig: Administer 1 Drop to right eye five (5) times daily for 7 days. Facility-Administered Medications: None CURRENT MEDS 
 Current Facility-Administered Medications Medication Dose Route Frequency  cefTRIAXone (ROCEPHIN) 1 g in 0.9% sodium chloride (MBP/ADV) 50 mL  1 g IntraVENous Q24H  
 amLODIPine (NORVASC) tablet 2.5 mg  2.5 mg Oral DAILY  B complex-vitaminC-folic acid (NEPHROCAP) cap  1 Cap Oral DAILY  donepezil (ARICEPT) tablet 5 mg  5 mg Oral QHS  famotidine (PEPCID) tablet 20 mg  20 mg Oral QPM  
 ferrous sulfate tablet 325 mg  325 mg Oral DAILY  ipratropium (ATROVENT) 0.03 % nasal spray 2 Spray  2 Spray Both Nostrils Q12H  
 losartan (COZAAR) tablet 100 mg  100 mg Oral QHS  metoprolol succinate (TOPROL-XL) XL tablet 50 mg  50 mg Oral DAILY  montelukast (SINGULAIR) tablet 10 mg  10 mg Oral QHS  pantoprazole (PROTONIX) tablet 40 mg  40 mg Oral DAILY  pravastatin (PRAVACHOL) tablet 20 mg  20 mg Oral QHS  sertraline (ZOLOFT) tablet 100 mg  100 mg Oral QHS  sodium chloride (NS) flush 5-10 mL  5-10 mL IntraVENous Q8H  
 insulin lispro (HUMALOG) injection   SubCUTAneous AC&HS  
 methylPREDNISolone ((Solu-MEDROL) 1,000 mg in 0.9% sodium chloride (MBP/ADV) 100 mL  1 g IntraVENous Q24H  hydrALAZINE (APRESOLINE) tablet 25 mg  25 mg Oral TID IMPRESSION: 
Aaron Yeager is a [de-identified] y.o. female who presents with acute painless right vision loss with elevated ESR/CRP along with right temporal pain. 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 RECOMMENDATIONS: 
1. Pt decided against temporal art biopsy 2. Solumedrol 1 g IV daily for 3 days and then prednisone 60 mg daily 3. Outpatient opthalmology referral and f/u with neurology. No further neuro w/u. Call with questions.  
 
May Yanes MD 
Neurologist

## 2018-09-30 NOTE — PROGRESS NOTES
Bedside and Verbal shift change report given to Xiomy Mahajan (oncoming nurse) by Kermit Silva (offgoing nurse). Report included the following information SBAR, Kardex and MAR.

## 2018-10-01 LAB
ANION GAP SERPL CALC-SCNC: 8 MMOL/L (ref 5–15)
BASOPHILS # BLD: 0 K/UL (ref 0–0.1)
BASOPHILS NFR BLD: 0 % (ref 0–1)
BUN SERPL-MCNC: 44 MG/DL (ref 6–20)
BUN/CREAT SERPL: 28 (ref 12–20)
CALCIUM SERPL-MCNC: 9.3 MG/DL (ref 8.5–10.1)
CHLORIDE SERPL-SCNC: 108 MMOL/L (ref 97–108)
CO2 SERPL-SCNC: 22 MMOL/L (ref 21–32)
CREAT SERPL-MCNC: 1.59 MG/DL (ref 0.55–1.02)
DIFFERENTIAL METHOD BLD: ABNORMAL
EOSINOPHIL # BLD: 0 K/UL (ref 0–0.4)
EOSINOPHIL NFR BLD: 0 % (ref 0–7)
ERYTHROCYTE [DISTWIDTH] IN BLOOD BY AUTOMATED COUNT: 14.7 % (ref 11.5–14.5)
GLUCOSE BLD STRIP.AUTO-MCNC: 196 MG/DL (ref 65–100)
GLUCOSE BLD STRIP.AUTO-MCNC: 201 MG/DL (ref 65–100)
GLUCOSE BLD STRIP.AUTO-MCNC: 202 MG/DL (ref 65–100)
GLUCOSE BLD STRIP.AUTO-MCNC: 218 MG/DL (ref 65–100)
GLUCOSE BLD STRIP.AUTO-MCNC: 239 MG/DL (ref 65–100)
GLUCOSE SERPL-MCNC: 253 MG/DL (ref 65–100)
HCT VFR BLD AUTO: 33.7 % (ref 35–47)
HGB BLD-MCNC: 10.7 G/DL (ref 11.5–16)
IMM GRANULOCYTES # BLD: 0.2 K/UL (ref 0–0.04)
IMM GRANULOCYTES NFR BLD AUTO: 1 % (ref 0–0.5)
LYMPHOCYTES # BLD: 1.2 K/UL (ref 0.8–3.5)
LYMPHOCYTES NFR BLD: 7 % (ref 12–49)
MCH RBC QN AUTO: 27.2 PG (ref 26–34)
MCHC RBC AUTO-ENTMCNC: 31.8 G/DL (ref 30–36.5)
MCV RBC AUTO: 85.5 FL (ref 80–99)
MONOCYTES # BLD: 0.3 K/UL (ref 0–1)
MONOCYTES NFR BLD: 2 % (ref 5–13)
NEUTS SEG # BLD: 14.8 K/UL (ref 1.8–8)
NEUTS SEG NFR BLD: 90 % (ref 32–75)
NRBC # BLD: 0 K/UL (ref 0–0.01)
NRBC BLD-RTO: 0 PER 100 WBC
PLATELET # BLD AUTO: 204 K/UL (ref 150–400)
PMV BLD AUTO: 11.2 FL (ref 8.9–12.9)
POTASSIUM SERPL-SCNC: 4.3 MMOL/L (ref 3.5–5.1)
RBC # BLD AUTO: 3.94 M/UL (ref 3.8–5.2)
SERVICE CMNT-IMP: ABNORMAL
SODIUM SERPL-SCNC: 138 MMOL/L (ref 136–145)
WBC # BLD AUTO: 16.6 K/UL (ref 3.6–11)

## 2018-10-01 PROCEDURE — 85025 COMPLETE CBC W/AUTO DIFF WBC: CPT | Performed by: INTERNAL MEDICINE

## 2018-10-01 PROCEDURE — 74011636637 HC RX REV CODE- 636/637: Performed by: INTERNAL MEDICINE

## 2018-10-01 PROCEDURE — 65660000000 HC RM CCU STEPDOWN

## 2018-10-01 PROCEDURE — 74011000258 HC RX REV CODE- 258: Performed by: INTERNAL MEDICINE

## 2018-10-01 PROCEDURE — 80048 BASIC METABOLIC PNL TOTAL CA: CPT | Performed by: INTERNAL MEDICINE

## 2018-10-01 PROCEDURE — 74011250636 HC RX REV CODE- 250/636: Performed by: HOSPITALIST

## 2018-10-01 PROCEDURE — 82962 GLUCOSE BLOOD TEST: CPT

## 2018-10-01 PROCEDURE — 74011000258 HC RX REV CODE- 258: Performed by: HOSPITALIST

## 2018-10-01 PROCEDURE — 36415 COLL VENOUS BLD VENIPUNCTURE: CPT | Performed by: INTERNAL MEDICINE

## 2018-10-01 PROCEDURE — 74011250636 HC RX REV CODE- 250/636: Performed by: INTERNAL MEDICINE

## 2018-10-01 PROCEDURE — 74011250637 HC RX REV CODE- 250/637: Performed by: HOSPITALIST

## 2018-10-01 PROCEDURE — 74011250637 HC RX REV CODE- 250/637: Performed by: INTERNAL MEDICINE

## 2018-10-01 RX ADMIN — SODIUM CHLORIDE 1000 MG: 900 INJECTION, SOLUTION INTRAVENOUS at 21:28

## 2018-10-01 RX ADMIN — INSULIN LISPRO 2 UNITS: 100 INJECTION, SOLUTION INTRAVENOUS; SUBCUTANEOUS at 22:17

## 2018-10-01 RX ADMIN — MONTELUKAST SODIUM 10 MG: 10 TABLET, FILM COATED ORAL at 21:26

## 2018-10-01 RX ADMIN — Medication 10 ML: at 21:26

## 2018-10-01 RX ADMIN — LOSARTAN POTASSIUM 100 MG: 100 TABLET, FILM COATED ORAL at 21:27

## 2018-10-01 RX ADMIN — NEPHROCAP 1 CAPSULE: 1 CAP ORAL at 08:01

## 2018-10-01 RX ADMIN — METOPROLOL SUCCINATE 50 MG: 50 TABLET, EXTENDED RELEASE ORAL at 08:01

## 2018-10-01 RX ADMIN — HYDRALAZINE HYDROCHLORIDE 25 MG: 25 TABLET, FILM COATED ORAL at 21:28

## 2018-10-01 RX ADMIN — CEFTRIAXONE 1 G: 1 INJECTION, POWDER, FOR SOLUTION INTRAMUSCULAR; INTRAVENOUS at 10:54

## 2018-10-01 RX ADMIN — IPRATROPIUM BROMIDE 2 SPRAY: 21 SPRAY, METERED NASAL at 10:57

## 2018-10-01 RX ADMIN — PANTOPRAZOLE SODIUM 40 MG: 40 TABLET, DELAYED RELEASE ORAL at 08:01

## 2018-10-01 RX ADMIN — HYDRALAZINE HYDROCHLORIDE 25 MG: 25 TABLET, FILM COATED ORAL at 08:01

## 2018-10-01 RX ADMIN — INSULIN LISPRO 3 UNITS: 100 INJECTION, SOLUTION INTRAVENOUS; SUBCUTANEOUS at 07:50

## 2018-10-01 RX ADMIN — SERTRALINE HYDROCHLORIDE 100 MG: 50 TABLET ORAL at 21:26

## 2018-10-01 RX ADMIN — PRAVASTATIN SODIUM 20 MG: 40 TABLET ORAL at 21:26

## 2018-10-01 RX ADMIN — HYDRALAZINE HYDROCHLORIDE 25 MG: 25 TABLET, FILM COATED ORAL at 17:50

## 2018-10-01 RX ADMIN — DONEPEZIL HYDROCHLORIDE 5 MG: 5 TABLET, FILM COATED ORAL at 21:26

## 2018-10-01 RX ADMIN — FERROUS SULFATE TAB 325 MG (65 MG ELEMENTAL FE) 325 MG: 325 (65 FE) TAB at 08:01

## 2018-10-01 RX ADMIN — INSULIN LISPRO 3 UNITS: 100 INJECTION, SOLUTION INTRAVENOUS; SUBCUTANEOUS at 17:50

## 2018-10-01 RX ADMIN — Medication 10 ML: at 12:57

## 2018-10-01 RX ADMIN — Medication 10 ML: at 06:26

## 2018-10-01 RX ADMIN — AMLODIPINE BESYLATE 2.5 MG: 2.5 TABLET ORAL at 08:01

## 2018-10-01 RX ADMIN — INSULIN LISPRO 2 UNITS: 100 INJECTION, SOLUTION INTRAVENOUS; SUBCUTANEOUS at 12:55

## 2018-10-01 RX ADMIN — FAMOTIDINE 20 MG: 20 TABLET ORAL at 17:50

## 2018-10-01 NOTE — PROGRESS NOTES
* No surgery found * 
* No surgery found * Bedside shift change report given to lakia(oncoming nurse) by jessica(offgoing nurse). Report included the following information SBAR, Kardex, Intake/Output, MAR and Recent Results. Zone Phone:   [phone] Significant changes during shift:   labs repeated and MD aware or results Patient Information Radha Zaldivar 
[de-identified] y.o. 
9/28/2018 10:59 PM by Ulisses Velez MD. Radha Zaldivar was admitted from Home 
 
Problem List 
 
Patient Active Problem List  
 Diagnosis Date Noted  Vision loss, right eye 09/29/2018  Anemia 02/22/2018  GERD (gastroesophageal reflux disease) 02/22/2018  Stage 3 chronic kidney disease 02/22/2018  Asthma 02/22/2018  
 HTN (hypertension) 02/22/2018  Acute gastric ulcer 02/22/2018  GI bleed 02/15/2018  Localized primary osteoarthritis of left lower leg 06/27/2016  Primary localized osteoarthritis of left knee 06/27/2016  Sinus node dysfunction (HCC) 08/13/2013 Past Medical History:  
Diagnosis Date  Adverse effect of anesthesia   
 passed out during EGD/stopped breathing  Arrhythmia   
 has pacemaker for low HR  
 Arthritis  Asthma  Cancer ([address] right kidney - surgery  Chronic pain   
 right side  Deep vein thrombosis (DVT) during current hospitalization ([address]   
 history of DVT was on coumadin in the past  
 Diabetes ([address] diet controlled  GERD (gastroesophageal reflux disease)  H/O acute pancreatitis  Hiatal hernia  Hypertension  MARLEN (obstructive sleep apnea) does not use CPAP/pt states she has not used since a pacemaker inserted  Other ill-defined conditions(799.89)   
 lymph node enlargement - left chest - benign bx - watching  Other ill-defined conditions(799.89)   
 wheezes  Psychiatric disorder   
 depression  PUD (peptic ulcer disease)   
 hx of  Thromboembolus ([address]  Unspecified adverse effect of anesthesia passed out during EGD per pt - stopped breathing per pt per MD  
 
 
 
Core Measures: CVA: No No 
 
Activity Status: OOB to Chair No 
Ambulated this shift Yes Bed Rest No 
 
DVT prophylaxis: DVT prophylaxis Med- No 
DVT prophylaxis SCD or RASHEED- Yes Wounds: (If Applicable) Wounds- No 
 
Location-none Patient Safety: 
 
Falls Score Total Score: 3 Safety Level_______ Bed Alarm On? Yes Sitter? No 
 
Plan for upcoming shift: safety Discharge Plan: home  After urine sensitivty comes back Active Consults: 
IP CONSULT TO VASCULAR SURGERY 
IP CONSULT TO NEUROLOGY

## 2018-10-01 NOTE — PROGRESS NOTES
Hospitalist Progress Note NAME: Vani Lyon :  1935 MRN:  [id number] Assessment / Plan: 
Monocular Vision Loss  And pain to Right Eye with associated headache. Possible Temporal Arteritis. ESR 84. Less likely CVA vs myeloproliferative dz. 
-On course of high dose IV solumedrol 
-Transition to prednisone upon d/c 
-Patient can not have MRI because of PPM 
-Patient can not have CTA Head and Neck because of CKD 
-Neuro on the case 
-Pt declines biopsy  
  
GNR 100k UTI; POA: 
-On rocephin IV 
-Follow up culture and sensitivities 
  
SSS s/p PPM 
Hypertension 
-stable 
-continue home BB, ARB and Amlodipine 
   
Gout without flare 
-stopped allopurinol as a rare side effect of this medication is vasculitis and macular retinitis 
   
PMH of Dementia, Depression, PUD, DM 2, CKD stage 3: 
-c/w home regimen 
-monitor closely 
   
Patient is a Jehovah Witness 
  
Body mass index is 31.94 kg/(m^2).: 30.0 - 39.9 Obese 
  
Code status: Full Code DVT prophylaxis:  []Enoxaparin  []Warfarin  []Hep SQ  []No AC d/t risk of bleeding  [x]SCDs Disposition: Home w/Family Subjective: Chief Complaint / Reason for Physician Visit: Vision loss Vision loss with some improvement. Continues to have headache and eye pain. No focal motor deficits, dysarthria, dysphagia. Review of Systems: 14 pt ROS unremarkable except as stated above. Objective: VITALS:  
Last 24hrs VS reviewed since prior progress note. Most recent are: 
Patient Vitals for the past 24 hrs: 
 Temp Pulse Resp BP SpO2  
10/01/18 1528 97.8 °F (36.6 °C) 60 18 178/76 96 % 10/01/18 1132 97.6 °F (36.4 °C) 60 18 161/72 98 % 10/01/18 0740 - - - (!) 156/91 -  
10/01/18 0739 - - - 156/85 -  
10/01/18 0735 98.1 °F (36.7 °C) 60 18 154/61 97 % 10/01/18 0330 98.1 °F (36.7 °C) 64 18 160/68 96 % 18 2321 97.8 °F (36.6 °C) 60 17 162/70 99 % 18 - - - 122/68 -  
18 - - - 130/80 -  
 09/30/18 2031 98 °F (36.7 °C) 60 19 138/76 98 % Intake/Output Summary (Last 24 hours) at 10/01/18 1544 Last data filed at 10/01/18 1334 Gross per 24 hour Intake             1070 ml Output              300 ml Net              770 ml PHYSICAL EXAM: 
General:    Alert, cooperative, no distress, appears stated age. HEENT: Atraumatic, anicteric sclerae Neck:  Supple, symmetrical 
Lungs:   Clear to auscultation bilaterally. No Wheezing or Rhonchi. No rales. No Accessory muscle use. Heart:   Regular  rhythm,  Normal S1 and S2   No edema Abdomen:   Soft, non-tender. Not distended. Bowel sounds normal.  No rebound Extremities: No cyanosis. No clubbing Skin:     Warm, dry Neurologic: R vision loss, CNs otherwise grossly intact, 5/5 strength bilat Reviewed most current lab test results and cultures  YES Reviewed most current radiology test results   YES Review and summation of old records today    NO Reviewed patient's current orders and MAR    YES 
PMH/SH reviewed - no change compared to H&P 
 
________________________________________________________________________ Latrice Saldivar MD  
 
Procedures: see electronic medical records for all procedures/Xrays and details which were not copied into this note but were reviewed prior to creation of Plan. LABS: 
I reviewed today's most current labs and imaging studies. Pertinent labs include: 
Recent Labs 10/01/18 
 1140  09/30/18 
 0000  09/28/18 
 2212 WBC  16.6*  13.2*  8.8 HGB  10.7*  10.2*  10.3* HCT  33.7*  31.8*  34.0*  
PLT  204  167  147* Recent Labs 10/01/18 
 1140  09/30/18 
 0000  09/28/18 
 2212 NA  138  137  139  
K  4.3  4.3  4.1 CL  108  108  107 CO2  22  22  25 GLU  253*  212*  160* BUN  44*  36*  33* CREA  1.59*  1.61*  1.78* CA  9.3  10.0  10.2* ALB   --   3.7  3.4* TBILI   --   0.3  0.3 SGOT   --   25  26 ALT   --   25  23 Signed: Latrice Saldivar MD

## 2018-10-01 NOTE — PROGRESS NOTES
Report given to [facility] by Triny Garcia Phone:   
  
  
Significant changes during shift:   none 
  
  
  
Patient Information 
  
Denisha Ritter 
[de-identified] y.o. 
9/28/2018 10:59 PM by Dee Paul MD. Denisha Ritter was admitted from Home 
  
Problem List 
  
    
Patient Active Problem List  
  Diagnosis Date Noted  Vision loss, right eye 09/29/2018  Anemia 02/22/2018  GERD (gastroesophageal reflux disease) 02/22/2018  Stage 3 chronic kidney disease 02/22/2018  Asthma 02/22/2018  
 HTN (hypertension) 02/22/2018  Acute gastric ulcer 02/22/2018  GI bleed 02/15/2018  Localized primary osteoarthritis of left lower leg 06/27/2016  Primary localized osteoarthritis of left knee 06/27/2016  Sinus node dysfunction ([address] 08/13/2013  
  
    
Past Medical History:  
Diagnosis Date  Adverse effect of anesthesia    
  passed out during EGD/stopped breathing  Arrhythmia    
  has pacemaker for low HR  
 Arthritis    
 Asthma    
 Cancer [facility])    
  right kidney - surgery  Chronic pain    
  right side  Deep vein thrombosis (DVT) during current hospitalization ([address]    
  history of DVT was on coumadin in the past  
 Diabetes ([address]    
  diet controlled  GERD (gastroesophageal reflux disease)    
 H/O acute pancreatitis    
 Hiatal hernia    
 Hypertension    
 MARLEN (obstructive sleep apnea)    
  does not use CPAP/pt states she has not used since a pacemaker inserted  Other ill-defined conditions(799.89)    
  lymph node enlargement - left chest - benign bx - watching  Other ill-defined conditions(799.89)    
  wheezes  Psychiatric disorder    
  depression  PUD (peptic ulcer disease)    
  hx of  Thromboembolus ([address]    
 Unspecified adverse effect of anesthesia    
  passed out during EGD per pt - stopped breathing per pt per MD  
  
  
  
Core Measures: 
  
CVA: No No 
  
Activity Status: 
  
OOB to Chair No 
Ambulated this shift Yes Bed Rest No 
  
 DVT prophylaxis: 
  
DVT prophylaxis Med- No 
DVT prophylaxis SCD or RASHEED- Yes  
  
Wounds: (If Applicable) 
  
Wounds- No 
  
Location-none 
  
Patient Safety: 
  
Falls Score Total Score: 3 Safety Level_______ Bed Alarm On? Yes Sitter?  No 
  
Plan for upcoming shift: safety  
  
  
  
Discharge Plan: home  After urine sensitivty comes back

## 2018-10-01 NOTE — PROGRESS NOTES
Problem: Falls - Risk of 
Goal: *Absence of Falls Document Yaneli Dias Fall Risk and appropriate interventions in the flowsheet. Outcome: Progressing Towards Goal 
Fall Risk Interventions: 
Mobility Interventions: Bed/chair exit alarm Medication Interventions: Bed/chair exit alarm Elimination Interventions: Call light in reach

## 2018-10-02 LAB
ANION GAP SERPL CALC-SCNC: 8 MMOL/L (ref 5–15)
BACTERIA SPEC CULT: ABNORMAL
BACTERIA SPEC CULT: ABNORMAL
BUN SERPL-MCNC: 47 MG/DL (ref 6–20)
BUN/CREAT SERPL: 31 (ref 12–20)
CALCIUM SERPL-MCNC: 9.8 MG/DL (ref 8.5–10.1)
CC UR VC: ABNORMAL
CHLORIDE SERPL-SCNC: 107 MMOL/L (ref 97–108)
CO2 SERPL-SCNC: 21 MMOL/L (ref 21–32)
CREAT SERPL-MCNC: 1.54 MG/DL (ref 0.55–1.02)
ERYTHROCYTE [DISTWIDTH] IN BLOOD BY AUTOMATED COUNT: 14.8 % (ref 11.5–14.5)
GLUCOSE BLD STRIP.AUTO-MCNC: 163 MG/DL (ref 65–100)
GLUCOSE BLD STRIP.AUTO-MCNC: 204 MG/DL (ref 65–100)
GLUCOSE BLD STRIP.AUTO-MCNC: 222 MG/DL (ref 65–100)
GLUCOSE BLD STRIP.AUTO-MCNC: 236 MG/DL (ref 65–100)
GLUCOSE SERPL-MCNC: 196 MG/DL (ref 65–100)
HCT VFR BLD AUTO: 32 % (ref 35–47)
HGB BLD-MCNC: 10.1 G/DL (ref 11.5–16)
MCH RBC QN AUTO: 26.7 PG (ref 26–34)
MCHC RBC AUTO-ENTMCNC: 31.6 G/DL (ref 30–36.5)
MCV RBC AUTO: 84.7 FL (ref 80–99)
NRBC # BLD: 0 K/UL (ref 0–0.01)
NRBC BLD-RTO: 0 PER 100 WBC
PLATELET # BLD AUTO: 197 K/UL (ref 150–400)
PMV BLD AUTO: 12.2 FL (ref 8.9–12.9)
POTASSIUM SERPL-SCNC: 4.3 MMOL/L (ref 3.5–5.1)
RBC # BLD AUTO: 3.78 M/UL (ref 3.8–5.2)
SERVICE CMNT-IMP: ABNORMAL
SODIUM SERPL-SCNC: 136 MMOL/L (ref 136–145)
WBC # BLD AUTO: 13.8 K/UL (ref 3.6–11)

## 2018-10-02 PROCEDURE — 36415 COLL VENOUS BLD VENIPUNCTURE: CPT | Performed by: INTERNAL MEDICINE

## 2018-10-02 PROCEDURE — 74011250636 HC RX REV CODE- 250/636: Performed by: HOSPITALIST

## 2018-10-02 PROCEDURE — 89055 LEUKOCYTE ASSESSMENT FECAL: CPT | Performed by: INTERNAL MEDICINE

## 2018-10-02 PROCEDURE — 85027 COMPLETE CBC AUTOMATED: CPT | Performed by: INTERNAL MEDICINE

## 2018-10-02 PROCEDURE — 74011250637 HC RX REV CODE- 250/637: Performed by: HOSPITALIST

## 2018-10-02 PROCEDURE — 74011636637 HC RX REV CODE- 636/637: Performed by: INTERNAL MEDICINE

## 2018-10-02 PROCEDURE — 74011250637 HC RX REV CODE- 250/637: Performed by: INTERNAL MEDICINE

## 2018-10-02 PROCEDURE — 82962 GLUCOSE BLOOD TEST: CPT

## 2018-10-02 PROCEDURE — 87045 FECES CULTURE AEROBIC BACT: CPT | Performed by: INTERNAL MEDICINE

## 2018-10-02 PROCEDURE — 80048 BASIC METABOLIC PNL TOTAL CA: CPT | Performed by: INTERNAL MEDICINE

## 2018-10-02 PROCEDURE — 65660000000 HC RM CCU STEPDOWN

## 2018-10-02 PROCEDURE — 74011250636 HC RX REV CODE- 250/636: Performed by: INTERNAL MEDICINE

## 2018-10-02 PROCEDURE — 74011000258 HC RX REV CODE- 258: Performed by: HOSPITALIST

## 2018-10-02 RX ORDER — HYDRALAZINE HYDROCHLORIDE 20 MG/ML
10 INJECTION INTRAMUSCULAR; INTRAVENOUS
Status: DISCONTINUED | OUTPATIENT
Start: 2018-10-02 | End: 2018-10-03 | Stop reason: HOSPADM

## 2018-10-02 RX ORDER — LABETALOL HYDROCHLORIDE 5 MG/ML
10 INJECTION, SOLUTION INTRAVENOUS
Status: DISCONTINUED | OUTPATIENT
Start: 2018-10-02 | End: 2018-10-03 | Stop reason: HOSPADM

## 2018-10-02 RX ORDER — AMLODIPINE BESYLATE 5 MG/1
10 TABLET ORAL DAILY
Status: DISCONTINUED | OUTPATIENT
Start: 2018-10-03 | End: 2018-10-03 | Stop reason: HOSPADM

## 2018-10-02 RX ORDER — PREDNISONE 20 MG/1
60 TABLET ORAL
Status: DISCONTINUED | OUTPATIENT
Start: 2018-10-03 | End: 2018-10-03 | Stop reason: HOSPADM

## 2018-10-02 RX ADMIN — IPRATROPIUM BROMIDE 2 SPRAY: 21 SPRAY, METERED NASAL at 08:17

## 2018-10-02 RX ADMIN — DONEPEZIL HYDROCHLORIDE 5 MG: 5 TABLET, FILM COATED ORAL at 21:16

## 2018-10-02 RX ADMIN — LOSARTAN POTASSIUM 100 MG: 100 TABLET, FILM COATED ORAL at 21:28

## 2018-10-02 RX ADMIN — CEFTRIAXONE 1 G: 1 INJECTION, POWDER, FOR SOLUTION INTRAMUSCULAR; INTRAVENOUS at 11:59

## 2018-10-02 RX ADMIN — FERROUS SULFATE TAB 325 MG (65 MG ELEMENTAL FE) 325 MG: 325 (65 FE) TAB at 08:13

## 2018-10-02 RX ADMIN — INSULIN LISPRO 2 UNITS: 100 INJECTION, SOLUTION INTRAVENOUS; SUBCUTANEOUS at 11:58

## 2018-10-02 RX ADMIN — NEPHROCAP 1 CAPSULE: 1 CAP ORAL at 08:12

## 2018-10-02 RX ADMIN — Medication 10 ML: at 21:15

## 2018-10-02 RX ADMIN — MONTELUKAST SODIUM 10 MG: 10 TABLET, FILM COATED ORAL at 21:16

## 2018-10-02 RX ADMIN — PANTOPRAZOLE SODIUM 40 MG: 40 TABLET, DELAYED RELEASE ORAL at 08:12

## 2018-10-02 RX ADMIN — METOPROLOL SUCCINATE 50 MG: 50 TABLET, EXTENDED RELEASE ORAL at 08:17

## 2018-10-02 RX ADMIN — INSULIN LISPRO 3 UNITS: 100 INJECTION, SOLUTION INTRAVENOUS; SUBCUTANEOUS at 08:12

## 2018-10-02 RX ADMIN — HYDRALAZINE HYDROCHLORIDE 25 MG: 25 TABLET, FILM COATED ORAL at 21:16

## 2018-10-02 RX ADMIN — IPRATROPIUM BROMIDE 2 SPRAY: 21 SPRAY, METERED NASAL at 21:28

## 2018-10-02 RX ADMIN — HYDRALAZINE HYDROCHLORIDE 10 MG: 20 INJECTION INTRAMUSCULAR; INTRAVENOUS at 14:19

## 2018-10-02 RX ADMIN — FAMOTIDINE 20 MG: 20 TABLET ORAL at 17:08

## 2018-10-02 RX ADMIN — INSULIN LISPRO 2 UNITS: 100 INJECTION, SOLUTION INTRAVENOUS; SUBCUTANEOUS at 21:27

## 2018-10-02 RX ADMIN — Medication 10 ML: at 06:24

## 2018-10-02 RX ADMIN — Medication 10 ML: at 14:00

## 2018-10-02 RX ADMIN — PRAVASTATIN SODIUM 20 MG: 40 TABLET ORAL at 21:15

## 2018-10-02 RX ADMIN — HYDRALAZINE HYDROCHLORIDE 25 MG: 25 TABLET, FILM COATED ORAL at 08:17

## 2018-10-02 RX ADMIN — SERTRALINE HYDROCHLORIDE 100 MG: 50 TABLET ORAL at 21:15

## 2018-10-02 RX ADMIN — INSULIN LISPRO 3 UNITS: 100 INJECTION, SOLUTION INTRAVENOUS; SUBCUTANEOUS at 17:09

## 2018-10-02 RX ADMIN — AMLODIPINE BESYLATE 2.5 MG: 2.5 TABLET ORAL at 08:17

## 2018-10-02 NOTE — PROGRESS NOTES
Bedside and Verbal shift change report given to Leandro Campos (oncoming nurse) by Zina Quiles (offgoing nurse). Report included the following information SBAR, Kardex, MAR, Recent Results and Med Rec Status. 18- Dr. Johanny Purvis paged. Per patient the MD said that she is not supposed to get another dose of Solumedrol and that another urine sample needs to be done. No new orders have been written. 1400- Spoke with MD new orders written. Call placed to [facility] [place] lab about urine cultures that are still pending. Spoke with Kellen, and she said that the cultures have been resulted. Pharmacy contacted.

## 2018-10-02 NOTE — PROGRESS NOTES
Hospitalist Progress Note NAME: Ronna Hemphill :  1935 MRN:  [id number] Assessment / Plan: 
Monocular Vision Loss  And pain to Right Eye with associated headache. Possible Temporal Arteritis? ESR 84. Less likely CVA vs myeloproliferative dz. 
-Transition solumedrol to prednisone 
-Patient can not have MRI because of PPM 
-Patient can not have CTA Head and Neck because of CKD 
-Neuro on the case 
-Pt declines biopsy Hypertensive urgency/emergency. BP up to 180s+. Possibly contributing to symptoms? 
-prn hydralazine and labetalol 
-up-titrate home BP regimen 
-on norvasc,hydralazine, toprol 
  
Klebsiella / proteus UTI; POA: 
-Third day of IV rocephin today 
  
SSS s/p PPM 
Hypertension 
-stable 
-continue home BB, ARB and Amlodipine 
   
Gout without flare 
-stopped allopurinol as a rare side effect of this medication is vasculitis and macular retinitis 
   
PMH of Dementia, Depression, PUD, DM 2, CKD stage 3: 
-c/w home regimen 
-monitor closely 
   
Patient is a Jehovah Witness 
  
Body mass index is 31.94 kg/(m^2).: 30.0 - 39.9 Obese 
  
Code status: Full Code DVT prophylaxis:  []Enoxaparin  []Warfarin  []Hep SQ  []No AC d/t risk of bleeding  [x]SCDs Disposition: Home w/Family Subjective: Chief Complaint / Reason for Physician Visit: Vision loss Vision loss improving. Continues to have headache and eye pain but better. No focal motor deficits, dysarthria, dysphagia. Review of Systems: 14 pt ROS unremarkable except as stated above. Objective: VITALS:  
Last 24hrs VS reviewed since prior progress note. Most recent are: 
Patient Vitals for the past 24 hrs: 
 Temp Pulse Resp BP SpO2  
10/02/18 1112 98 °F (36.7 °C) 60 18 182/82 96 % 10/02/18 0822 97.7 °F (36.5 °C) 60 18 180/72 94 % 10/02/18 0816 - - - 180/72 -  
10/02/18 0330 98.1 °F (36.7 °C) 64 18 164/61 95 % 10/01/18 2142 97.8 °F (36.6 °C) 65 18 180/83 96 % 10/01/18 1528 97.8 °F (36.6 °C) 60 18 178/76 96 % Intake/Output Summary (Last 24 hours) at 10/02/18 1508 Last data filed at 10/02/18 3479 Gross per 24 hour Intake              240 ml Output              100 ml Net              140 ml PHYSICAL EXAM: 
General:    Alert, cooperative, no distress, appears stated age. HEENT: Atraumatic, anicteric sclerae Neck:  Supple, symmetrical 
Lungs:   Clear to auscultation bilaterally. No Wheezing or Rhonchi. No rales. No Accessory muscle use. Heart:   Regular  rhythm,  Normal S1 and S2   No edema Abdomen:   Soft, non-tender. Not distended. Bowel sounds normal.  No rebound Extremities: No cyanosis. No clubbing Skin:     Warm, dry Neurologic: R vision loss, CNs otherwise grossly intact, 5/5 strength bilat Reviewed most current lab test results and cultures  YES Reviewed most current radiology test results   YES Review and summation of old records today    NO Reviewed patient's current orders and MAR    YES 
PMH/SH reviewed - no change compared to H&P 
 
________________________________________________________________________ Maximino Brown MD  
 
Procedures: see electronic medical records for all procedures/Xrays and details which were not copied into this note but were reviewed prior to creation of Plan. LABS: 
I reviewed today's most current labs and imaging studies. Pertinent labs include: 
Recent Labs 10/02/18 
 0329  10/01/18 
 1140  09/30/18 
 0000 WBC  13.8*  16.6*  13.2* HGB  10.1*  10.7*  10.2* HCT  32.0*  33.7*  31.8*  
PLT  197  204  167 Recent Labs 10/02/18 
 0329  10/01/18 
 1140  09/30/18 
 0000 NA  136  138  137  
K  4.3  4.3  4.3 CL  107  108  108 CO2  21  22  22 GLU  196*  253*  212* BUN  47*  44*  36* CREA  1.54*  1.59*  1.61* CA  9.8  9.3  10.0 ALB   --    --   3.7 TBILI   --    --   0.3 SGOT   --    --   25 ALT   --    --   25 Signed: Maximino Brown MD

## 2018-10-03 VITALS
SYSTOLIC BLOOD PRESSURE: 136 MMHG | HEART RATE: 59 BPM | OXYGEN SATURATION: 94 % | TEMPERATURE: 97.9 F | WEIGHT: 186.07 LBS | RESPIRATION RATE: 16 BRPM | BODY MASS INDEX: 31.77 KG/M2 | DIASTOLIC BLOOD PRESSURE: 65 MMHG | HEIGHT: 64 IN

## 2018-10-03 LAB
ANION GAP SERPL CALC-SCNC: 9 MMOL/L (ref 5–15)
BUN SERPL-MCNC: 48 MG/DL (ref 6–20)
BUN/CREAT SERPL: 34 (ref 12–20)
CALCIUM SERPL-MCNC: 9.6 MG/DL (ref 8.5–10.1)
CHLORIDE SERPL-SCNC: 108 MMOL/L (ref 97–108)
CO2 SERPL-SCNC: 20 MMOL/L (ref 21–32)
CREAT SERPL-MCNC: 1.4 MG/DL (ref 0.55–1.02)
CRYPTOSP AG STL QL: NEGATIVE
ERYTHROCYTE [DISTWIDTH] IN BLOOD BY AUTOMATED COUNT: 14.9 % (ref 11.5–14.5)
G LAMBLIA AG STL QL: NEGATIVE
GLUCOSE BLD STRIP.AUTO-MCNC: 132 MG/DL (ref 65–100)
GLUCOSE BLD STRIP.AUTO-MCNC: 206 MG/DL (ref 65–100)
GLUCOSE SERPL-MCNC: 141 MG/DL (ref 65–100)
HCT VFR BLD AUTO: 34.2 % (ref 35–47)
HGB BLD-MCNC: 10.9 G/DL (ref 11.5–16)
MCH RBC QN AUTO: 27.1 PG (ref 26–34)
MCHC RBC AUTO-ENTMCNC: 31.9 G/DL (ref 30–36.5)
MCV RBC AUTO: 85.1 FL (ref 80–99)
NRBC # BLD: 0.02 K/UL (ref 0–0.01)
NRBC BLD-RTO: 0.2 PER 100 WBC
PLATELET # BLD AUTO: 240 K/UL (ref 150–400)
PMV BLD AUTO: 12.1 FL (ref 8.9–12.9)
POTASSIUM SERPL-SCNC: 4 MMOL/L (ref 3.5–5.1)
RBC # BLD AUTO: 4.02 M/UL (ref 3.8–5.2)
SERVICE CMNT-IMP: ABNORMAL
SERVICE CMNT-IMP: ABNORMAL
SODIUM SERPL-SCNC: 137 MMOL/L (ref 136–145)
WBC # BLD AUTO: 12.8 K/UL (ref 3.6–11)
WBC #/AREA STL HPF: NORMAL /HPF (ref 0–4)

## 2018-10-03 PROCEDURE — 74011636637 HC RX REV CODE- 636/637: Performed by: INTERNAL MEDICINE

## 2018-10-03 PROCEDURE — 36415 COLL VENOUS BLD VENIPUNCTURE: CPT | Performed by: INTERNAL MEDICINE

## 2018-10-03 PROCEDURE — 82705 FATS/LIPIDS FECES QUAL: CPT | Performed by: INTERNAL MEDICINE

## 2018-10-03 PROCEDURE — 74011250637 HC RX REV CODE- 250/637: Performed by: HOSPITALIST

## 2018-10-03 PROCEDURE — 87329 GIARDIA AG IA: CPT | Performed by: INTERNAL MEDICINE

## 2018-10-03 PROCEDURE — 74011250637 HC RX REV CODE- 250/637: Performed by: INTERNAL MEDICINE

## 2018-10-03 PROCEDURE — 82962 GLUCOSE BLOOD TEST: CPT

## 2018-10-03 PROCEDURE — 85027 COMPLETE CBC AUTOMATED: CPT | Performed by: INTERNAL MEDICINE

## 2018-10-03 PROCEDURE — 80048 BASIC METABOLIC PNL TOTAL CA: CPT | Performed by: INTERNAL MEDICINE

## 2018-10-03 RX ORDER — PREDNISONE 20 MG/1
60 TABLET ORAL
Qty: 90 TAB | Refills: 0 | Status: ON HOLD | OUTPATIENT
Start: 2018-10-04 | End: 2018-12-03

## 2018-10-03 RX ORDER — AMLODIPINE BESYLATE 10 MG/1
10 TABLET ORAL DAILY
Qty: 30 TAB | Refills: 0 | Status: SHIPPED | OUTPATIENT
Start: 2018-10-04 | End: 2022-03-17 | Stop reason: SDUPTHER

## 2018-10-03 RX ADMIN — METOPROLOL SUCCINATE 50 MG: 50 TABLET, EXTENDED RELEASE ORAL at 09:27

## 2018-10-03 RX ADMIN — NEPHROCAP 1 CAPSULE: 1 CAP ORAL at 09:27

## 2018-10-03 RX ADMIN — PANTOPRAZOLE SODIUM 40 MG: 40 TABLET, DELAYED RELEASE ORAL at 09:27

## 2018-10-03 RX ADMIN — PREDNISONE 60 MG: 20 TABLET ORAL at 09:27

## 2018-10-03 RX ADMIN — HYDRALAZINE HYDROCHLORIDE 25 MG: 25 TABLET, FILM COATED ORAL at 09:27

## 2018-10-03 RX ADMIN — AMLODIPINE BESYLATE 10 MG: 5 TABLET ORAL at 09:27

## 2018-10-03 RX ADMIN — INSULIN LISPRO 3 UNITS: 100 INJECTION, SOLUTION INTRAVENOUS; SUBCUTANEOUS at 11:52

## 2018-10-03 RX ADMIN — FERROUS SULFATE TAB 325 MG (65 MG ELEMENTAL FE) 325 MG: 325 (65 FE) TAB at 09:27

## 2018-10-03 RX ADMIN — IPRATROPIUM BROMIDE 2 SPRAY: 21 SPRAY, METERED NASAL at 09:28

## 2018-10-03 RX ADMIN — Medication 10 ML: at 04:51

## 2018-10-03 NOTE — PROGRESS NOTES
* No surgery found * 
* No surgery found * Bedside shift change report given to [address] Patrick Murillo RN(oncoming nurse) by Randal Hernandez RN(offgoing nurse). Report included the following information SBAR, Kardex, Intake/Output, MAR and Recent Results. Zone Phone:   [phone] Significant changes during shift:   Stools most the time mixed with urine unable to send sample to lab , stool culture was send Patient Information Maye Sample 
[de-identified] y.o. 
9/28/2018 10:59 PM by Bernadette Gonzalez MD. Maye Sample was admitted from Home 
 
Problem List 
 
Patient Active Problem List  
 Diagnosis Date Noted  Vision loss, right eye 09/29/2018  Anemia 02/22/2018  GERD (gastroesophageal reflux disease) 02/22/2018  Stage 3 chronic kidney disease ([facility] 02/22/2018  Asthma 02/22/2018  
 HTN (hypertension) 02/22/2018  Acute gastric ulcer 02/22/2018  GI bleed 02/15/2018  Localized primary osteoarthritis of left lower leg 06/27/2016  Primary localized osteoarthritis of left knee 06/27/2016  Sinus node dysfunction (HCC) 08/13/2013 Past Medical History:  
Diagnosis Date  Adverse effect of anesthesia   
 passed out during EGD/stopped breathing  Arrhythmia   
 has pacemaker for low HR  
 Arthritis  Asthma  Cancer ([facility] right kidney - surgery  Chronic pain   
 right side  Deep vein thrombosis (DVT) during current hospitalization ([address]   
 history of DVT was on coumadin in the past  
 Diabetes ([address] diet controlled  GERD (gastroesophageal reflux disease)  H/O acute pancreatitis  Hiatal hernia  Hypertension  MARLEN (obstructive sleep apnea) does not use CPAP/pt states she has not used since a pacemaker inserted  Other ill-defined conditions(799.89)   
 lymph node enlargement - left chest - benign bx - watching  Other ill-defined conditions(799.89)   
 wheezes  Psychiatric disorder   
 depression  PUD (peptic ulcer disease)   
 hx of  
  Thromboembolus ([facility]  Unspecified adverse effect of anesthesia   
 passed out during EGD per pt - stopped breathing per pt per MD  
 
 
 
Core Measures: CVA: No No 
 
Activity Status: OOB to Chair No 
Ambulated this shift Yes Bed Rest No 
 
DVT prophylaxis: DVT prophylaxis Med- No 
DVT prophylaxis SCD or RASHEED- Yes Wounds: (If Applicable) Wounds- No 
 
Location-none Patient Safety: 
 
Falls Score Total Score: 2 Safety Level_______ Bed Alarm On? Yes Sitter? No 
 
Plan for upcoming shift: safety , fs ac and hs, collect stool if able Discharge Plan: yes TBD Active Consults: 
IP CONSULT TO VASCULAR SURGERY 
IP CONSULT TO NEUROLOGY

## 2018-10-03 NOTE — ROUTINE PROCESS
No surgery found * 
* No surgery found * Bedside shift change report given to Diego Miguel RN(oncoming nurse) by Iam Restrepo RN(offgoing nurse). Report included the following information SBAR, Kardex, Intake/Output, MAR and Recent Results. 
  
Zone Phone:   [phone] 
  
  
Significant changes during shift:        
  
Patient Information 
  
Tyree Casey 
[de-identified] y.o. 
9/28/2018 10:59 PM by Oralia Curry MD. Tyree Casey was admitted from Home 
  
Problem List 
  
    
Patient Active Problem List  
  Diagnosis Date Noted  Vision loss, right eye 09/29/2018  Anemia 02/22/2018  GERD (gastroesophageal reflux disease) 02/22/2018  Stage 3 chronic kidney disease ([address] 02/22/2018  Asthma 02/22/2018  
 HTN (hypertension) 02/22/2018  Acute gastric ulcer 02/22/2018  GI bleed 02/15/2018  Localized primary osteoarthritis of left lower leg 06/27/2016  Primary localized osteoarthritis of left knee 06/27/2016  Sinus node dysfunction ([address] 08/13/2013  
  
    
Past Medical History:  
Diagnosis Date  Adverse effect of anesthesia    
  passed out during EGD/stopped breathing  Arrhythmia    
  has pacemaker for low HR  
 Arthritis    
 Asthma    
 Cancer [facility])    
  right kidney - surgery  Chronic pain    
  right side  Deep vein thrombosis (DVT) during current hospitalization ([address]    
  history of DVT was on coumadin in the past  
 Diabetes ([address]    
  diet controlled  GERD (gastroesophageal reflux disease)    
 H/O acute pancreatitis    
 Hiatal hernia    
 Hypertension    
 MARLEN (obstructive sleep apnea)    
  does not use CPAP/pt states she has not used since a pacemaker inserted  Other ill-defined conditions(799.89)    
  lymph node enlargement - left chest - benign bx - watching  Other ill-defined conditions(799.89)    
  wheezes  Psychiatric disorder    
  depression  PUD (peptic ulcer disease)    
  hx of  Thromboembolus ([address]    
  Unspecified adverse effect of anesthesia    
  passed out during EGD per pt - stopped breathing per pt per MD  
  
  
  
Core Measures: 
  
CVA: No No 
  
Activity Status: 
  
OOB to Chair No 
Ambulated this shift Yes Bed Rest No 
  
DVT prophylaxis: 
  
DVT prophylaxis Med- No 
DVT prophylaxis SCD or RASHEED- Yes  
  
Wounds: (If Applicable) 
  
Wounds- No 
  
Location-none 
  
Patient Safety: 
  
Falls Score Total Score: 2 Safety Level_______ Bed Alarm On? Yes Sitter? No 
  
Plan for upcoming shift: safety ,  
  
  
  
Discharge Plan: yes TBD Active Consults: 
IP CONSULT TO VASCULAR SURGERY 
IP CONSULT TO NEUROLOGY

## 2018-10-03 NOTE — PROGRESS NOTES
Patient given discharged instructions. Patient verbalized understanding of new medications, prescriptions and follow up appointments. Patient discharged home with family via wheelchair.

## 2018-10-03 NOTE — PROGRESS NOTES
Reason for Admission:   Vision Loss, Right Eye 
            
RRAT Score:     25 - High Risk Resources/supports as identified by patient/family:   Good family support Top Challenges facing patient (as identified by patient/family and CM): Finances/Medication cost?      No issues - has insurance Transportation? Pt drives and family assists Support system or lack thereof? Good support system Living arrangements? Lives with  in a 1 story home with 1 step to the entrance Self-care/ADLs/Cognition? Independent with her ADL's and IADL's Current Advanced Directive/Advance Care Plan:  Full code Plan for utilizing home health:   No   
                   
Likelihood of readmission:  Low Transition of Care Plan:       Home with f/u appts Pt is a [de-identified] y.o  [place] female admitted with Vision Loss, Right Eye. Pt was alert, oriented and in no distress. Demographic information verified and all is correct. Pt lives with her  in a 1 story home with 1 step to the entrance. Prior to admission, pt was independent with her ADL's and IADL's and driving. Pt has a cane and walker. Pt had home health in the past and denies rehab. [facility] Pt's family can transport pt home by car at discharge. Therapy not recommending home health. CM will continue to follow pt for discharge planning needs. Care Management Interventions PCP Verified by CM: Yes Mode of Transport at Discharge: Other (see comment) (pt's family can transport by car) Transition of Care Consult (CM Consult): Discharge Planning Discharge Durable Medical Equipment: No (cane and walker) Physical Therapy Consult: Yes Occupational Therapy Consult: Yes Speech Therapy Consult: No 
Current Support Network: Lives with Spouse, Own Home (pt lives with her  in a 1 story home with 1 step to the entrance) Confirm Follow Up Transport: Family Plan discussed with Pt/Family/Caregiver: Yes Discharge Location Discharge Placement: Home [place], [place]

## 2018-10-03 NOTE — DISCHARGE SUMMARY
Hospitalist Discharge Summary     Patient ID:  Lula Zhong  [id number]  22 y.o.  1935    PCP on record: Brittany Dailey MD    Admit date: 9/28/2018  Discharge date and time: 10/3/2018      DISCHARGE DIAGNOSIS AND HOSPITAL COURSE:    Monocular Vision Loss  And pain to Right Eye with associated headache. Possible Temporal Arteritis? ESR 84. Less likely CVA vs myeloproliferative dz. Clinically symptoms improved. -Pt declines biopsy   -Transitioned from solumedrol course to prednisone  -Patient can not have MRI because of PPM  -Patient can not have CTA Head and Neck because of CKD  -Neuro on the case     Hypertensive urgency/emergency. BP up to 180s+. Possibly contributing to symptoms? Improved. BPs stable upon discharge  -prn hydralazine and labetalol  -up-titrated home BP regimen  -on norvasc,hydralazine, toprol     Klebsiella / proteus 100k UTI; POA:  -s/p course of IV rocephin  -asymptomatic      SSS s/p PPM  Hypertension  -stable  -continue home BB, ARB and Amlodipine      Gout without flare  -stopped allopurinol transiently as a rare side effect of this medication is vasculitis and macular retinitis. Unlikely in this case      PMH of Dementia, Depression, PUD, DM 2, CKD stage 3:  -c/w home regimen  -monitored closely      Body mass index is 31.94 kg/(m^2).: 30.0 - 39.9 Obese      CONSULTATIONS:  IP CONSULT TO VASCULAR SURGERY  IP CONSULT TO NEUROLOGY    Excerpted HPI from H&P of Lluvia Nguyen MD:  [de-identified] y.o.  female who presents with loss of vision in her right eye over the last 5 days. Patient reprots that she was started on eye drops for \"pink eye\" last Sunday. She reports that she has a \"headache with dizziness. \" She denies tenderness over her temporal arteries. Patient denies other neurologic symptoms. Eye exam in the ED was unremarkable.  Patient states that she does not have a MRI compatible PPM and can not have IV contrast because of her kideny function. ______________________________________________________________________  Patient seen and examined by me on discharge day. General:                    Alert, cooperative, no distress, appears stated age. HEENT:                     Atraumatic, anicteric sclerae  Neck:                                   Supple, symmetrical  Lungs:                       Clear to auscultation bilaterally. No Wheezing or Rhonchi. No rales. No Accessory muscle use. Heart:                                  Regular  rhythm,  Normal S1 and S2   No edema  Abdomen:                  Soft, non-tender. Not distended. Bowel sounds normal.  No rebound  Extremities:               No cyanosis. No clubbing  Skin:                                    Warm, dry   Neurologic:                R vision loss, CNs otherwise grossly intact, 5/5 strength bilat  _______________________________________________________________________  DISCHARGE MEDICATIONS:   Discharge Medication List as of 10/3/2018  1:21 PM      START taking these medications    Details   predniSONE (DELTASONE) 20 mg tablet Take 3 Tabs by mouth daily (with breakfast). , Print, Disp-90 Tab, R-0         CONTINUE these medications which have CHANGED    Details   amLODIPine (NORVASC) 10 mg tablet Take 1 Tab by mouth daily. , Print, Disp-30 Tab, R-0         CONTINUE these medications which have NOT CHANGED    Details   ipratropium (ATROVENT) 0.03 % nasal spray 2 Sprays by Both Nostrils route every twelve (12) hours. , Normal, Disp-30 mL, R-0      hydrOXYzine pamoate (VISTARIL) 25 mg capsule Take 1 Cap by mouth nightly as needed for Itching., Normal, Disp-15 Cap, R-0      betamethasone valerate (VALISONE) 0.1 % topical cream Apply  to affected area two (2) times a day., Normal, Disp-45 g, R-0      famotidine (PEPCID) 20 mg tablet Take 1 Tab by mouth nightly.  Indications: ZOLLINGER-ROBERSON SYNDROME, Print, Disp-30 Tab, R-0      pantoprazole (PROTONIX) 40 mg tablet Take 40 mg by mouth daily. , Historical Med      b complex vitamins tablet Take 1 Tab by mouth daily. , Historical Med      LACTOBACILLUS ACIDOPHILUS (PROBIOTIC PO) Take 1 Cap by mouth daily. , Historical Med      FERROUS SULFATE PO Take 325 mg by mouth daily. , Historical Med      gabapentin (NEURONTIN) 100 mg capsule Take 200 mg by mouth two (2) times daily as needed., Historical Med      metoprolol succinate (TOPROL-XL) 50 mg XL tablet Take 1 Tab by mouth daily. , Print, Disp-30 Tab, R-11      pravastatin (PRAVACHOL) 20 mg tablet Take 1 Tab by mouth nightly. , Print, Disp-30 Tab, R-11      allopurinol (ZYLOPRIM) 300 mg tablet Take 300 mg by mouth daily. , Historical Med      donepezil (ARICEPT) 5 mg tablet Take 5 mg by mouth nightly., Historical Med, R-1      PROAIR HFA 90 mcg/actuation inhaler Take 2 Puffs by inhalation four (4) times daily as needed., Historical Med, MINESH      furosemide (LASIX) 40 mg tablet Take 40 mg by mouth daily as needed., Historical Med      montelukast (SINGULAIR) 10 mg tablet Take 10 mg by mouth nightly., Historical Med      sertraline (ZOLOFT) 100 mg tablet Take 100 mg by mouth nightly., Historical Med      losartan (COZAAR) 100 mg tablet Take 100 mg by mouth nightly., Historical Med         STOP taking these medications       trimethoprim-polymyxin b (POLYTRIM) ophthalmic solution Comments:   Reason for Stopping:               My Recommended Diet, Activity, Wound Care, and follow-up labs are listed in the patient's Discharge Insturctions which I have personally completed and reviewed.     ______________________________________________________________________    Risk of deterioration: Low    Condition at Discharge:  Stable  ______________________________________________________________________    Disposition  Home with family, no needs    ______________________________________________________________________      Code Status: Full Code  ______________________________________________________________________      Follow up with:   PCP : Agus Delarosa MD  Follow-up Information     None              Total time in minutes spent coordinating this discharge (includes going over instructions, follow-up, prescriptions, and preparing report for sign off to her PCP) :  45 minutes    Signed:  Vince Gonzales MD

## 2018-10-03 NOTE — PROGRESS NOTES
Pt's daughter transported pt home by car. Pt's PCP Dr. Davina Orr, pt doesn't go to since she changed her insurance. Pt's daughter in law set pt up with a new PCP and has an appt. Pt and pt's daughter was not sure of the PCP's name. Care Management Interventions PCP Verified by CM: Yes Mode of Transport at Discharge: Other (see comment) (pt's family will transport by car) Hospital Transport Time of Discharge: 1500 Transition of Care Consult (CM Consult): Discharge Planning Discharge Durable Medical Equipment: No 
Physical Therapy Consult: Yes Occupational Therapy Consult: Yes Speech Therapy Consult: No 
Current Support Network: Lives with Spouse, Own Home (pt lives with her  in a 1 story home with 1 step to the entrance) Confirm Follow Up Transport: Family Plan discussed with Pt/Family/Caregiver: Yes Discharge Location Discharge Placement: Home [facility], [address] [facility]

## 2018-10-04 LAB
BACTERIA SPEC CULT: NORMAL
BACTERIA SPEC CULT: NORMAL
C JEJUNI+C COLI AG STL QL: NEGATIVE
E COLI SXT1+2 STL IA: NO GROWTH
FAT STL QL: NORMAL
NEUTRAL FAT STL QL: NORMAL
SERVICE CMNT-IMP: NORMAL

## 2018-10-06 LAB
BACTERIA SPEC CULT: NORMAL
SERVICE CMNT-IMP: NORMAL

## 2018-12-03 ENCOUNTER — ANESTHESIA EVENT (OUTPATIENT)
Dept: ENDOSCOPY | Age: 83
End: 2018-12-03
Payer: MEDICARE

## 2018-12-03 ENCOUNTER — HOSPITAL ENCOUNTER (OUTPATIENT)
Age: 83
Setting detail: OUTPATIENT SURGERY
Discharge: HOME | End: 2018-12-03
Attending: INTERNAL MEDICINE | Admitting: INTERNAL MEDICINE
Payer: MEDICARE

## 2018-12-03 ENCOUNTER — ANESTHESIA (OUTPATIENT)
Dept: ENDOSCOPY | Age: 83
End: 2018-12-03
Payer: MEDICARE

## 2018-12-03 ENCOUNTER — APPOINTMENT (OUTPATIENT)
Dept: ULTRASOUND IMAGING | Age: 83
End: 2018-12-03
Attending: INTERNAL MEDICINE
Payer: MEDICARE

## 2018-12-03 VITALS
OXYGEN SATURATION: 95 % | HEART RATE: 62 BPM | DIASTOLIC BLOOD PRESSURE: 55 MMHG | SYSTOLIC BLOOD PRESSURE: 116 MMHG | RESPIRATION RATE: 16 BRPM | TEMPERATURE: 98 F

## 2018-12-03 PROCEDURE — 74011250636 HC RX REV CODE- 250/636

## 2018-12-03 PROCEDURE — 76040000019: Performed by: INTERNAL MEDICINE

## 2018-12-03 PROCEDURE — 74011000250 HC RX REV CODE- 250

## 2018-12-03 PROCEDURE — 76060000031 HC ANESTHESIA FIRST 0.5 HR: Performed by: INTERNAL MEDICINE

## 2018-12-03 RX ORDER — SODIUM CHLORIDE 0.9 % (FLUSH) 0.9 %
5-10 SYRINGE (ML) INJECTION AS NEEDED
Status: DISCONTINUED | OUTPATIENT
Start: 2018-12-03 | End: 2018-12-03 | Stop reason: HOSPADM

## 2018-12-03 RX ORDER — MIDAZOLAM HYDROCHLORIDE 1 MG/ML
.25-1 INJECTION, SOLUTION INTRAMUSCULAR; INTRAVENOUS
Status: DISCONTINUED | OUTPATIENT
Start: 2018-12-03 | End: 2018-12-03 | Stop reason: HOSPADM

## 2018-12-03 RX ORDER — ATROPINE SULFATE 0.1 MG/ML
0.5 INJECTION INTRAVENOUS
Status: DISCONTINUED | OUTPATIENT
Start: 2018-12-03 | End: 2018-12-03 | Stop reason: HOSPADM

## 2018-12-03 RX ORDER — NALOXONE HYDROCHLORIDE 0.4 MG/ML
0.4 INJECTION, SOLUTION INTRAMUSCULAR; INTRAVENOUS; SUBCUTANEOUS
Status: DISCONTINUED | OUTPATIENT
Start: 2018-12-03 | End: 2018-12-03 | Stop reason: HOSPADM

## 2018-12-03 RX ORDER — GLYCOPYRROLATE 0.2 MG/ML
INJECTION INTRAMUSCULAR; INTRAVENOUS AS NEEDED
Status: DISCONTINUED | OUTPATIENT
Start: 2018-12-03 | End: 2018-12-03 | Stop reason: HOSPADM

## 2018-12-03 RX ORDER — FLUMAZENIL 0.1 MG/ML
0.2 INJECTION INTRAVENOUS
Status: DISCONTINUED | OUTPATIENT
Start: 2018-12-03 | End: 2018-12-03 | Stop reason: HOSPADM

## 2018-12-03 RX ORDER — FENTANYL CITRATE 50 UG/ML
100 INJECTION, SOLUTION INTRAMUSCULAR; INTRAVENOUS
Status: DISCONTINUED | OUTPATIENT
Start: 2018-12-03 | End: 2018-12-03 | Stop reason: HOSPADM

## 2018-12-03 RX ORDER — PROPOFOL 10 MG/ML
INJECTION, EMULSION INTRAVENOUS AS NEEDED
Status: DISCONTINUED | OUTPATIENT
Start: 2018-12-03 | End: 2018-12-03 | Stop reason: HOSPADM

## 2018-12-03 RX ORDER — DEXTROMETHORPHAN/PSEUDOEPHED 2.5-7.5/.8
1.2 DROPS ORAL
Status: DISCONTINUED | OUTPATIENT
Start: 2018-12-03 | End: 2018-12-03 | Stop reason: HOSPADM

## 2018-12-03 RX ORDER — EPINEPHRINE 0.1 MG/ML
1 INJECTION INTRACARDIAC; INTRAVENOUS
Status: DISCONTINUED | OUTPATIENT
Start: 2018-12-03 | End: 2018-12-03 | Stop reason: HOSPADM

## 2018-12-03 RX ORDER — SODIUM CHLORIDE 0.9 % (FLUSH) 0.9 %
5-10 SYRINGE (ML) INJECTION EVERY 8 HOURS
Status: DISCONTINUED | OUTPATIENT
Start: 2018-12-03 | End: 2018-12-03 | Stop reason: HOSPADM

## 2018-12-03 RX ORDER — SODIUM CHLORIDE 9 MG/ML
50 INJECTION, SOLUTION INTRAVENOUS CONTINUOUS
Status: DISCONTINUED | OUTPATIENT
Start: 2018-12-03 | End: 2018-12-03 | Stop reason: HOSPADM

## 2018-12-03 RX ORDER — LIDOCAINE HYDROCHLORIDE 20 MG/ML
INJECTION, SOLUTION EPIDURAL; INFILTRATION; INTRACAUDAL; PERINEURAL AS NEEDED
Status: DISCONTINUED | OUTPATIENT
Start: 2018-12-03 | End: 2018-12-03 | Stop reason: HOSPADM

## 2018-12-03 RX ORDER — SODIUM CHLORIDE 9 MG/ML
INJECTION, SOLUTION INTRAVENOUS
Status: DISCONTINUED | OUTPATIENT
Start: 2018-12-03 | End: 2018-12-03 | Stop reason: HOSPADM

## 2018-12-03 RX ORDER — GUAIFENESIN 100 MG/5ML
81 LIQUID (ML) ORAL DAILY
COMMUNITY

## 2018-12-03 RX ADMIN — PROPOFOL 50 MG: 10 INJECTION, EMULSION INTRAVENOUS at 13:45

## 2018-12-03 RX ADMIN — PROPOFOL 50 MG: 10 INJECTION, EMULSION INTRAVENOUS at 13:43

## 2018-12-03 RX ADMIN — PROPOFOL 50 MG: 10 INJECTION, EMULSION INTRAVENOUS at 13:38

## 2018-12-03 RX ADMIN — LIDOCAINE HYDROCHLORIDE 60 MG: 20 INJECTION, SOLUTION EPIDURAL; INFILTRATION; INTRACAUDAL; PERINEURAL at 13:38

## 2018-12-03 RX ADMIN — SODIUM CHLORIDE: 9 INJECTION, SOLUTION INTRAVENOUS at 13:07

## 2018-12-03 RX ADMIN — GLYCOPYRROLATE 0.2 MG: 0.2 INJECTION INTRAMUSCULAR; INTRAVENOUS at 13:41

## 2018-12-03 RX ADMIN — PROPOFOL 50 MG: 10 INJECTION, EMULSION INTRAVENOUS at 13:40

## 2018-12-03 NOTE — ANESTHESIA PREPROCEDURE EVALUATION
Anesthetic History No history of anesthetic complications Review of Systems / Medical History Patient summary reviewed, nursing notes reviewed and pertinent labs reviewed Pulmonary Within defined limits Sleep apnea Asthma Neuro/Psych Within defined limits Psychiatric history Cardiovascular Within defined limits Hypertension Dysrhythmias GI/Hepatic/Renal 
Within defined limits GERD 
 
 
PUD Endo/Other Within defined limits Diabetes Arthritis Other Findings Physical Exam 
 
Airway Mallampati: II 
TM Distance: > 6 cm Neck ROM: normal range of motion Mouth opening: Normal 
 
 Cardiovascular Regular rate and rhythm,  S1 and S2 normal,  no murmur, click, rub, or gallop Dental 
 
Dentition: Poor dentition Pulmonary Breath sounds clear to auscultation Abdominal 
GI exam deferred Other Findings Anesthetic Plan ASA: 3 Anesthesia type: MAC Anesthetic plan and risks discussed with: Patient

## 2018-12-03 NOTE — PROGRESS NOTES

## 2018-12-03 NOTE — ROUTINE PROCESS
Oriana Arreola  1935  [id number]    Situation:  Verbal report received from: Yanet Plunktet  Procedure: Procedure(s):  ENDOSCOPIC ULTRASOUND (EUS)  ESOPHAGOGASTRODUODENOSCOPY (EGD)    Background:    Preoperative diagnosis: CYST OF PANCREAS  Postoperative diagnosis: 1. Hiatal Hernia  2. Pancrease Head Cyst    :  Dr. Lorena Ojeda  Assistant(s): Endoscopy Technician-1: Jeanne Cordova  Endoscopy RN-1: Mandi Bolden RN    Specimens: * No specimens in log *  H. Pylori  no    Assessment:  Intra-procedure medications   Anesthesia gave intra-procedure sedation and medications, see anesthesia flow sheet yes    Intravenous fluids: NS@ KVO     Vital signs stable     Abdominal assessment: round and soft     Recommendation:  Discharge patient per MD order.     Family or Friend   Permission to share finding with family or friend yes

## 2018-12-03 NOTE — PROCEDURES
[address] [facility]  [address] [facility] [address]  [place], [address]  [phone]                                Endoscopic Ultrasound    NAME:  Marvin Almendarez   :   1935   MRN:   [id number]       Date/Time:  12/3/2018   Procedure Type: Linear EUS       Indications: Pancreatic cyst    Pre-operative Diagnosis: see indication above    Post-operative Diagnosis:  See findings below    : Gutierrez Sparrow MD    Referring Provider: -Tani Franco MD    Anethesia/Sedation:  MAC anesthesia      Procedure Details   After infom consent was obtained for the procedure, with all risks and benefits of procedure explained the patient was taken to the endoscopy suite and placed in the left lateral decubitus position. Following sequential administration of sedation as per above, the linear echoendoscope was inserted into the mouth and advanced under direct vision to second portion of the duodenum. A careful inspection was made as the gastroscope was withdrawn, including a retroflexed view of the proximal stomach; findings and interventions are described below. Findings:     Endoscopic:   Esophagus: small hiatal hernia   Stomach: normal    Duodenum/jejunum: normal    Ultrasound:   Esophagus: not examined   Stomach: not examined   Pancreas:     Areas examined: the entire gland    Parenchyma: -A thin walled cyst with small mucin plugs was seen in head of pancreas and measured about 12 X 8 mm. No wall thickness or associated mass lesion were noted. Otherwise, normal pancreas, including the main pancreatic duct and side branches    Pancreatic Duct: normal findings   Liver:     Parenchyma: not examined    Gallbladder: not examined    Bile Duct: the common bile duct was normal                Lymph Node: no adenopathy        Specimen Removed:  None    Complications: None. EBL:  None.     Interventions: none      Recommendations:   -Continue current medications-  -No further surveillance required as cyst is small and has not changed in size  -Follow up with Dr Houston Friedman as scheduled    Teddy Ledezma MD  12/3/2018  2:00 PM

## 2018-12-03 NOTE — DISCHARGE INSTRUCTIONS
[address]  [address] [facility]  [phone]                     DISCHARGE INSTRUCTIONS    Katheryn Lujan  [id number]  1935    DISCOMFORT:  Sore throat- throat lozenges or warm salt water gargle  redness at IV site- apply warm compress to area; if redness or soreness persist- contact your physician  Gaseous discomfort- walking, belching will help relieve any discomfort  You may not operate a vehicle for 12 hours  You may not engage in an occupation involving machinery or appliances for rest of today  You may not drink alcoholic beverages for at least 12 hours  Avoid making any critical decisions for at least 24 hour  DIET  You may eat and drink after you leave. You may resume your regular diet - however -  remember your colon is empty and a heavy meal will produce gas. Avoid these foods:  vegetables, fried / greasy foods, carbonated drinks    ACTIVITY  You may resume your normal daily activities   Spend the remainder of the day resting -  avoid any strenuous activity. CALL M.D. ANY SIGN OF   Increasing pain, nausea, vomiting  Abdominal distension (swelling)  New increased bleeding (oral or rectal)  Fever (chills)  Pain in chest area  Bloody discharge from nose or mouth  Shortness of breath    Follow-up Instructions:   Call Dr. Kervin Edouard for any questions or problems. If we took a biopsy please call the office within 2 weeks to discuss your                             pathology results. Telephone # [phone]        Continue same medications.

## 2018-12-03 NOTE — ANESTHESIA POSTPROCEDURE EVALUATION
Procedure(s): ENDOSCOPIC ULTRASOUND (EUS) ESOPHAGOGASTRODUODENOSCOPY (EGD). Anesthesia Post Evaluation Comments: Post-Anesthesia Evaluation and Assessment I have evaluated the patient and they are ready for PACU discharge. Patient: Ying Scherer MRN: [id number]  SSN: [id number] YOB: 1935  Age: 82 y.o. Sex: female Cardiovascular Function/Vital Signs /66   Pulse 62   Temp 36.7 °C (98 °F)   Resp 18   SpO2 97% Patient is status post MAC anesthesia for Procedure(s): ENDOSCOPIC ULTRASOUND (EUS) ESOPHAGOGASTRODUODENOSCOPY (EGD). Nausea/Vomiting: None Postoperative hydration reviewed and adequate. Pain: 
Pain Scale 1: Numeric (0 - 10) (12/03/18 1338) Pain Intensity 1: 0 (12/03/18 1338) Managed Neurological Status: At baseline Mental Status, Level of Consciousness: Alert and  oriented to person, place, and time Pulmonary Status:  
O2 Device: Nasal cannula (12/03/18 1400) Adequate oxygenation and airway patent Complications related to anesthesia: None Post-anesthesia assessment completed. No concerns Signed By: Leslie Land MD  
 December 3, 2018 Visit Vitals /66 Pulse 62 Temp 36.7 °C (98 °F) Resp 18 SpO2 97%

## 2018-12-05 NOTE — H&P
[address] [facility] [address]  [address] [facility] 140 [facility], [address]  [phone]                                History and Physical     NAME: Oriana Arreola   :  1935   MRN:  [id number]     HPI:  The patient was seen and examined.     Past Surgical History:   Procedure Laterality Date    HX APPENDECTOMY      HX CHOLECYSTECTOMY      HX GYN          HX HEENT Right     laser eye surgery to stop bleeding in back of eye - multiple laser surgeries    HX KNEE ARTHROSCOPY      left knee; cartilage repair    HX ORTHOPAEDIC      right hip replacement    HX ORTHOPAEDIC      lower back surgery    HX ORTHOPAEDIC      straighten toe - 2nd toe on right    HX ORTHOPAEDIC Bilateral     carpal tunnel release    HX PACEMAKER      not defibrillator    HX UROLOGICAL      implant in hip to control urine and then removed    HX UROLOGICAL      Right kidney partial nephrectomy     Past Medical History:   Diagnosis Date    Adverse effect of anesthesia     passed out during EGD/stopped breathing    Arrhythmia     has pacemaker for low HR    Arthritis     Asthma     Cancer ([address]     right kidney - surgery    Chronic pain     right side    Deep vein thrombosis (DVT) during current hospitalization ([address]     history of DVT was on coumadin in the past    Diabetes ([address]     diet controlled    GERD (gastroesophageal reflux disease)     H/O acute pancreatitis     Hiatal hernia     Hypertension     MARLEN (obstructive sleep apnea)     does not use CPAP/pt states she has not used since a pacemaker inserted    Other ill-defined conditions(799.89)     lymph node enlargement - left chest - benign bx - watching    Other ill-defined conditions(799.89)     wheezes    Psychiatric disorder     depression    PUD (peptic ulcer disease)     hx of    Thromboembolus ([address]     Unspecified adverse effect of anesthesia     passed out during EGD per pt - stopped breathing per pt per MD     Social History Tobacco Use    Smoking status: Former Smoker     Packs/day: 0.25     Years: 20.00     Pack years: 5.00     Last attempt to quit: 1971     Years since quittin.3    Smokeless tobacco: Never Used   Substance Use Topics    Alcohol use: No    Drug use: No     Allergies   Allergen Reactions    Pcn [Penicillins] Rash     But can take cephalosporins. Allergic to all \"cillins\".  Codeine Other (comments)     Hallucinations, takes hydrocodone at home for pain    Contrast Dye [Iodine] Other (comments)     Not to take due to kidney function    Prednisone Other (comments)     \"makes my pancreas numbers go way up\"     Family History   Problem Relation Age of Onset    Stroke Mother         brain aneurysm    Hypertension Father     Heart Disease Father     Cancer Sister         breast    Cancer Brother         lung    Cancer Sister         breast    SLE Other         half siblings (5+) - lupus    Hypertension Daughter     Diabetes Daughter      No current facility-administered medications for this encounter. Current Outpatient Medications   Medication Sig    aspirin 81 mg chewable tablet Take 81 mg by mouth daily.  metformin HCl (METFORMIN PO) Take  by mouth.  amLODIPine (NORVASC) 10 mg tablet Take 1 Tab by mouth daily.  famotidine (PEPCID) 20 mg tablet Take 1 Tab by mouth nightly. Indications: ZOLLINGER-ROBERSON SYNDROME    pantoprazole (PROTONIX) 40 mg tablet Take 40 mg by mouth daily.  LACTOBACILLUS ACIDOPHILUS (PROBIOTIC PO) Take 1 Cap by mouth daily.  FERROUS SULFATE PO Take 325 mg by mouth daily.  metoprolol succinate (TOPROL-XL) 50 mg XL tablet Take 1 Tab by mouth daily.  pravastatin (PRAVACHOL) 20 mg tablet Take 1 Tab by mouth nightly.  donepezil (ARICEPT) 5 mg tablet Take 5 mg by mouth nightly.  PROAIR HFA 90 mcg/actuation inhaler Take 2 Puffs by inhalation four (4) times daily as needed.     furosemide (LASIX) 40 mg tablet Take 40 mg by mouth daily as needed.  montelukast (SINGULAIR) 10 mg tablet Take 10 mg by mouth nightly.  sertraline (ZOLOFT) 100 mg tablet Take 100 mg by mouth nightly.  losartan (COZAAR) 100 mg tablet Take 100 mg by mouth nightly.  ipratropium (ATROVENT) 0.03 % nasal spray 2 Sprays by Both Nostrils route every twelve (12) hours.  hydrOXYzine pamoate (VISTARIL) 25 mg capsule Take 1 Cap by mouth nightly as needed for Itching.  betamethasone valerate (VALISONE) 0.1 % topical cream Apply  to affected area two (2) times a day.  b complex vitamins tablet Take 1 Tab by mouth daily.  allopurinol (ZYLOPRIM) 300 mg tablet Take 300 mg by mouth daily. PHYSICAL EXAM:  General: WD, WN. Alert, cooperative, no acute distress    HEENT: NC, Atraumatic. PERRLA, EOMI. Anicteric sclerae. Lungs:  CTA Bilaterally. No Wheezing/Rhonchi/Rales. Heart:  Regular  rhythm,  No murmur, No Rubs, No Gallops  Abdomen: Soft, Non distended, Non tender.  +Bowel sounds, no HSM  Extremities: No c/c/e  Neurologic:  CN 2-12 gi, Alert and oriented X 3. No acute neurological distress   Psych:   Good insight. Not anxious nor agitated. The heart, lungs and mental status were satisfactory for the administration of MAC sedation and for the procedure.       Mallampati score: 3       Assessment:   · Pancreas cyst    Plan:   · Endoscopic procedure  · MAC sedation   ·

## 2019-09-27 ENCOUNTER — HOSPITAL ENCOUNTER (OUTPATIENT)
Dept: CT IMAGING | Age: 84
Discharge: HOME | End: 2019-09-27
Attending: SPECIALIST
Payer: MEDICARE

## 2019-09-27 DIAGNOSIS — K59.1 FUNCTIONAL DIARRHEA: ICD-10-CM

## 2019-09-27 DIAGNOSIS — K86.2 CYST OF PANCREAS: ICD-10-CM

## 2019-09-27 DIAGNOSIS — E11.9 DIABETES MELLITUS TYPE 2, CONTROLLED (HCC): ICD-10-CM

## 2019-09-27 LAB — CREAT BLD-MCNC: 1.7 MG/DL (ref 0.6–1.3)

## 2019-09-27 PROCEDURE — 82565 ASSAY OF CREATININE: CPT

## 2019-09-27 PROCEDURE — 74176 CT ABD & PELVIS W/O CONTRAST: CPT

## 2019-09-27 RX ORDER — SODIUM CHLORIDE 0.9 % (FLUSH) 0.9 %
10 SYRINGE (ML) INJECTION
Status: DISCONTINUED | OUTPATIENT
Start: 2019-09-27 | End: 2019-09-28 | Stop reason: HOSPADM

## 2020-01-29 ENCOUNTER — HOSPITAL ENCOUNTER (OUTPATIENT)
Age: 85
Setting detail: OUTPATIENT SURGERY
Discharge: HOME | End: 2020-01-29
Attending: INTERNAL MEDICINE | Admitting: INTERNAL MEDICINE
Payer: MEDICARE

## 2020-01-29 ENCOUNTER — ANESTHESIA (OUTPATIENT)
Dept: ENDOSCOPY | Age: 85
End: 2020-01-29
Payer: MEDICARE

## 2020-01-29 ENCOUNTER — ANESTHESIA EVENT (OUTPATIENT)
Dept: ENDOSCOPY | Age: 85
End: 2020-01-29
Payer: MEDICARE

## 2020-01-29 VITALS
OXYGEN SATURATION: 97 % | HEART RATE: 64 BPM | DIASTOLIC BLOOD PRESSURE: 73 MMHG | TEMPERATURE: 97.6 F | SYSTOLIC BLOOD PRESSURE: 132 MMHG | WEIGHT: 177 LBS | HEIGHT: 64 IN | BODY MASS INDEX: 30.22 KG/M2 | RESPIRATION RATE: 16 BRPM

## 2020-01-29 PROCEDURE — 77030021593 HC FCPS BIOP ENDOSC BSC -A: Performed by: INTERNAL MEDICINE

## 2020-01-29 PROCEDURE — C1726 CATH, BAL DIL, NON-VASCULAR: HCPCS | Performed by: INTERNAL MEDICINE

## 2020-01-29 PROCEDURE — 88305 TISSUE EXAM BY PATHOLOGIST: CPT

## 2020-01-29 PROCEDURE — 74011250636 HC RX REV CODE- 250/636: Performed by: NURSE ANESTHETIST, CERTIFIED REGISTERED

## 2020-01-29 PROCEDURE — 74011000250 HC RX REV CODE- 250: Performed by: NURSE ANESTHETIST, CERTIFIED REGISTERED

## 2020-01-29 PROCEDURE — 76060000031 HC ANESTHESIA FIRST 0.5 HR: Performed by: INTERNAL MEDICINE

## 2020-01-29 PROCEDURE — 74011250636 HC RX REV CODE- 250/636: Performed by: INTERNAL MEDICINE

## 2020-01-29 PROCEDURE — 76040000019: Performed by: INTERNAL MEDICINE

## 2020-01-29 RX ORDER — FLUMAZENIL 0.1 MG/ML
0.2 INJECTION INTRAVENOUS
Status: DISCONTINUED | OUTPATIENT
Start: 2020-01-29 | End: 2020-01-29 | Stop reason: HOSPADM

## 2020-01-29 RX ORDER — EPINEPHRINE 0.1 MG/ML
1 INJECTION INTRACARDIAC; INTRAVENOUS
Status: DISCONTINUED | OUTPATIENT
Start: 2020-01-29 | End: 2020-01-29 | Stop reason: HOSPADM

## 2020-01-29 RX ORDER — SODIUM CHLORIDE 9 MG/ML
INJECTION, SOLUTION INTRAVENOUS
Status: DISCONTINUED | OUTPATIENT
Start: 2020-01-29 | End: 2020-01-29 | Stop reason: HOSPADM

## 2020-01-29 RX ORDER — ATROPINE SULFATE 0.1 MG/ML
0.5 INJECTION INTRAVENOUS
Status: DISCONTINUED | OUTPATIENT
Start: 2020-01-29 | End: 2020-01-29 | Stop reason: HOSPADM

## 2020-01-29 RX ORDER — LIDOCAINE HYDROCHLORIDE 20 MG/ML
INJECTION, SOLUTION EPIDURAL; INFILTRATION; INTRACAUDAL; PERINEURAL AS NEEDED
Status: DISCONTINUED | OUTPATIENT
Start: 2020-01-29 | End: 2020-01-29 | Stop reason: HOSPADM

## 2020-01-29 RX ORDER — SODIUM CHLORIDE 0.9 % (FLUSH) 0.9 %
5-40 SYRINGE (ML) INJECTION AS NEEDED
Status: DISCONTINUED | OUTPATIENT
Start: 2020-01-29 | End: 2020-01-29 | Stop reason: HOSPADM

## 2020-01-29 RX ORDER — PROPOFOL 10 MG/ML
INJECTION, EMULSION INTRAVENOUS AS NEEDED
Status: DISCONTINUED | OUTPATIENT
Start: 2020-01-29 | End: 2020-01-29 | Stop reason: HOSPADM

## 2020-01-29 RX ORDER — SODIUM CHLORIDE 9 MG/ML
50 INJECTION, SOLUTION INTRAVENOUS CONTINUOUS
Status: DISCONTINUED | OUTPATIENT
Start: 2020-01-29 | End: 2020-01-29 | Stop reason: HOSPADM

## 2020-01-29 RX ORDER — NALOXONE HYDROCHLORIDE 0.4 MG/ML
0.4 INJECTION, SOLUTION INTRAMUSCULAR; INTRAVENOUS; SUBCUTANEOUS
Status: DISCONTINUED | OUTPATIENT
Start: 2020-01-29 | End: 2020-01-29 | Stop reason: HOSPADM

## 2020-01-29 RX ORDER — DEXTROMETHORPHAN/PSEUDOEPHED 2.5-7.5/.8
1.2 DROPS ORAL
Status: DISCONTINUED | OUTPATIENT
Start: 2020-01-29 | End: 2020-01-29 | Stop reason: HOSPADM

## 2020-01-29 RX ORDER — SODIUM CHLORIDE 0.9 % (FLUSH) 0.9 %
5-40 SYRINGE (ML) INJECTION EVERY 8 HOURS
Status: DISCONTINUED | OUTPATIENT
Start: 2020-01-29 | End: 2020-01-29 | Stop reason: HOSPADM

## 2020-01-29 RX ADMIN — PROPOFOL 50 MG: 10 INJECTION, EMULSION INTRAVENOUS at 08:57

## 2020-01-29 RX ADMIN — SODIUM CHLORIDE: 900 INJECTION, SOLUTION INTRAVENOUS at 08:54

## 2020-01-29 RX ADMIN — PROPOFOL 50 MG: 10 INJECTION, EMULSION INTRAVENOUS at 08:59

## 2020-01-29 RX ADMIN — PROPOFOL 20 MG: 10 INJECTION, EMULSION INTRAVENOUS at 09:02

## 2020-01-29 RX ADMIN — LIDOCAINE HYDROCHLORIDE 40 MG: 20 INJECTION, SOLUTION EPIDURAL; INFILTRATION; INTRACAUDAL; PERINEURAL at 08:56

## 2020-01-29 NOTE — PROGRESS NOTES
Estee Schulte  1935  [id number]    Situation:  Verbal report received from: rod barney rn  Procedure: Procedure(s):  ESOPHAGOGASTRODUODENOSCOPY (EGD)  ESOPHAGEAL DILATION  ESOPHAGOGASTRODUODENAL (EGD) BIOPSY    Background:    Preoperative diagnosis: DYSPHAGIA  Postoperative diagnosis: 1)gastritis  2)hiatal hernia  3)Schatzki ring    :  Dr. Maribel Newby  Assistant(s): Endoscopy Technician-1: Noe Ellis  Endoscopy RN-1: Geovany Donohue RN  Float Staff: Anabel Anderson RN    Specimens:   ID Type Source Tests Collected by Time Destination   1 : gastric bx Preservative Gastric  Genita Code, MD 1/29/2020 8765 Pathology     H. Pylori  no    Assessment:  Intra-procedure medications     Anesthesia gave intra-procedure sedation and medications, see anesthesia flow sheet yes    Intravenous fluids: NS@ KVO     Vital signs stable  yes    Abdominal assessment: round and soft  yes    Recommendation:  Discharge patient per MD order yes.   Family or Friend  yes  Permission to share finding with family or friend yes

## 2020-01-29 NOTE — ANESTHESIA POSTPROCEDURE EVALUATION
Post-Anesthesia Evaluation and Assessment    Patient: Daren Bhakta MRN: [id number]  SSN: [id number]    YOB: 1935  Age: 82 y.o. Sex: female      I have evaluated the patient and they are stable and ready for discharge from the PACU. Cardiovascular Function/Vital Signs  Visit Vitals  /73   Pulse 64   Temp 36.4 °C (97.6 °F)   Resp 16   Ht 5' 4\" (1.626 m)   Wt 80.3 kg (177 lb)   SpO2 97%   Breastfeeding No   BMI 30.38 kg/m²       Patient is status post MAC anesthesia for Procedure(s):  ESOPHAGOGASTRODUODENOSCOPY (EGD)  ESOPHAGEAL DILATION  ESOPHAGOGASTRODUODENAL (EGD) BIOPSY. Nausea/Vomiting: None    Postoperative hydration reviewed and adequate. Pain:  Pain Scale 1: Numeric (0 - 10) (01/29/20 0931)  Pain Intensity 1: 0 (01/29/20 0931)   Managed    Neurological Status: At baseline    Mental Status, Level of Consciousness: Alert and  oriented to person, place, and time    Pulmonary Status:   O2 Device: Room air (01/29/20 0931)   Adequate oxygenation and airway patent    Complications related to anesthesia: None    Post-anesthesia assessment completed. No concerns    Signed By: Geovani Hale MD     January 29, 2020              Procedure(s):  ESOPHAGOGASTRODUODENOSCOPY (EGD)  ESOPHAGEAL DILATION  ESOPHAGOGASTRODUODENAL (EGD) BIOPSY. MAC    <BSHSIANPOST>    Vitals Value Taken Time   /73 1/29/2020  9:31 AM   Temp 36.4 °C (97.6 °F) 1/29/2020  9:12 AM   Pulse 65 1/29/2020  9:31 AM   Resp 21 1/29/2020  9:31 AM   SpO2 97 % 1/29/2020  9:31 AM   Vitals shown include unvalidated device data.

## 2020-01-29 NOTE — PERIOP NOTES

## 2020-01-29 NOTE — H&P
118 [facility].  217 [address]  1400 [facility], [address]  [phone]                                History and Physical     NAME: Ronna Nicholas   :  1935   MRN:  [id number]     HPI:  The patient was seen and examined.     Past Surgical History:   Procedure Laterality Date    HX APPENDECTOMY      HX CHOLECYSTECTOMY      HX GYN          HX HEENT Right     laser eye surgery to stop bleeding in back of eye - multiple laser surgeries    HX KNEE ARTHROSCOPY      left knee; cartilage repair    HX ORTHOPAEDIC      right hip replacement    HX ORTHOPAEDIC      lower back surgery    HX ORTHOPAEDIC      straighten toe - 2nd toe on right    HX ORTHOPAEDIC Bilateral     carpal tunnel release    HX PACEMAKER      not defibrillator    HX UROLOGICAL      implant in hip to control urine and then removed    HX UROLOGICAL      Right kidney partial nephrectomy     Past Medical History:   Diagnosis Date    Adverse effect of anesthesia     passed out during EGD/stopped breathing    Arrhythmia     has pacemaker for low HR    Arthritis     Asthma     Cancer ([address]     right kidney - surgery    Chronic pain     right side    Deep vein thrombosis (DVT) during current hospitalization ([address]     history of DVT was on coumadin in the past    Diabetes ([address]     diet controlled    GERD (gastroesophageal reflux disease)     H/O acute pancreatitis     Hiatal hernia     Hypertension     MARLEN (obstructive sleep apnea)     does not use CPAP/pt states she has not used since a pacemaker inserted    Other ill-defined conditions(799.89)     lymph node enlargement - left chest - benign bx - watching    Other ill-defined conditions(799.89)     wheezes    Psychiatric disorder     depression    PUD (peptic ulcer disease)     hx of    Thromboembolus ([address]     Unspecified adverse effect of anesthesia     passed out during EGD per pt - stopped breathing per pt per MD     Social History Tobacco Use    Smoking status: Former Smoker     Packs/day: 0.25     Years: 20.00     Pack years: 5.00     Last attempt to quit: 1971     Years since quittin.4    Smokeless tobacco: Never Used   Substance Use Topics    Alcohol use: No    Drug use: No     Allergies   Allergen Reactions    Pcn [Penicillins] Rash     But can take cephalosporins. Allergic to all \"cillins\".  Codeine Other (comments)     Hallucinations, takes hydrocodone at home for pain    Contrast Dye [Iodine] Other (comments)     Not to take due to kidney function    Prednisone Other (comments)     \"makes my pancreas numbers go way up\"     Family History   Problem Relation Age of Onset    Stroke Mother         brain aneurysm    Hypertension Father     Heart Disease Father     Cancer Sister         breast    Cancer Brother         lung    Cancer Sister         breast    SLE Other         half siblings (5+) - lupus    Hypertension Daughter     Diabetes Daughter      Current Facility-Administered Medications   Medication Dose Route Frequency    0.9% sodium chloride infusion  50 mL/hr IntraVENous CONTINUOUS    sodium chloride (NS) flush 5-40 mL  5-40 mL IntraVENous Q8H    sodium chloride (NS) flush 5-40 mL  5-40 mL IntraVENous PRN    naloxone (NARCAN) injection 0.4 mg  0.4 mg IntraVENous Multiple    flumazeniL (ROMAZICON) 0.1 mg/mL injection 0.2 mg  0.2 mg IntraVENous Multiple    simethicone (MYLICON) 23ZC/6.6LB oral drops 80 mg  1.2 mL Oral Multiple    atropine injection 0.5 mg  0.5 mg IntraVENous ONCE PRN    EPINEPHrine (ADRENALIN) 0.1 mg/mL syringe 1 mg  1 mg Endoscopically ONCE PRN         PHYSICAL EXAM:  General: WD, WN. Alert, cooperative, no acute distress    HEENT: NC, Atraumatic. PERRLA, EOMI. Anicteric sclerae. Lungs:  CTA Bilaterally. No Wheezing/Rhonchi/Rales.   Heart:  Regular  rhythm,  No murmur, No Rubs, No Gallops  Abdomen: Soft, Non distended, Non tender.  +Bowel sounds, no HSM  Extremities: No c/c/e  Neurologic:  CN 2-12 gi, Alert and oriented X 3. No acute neurological distress   Psych:   Good insight. Not anxious nor agitated. The heart, lungs and mental status were satisfactory for the administration of MAC sedation and for the procedure.       Mallampati score: 2       Assessment:   · Dysphagia    Plan:   · Endoscopic procedure  · MAC sedation   ·

## 2020-01-29 NOTE — ANESTHESIA PREPROCEDURE EVALUATION
Relevant Problems   No relevant active problems       Anesthetic History               Review of Systems / Medical History  Patient summary reviewed, nursing notes reviewed and pertinent labs reviewed    Pulmonary        Sleep apnea  Pneumonia  Asthma     Comments: pnuemonia 1 week ago, not admitted.   Lungs clear, no SOB,    Neuro/Psych         Psychiatric history     Cardiovascular    Hypertension        Dysrhythmias       Exercise tolerance: >4 METS     GI/Hepatic/Renal     GERD      PUD     Endo/Other    Diabetes    Arthritis     Other Findings            Physical Exam    Airway  Mallampati: I  TM Distance: > 6 cm  Neck ROM: normal range of motion   Mouth opening: Normal     Cardiovascular    Rhythm: regular  Rate: normal         Dental  No notable dental hx       Pulmonary  Breath sounds clear to auscultation               Abdominal         Other Findings            Anesthetic Plan    ASA: 3  Anesthesia type: MAC          Induction: Intravenous  Anesthetic plan and risks discussed with: Patient

## 2020-01-29 NOTE — PROCEDURES
118 [facility].  217 [facility] 140 [facility], [address]  [phone]                            NAME:  Heriberto Vizcarra   :   1935   MRN:   [id number]     Date/Time:  2020 9:09 AM    Esophagogastroduodenoscopy (EGD) Procedure Note    :  Talita Holder MD    Staff: Endoscopy Technician-1: Tc Becerra  Endoscopy RN-1: Yara Alberto RN  Float Staff: Xena James RN     Implants: none    Referring Provider:  THALIA Dave    Anethesia/Sedation:  MAC anesthesia    Procedure Details     After infom consent was obtained for the procedure, with all risks and benefits of procedure explained the patient was taken to the endoscopy suite and placed in the left lateral decubitus position. Following sequential administration of sedation as per above, the [id number] gastroscope was inserted into the mouth and advanced under direct vision to second portion of the duodenum. A careful inspection was made as the gastroscope was withdrawn, including a retroflexed view of the proximal stomach; findings and interventions are described below. Findings:  Esophagus: Partially obstructing Schatzki's ring was noted in lower third of esophagus. Medium sliding hiatal hernia was noted. Stomach:moderate erythema was noted in  body, antrum  Duodenum/jejunum:normal      Therapies:  esophageal dilation with 15-18 mm CRE balloon  biopsy of stomach body, antrum    Specimens: biopsy of stomach body, antrum           EBL: None    Complications:   None; patient tolerated the procedure well. Impression:    See Postoperative diagnosis above    Recommendations:  -Continue acid suppression. , -Await pathology. , -Follow symptoms. , -GERD diet: avoid fried and fatty foods. peppermint, chocolate, alcohol, coffee, citrus fruits and juices, tomoato products; avoid lying down for 2 to 3 hours after eating.     Discharge disposition:  Home in the company of  when able to ambulate    Raz Pump, MD

## 2020-01-29 NOTE — DISCHARGE INSTRUCTIONS
118 [facility].  [address]  [phone]                     DISCHARGE INSTRUCTIONS    Leora Anderson  [id number]  1935    DISCOMFORT:  Sore throat- throat lozenges or warm salt water gargle  redness at IV site- apply warm compress to area; if redness or soreness persist- contact your physician  Gaseous discomfort- walking, belching will help relieve any discomfort    DIET  You may eat and drink after you leave. You may resume your regular diet - however -  remember your colon is empty and a heavy meal will produce gas. Avoid these foods:  vegetables, fried / greasy foods, carbonated drinks    ACTIVITY  You may resume your normal daily activities   Spend the remainder of the day resting -  avoid any strenuous activity. You may not operate a vehicle for 12 hours  You may not engage in an occupation involving machinery or appliances for rest of today  You may not drink alcoholic beverages for at least 12 hours  Avoid making any critical decisions for at least 24 hour    CALL M.D. ANY SIGN OF   Increasing pain, nausea, vomiting  Abdominal distension (swelling)  New increased bleeding (oral or rectal)  Fever (chills)  Pain in chest area  Bloody discharge from nose or mouth  Shortness of breath    Follow-up Instructions:   Call Dr. Naomi Moon for any questions or problems. If we took a biopsy please call the office within 2 weeks to discuss your pathology results.  Telephone # [phone]       ENDOSCOPY FINDINGS:  1)gastritis  2)hiatal hernia  3)Schatzki ring

## 2020-02-24 ENCOUNTER — HOSPITAL ENCOUNTER (OUTPATIENT)
Age: 85
Setting detail: OUTPATIENT SURGERY
Discharge: HOME | End: 2020-02-24
Attending: INTERNAL MEDICINE | Admitting: INTERNAL MEDICINE
Payer: MEDICARE

## 2020-02-24 VITALS
RESPIRATION RATE: 14 BRPM | HEIGHT: 64 IN | OXYGEN SATURATION: 100 % | HEART RATE: 76 BPM | BODY MASS INDEX: 30.22 KG/M2 | SYSTOLIC BLOOD PRESSURE: 147 MMHG | DIASTOLIC BLOOD PRESSURE: 74 MMHG | WEIGHT: 177 LBS

## 2020-02-24 PROCEDURE — 76040000007: Performed by: INTERNAL MEDICINE

## 2020-02-24 PROCEDURE — 77030040810 HC CATH BLLN ANORECT EXPULSN MUIS -B: Performed by: INTERNAL MEDICINE

## 2020-02-24 NOTE — DISCHARGE INSTRUCTIONS
Lara James  [id number]  1935      MANOMETRY DISCHARGE INSTRUCTION    You may resume your regular diet as tolerated. You may resume your normal daily activities. Call your Physician if you have any complications or questions. [employer] Activation    Thank you for requesting access to [facility]. Please follow the instructions below to securely access and download your online medical record. [facility] allows you to send messages to your doctor, view your test results, renew your prescriptions, schedule appointments, and more. How Do I Sign Up? 1. In your internet browser, go to [email or web address]  2. Click on the First Time User? Click Here link in the Sign In box. You will be redirect to the New Member Sign Up page. 3. Enter your [facility] Access Code exactly as it appears below. You will not need to use this code after youve completed the sign-up process. If you do not sign up before the expiration date, you must request a new code. [facility] Access Code: [email or web address]  Expires: 3/6/2020  5:00 AM (This is the date your [facility] access code will )    4. Enter the last four digits of your Social Security Number (xxxx) and Date of Birth (mm/dd/yyyy) as indicated and click Submit. You will be taken to the next sign-up page. 5. Create a [facility] ID. This will be your [facility] login ID and cannot be changed, so think of one that is secure and easy to remember. 6. Create a [facility] password. You can change your password at any time. 7. Enter your Password Reset Question and Answer. This can be used at a later time if you forget your password. 8. Enter your e-mail address. You will receive e-mail notification when new information is available in [address] 9. Click Sign Up. You can now view and download portions of your medical record. 10. Click the Download Summary menu link to download a portable copy of your medical information.     Additional Information    If you have questions, please visit the Frequently Asked Questions section of the [employer] website at [email or web address] "[email or web address]. [email or web address] Remember, [facility] is NOT to be used for urgent needs. For medical emergencies, dial 911.

## 2020-03-05 NOTE — PROCEDURES
Female Anorectal Manometry and Balloon Expulsion Study    Anorectal Manometry Procedure Note    Procedure Date: 2/25/20    Indication: Fecal Incontinence    Referring Physician: Dr. Laura Russo    Description of the Operation/Procedure:A 12-sensor high resolution solid state manometry probe with a 4cm long balloon was placed through the anus into the rectum. When correctly positioned, the pressure sensors were located 1cm apart from the anal margin and the balloon at 7-11 cm. After correct placement, the probe was taped to the perineum. After a run in period of 5 minutes, the patient was asked to perform anal squeeze maneuvers twice, party balloon inflation maneuvers twice, bearing down maneuvers twice. Thereafter, intermittent rectal balloon distention was performed to assess rectoanal reflexes, rectal sensation, and rectal compliance by distending the balloon in a step wise manner from 10 cc up to a maximum of 320 cc. After this, the probe was removed. We then placed a 50cc water filled balloon in the rectum and the patient was asked to expel this device in privacy on a commode. Findings:  (Normal mean and 95% Confidence Interval shown in parenthesis)  Anal Sphincter Pressures: The maximum resting pressure was 19.2 mmHg (65, 56-74). The maximum squeeze pressure was 28.4 mmHg (143, 124-162 ). The duration of squeeze was 19.8 seconds. During a straining maneuver, the rectal pressure generated was 73.6 mmHg (63, 54-72), while anal residual pressure was 70.5 mmHg (40, 32-48). There was evidence of 20% anal relaxation. The sphincter length was 3.8 cm (3.6, 3.4-3.8). Rectoanal Reflexes: The rectoanal inhibitory reflex was normal.    Rectal Sensation:The patient reported first sensation at 30 cc (12,82-82), the desire to defecate at 50 cc (103, ), and urgency at 80 cc (173, 150-195).     Balloon Expulsion Test:The patient was unable to expel a 50cc balloon in 1 minute    Impression: There is markedly decreased sphincter tone at baseline with inadequate augmentation with squeeze. Paradoxically, the pressure generated with push maneuver is much greater than the pressure with squeeze. There is evidence of hypersensitive rectal vault with urgency at only 80 cc. Patient could not expel balloon, suggesting component of dyssynergia. Recommendation: Defography is pending. Recommend formal biofeedback therapy.

## 2021-07-22 ENCOUNTER — TRANSCRIBE ORDER (OUTPATIENT)
Dept: SCHEDULING | Age: 86
End: 2021-07-22

## 2021-07-22 DIAGNOSIS — M62.89: ICD-10-CM

## 2021-07-22 DIAGNOSIS — R15.9 FECAL INCONTINENCE: ICD-10-CM

## 2021-07-22 DIAGNOSIS — R10.9 PAIN, ABDOMINAL: Primary | ICD-10-CM

## 2021-07-22 DIAGNOSIS — M62.89 PELVIC FLOOR DYSFUNCTION: ICD-10-CM

## 2021-07-22 DIAGNOSIS — K52.9 CHRONIC DIARRHEA OF UNKNOWN ORIGIN: ICD-10-CM

## 2021-07-24 ENCOUNTER — APPOINTMENT (OUTPATIENT)
Dept: CT IMAGING | Age: 86
End: 2021-07-24
Attending: EMERGENCY MEDICINE
Payer: MEDICARE

## 2021-07-24 ENCOUNTER — HOSPITAL ENCOUNTER (EMERGENCY)
Age: 86
Discharge: HOME | End: 2021-07-24
Attending: EMERGENCY MEDICINE
Payer: MEDICARE

## 2021-07-24 VITALS
TEMPERATURE: 98.6 F | OXYGEN SATURATION: 100 % | DIASTOLIC BLOOD PRESSURE: 66 MMHG | HEART RATE: 60 BPM | SYSTOLIC BLOOD PRESSURE: 116 MMHG | RESPIRATION RATE: 16 BRPM

## 2021-07-24 DIAGNOSIS — R10.813 RIGHT LOWER QUADRANT ABDOMINAL TENDERNESS WITHOUT REBOUND TENDERNESS: Primary | ICD-10-CM

## 2021-07-24 DIAGNOSIS — R19.7 DIARRHEA, UNSPECIFIED TYPE: ICD-10-CM

## 2021-07-24 LAB
ALBUMIN SERPL-MCNC: 3.3 G/DL (ref 3.5–5)
ALBUMIN/GLOB SERPL: 1.1 {RATIO} (ref 1.1–2.2)
ALP SERPL-CCNC: 64 U/L (ref 45–117)
ALT SERPL-CCNC: 22 U/L (ref 12–78)
ANION GAP SERPL CALC-SCNC: 4 MMOL/L (ref 5–15)
APPEARANCE UR: CLEAR
AST SERPL-CCNC: 20 U/L (ref 15–37)
BACTERIA URNS QL MICRO: NEGATIVE /HPF
BASOPHILS # BLD: 0 K/UL (ref 0–0.1)
BASOPHILS NFR BLD: 0 % (ref 0–1)
BILIRUB SERPL-MCNC: 0.5 MG/DL (ref 0.2–1)
BILIRUB UR QL: NEGATIVE
BUN SERPL-MCNC: 30 MG/DL (ref 6–20)
BUN/CREAT SERPL: 18 (ref 12–20)
CALCIUM SERPL-MCNC: 10.3 MG/DL (ref 8.5–10.1)
CAOX CRY URNS QL MICRO: ABNORMAL
CHLORIDE SERPL-SCNC: 115 MMOL/L (ref 97–108)
CO2 SERPL-SCNC: 21 MMOL/L (ref 21–32)
COLOR UR: ABNORMAL
CREAT SERPL-MCNC: 1.66 MG/DL (ref 0.55–1.02)
DIFFERENTIAL METHOD BLD: ABNORMAL
EOSINOPHIL # BLD: 0.1 K/UL (ref 0–0.4)
EOSINOPHIL NFR BLD: 2 % (ref 0–7)
EPITH CASTS URNS QL MICRO: ABNORMAL /LPF
ERYTHROCYTE [DISTWIDTH] IN BLOOD BY AUTOMATED COUNT: 16.4 % (ref 11.5–14.5)
GLOBULIN SER CALC-MCNC: 3.1 G/DL (ref 2–4)
GLUCOSE SERPL-MCNC: 91 MG/DL (ref 65–100)
GLUCOSE UR STRIP.AUTO-MCNC: NEGATIVE MG/DL
HCT VFR BLD AUTO: 31.9 % (ref 35–47)
HGB BLD-MCNC: 9.9 G/DL (ref 11.5–16)
HGB UR QL STRIP: NEGATIVE
HYALINE CASTS URNS QL MICRO: ABNORMAL /LPF (ref 0–5)
IMM GRANULOCYTES # BLD AUTO: 0 K/UL (ref 0–0.04)
IMM GRANULOCYTES NFR BLD AUTO: 0 % (ref 0–0.5)
KETONES UR QL STRIP.AUTO: NEGATIVE MG/DL
LACTATE SERPL-SCNC: 1.4 MMOL/L (ref 0.4–2)
LEUKOCYTE ESTERASE UR QL STRIP.AUTO: ABNORMAL
LIPASE SERPL-CCNC: 1027 U/L (ref 73–393)
LYMPHOCYTES # BLD: 1.4 K/UL (ref 0.8–3.5)
LYMPHOCYTES NFR BLD: 25 % (ref 12–49)
MAGNESIUM SERPL-MCNC: 1.3 MG/DL (ref 1.6–2.4)
MCH RBC QN AUTO: 27.5 PG (ref 26–34)
MCHC RBC AUTO-ENTMCNC: 31 G/DL (ref 30–36.5)
MCV RBC AUTO: 88.6 FL (ref 80–99)
MONOCYTES # BLD: 0.9 K/UL (ref 0–1)
MONOCYTES NFR BLD: 15 % (ref 5–13)
MUCOUS THREADS URNS QL MICRO: ABNORMAL /LPF
NEUTS SEG # BLD: 3.3 K/UL (ref 1.8–8)
NEUTS SEG NFR BLD: 58 % (ref 32–75)
NITRITE UR QL STRIP.AUTO: NEGATIVE
NRBC # BLD: 0 K/UL (ref 0–0.01)
NRBC BLD-RTO: 0 PER 100 WBC
PH UR STRIP: 5 [PH] (ref 5–8)
PLATELET # BLD AUTO: 122 K/UL (ref 150–400)
PMV BLD AUTO: 11.1 FL (ref 8.9–12.9)
POTASSIUM SERPL-SCNC: 4.7 MMOL/L (ref 3.5–5.1)
PROT SERPL-MCNC: 6.4 G/DL (ref 6.4–8.2)
PROT UR STRIP-MCNC: NEGATIVE MG/DL
RBC # BLD AUTO: 3.6 M/UL (ref 3.8–5.2)
RBC #/AREA URNS HPF: ABNORMAL /HPF (ref 0–5)
SODIUM SERPL-SCNC: 140 MMOL/L (ref 136–145)
SP GR UR REFRACTOMETRY: 1.02 (ref 1–1.03)
UR CULT HOLD, URHOLD: NORMAL
UROBILINOGEN UR QL STRIP.AUTO: 0.2 EU/DL (ref 0.2–1)
WBC # BLD AUTO: 5.6 K/UL (ref 3.6–11)
WBC URNS QL MICRO: ABNORMAL /HPF (ref 0–4)

## 2021-07-24 PROCEDURE — 36415 COLL VENOUS BLD VENIPUNCTURE: CPT

## 2021-07-24 PROCEDURE — 80053 COMPREHEN METABOLIC PANEL: CPT

## 2021-07-24 PROCEDURE — 83735 ASSAY OF MAGNESIUM: CPT

## 2021-07-24 PROCEDURE — 83605 ASSAY OF LACTIC ACID: CPT

## 2021-07-24 PROCEDURE — 83690 ASSAY OF LIPASE: CPT

## 2021-07-24 PROCEDURE — 99285 EMERGENCY DEPT VISIT HI MDM: CPT

## 2021-07-24 PROCEDURE — 81001 URINALYSIS AUTO W/SCOPE: CPT

## 2021-07-24 PROCEDURE — 74011250637 HC RX REV CODE- 250/637: Performed by: EMERGENCY MEDICINE

## 2021-07-24 PROCEDURE — 74011250636 HC RX REV CODE- 250/636: Performed by: EMERGENCY MEDICINE

## 2021-07-24 PROCEDURE — 74176 CT ABD & PELVIS W/O CONTRAST: CPT

## 2021-07-24 PROCEDURE — 85025 COMPLETE CBC W/AUTO DIFF WBC: CPT

## 2021-07-24 PROCEDURE — 96365 THER/PROPH/DIAG IV INF INIT: CPT

## 2021-07-24 RX ORDER — DICYCLOMINE HYDROCHLORIDE 20 MG/1
20 TABLET ORAL
Qty: 21 TABLET | Refills: 0 | Status: SHIPPED | OUTPATIENT
Start: 2021-07-24 | End: 2022-01-20

## 2021-07-24 RX ORDER — MAGNESIUM SULFATE HEPTAHYDRATE 40 MG/ML
2 INJECTION, SOLUTION INTRAVENOUS ONCE
Status: COMPLETED | OUTPATIENT
Start: 2021-07-24 | End: 2021-07-24

## 2021-07-24 RX ORDER — DICYCLOMINE HYDROCHLORIDE 10 MG/1
10 CAPSULE ORAL
Status: COMPLETED | OUTPATIENT
Start: 2021-07-24 | End: 2021-07-24

## 2021-07-24 RX ADMIN — MAGNESIUM SULFATE HEPTAHYDRATE 2 G: 40 INJECTION, SOLUTION INTRAVENOUS at 21:26

## 2021-07-24 RX ADMIN — SODIUM CHLORIDE 500 ML: 9 INJECTION, SOLUTION INTRAVENOUS at 20:43

## 2021-07-24 RX ADMIN — DICYCLOMINE HYDROCHLORIDE 10 MG: 10 CAPSULE ORAL at 20:42

## 2021-07-24 NOTE — PROGRESS NOTES
7:34 PM  I have evaluated the patient as the Provider in Triage. I have reviewed Her vital signs and the triage nurse assessment. I have talked with the patient and any available family and advised that I am the provider in triage and have ordered the appropriate study to initiate their work up based on the clinical presentation during my assessment. I have advised that the patient will be accommodated in the Main ED as soon as possible. I have also requested to contact the triage nurse or myself immediately if the patient experiences any changes in their condition during this brief waiting period. Pt to ED for 1 mos of worsening RLQ pain. Was seen at [facility] this week on Wednesday, referred for CT Abd/ Pelvis w/ IV and PO Contrast (has CKD). Pain became too severe tonight and pt was also tender on exam per family member. 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 Was also referred for stool specimen per [facility], however, family member states stool comes too quickly to catch in specimend cup. Denies any fevers, chills, urinary concerns, nausea, vomiting, blood in stool.      Yuri Shaw, RONNELL

## 2021-07-24 NOTE — ED TRIAGE NOTES
Triage: Pt arrives ambulatory from home with CC of RLQ abdominal pain. She has had the pain x1 month. She has also had diarrhea. She was referred to [facility] who ordered CT and stool sample but they haven't been able to obtain those as of yet.

## 2021-07-25 NOTE — ED PROVIDER NOTES
The history is provided by the patient. Abdominal Pain   The current episode started more than 1 week ago (1 month). The problem occurs constantly. The problem has not changed since onset. The pain is located in the RLQ. The pain is mild. Associated symptoms include diarrhea (4 x day). Pertinent negatives include no fever, no nausea, no vomiting, no constipation, no dysuria, no frequency, no hematuria and no chest pain. Nothing worsens the pain. Relieved by: immodium. Past workup includes ultrasound (unremarkable ruq US). The patient's surgical history includes cholecystectomy.        Past Medical History:   Diagnosis Date    Adverse effect of anesthesia     passed out during EGD/stopped breathing    Arrhythmia     has pacemaker for low HR    Arthritis     Asthma     Cancer ([address]     right kidney - surgery    Chronic pain     right side    Deep vein thrombosis (DVT) during current hospitalization ([address]     history of DVT was on coumadin in the past    Diabetes ([address]     diet controlled    GERD (gastroesophageal reflux disease)     H/O acute pancreatitis     Hiatal hernia     Hypertension     MARLEN (obstructive sleep apnea)     does not use CPAP/pt states she has not used since a pacemaker inserted    Other ill-defined conditions(799.89)     lymph node enlargement - left chest - benign bx - watching    Other ill-defined conditions(799.89)     wheezes    Psychiatric disorder     depression    PUD (peptic ulcer disease)     hx of    Thromboembolus ([address]     Unspecified adverse effect of anesthesia     passed out during EGD per pt - stopped breathing per pt per MD       Past Surgical History:   Procedure Laterality Date    HX APPENDECTOMY      HX CHOLECYSTECTOMY      HX GYN          HX HEENT Right     laser eye surgery to stop bleeding in back of eye - multiple laser surgeries    HX KNEE ARTHROSCOPY      left knee; cartilage repair    HX ORTHOPAEDIC      right hip replacement    HX ORTHOPAEDIC      lower back surgery    HX ORTHOPAEDIC      straighten toe - 2nd toe on right    HX ORTHOPAEDIC Bilateral     carpal tunnel release    HX PACEMAKER      not defibrillator    HX UROLOGICAL      implant in hip to control urine and then removed    HX UROLOGICAL      Right kidney partial nephrectomy         Family History:   Problem Relation Age of Onset    Stroke Mother         brain aneurysm    Hypertension Father     Heart Disease Father     Cancer Sister         breast    Cancer Brother         lung    Cancer Sister         breast    SLE Other         half siblings (5+) - lupus    Hypertension Daughter     Diabetes Daughter        Social History     Socioeconomic History    Marital status:      Spouse name: Not on file    Number of children: Not on file    Years of education: Not on file    Highest education level: Not on file   Occupational History    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   Tobacco Use    Smoking status: Former Smoker     Packs/day: 0.25     Years: 20.00     Pack years: 5.00     Quit date: 1971     Years since quittin.9    Smokeless tobacco: Never Used   Substance and Sexual Activity    Alcohol use: No    Drug use: No    Sexual activity: Never   Other Topics Concern    Not on file   Social History Narrative    Not on file     Social Determinants of Health     Financial Resource Strain:     Difficulty of Paying Living Expenses:    Food Insecurity:     Worried About Running Out of Food in the Last Year:     Ran Out of Food in the Last Year:    Transportation Needs:     Lack of Transportation (Medical):      Lack of Transportation (Non-Medical):    Physical Activity:     Days of Exercise per Week:     Minutes of Exercise per Session:    Stress:     Feeling of Stress :    Social Connections:     Frequency of Communication with Friends and Family:     Frequency of Social Gatherings with Friends and Family:  Attends Buddhist Services:     Active Member of Clubs or Organizations:     Attends Club or Organization Meetings:     Marital Status:    Intimate Partner Violence:     Fear of Current or Ex-Partner:     Emotionally Abused:     Physically Abused:     Sexually Abused: ALLERGIES: Pcn [penicillins], Codeine, Contrast dye [iodine], and Prednisone    Review of Systems   Constitutional: Negative for chills and fever. Respiratory: Negative for shortness of breath. Cardiovascular: Negative for chest pain. Gastrointestinal: Positive for abdominal pain and diarrhea (4 x day). Negative for constipation, nausea and vomiting. Genitourinary: Negative for dysuria, frequency and hematuria. Neurological: Negative for dizziness and light-headedness. All other systems reviewed and are negative. Vitals:    07/24/21 1933 07/24/21 2005 07/24/21 2008   BP: 110/67  119/79   Pulse: 83  65   Resp: 16  12   Temp: 98.7 °F (37.1 °C)     SpO2: 99% 92% 92%            Physical Exam  Vitals and nursing note reviewed. Constitutional:       Appearance: She is well-developed. HENT:      Head: Normocephalic and atraumatic. Eyes:      Pupils: Pupils are equal, round, and reactive to light. Cardiovascular:      Rate and Rhythm: Normal rate and regular rhythm. Pulmonary:      Effort: Pulmonary effort is normal.      Breath sounds: Normal breath sounds. Abdominal:      General: There is no distension. Palpations: Abdomen is soft. Tenderness: There is abdominal tenderness in the right lower quadrant. Musculoskeletal:      Cervical back: Normal range of motion and neck supple. Skin:     General: Skin is warm and dry. Capillary Refill: Capillary refill takes less than 2 seconds. Neurological:      General: No focal deficit present. Mental Status: She is alert and oriented to person, place, and time.    Psychiatric:         Mood and Affect: Mood normal.         Behavior: Behavior normal.          MDM       Procedures        The patient is resting comfortably and feels better, is alert and in no distress. The history, exam, diagnostic testing, and current condition do not suggest acute appendicitis, bowel obstruction, incarcerated hernia, bowel perforation, major gastrointestinal bleeding, severe diverticulitis, sepsis, or other significant pathology to warrant further testing, continued ED treatment, admission, or surgical evaluation at this point. The vital signs have been stable and are within normal limits at this time. The patient does not have uncontrollable pain, intractable vomiting, or other significant symptoms. The patient's condition is stable and appropriate for discharge. The patient will pursue further outpatient evaluation with the gastroenterologist physician as indicated in the discharge instructions. 10:20 PM  10:20 PM  Change of shift. Care of patient signed over to Dr. Felicity Sapp. Bedside handoff complete. Awaiting UA.

## 2021-07-25 NOTE — ED NOTES
Assumed care of patient at change of shift with UA pending. Patient was evaluated by Dr. Aguilar who reviewed all the patient's results with her and discussed her follow-up plan. I informed the patient of her negative urine test, and she was comfortable with discharge and follow-up.

## 2022-01-20 ENCOUNTER — OFFICE VISIT (OUTPATIENT)
Dept: ENDOCRINOLOGY | Age: 87
End: 2022-01-20
Payer: MEDICARE

## 2022-01-20 VITALS
HEART RATE: 74 BPM | DIASTOLIC BLOOD PRESSURE: 76 MMHG | WEIGHT: 165.4 LBS | SYSTOLIC BLOOD PRESSURE: 128 MMHG | HEIGHT: 64 IN | BODY MASS INDEX: 28.24 KG/M2

## 2022-01-20 DIAGNOSIS — E21.3 HYPERPARATHYROIDISM (HCC): ICD-10-CM

## 2022-01-20 DIAGNOSIS — E83.52 HYPERCALCEMIA: Primary | ICD-10-CM

## 2022-01-20 PROCEDURE — G8427 DOCREV CUR MEDS BY ELIG CLIN: HCPCS | Performed by: INTERNAL MEDICINE

## 2022-01-20 PROCEDURE — G8754 DIAS BP LESS 90: HCPCS | Performed by: INTERNAL MEDICINE

## 2022-01-20 PROCEDURE — G8432 DEP SCR NOT DOC, RNG: HCPCS | Performed by: INTERNAL MEDICINE

## 2022-01-20 PROCEDURE — G8400 PT W/DXA NO RESULTS DOC: HCPCS | Performed by: INTERNAL MEDICINE

## 2022-01-20 PROCEDURE — G8752 SYS BP LESS 140: HCPCS | Performed by: INTERNAL MEDICINE

## 2022-01-20 PROCEDURE — 99205 OFFICE O/P NEW HI 60 MIN: CPT | Performed by: INTERNAL MEDICINE

## 2022-01-20 PROCEDURE — G8536 NO DOC ELDER MAL SCRN: HCPCS | Performed by: INTERNAL MEDICINE

## 2022-01-20 PROCEDURE — 1101F PT FALLS ASSESS-DOCD LE1/YR: CPT | Performed by: INTERNAL MEDICINE

## 2022-01-20 PROCEDURE — 1090F PRES/ABSN URINE INCON ASSESS: CPT | Performed by: INTERNAL MEDICINE

## 2022-01-20 PROCEDURE — G8419 CALC BMI OUT NRM PARAM NOF/U: HCPCS | Performed by: INTERNAL MEDICINE

## 2022-01-20 RX ORDER — CINACALCET 30 MG/1
30 TABLET, FILM COATED ORAL DAILY
Qty: 30 TABLET | Refills: 4 | Status: SHIPPED | OUTPATIENT
Start: 2022-01-20 | End: 2022-01-25 | Stop reason: SDUPTHER

## 2022-01-20 NOTE — LETTER
1/20/2022    Patient: Lara Suh   YOB: 1935   Date of Visit: 1/20/2022     THALIA Velazquez  [address]  [address] [address]  Via Fax: [phone]    Dear THALIA Velazquez,      Thank you for referring Ms. Lara Suh to [facility] for evaluation. My notes for this consultation are attached. If you have questions, please do not hesitate to call me. I look forward to following your patient along with you.       Sincerely,    Nicole Yanez MD

## 2022-01-20 NOTE — PATIENT INSTRUCTIONS
1) Cinaclcet 30mg pill she will take one pill once per day. Cinacalcet (By mouth)   Cinacalcet (sin-a-RAKESH-set)  Lowers the amount of calcium in your blood. Brand Name(s): Sensipar   There may be other brand names for this medicine. When This Medicine Should Not Be Used: This medicine is not right for everyone. Do not use it if you had an allergic reaction to cinacalcet. How to Use This Medicine:   Tablet  · Take your medicine as directed. Your dose may need to be changed several times to find what works best for you. · Take this medicine with food or shortly after a meal.  · Swallow the tablet whole. Do not crush, chew, or break it. · Missed dose: Take a dose as soon as you remember. If it is almost time for your next dose, wait until then and take a regular dose. Do not take extra medicine to make up for a missed dose. · Store the medicine in a closed container at room temperature, away from heat, moisture, and direct light. Drugs and Foods to Avoid:   Ask your doctor or pharmacist before using any other medicine, including over-the-counter medicines, vitamins, and herbal products. · Some medicines can affect how cinacalcet works. Tell your doctor if you are using any of the following:   ¨ Carvedilol  ¨ Desipramine  ¨ Flecainide  ¨ Itraconazole  ¨ Ketoconazole  ¨ Metoprolol  ¨ Tricyclic antidepressant  Warnings While Using This Medicine:   · Tell your doctor if you are pregnant or breastfeeding, or if you have liver disease, bone disease, heart failure or other heart problems, or a history of seizures. · This medicine may lower calcium levels too much and increase your risk for heart rhythm problems, seizures, or bone disease. · Your doctor will do lab tests at regular visits to check on the effects of this medicine. Keep all appointments. · Keep all medicine out of the reach of children. Never share your medicine with anyone.   Possible Side Effects While Using This Medicine:   Call your doctor right away if you notice any of these side effects:  · Allergic reaction: Itching or hives, swelling in your face or hands, swelling or tingling in your mouth or throat, chest tightness, trouble breathing  · Anxiety, confusion, mental changes  · Dizziness or lightheadedness  · Fast, pounding, or uneven heartbeat  · Muscle spasms, stiffness, or pain  · Numbness, tingling, or burning pain in your hands, arms, legs, and feet  · Seizures  If you notice these less serious side effects, talk with your doctor:   · Back or joint pain  · Headache  · Nausea, vomiting, diarrhea, or loss of appetite  If you notice other side effects that you think are caused by this medicine, tell your doctor. Call your doctor for medical advice about side effects. You may report side effects to FDA at 7-547-FDA-7231  © 2017 [facility] Information is for End User's use only and may not be sold, redistributed or otherwise used for commercial purposes. The above information is an  only. It is not intended as medical advice for individual conditions or treatments. Talk to your doctor, nurse or pharmacist before following any medical regimen to see if it is safe and effective for you.

## 2022-01-20 NOTE — PROGRESS NOTES
Chief Complaint   Patient presents with    Thyroid Problem     pcp and pharmacy confirmed     History of Present Illness: Jazmine Barrera is a 82 y.o. female who I was asked to see in consult by   Bobby Spaulding for evaluation of Hyperparathyroidism. Will request records from  [facility]. Per pt's son this visit is for the Calcium and diabetes. Her A1C was recently 5.8%. Pt is no longer taking metformin. \"The doctor took her off the metformin because she had issues of diarrhea. \"    Per pt's grand daughter she was first told about the high Ca in 2019. Pt has hx of renal surgery in 2000 for RCC. No hx or renal stones. No hx of bone fractures or osteoporosis. Pt is on medication for dementia and depression. Per her son Lolis Kirkpatrick is aching all the time, particularly in her neck. She does not want to get out of bed, she complains of feeling achy and tired all day. There are times she will not get out of bed all day\". Per her son and granddaughter she is not on HCTZ or any thiazide diuretics. Looking through her records, I see evidence of elevated Ca levels as far back as 2016. Her Ca levels have been in the range of 10.5-10.9. No known family hx of osteoporosis, renal stones or hypercalcemia.     Past Medical History:   Diagnosis Date    Adverse effect of anesthesia     passed out during EGD/stopped breathing    Arrhythmia     has pacemaker for low HR    Arthritis     Asthma     Cancer ([address]     right kidney - surgery    Chronic pain     right side    Deep vein thrombosis (DVT) during current hospitalization ([address]     history of DVT was on coumadin in the past    Diabetes ([address]     diet controlled    GERD (gastroesophageal reflux disease)     H/O acute pancreatitis     Hiatal hernia     Hypertension     MARLEN (obstructive sleep apnea)     does not use CPAP/pt states she has not used since a pacemaker inserted    Other ill-defined conditions(799.89)     lymph node enlargement - left chest - benign bx - watching    Other ill-defined conditions(799.89)     wheezes    Psychiatric disorder     depression    PUD (peptic ulcer disease)     hx of    Thromboembolus ([address]     Unspecified adverse effect of anesthesia     passed out during EGD per pt - stopped breathing per pt per MD     Past Surgical History:   Procedure Laterality Date    HX APPENDECTOMY      HX CHOLECYSTECTOMY      HX GYN          HX HEENT Right     laser eye surgery to stop bleeding in back of eye - multiple laser surgeries    HX KNEE ARTHROSCOPY      left knee; cartilage repair    HX ORTHOPAEDIC      right hip replacement    HX ORTHOPAEDIC      lower back surgery    HX ORTHOPAEDIC      straighten toe - 2nd toe on right    HX ORTHOPAEDIC Bilateral     carpal tunnel release    HX PACEMAKER      not defibrillator    HX UROLOGICAL      implant in hip to control urine and then removed    HX UROLOGICAL      Right kidney partial nephrectomy     Current Outpatient Medications   Medication Sig    cinacalcet (SENSIPAR) 30 mg tablet Take 1 Tablet by mouth daily.  aspirin 81 mg chewable tablet Take 81 mg by mouth daily.  metformin HCl (METFORMIN PO) Take  by mouth.  amLODIPine (NORVASC) 10 mg tablet Take 1 Tab by mouth daily.  hydrOXYzine pamoate (VISTARIL) 25 mg capsule Take 1 Cap by mouth nightly as needed for Itching.  famotidine (PEPCID) 20 mg tablet Take 1 Tab by mouth nightly. Indications: ZOLLINGER-ROBERSON SYNDROME    pantoprazole (PROTONIX) 40 mg tablet Take 40 mg by mouth daily.  b complex vitamins tablet Take 1 Tab by mouth daily.  FERROUS SULFATE PO Take 325 mg by mouth daily.  metoprolol succinate (TOPROL-XL) 50 mg XL tablet Take 1 Tab by mouth daily.  pravastatin (PRAVACHOL) 20 mg tablet Take 1 Tab by mouth nightly.  allopurinol (ZYLOPRIM) 300 mg tablet Take 300 mg by mouth daily.  PROAIR HFA 90 mcg/actuation inhaler Take 2 Puffs by inhalation four (4) times daily as needed.     furosemide (LASIX) 40 mg tablet Take 40 mg by mouth daily as needed.  montelukast (SINGULAIR) 10 mg tablet Take 10 mg by mouth nightly.  losartan (COZAAR) 100 mg tablet Take 100 mg by mouth nightly. No current facility-administered medications for this visit. Allergies   Allergen Reactions    Pcn [Penicillins] Rash     But can take cephalosporins. Allergic to all \"cillins\".     Codeine Other (comments)     Hallucinations, takes hydrocodone at home for pain    Contrast Dye [Iodine] Other (comments)     Not to take due to kidney function    Prednisone Other (comments)     \"makes my pancreas numbers go way up\"     Family History   Problem Relation Age of Onset    Stroke Mother         brain aneurysm    Hypertension Father     Heart Disease Father     Cancer Sister         breast    Cancer Brother         lung and prostate    Cancer Sister         breast    SLE Other         half siblings (5+) - lupus    Hypertension Daughter     Diabetes Daughter      Social History     Socioeconomic History    Marital status:      Spouse name: Not on file    Number of children: Not on file    Years of education: Not on file    Highest education level: Not on file   Occupational History    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   Tobacco Use    Smoking status: Former Smoker     Packs/day: 0.25     Years: 20.00     Pack years: 5.00     Quit date: 1971     Years since quittin.4    Smokeless tobacco: Never Used   Substance and Sexual Activity    Alcohol use: No    Drug use: No    Sexual activity: Never   Other Topics Concern    Not on file   Social History Narrative    Not on file     Social Determinants of Health     Financial Resource Strain:     Difficulty of Paying Living Expenses: Not on file   Food Insecurity:     Worried About [address] in the Last Year: Not on file    [place] of Food in the Last Year: Not on BHARATH [place] Needs:     Lack of Transportation (Medical): Not on file    Lack of Transportation (Non-Medical): Not on file   Physical Activity:     Days of Exercise per Week: Not on file    Minutes of Exercise per Session: Not on file   Stress:     Feeling of Stress : Not on file   Social Connections:     Frequency of Communication with Friends and Family: Not on file    Frequency of Social Gatherings with Friends and Family: Not on file    Attends Cheondoism Services: Not on file    Active Member of [address] [place] [address] or Organizations: Not on file    Attends Club or Organization Meetings: Not on file    Marital Status: Not on file   Intimate Partner Violence:     Fear of Current or Ex-Partner: Not on file    Emotionally Abused: Not on file    Physically Abused: Not on file    Sexually Abused: Not on file   Housing Stability:     Unable to Pay for Housing in the Last Year: Not on file    Number of [place] in the Last Year: Not on file    Unstable Housing in the Last Year: Not on file     Review of Systems:  - Constitutional Symptoms: no fevers, chills, weight loss  - Eyes: no blurry vision or double vision  - Cardiovascular: no chest pain or palpitations  - Respiratory: no cough or shortness of breath  - Gastrointestinal: no dysphagia or abdominal pain  - Musculoskeletal: no joint pains or weakness  - Integumentary: no rashes  - Neurological: no numbness, tingling, or headaches  - Psychiatric: no depression or anxiety  - Endocrine: no heat or cold intolerance, no polyuria or polydipsia    Physical Examination:  Blood pressure 128/76, pulse 74, height 5' 4\" (1.626 m), weight 165 lb 6.4 oz (75 kg).   - General: pleasant, no distress, good eye contact  - HEENT: no exopthalmos, no periorbital edema, no scleral/conjunctival injection, EOMI, no lid lag or stare  - Neck: supple, no thyromegaly, masses, lymph nodes, or carotid bruits, no supraclavicular or dorsocervical fat pads, + lipoma in the back right neck  - Cardiovascular: regular, normal rate, normal S1 and S2, no murmurs/rubs/gallops   - Respiratory: clear to auscultation bilaterally  - Gastrointestinal: soft, nontender, nondistended, no masses, no hepatosplenomegaly  - Musculoskeletal: no proximal muscle weakness in upper or lower extremities  - Integumentary: no acanthosis nigricans, no abdominal striae, no rashes, no edema  - Neurological: reflexes 2+ at biceps, no tremor  - Psychiatric: normal mood and affect    Data Reviewed:   August 2021  PTH 69  Ca 10.8  April 2021  Vitamin D 89.9  PTH 52  Ca 10.9  Specimen:  Blood - Blood, Venous   Ref Range & Units 5 mo ago Comments   Albumin 3.5 - 5.0 g/dL 3.6     Calcium 8.4 - 10.2 mg/dL 10.8 High      Phosphorus 2.5 - 4.5 mg/dL 3.5     Glucose 65 - 105 mg/dL 100     BUN 7 - 22 mg/dL 34 High      Creatinine 0.7 - 1.2 mg/dL 1.6 High      Sodium 137 - 145 mmol/L 142     Potassium 3.5 - 5.3 mmol/L 5.1     Chloride 98 - 107 mmol/L 112 High      Bicarbonate (CO2) 22 - 30 mmol/L 25     eGFR >60 mL/min/1.73m*2 34 Low      Anion Gap 3 - 12 mmol/L 5     Performed by:            Assessment/Plan:   1. Hypercalcemia    2. Hyperparathyroidism [facility])    Her son and granddaughter note that her mental faclities have deteriorated rapidly over the last couple of months. They note that she has gone from an active and busy person to staying in the bed all day and complaining of hurting all the time. Her PCP did a very good work up to evaluate the high calcium levels and with the high PTH and high Ca and the Vitamin D in a sufficient range, these are strong evidence of hyperparathyroidism. Her family is not interested in surgery of any kind for the hyperparathyroidism, but would like to start some form of treatment to see if this will help with her deteriorating AMS. Will start pt on Cinacalcet 30mg daily and repeat her renal panel, PTH and 1-25 OH Vitamin D level in 6 weeks.     Pt voices understanding and agreement with the plan.    RTC 4 months      Patient Instructions   1) Cinaclcet 30mg pill she will take one pill once per day. Cinacalcet (By mouth)   Cinacalcet (sin-a-RAKESH-set)  Lowers the amount of calcium in your blood. Brand Name(s): Sensipar   There may be other brand names for this medicine. When This Medicine Should Not Be Used: This medicine is not right for everyone. Do not use it if you had an allergic reaction to cinacalcet. How to Use This Medicine:   Tablet  · Take your medicine as directed. Your dose may need to be changed several times to find what works best for you. · Take this medicine with food or shortly after a meal.  · Swallow the tablet whole. Do not crush, chew, or break it. · Missed dose: Take a dose as soon as you remember. If it is almost time for your next dose, wait until then and take a regular dose. Do not take extra medicine to make up for a missed dose. · Store the medicine in a closed container at room temperature, away from heat, moisture, and direct light. Drugs and Foods to Avoid:   Ask your doctor or pharmacist before using any other medicine, including over-the-counter medicines, vitamins, and herbal products. · Some medicines can affect how cinacalcet works. Tell your doctor if you are using any of the following:   ¨ Carvedilol  ¨ Desipramine  ¨ Flecainide  ¨ Itraconazole  ¨ Ketoconazole  ¨ Metoprolol  ¨ Tricyclic antidepressant  Warnings While Using This Medicine:   · Tell your doctor if you are pregnant or breastfeeding, or if you have liver disease, bone disease, heart failure or other heart problems, or a history of seizures. · This medicine may lower calcium levels too much and increase your risk for heart rhythm problems, seizures, or bone disease. · Your doctor will do lab tests at regular visits to check on the effects of this medicine. Keep all appointments. · Keep all medicine out of the reach of children. Never share your medicine with anyone.   Possible Side Effects While Using This Medicine:   Call your doctor right away if you notice any of these side effects:  · Allergic reaction: Itching or hives, swelling in your face or hands, swelling or tingling in your mouth or throat, chest tightness, trouble breathing  · Anxiety, confusion, mental changes  · Dizziness or lightheadedness  · Fast, pounding, or uneven heartbeat  · Muscle spasms, stiffness, or pain  · Numbness, tingling, or burning pain in your hands, arms, legs, and feet  · Seizures  If you notice these less serious side effects, talk with your doctor:   · Back or joint pain  · Headache  · Nausea, vomiting, diarrhea, or loss of appetite  If you notice other side effects that you think are caused by this medicine, tell your doctor. Call your doctor for medical advice about side effects. You may report side effects to FDA at 5-100-FDA-7564  © 2017 [facility] Information is for End User's use only and may not be sold, redistributed or otherwise used for commercial purposes. The above information is an  only. It is not intended as medical advice for individual conditions or treatments. Talk to your doctor, nurse or pharmacist before following any medical regimen to see if it is safe and effective for you. Follow-up and Dispositions    · Return in about 4 months (around 5/20/2022). Copy sent to:  [facility]    I spent 60 minutes on this case and > 50% of the time was spent in counseling on the above issues.

## 2022-01-25 RX ORDER — CINACALCET 30 MG/1
30 TABLET, FILM COATED ORAL DAILY
Qty: 30 TABLET | Refills: 4 | Status: SHIPPED | OUTPATIENT
Start: 2022-01-25 | End: 2022-03-11 | Stop reason: SDUPTHER

## 2022-02-04 ENCOUNTER — TELEPHONE (OUTPATIENT)
Dept: ONCOLOGY | Age: 87
End: 2022-02-04

## 2022-02-04 NOTE — TELEPHONE ENCOUNTER
NP-Hema/Hx Kidney Cancer wp partial right nephrectomy (2005) & anemia. Patient's point of contact called. Per contact, all of the patient's care is being transferred to the [place] area due to the patient's increased Dementia. Caller would like all communications to go through him Kya Murrell).       Patient has a new patient appointment with Dr. eDsirae Arana on 2/8/22 @ 930am.

## 2022-02-07 ENCOUNTER — DOCUMENTATION ONLY (OUTPATIENT)
Dept: INTERNAL MEDICINE CLINIC | Age: 87
End: 2022-02-07

## 2022-02-07 NOTE — PROGRESS NOTES
Called, LM for pt's son (#[phone])  explained the next available appt to schedule w/ Dr. Tony Felipe will be 5/9. Pt is on cancellation list if sooner appt comes up.

## 2022-02-07 NOTE — PROGRESS NOTES
Cheyenne Wong is a 82 y.o. female here for new patient appt for history of kidney cancer and anemia. Pt had partial right nephrectomy in 2005. Pt here with son. No concerns brought up. 1. Have you been to the ER, urgent care clinic since your last visit? Hospitalized since your last visit? New Pt    2. Have you seen or consulted any other health care providers outside of the [address] [place] [address] since your last visit? Include any pap smears or colon screening.  New Pt

## 2022-02-08 ENCOUNTER — TELEPHONE (OUTPATIENT)
Dept: ONCOLOGY | Age: 87
End: 2022-02-08

## 2022-02-08 ENCOUNTER — OFFICE VISIT (OUTPATIENT)
Dept: ONCOLOGY | Age: 87
End: 2022-02-08
Payer: MEDICARE

## 2022-02-08 ENCOUNTER — DOCUMENTATION ONLY (OUTPATIENT)
Dept: ONCOLOGY | Age: 87
End: 2022-02-08

## 2022-02-08 VITALS
HEIGHT: 64 IN | BODY MASS INDEX: 29.02 KG/M2 | SYSTOLIC BLOOD PRESSURE: 136 MMHG | TEMPERATURE: 97.7 F | HEART RATE: 73 BPM | WEIGHT: 170 LBS | DIASTOLIC BLOOD PRESSURE: 72 MMHG | OXYGEN SATURATION: 94 %

## 2022-02-08 DIAGNOSIS — N18.9 HISTORY OF ANEMIA DUE TO CHRONIC KIDNEY DISEASE: Primary | ICD-10-CM

## 2022-02-08 DIAGNOSIS — Z86.2 HISTORY OF ANEMIA DUE TO CHRONIC KIDNEY DISEASE: Primary | ICD-10-CM

## 2022-02-08 PROCEDURE — 1090F PRES/ABSN URINE INCON ASSESS: CPT | Performed by: STUDENT IN AN ORGANIZED HEALTH CARE EDUCATION/TRAINING PROGRAM

## 2022-02-08 PROCEDURE — 99204 OFFICE O/P NEW MOD 45 MIN: CPT | Performed by: STUDENT IN AN ORGANIZED HEALTH CARE EDUCATION/TRAINING PROGRAM

## 2022-02-08 PROCEDURE — G8427 DOCREV CUR MEDS BY ELIG CLIN: HCPCS | Performed by: STUDENT IN AN ORGANIZED HEALTH CARE EDUCATION/TRAINING PROGRAM

## 2022-02-08 PROCEDURE — G8752 SYS BP LESS 140: HCPCS | Performed by: STUDENT IN AN ORGANIZED HEALTH CARE EDUCATION/TRAINING PROGRAM

## 2022-02-08 PROCEDURE — G8419 CALC BMI OUT NRM PARAM NOF/U: HCPCS | Performed by: STUDENT IN AN ORGANIZED HEALTH CARE EDUCATION/TRAINING PROGRAM

## 2022-02-08 PROCEDURE — G8754 DIAS BP LESS 90: HCPCS | Performed by: STUDENT IN AN ORGANIZED HEALTH CARE EDUCATION/TRAINING PROGRAM

## 2022-02-08 PROCEDURE — G8536 NO DOC ELDER MAL SCRN: HCPCS | Performed by: STUDENT IN AN ORGANIZED HEALTH CARE EDUCATION/TRAINING PROGRAM

## 2022-02-08 PROCEDURE — 1101F PT FALLS ASSESS-DOCD LE1/YR: CPT | Performed by: STUDENT IN AN ORGANIZED HEALTH CARE EDUCATION/TRAINING PROGRAM

## 2022-02-08 PROCEDURE — G8400 PT W/DXA NO RESULTS DOC: HCPCS | Performed by: STUDENT IN AN ORGANIZED HEALTH CARE EDUCATION/TRAINING PROGRAM

## 2022-02-08 PROCEDURE — G8432 DEP SCR NOT DOC, RNG: HCPCS | Performed by: STUDENT IN AN ORGANIZED HEALTH CARE EDUCATION/TRAINING PROGRAM

## 2022-02-08 NOTE — PROGRESS NOTES
[facility]   Oncology Social Work  Encounter     Patient Name:  Adry Amato    Medical History: dx anemia    Advance Directives: pt son requesting medical adv directive as his mother has dementia per PSR. Pt/son left exam room without SW seeing pt but SW was called by MARCO GONGORA [facility] to see son. SW stated nothing in referral documented pt having dementia. SW spoke with MD who stated some of her deficiency can be caused by her calcium level. MD suggested the endocrinologist and or PCP should address the issue. SW will call and speak with pt son.       Narrative:     Barriers to Care:     Plan:    Referral/Handouts:

## 2022-02-08 NOTE — PROGRESS NOTES
[address] [facility] [place] at [address] [facility] [address] [facility]  W: [phone] F: [phone]      Reason for Visit:   Chhaya Hightower is a 82 y.o. female who is seen in consultation at the request of Dr. Manuel Lemos for evaluation of normocytic anemia in the setting of chronic kidney disease. Hematology / Oncology Treatment History:     Hematological/Oncological Diagnosis: Anemia of  Chronic kidney disease    Date of Diagnosis: Chronic    Treatment course: Retacrit every 2 -4 weeks      History of Present Illness:     Very pleasant 44-year-old female with a relevant medical history most significant for hyperparathyroidism with hypercalcemia, diabetes mellitus type 2, hypertension, history of DVT in 2013, history of renal cell carcinoma status post right partial nephrectomy in 2005, hypertension, GERD, presents for evaluation and treatment of anemia associated with chronic kidney disease has been longstanding. The patient presents to us as a transfer of care from [place] [id number]. Overall, the patient reports clinical stability. She was recently started on a new medication cinacalcet for management of hyperparathyroidism. She is tolerating that medication well and has had some improvement in her confusion, mentation, and improvement in energy level since starting this medication. Most recent labs reviewed from outside source, notable for last hemoglobin on January 25, 2022 of 10.6 g/dL, platelet count of 616, white blood cell count of 4.0, last creatinine of 1.6, last iron studies done in December 2021 showing ferritin of 516, iron saturation of 26%    Clinically, the patient reports that she is in good spirits, continues to have some fatigue, mild confusion. Per the patient's son, the patient is a history of dementia. She is currently taking donezepil. No other current clinical symptoms.   She denies any bleeding, bruising, focal bone pain. No other constitutional symptoms of concern for malignancy. Family history reviewed, noncontributory  Social history reviewed, also noncontributory      Review of Systems: A complete review of systems was obtained, negative except as described above.     Past Medical History:   Diagnosis Date    Adverse effect of anesthesia     passed out during EGD/stopped breathing    Arrhythmia     has pacemaker for low HR    Arthritis     Asthma     Cancer ([facility]     right kidney - surgery    Chronic pain     right side    Deep vein thrombosis (DVT) during current hospitalization ([facility]     history of DVT was on coumadin in the past    Diabetes ([facility]     diet controlled    GERD (gastroesophageal reflux disease)     H/O acute pancreatitis     Hiatal hernia     Hypertension     MARLEN (obstructive sleep apnea)     does not use CPAP/pt states she has not used since a pacemaker inserted    Other ill-defined conditions(799.89)     lymph node enlargement - left chest - benign bx - watching    Other ill-defined conditions(799.89)     wheezes    Psychiatric disorder     depression    PUD (peptic ulcer disease)     hx of    Thromboembolus ([facility]     Unspecified adverse effect of anesthesia     passed out during EGD per pt - stopped breathing per pt per MD      Past Surgical History:   Procedure Laterality Date    HX APPENDECTOMY      HX CHOLECYSTECTOMY      HX GYN          HX HEENT Right     laser eye surgery to stop bleeding in back of eye - multiple laser surgeries    HX KNEE ARTHROSCOPY      left knee; cartilage repair    HX ORTHOPAEDIC      right hip replacement    HX ORTHOPAEDIC      lower back surgery    HX ORTHOPAEDIC      straighten toe - 2nd toe on right    HX ORTHOPAEDIC Bilateral     carpal tunnel release    HX PACEMAKER      not defibrillator    HX UROLOGICAL      implant in hip to control urine and then removed    HX UROLOGICAL      Right kidney partial nephrectomy      Social History     Tobacco Use    Smoking status: Former Smoker     Packs/day: 0.25     Years: 20.00     Pack years: 5.00     Quit date: 1971     Years since quittin.5    Smokeless tobacco: Never Used   Substance Use Topics    Alcohol use: No      Family History   Problem Relation Age of Onset    Stroke Mother         brain aneurysm    Hypertension Father     Heart Disease Father     Cancer Sister         breast    Cancer Brother         lung and prostate    Cancer Sister         breast    SLE Other         half siblings (5+) - lupus    Hypertension Daughter     Diabetes Daughter      Current Outpatient Medications   Medication Sig    aspirin 81 mg chewable tablet Take 81 mg by mouth daily.  amLODIPine (NORVASC) 10 mg tablet Take 1 Tab by mouth daily.  hydrOXYzine pamoate (VISTARIL) 25 mg capsule Take 1 Cap by mouth nightly as needed for Itching.  famotidine (PEPCID) 20 mg tablet Take 1 Tab by mouth nightly. Indications: ZOLLINGER-ROBERSON SYNDROME    pantoprazole (PROTONIX) 40 mg tablet Take 40 mg by mouth daily.  FERROUS SULFATE PO Take 325 mg by mouth daily.  metoprolol succinate (TOPROL-XL) 50 mg XL tablet Take 1 Tab by mouth daily.  pravastatin (PRAVACHOL) 20 mg tablet Take 1 Tab by mouth nightly.  allopurinol (ZYLOPRIM) 300 mg tablet Take 300 mg by mouth daily.  PROAIR HFA 90 mcg/actuation inhaler Take 2 Puffs by inhalation four (4) times daily as needed.  furosemide (LASIX) 40 mg tablet Take 40 mg by mouth daily as needed.  montelukast (SINGULAIR) 10 mg tablet Take 10 mg by mouth nightly.  losartan (COZAAR) 100 mg tablet Take 100 mg by mouth nightly.  cinacalcet (SENSIPAR) 30 mg tablet Take 1 Tablet by mouth daily. (will use Good Rx coupon) (Patient not taking: Reported on 2022)    metformin HCl (METFORMIN PO) Take  by mouth.  (Patient not taking: Reported on 2022)    b complex vitamins tablet Take 1 Tab by mouth daily. (Patient not taking: Reported on 2/8/2022)     No current facility-administered medications for this visit. Allergies   Allergen Reactions    Pcn [Penicillins] Rash     But can take cephalosporins. Allergic to all \"cillins\".  Codeine Other (comments)     Hallucinations, takes hydrocodone at home for pain    Contrast Dye [Iodine] Other (comments)     Not to take due to kidney function    Prednisone Other (comments)     \"makes my pancreas numbers go way up\"            Physical Exam:     Visit Vitals  /72 (BP 1 Location: Left upper arm, BP Patient Position: Sitting)   Pulse 73   Temp 97.7 °F (36.5 °C) (Temporal)   Ht 5' 4\" (1.626 m)   Wt 170 lb (77.1 kg)   SpO2 94%   BMI 29.18 kg/m²     ECOG PS: 1  General: No distress  Eyes: PERRLA, anicteric sclerae  HENT: Atraumatic with normal appearance of ears and nose; OP clear  Neck: Supple; no visualized JVD  Lymphatic: No cervical, or supraclavicular lymphadenopathy. Respiratory: CTAB, normal respiratory effort  CV: Normal rate, regular rhythm, no murmurs, no peripheral edema  GI: Soft, nontender, nondistended, no palpable masses, no hepatomegaly, no splenomegaly  MS: Normal gait and station. Digits without clubbing or cyanosis. Skin: No rashes, ecchymoses, or petechiae. Normal temperature, turgor, and texture. Neuro/Psych: Alert, oriented, appropriate affect, normal judgment/insight      Results:     Lab Results   Component Value Date/Time    WBC 5.6 07/24/2021 07:56 PM    HGB 9.9 (L) 07/24/2021 07:56 PM    HCT 31.9 (L) 07/24/2021 07:56 PM    PLATELET 009 (L) 40/62/7435 07:56 PM    MCV 88.6 07/24/2021 07:56 PM    ABS.  NEUTROPHILS 3.3 07/24/2021 07:56 PM     Lab Results   Component Value Date/Time    Sodium 140 07/24/2021 07:56 PM    Potassium 4.7 07/24/2021 07:56 PM    Chloride 115 (H) 07/24/2021 07:56 PM    CO2 21 07/24/2021 07:56 PM    Glucose 91 07/24/2021 07:56 PM    BUN 30 (H) 07/24/2021 07:56 PM    Creatinine 1.66 (H) 07/24/2021 07:56 PM    GFR est AA 36 (L) 07/24/2021 07:56 PM    GFR est non-AA 30 (L) 07/24/2021 07:56 PM    Calcium 10.3 (H) 07/24/2021 07:56 PM    Glucose (POC) 206 (H) 10/03/2018 11:37 AM    Creatinine (POC) 1.7 (H) 09/27/2019 05:49 PM     Lab Results   Component Value Date/Time    Bilirubin, total 0.5 07/24/2021 07:56 PM    ALT (SGPT) 22 07/24/2021 07:56 PM    Alk. phosphatase 64 07/24/2021 07:56 PM    Protein, total 6.4 07/24/2021 07:56 PM    Albumin 3.3 (L) 07/24/2021 07:56 PM    Globulin 3.1 07/24/2021 07:56 PM         Assessment and Recommendations:     # Anemia of chronic kidney disease stage III  -The patient has been taking Retacrit administered [facility] [address] [place] [address]. She wishes to transfer her care closer to her home here in [place].    -Chart reviewed, no iron deficiency seen in December 2021    -Agree with continuing Retacrit 40,000 units every 2 weeks with hold parameters for holding if hemoglobin greater than 11 g/dL    -Today I discussed the risks and benefits of Retacrit including the risk of possible stroke, thrombotic events, worsening hypertension. She and her son verbalized understanding and agreement and wished to continue. # Distant history of renal cell carcinoma  -No current clinical symptoms of be concerning for disease recurrence. The patient reports that she continues to follow with urology    Plan for follow-up in about 1 month with interval plans for Retacrit administration in the next 1 to 2 weeks.     Signed By: Tommy Toro MD      Attending Medical Oncologist   [facility]

## 2022-02-08 NOTE — TELEPHONE ENCOUNTER
[address] [facility]   Oncology Social Work  Encounter     Patient Name:  Cheyenne Wong     Medical History:     Advance Directives:    Narrative: Son states PCP appt not until March and he would like sooner with [facility]. Son doesn't know how long his parents have been  but there was 4 children. Pt son, Cornelius Santos lives relatively close and goes and makes them breakfast every morning and makes sure they take their meds. His sister comes and makes sure they have dinner.        Barriers to Care:     Plan:    Referral/Handouts:   Primary Care referral

## 2022-02-14 ENCOUNTER — TELEPHONE (OUTPATIENT)
Dept: ONCOLOGY | Age: 87
End: 2022-02-14

## 2022-02-16 ENCOUNTER — OFFICE VISIT (OUTPATIENT)
Dept: INTERNAL MEDICINE CLINIC | Age: 87
End: 2022-02-16
Payer: MEDICARE

## 2022-02-16 VITALS
OXYGEN SATURATION: 99 % | DIASTOLIC BLOOD PRESSURE: 71 MMHG | HEIGHT: 64 IN | BODY MASS INDEX: 29.02 KG/M2 | WEIGHT: 170 LBS | HEART RATE: 61 BPM | TEMPERATURE: 97.2 F | SYSTOLIC BLOOD PRESSURE: 137 MMHG | RESPIRATION RATE: 16 BRPM

## 2022-02-16 DIAGNOSIS — Z13.31 SCREENING FOR DEPRESSION: ICD-10-CM

## 2022-02-16 DIAGNOSIS — N18.30 STAGE 3 CHRONIC KIDNEY DISEASE, UNSPECIFIED WHETHER STAGE 3A OR 3B CKD (HCC): ICD-10-CM

## 2022-02-16 DIAGNOSIS — Z00.00 MEDICARE ANNUAL WELLNESS VISIT, SUBSEQUENT: Primary | ICD-10-CM

## 2022-02-16 DIAGNOSIS — I49.5 SINUS NODE DYSFUNCTION (HCC): ICD-10-CM

## 2022-02-16 DIAGNOSIS — E11.8 CONTROLLED DIABETES MELLITUS TYPE 2 WITH COMPLICATIONS, UNSPECIFIED WHETHER LONG TERM INSULIN USE (HCC): ICD-10-CM

## 2022-02-16 DIAGNOSIS — Z78.0 POSTMENOPAUSAL: ICD-10-CM

## 2022-02-16 PROCEDURE — G8754 DIAS BP LESS 90: HCPCS | Performed by: INTERNAL MEDICINE

## 2022-02-16 PROCEDURE — G8510 SCR DEP NEG, NO PLAN REQD: HCPCS | Performed by: INTERNAL MEDICINE

## 2022-02-16 PROCEDURE — G8419 CALC BMI OUT NRM PARAM NOF/U: HCPCS | Performed by: INTERNAL MEDICINE

## 2022-02-16 PROCEDURE — 1101F PT FALLS ASSESS-DOCD LE1/YR: CPT | Performed by: INTERNAL MEDICINE

## 2022-02-16 PROCEDURE — G8752 SYS BP LESS 140: HCPCS | Performed by: INTERNAL MEDICINE

## 2022-02-16 PROCEDURE — G8400 PT W/DXA NO RESULTS DOC: HCPCS | Performed by: INTERNAL MEDICINE

## 2022-02-16 PROCEDURE — G0439 PPPS, SUBSEQ VISIT: HCPCS | Performed by: INTERNAL MEDICINE

## 2022-02-16 PROCEDURE — G8427 DOCREV CUR MEDS BY ELIG CLIN: HCPCS | Performed by: INTERNAL MEDICINE

## 2022-02-16 PROCEDURE — G8536 NO DOC ELDER MAL SCRN: HCPCS | Performed by: INTERNAL MEDICINE

## 2022-02-16 NOTE — PROGRESS NOTES
PROGRESS NOTE  Name: Ana Laura Dumont   : 1935       ASSESSMENT/ PLAN:       1. Hypertension: BP borderline today. Continue current meds for now. 2. Controlled diabetes mellitus type 2 with complications, unspecified whether long term insulin use ([facility]): We'll defer labs as she has just begun seeing an endocrinologist.  3. Memory loss, likely dementia. 4. Hyperparathyroidism: Per endocrinology, Dr. Wenceslao Arciniega. 5. Stage 3 chronic kidney disease, unspecified whether stage 3a or 3b CKD [facility]): She is seeing nephrology, Dr. Kvng Hector. 6. Sinus node dysfunction ([facility]): s/p pacemaker. Sees cardiologist, Dr. Deisy [place]. 7. Asthma: Controlled at this time. 8. Arthritis / Chronic pain: Stable. 9. Hx of Deep vein thrombosis (DVT)  10. GERD (gastroesophageal reflux disease) / Hiatal hernia / History of PUD: On famotidine. 11. MARLEN (obstructive sleep apnea)  12. History of depression. She denies any depression today. Follow-up and Dispositions  ·   Return in about 6 months (around 2022) for HTN. I have reviewed the patient's medications and risks/side effects/benefits were discussed. Diagnosis(-es) explained to patient and questions answered. Literature provided where appropriate. SUBJECTIVE:   Ms. Ana Laura Dumont is a 82 y.o. female who is here for follow up of routine medical issues. Chief Complaint   Patient presents with    New Patient   [address]       She is here with son, who does most of the talking. She answers some questions, but seems confused at times. Dr. Wenceslao Arciniega has seen her last week, for hyperparathyroidism. She has diet controlled DM. Dr. Vicki Ferraro is seeing her due to anemia; plans lab work next week. She sees Dr. Deisy [place]. She has had pacemaker placed. She sees nephrologist, Dr. Kvng Hector. At this time, she is otherwise doing well and has brought no other complaints to my attention today.   For a list of the medical issues addressed today, see the assessment and plan below. PMH:   Past Medical History:   Diagnosis Date    Adverse effect of anesthesia     passed out during EGD/stopped breathing    Arrhythmia     has pacemaker for low HR    Arthritis     Asthma     Cancer ([facility]     right kidney - surgery    Chronic pain     right side    Deep vein thrombosis (DVT) during current hospitalization ([facility]     history of DVT was on coumadin in the past    Diabetes ([facility]     diet controlled    GERD (gastroesophageal reflux disease)     H/O acute pancreatitis     Hiatal hernia     Hypertension     MARLEN (obstructive sleep apnea)     does not use CPAP/pt states she has not used since a pacemaker inserted    Other ill-defined conditions(799.89)     lymph node enlargement - left chest - benign bx - watching    Other ill-defined conditions(799.89)     wheezes    Psychiatric disorder     depression    PUD (peptic ulcer disease)     hx of    Thromboembolus ([facility]     Unspecified adverse effect of anesthesia     passed out during EGD per pt - stopped breathing per pt per MD       Past Surgical History:   Procedure Laterality Date    HX APPENDECTOMY      HX CHOLECYSTECTOMY      HX GYN          HX HEENT Right     laser eye surgery to stop bleeding in back of eye - multiple laser surgeries    HX KNEE ARTHROSCOPY      left knee; cartilage repair    HX ORTHOPAEDIC      right hip replacement    HX ORTHOPAEDIC      lower back surgery    HX ORTHOPAEDIC      straighten toe - 2nd toe on right    HX ORTHOPAEDIC Bilateral     carpal tunnel release    HX PACEMAKER      not defibrillator    HX UROLOGICAL      implant in hip to control urine and then removed    HX UROLOGICAL      Right kidney partial nephrectomy       All: is allergic to pcn [penicillins], codeine, contrast dye [iodine], and prednisone. Current Outpatient Medications   Medication Sig    aspirin 81 mg chewable tablet Take 81 mg by mouth daily.     amLODIPine (NORVASC) 10 mg tablet Take 1 Tab by mouth daily.  hydrOXYzine pamoate (VISTARIL) 25 mg capsule Take 1 Cap by mouth nightly as needed for Itching.  famotidine (PEPCID) 20 mg tablet Take 1 Tab by mouth nightly. Indications: ZOLLINGER-ROBERSON SYNDROME    pantoprazole (PROTONIX) 40 mg tablet Take 40 mg by mouth daily.  FERROUS SULFATE PO Take 325 mg by mouth daily.  metoprolol succinate (TOPROL-XL) 50 mg XL tablet Take 1 Tab by mouth daily.  pravastatin (PRAVACHOL) 20 mg tablet Take 1 Tab by mouth nightly.  allopurinol (ZYLOPRIM) 300 mg tablet Take 300 mg by mouth daily.  PROAIR HFA 90 mcg/actuation inhaler Take 2 Puffs by inhalation four (4) times daily as needed.  furosemide (LASIX) 40 mg tablet Take 40 mg by mouth daily as needed.  montelukast (SINGULAIR) 10 mg tablet Take 10 mg by mouth nightly.  losartan (COZAAR) 100 mg tablet Take 100 mg by mouth nightly.  cinacalcet (SENSIPAR) 30 mg tablet Take 1 Tablet by mouth daily. (will use Good Rx coupon) (Patient not taking: Reported on 2/8/2022)    metformin HCl (METFORMIN PO) Take  by mouth. (Patient not taking: Reported on 2/8/2022)    b complex vitamins tablet Take 1 Tab by mouth daily. (Patient not taking: Reported on 2/8/2022)     No current facility-administered medications for this visit. FH: Her family history includes Cancer in her brother, sister, and sister; Diabetes in her daughter; Heart Disease in her father; Hypertension in her daughter and father; SLE in an other family member; Stroke in her mother. SH:  reports that she quit smoking about 50 years ago. She has a 5.00 pack-year smoking history. She has never used smokeless tobacco. She reports that she does not drink alcohol and does not use drugs. ROS: See above; Complete ROS otherwise negative.      OBJECTIVE:   Vitals:   Visit Vitals  /71   Pulse 61   Temp 97.2 °F (36.2 °C) (Temporal)   Resp 16   Ht 5' 4\" (1.626 m)   Wt 170 lb (77.1 kg)   SpO2 99% BMI 29.18 kg/m²      Gen: Pleasant 82 y.o.  female in NAD. HEENT: PERRLA. EOMI. OP moist and pink. Neck: Supple. No LAD. HEART: RRR, No M/G/R.    LUNGS: CTAB No W/R. ABDOMEN: S, NT, ND, BS+. EXTREMITIES: Warm. No C/C/E.  MUSCULOSKELETAL: Normal ROM, muscle strength 5/5 all groups. NEURO: Alert and oriented x 3. Cranial nerves grossly intact. No focal sensory or motor deficits noted. SKIN: Warm. Dry. No rashes or other lesions noted. Lab Results   Component Value Date/Time    Sodium 140 07/24/2021 07:56 PM    Potassium 4.7 07/24/2021 07:56 PM    Chloride 115 (H) 07/24/2021 07:56 PM    CO2 21 07/24/2021 07:56 PM    Anion gap 4 (L) 07/24/2021 07:56 PM    Glucose 91 07/24/2021 07:56 PM    BUN 30 (H) 07/24/2021 07:56 PM    Creatinine 1.66 (H) 07/24/2021 07:56 PM    BUN/Creatinine ratio 18 07/24/2021 07:56 PM    GFR est AA 36 (L) 07/24/2021 07:56 PM    GFR est non-AA 30 (L) 07/24/2021 07:56 PM    Calcium 10.3 (H) 07/24/2021 07:56 PM    Bilirubin, total 0.5 07/24/2021 07:56 PM    ALT (SGPT) 22 07/24/2021 07:56 PM    Alk.  phosphatase 64 07/24/2021 07:56 PM    Protein, total 6.4 07/24/2021 07:56 PM    Albumin 3.3 (L) 07/24/2021 07:56 PM    Globulin 3.1 07/24/2021 07:56 PM    A-G Ratio 1.1 07/24/2021 07:56 PM       Lab Results   Component Value Date/Time    Triglyceride 87 02/24/2014 02:30 AM        Lab Results   Component Value Date/Time    Hemoglobin A1c 6.4 (H) 12/19/2016 03:06 PM       Lab Results   Component Value Date/Time    WBC 5.6 07/24/2021 07:56 PM    HGB 9.9 (L) 07/24/2021 07:56 PM    HCT 31.9 (L) 07/24/2021 07:56 PM    PLATELET 332 (L) 70/25/5086 07:56 PM    MCV 88.6 07/24/2021 07:56 PM

## 2022-02-16 NOTE — PROGRESS NOTES
Lara Suh is a 82 y.o. female  Chief Complaint   Patient presents with    New Patient   [address] [address] Maintenance Due   Topic Date Due    Depression Screen  Never done    DTaP/Tdap/Td series (1 - Tdap) Never done    Shingrix Vaccine Age 50> (1 of 2) Never done    Bone Densitometry (Dexa) Screening  Never done    Medicare Yearly Exam  Never done    Lipid Screen  12/24/2020    Flu Vaccine (1) Never done     Visit Vitals  /71   Pulse 61   Temp 97.2 °F (36.2 °C) (Temporal)   Resp 16   Ht 5' 4\" (1.626 m)   Wt 170 lb (77.1 kg)   SpO2 99%   BMI 29.18 kg/m²

## 2022-02-16 NOTE — PATIENT INSTRUCTIONS

## 2022-02-16 NOTE — PROGRESS NOTES
This is the Subsequent Medicare Annual Wellness Exam, performed 12 months or more after the Initial AWV or the last Subsequent AWV    I have reviewed the patient's medical history in detail and updated the computerized patient record. Assessment/Plan   Education and counseling provided:  Are appropriate based on today's review and evaluation         Depression Risk Factor Screening     3 most recent PHQ Screens 2/16/2022   Little interest or pleasure in doing things Not at all   Feeling down, depressed, irritable, or hopeless Not at all   Total Score PHQ 2 0       Alcohol & Drug Abuse Risk Screen    Do you average more than 1 drink per night or more than 7 drinks a week:  No    On any one occasion in the past three months have you have had more than 3 drinks containing alcohol:  No          Functional Ability and Level of Safety    Hearing: Hearing is good. She lives in 1 story home with . Ambulates with walker. PT is coming in for a couple of weeks. Activities of Daily Living: The home contains: handrails and grab bars  Patient needs help with:  transportation, preparing meals, managing medications, managing money and bathing      Ambulation: with difficulty, uses a walker     Fall Risk:  Fall Risk Assessment, last 12 mths 2/16/2022   Able to walk? Yes   Fall in past 12 months? 1   Do you feel unsteady? 1   Are you worried about falling 1   Is TUG test greater than 12 seconds? 1   Is the gait abnormal? 1   Number of falls in past 12 months 1   Fall with injury?  0      Abuse Screen:  Patient is not abused       Cognitive Screening    Has your family/caregiver stated any concerns about your memory: yes - Has dementia     Cognitive Screening: abnormal. She is not oriented to date, place    Health Maintenance Due     Health Maintenance Due   Topic Date Due    Depression Screen  Never done    DTaP/Tdap/Td series (1 - Tdap) Never done    Shingrix Vaccine Age 50> (1 of 2) Never done    Bone Densitometry (Dexa) Screening  Never done    Medicare Yearly Exam  Never done    Lipid Screen  2020    Flu Vaccine (1) Never done       Patient Care Team   Patient Care Team:  Daniel Putnam MD as PCP - General (Internal Medicine)    History     Patient Active Problem List   Diagnosis Code    Sinus node dysfunction (HCC) I49.5    Localized primary osteoarthritis of left lower leg M17.12    Primary localized osteoarthritis of left knee M17.12    GI bleed K92.2    Anemia D64.9    GERD (gastroesophageal reflux disease) K21.9    Stage 3 chronic kidney disease ([address] N18.30    Asthma J45.909    HTN (hypertension) I10    Acute gastric ulcer K25.3    Vision loss, right eye H54.61     Past Medical History:   Diagnosis Date    Adverse effect of anesthesia     passed out during EGD/stopped breathing    Arrhythmia     has pacemaker for low HR    Arthritis     Asthma     Cancer [facility]     right kidney - surgery    Chronic pain     right side    Deep vein thrombosis (DVT) during current hospitalization ([facility]     history of DVT was on coumadin in the past    Diabetes ([facility]     diet controlled    GERD (gastroesophageal reflux disease)     H/O acute pancreatitis     Hiatal hernia     Hypertension     MARLEN (obstructive sleep apnea)     does not use CPAP/pt states she has not used since a pacemaker inserted    Other ill-defined conditions(799.89)     lymph node enlargement - left chest - benign bx - watching    Other ill-defined conditions(799.89)     wheezes    Psychiatric disorder     depression    PUD (peptic ulcer disease)     hx of    Thromboembolus ([address]     Unspecified adverse effect of anesthesia     passed out during EGD per pt - stopped breathing per pt per MD      Past Surgical History:   Procedure Laterality Date    HX APPENDECTOMY      HX CHOLECYSTECTOMY      HX GYN          HX HEENT Right     laser eye surgery to stop bleeding in back of eye - multiple laser surgeries  HX KNEE ARTHROSCOPY      left knee; cartilage repair    HX ORTHOPAEDIC      right hip replacement    HX ORTHOPAEDIC      lower back surgery    HX ORTHOPAEDIC      straighten toe - 2nd toe on right    HX ORTHOPAEDIC Bilateral     carpal tunnel release    HX PACEMAKER      not defibrillator    HX UROLOGICAL      implant in hip to control urine and then removed    HX UROLOGICAL      Right kidney partial nephrectomy     Current Outpatient Medications   Medication Sig Dispense Refill    aspirin 81 mg chewable tablet Take 81 mg by mouth daily.  amLODIPine (NORVASC) 10 mg tablet Take 1 Tab by mouth daily. 30 Tab 0    hydrOXYzine pamoate (VISTARIL) 25 mg capsule Take 1 Cap by mouth nightly as needed for Itching. 15 Cap 0    famotidine (PEPCID) 20 mg tablet Take 1 Tab by mouth nightly. Indications: ZOLLINGER-ROBERSON SYNDROME 30 Tab 0    pantoprazole (PROTONIX) 40 mg tablet Take 40 mg by mouth daily.  FERROUS SULFATE PO Take 325 mg by mouth daily.  metoprolol succinate (TOPROL-XL) 50 mg XL tablet Take 1 Tab by mouth daily. 30 Tab 11    pravastatin (PRAVACHOL) 20 mg tablet Take 1 Tab by mouth nightly. 30 Tab 11    allopurinol (ZYLOPRIM) 300 mg tablet Take 300 mg by mouth daily.  PROAIR HFA 90 mcg/actuation inhaler Take 2 Puffs by inhalation four (4) times daily as needed.  furosemide (LASIX) 40 mg tablet Take 40 mg by mouth daily as needed.  montelukast (SINGULAIR) 10 mg tablet Take 10 mg by mouth nightly.  losartan (COZAAR) 100 mg tablet Take 100 mg by mouth nightly.  cinacalcet (SENSIPAR) 30 mg tablet Take 1 Tablet by mouth daily. (will use Good Rx coupon) (Patient not taking: Reported on 2/8/2022) 30 Tablet 4    metformin HCl (METFORMIN PO) Take  by mouth. (Patient not taking: Reported on 2/8/2022)      b complex vitamins tablet Take 1 Tab by mouth daily.  (Patient not taking: Reported on 2/8/2022)       Allergies   Allergen Reactions    Pcn [Penicillins] Rash But can take cephalosporins. Allergic to all \"cillins\".     Codeine Other (comments)     Hallucinations, takes hydrocodone at home for pain    Contrast Dye [Iodine] Other (comments)     Not to take due to kidney function    Prednisone Other (comments)     \"makes my pancreas numbers go way up\"       Family History   Problem Relation Age of Onset    Stroke Mother         brain aneurysm    Hypertension Father     Heart Disease Father     Cancer Sister         breast    Cancer Brother         lung and prostate    Cancer Sister         breast    SLE Other         half siblings (5+) - lupus    Hypertension Daughter     Diabetes Daughter      Social History     Tobacco Use    Smoking status: Former Smoker     Packs/day: 0.25     Years: 20.00     Pack years: 5.00     Quit date: 1971     Years since quittin.5    Smokeless tobacco: Never Used   Substance Use Topics    Alcohol use: No         Tonio Angulo MD

## 2022-02-21 ENCOUNTER — HOSPITAL ENCOUNTER (OUTPATIENT)
Dept: INFUSION THERAPY | Age: 87
Discharge: HOME | End: 2022-02-21
Payer: MEDICARE

## 2022-02-21 VITALS
WEIGHT: 176.3 LBS | SYSTOLIC BLOOD PRESSURE: 152 MMHG | TEMPERATURE: 97.3 F | HEART RATE: 74 BPM | BODY MASS INDEX: 30.26 KG/M2 | OXYGEN SATURATION: 95 % | DIASTOLIC BLOOD PRESSURE: 82 MMHG | RESPIRATION RATE: 17 BRPM

## 2022-02-21 DIAGNOSIS — D64.9 ANEMIA, UNSPECIFIED TYPE: Primary | ICD-10-CM

## 2022-02-21 LAB
FERRITIN SERPL-MCNC: 466 NG/ML (ref 8–252)
HGB BLD-MCNC: 9.9 G/DL (ref 11.5–16)
IRON SATN MFR SERPL: 45 % (ref 20–50)
IRON SERPL-MCNC: 74 UG/DL (ref 35–150)
TIBC SERPL-MCNC: 164 UG/DL (ref 250–450)

## 2022-02-21 PROCEDURE — 83540 ASSAY OF IRON: CPT

## 2022-02-21 PROCEDURE — 36416 COLLJ CAPILLARY BLOOD SPEC: CPT

## 2022-02-21 PROCEDURE — 82728 ASSAY OF FERRITIN: CPT

## 2022-02-21 PROCEDURE — 96372 THER/PROPH/DIAG INJ SC/IM: CPT

## 2022-02-21 PROCEDURE — 74011250636 HC RX REV CODE- 250/636: Performed by: STUDENT IN AN ORGANIZED HEALTH CARE EDUCATION/TRAINING PROGRAM

## 2022-02-21 PROCEDURE — 85018 HEMOGLOBIN: CPT

## 2022-02-21 RX ADMIN — EPOETIN ALFA-EPBX 40000 UNITS: 40000 INJECTION, SOLUTION INTRAVENOUS; SUBCUTANEOUS at 11:21

## 2022-02-21 NOTE — PROGRESS NOTES
[facility] Consult Note:  Arrived - 1100  Labs obtained: Hemocue, Iron Profile, Ferritin    Patient denied having any symptoms of COVID-19, i.e. SOB, coughing, fever, or generally not feeling well. Also denies having been exposed to COVID-19 recently or having had any recent contact with family/friend that has a pending COVID test.    Visit Vitals  BP (!) 152/82 (BP 1 Location: Left upper arm, BP Patient Position: Sitting)   Pulse 74   Temp 97.3 °F (36.3 °C)   Resp 17   Wt 80 kg (176 lb 4.8 oz)   SpO2 95%   Breastfeeding No   BMI 30.26 kg/m²     Recent Results (from the past 12 hour(s))   POC HEMOGLOBIN    Collection Time: 02/21/22 11:06 AM   Result Value Ref Range    Hemoglobin (POC) 9.9 (L) 11.5 - 16.0 g/dL         Assessment - unchanged. Retacrit 40,000 units SQ slowly in L arm.    1125 - Tolerated well. Pt denies any acute problems/changes. Discharged from [facility] ambulatory. No distress.  Next appt: 3/7/22 at 1400

## 2022-03-01 ENCOUNTER — HOSPITAL ENCOUNTER (OUTPATIENT)
Dept: MAMMOGRAPHY | Age: 87
Discharge: HOME | End: 2022-03-01
Attending: INTERNAL MEDICINE
Payer: MEDICARE

## 2022-03-01 DIAGNOSIS — Z78.0 POSTMENOPAUSAL: ICD-10-CM

## 2022-03-01 PROCEDURE — 77080 DXA BONE DENSITY AXIAL: CPT

## 2022-03-03 DIAGNOSIS — E83.52 HYPERCALCEMIA: ICD-10-CM

## 2022-03-03 DIAGNOSIS — E21.3 HYPERPARATHYROIDISM (HCC): ICD-10-CM

## 2022-03-07 ENCOUNTER — HOSPITAL ENCOUNTER (OUTPATIENT)
Dept: INFUSION THERAPY | Age: 87
Discharge: HOME | End: 2022-03-07
Payer: MEDICARE

## 2022-03-07 VITALS
TEMPERATURE: 97.7 F | OXYGEN SATURATION: 97 % | BODY MASS INDEX: 30.45 KG/M2 | HEART RATE: 64 BPM | WEIGHT: 177.4 LBS | RESPIRATION RATE: 18 BRPM | DIASTOLIC BLOOD PRESSURE: 71 MMHG | SYSTOLIC BLOOD PRESSURE: 141 MMHG

## 2022-03-07 DIAGNOSIS — D64.9 ANEMIA, UNSPECIFIED TYPE: Primary | ICD-10-CM

## 2022-03-07 LAB — HGB BLD-MCNC: 10.4 G/DL (ref 11.5–16)

## 2022-03-07 PROCEDURE — 96372 THER/PROPH/DIAG INJ SC/IM: CPT

## 2022-03-07 PROCEDURE — 36416 COLLJ CAPILLARY BLOOD SPEC: CPT

## 2022-03-07 PROCEDURE — 74011250636 HC RX REV CODE- 250/636: Performed by: STUDENT IN AN ORGANIZED HEALTH CARE EDUCATION/TRAINING PROGRAM

## 2022-03-07 PROCEDURE — 85018 HEMOGLOBIN: CPT

## 2022-03-07 RX ADMIN — EPOETIN ALFA-EPBX 40000 UNITS: 40000 INJECTION, SOLUTION INTRAVENOUS; SUBCUTANEOUS at 14:31

## 2022-03-07 NOTE — PROGRESS NOTES
[facility] Consult Note:  Arrived - 1410    Patient denied having any symptoms of COVID-19, i.e. SOB, coughing, fever, or generally not feeling well. Also denies having been exposed to COVID-19 recently or having had any recent contact with family/friend that has a pending COVID test.     Visit Vitals  BP (!) 141/71 (BP 1 Location: Left upper arm, BP Patient Position: Sitting)   Pulse 64   Temp 97.7 °F (36.5 °C)   Resp 18   Wt 80.5 kg (177 lb 6.4 oz)   SpO2 97%   BMI 30.45 kg/m²       Assessment unremarkable except constipation and bilateral ankle 1+ edema. Hgb - 10.4    Recent Results (from the past 12 hour(s))   POC HEMOGLOBIN    Collection Time: 03/07/22  2:19 PM   Result Value Ref Range    Hemoglobin (POC) 10.4 (L) 11.5 - 16.0 g/dL       Retacrit 40,000 units SQ slowly in left arm. 1435 - Tolerated well. Pt denies any acute problems/changes. Discharged from [facility] ambulatory. No distress. Next appt: 3/21/22 @ 1100.

## 2022-03-11 RX ORDER — ALLOPURINOL 300 MG/1
300 TABLET ORAL DAILY
Status: CANCELLED | OUTPATIENT
Start: 2022-03-11

## 2022-03-11 RX ORDER — AMLODIPINE BESYLATE 10 MG/1
10 TABLET ORAL DAILY
Qty: 30 TABLET | Refills: 0 | Status: CANCELLED | OUTPATIENT
Start: 2022-03-11

## 2022-03-11 NOTE — TELEPHONE ENCOUNTER
3/11/2022  1:22       Pharmacy called to follow up on prescription for cinacalcet 30 mg tablets sent on 3-11.     #[phone]  FYY#[phone]  [id number]#[phone]    #[phone]      Thanks,  Stefano Sales

## 2022-03-11 NOTE — TELEPHONE ENCOUNTER
Left message on son's VM asking if [facility] is where he wants his mother to get Cinacalcet from them or [facility] with Good Rx card.

## 2022-03-12 RX ORDER — CINACALCET 30 MG/1
30 TABLET, FILM COATED ORAL DAILY
Qty: 90 TABLET | Refills: 1 | Status: SHIPPED | OUTPATIENT
Start: 2022-03-12 | End: 2022-07-21

## 2022-03-17 RX ORDER — METOPROLOL SUCCINATE 50 MG/1
50 TABLET, EXTENDED RELEASE ORAL DAILY
Qty: 30 TABLET | Refills: 11 | Status: SHIPPED | OUTPATIENT
Start: 2022-03-17

## 2022-03-17 RX ORDER — AMITRIPTYLINE HYDROCHLORIDE 25 MG/1
25 TABLET, FILM COATED ORAL
COMMUNITY
Start: 2022-02-18 | End: 2022-03-17 | Stop reason: SDUPTHER

## 2022-03-17 RX ORDER — DONEPEZIL HYDROCHLORIDE 10 MG/1
TABLET, FILM COATED ORAL
COMMUNITY
Start: 2022-03-11 | End: 2022-03-17 | Stop reason: SDUPTHER

## 2022-03-17 RX ORDER — PANTOPRAZOLE SODIUM 40 MG/1
40 TABLET, DELAYED RELEASE ORAL DAILY
Qty: 30 TABLET | Refills: 11 | Status: SHIPPED | OUTPATIENT
Start: 2022-03-17

## 2022-03-17 RX ORDER — FLUOXETINE 10 MG/1
CAPSULE ORAL
COMMUNITY
Start: 2022-03-11 | End: 2022-03-17 | Stop reason: SDUPTHER

## 2022-03-17 RX ORDER — AMITRIPTYLINE HYDROCHLORIDE 25 MG/1
25 TABLET, FILM COATED ORAL
Qty: 30 TABLET | Refills: 11 | Status: SHIPPED | OUTPATIENT
Start: 2022-03-17

## 2022-03-17 RX ORDER — AMLODIPINE BESYLATE 10 MG/1
10 TABLET ORAL DAILY
Qty: 30 TABLET | Refills: 0 | Status: SHIPPED | OUTPATIENT
Start: 2022-03-17 | End: 2022-03-25

## 2022-03-17 RX ORDER — DONEPEZIL HYDROCHLORIDE 10 MG/1
10 TABLET, FILM COATED ORAL
Qty: 30 TABLET | Refills: 11 | Status: SHIPPED | OUTPATIENT
Start: 2022-03-17

## 2022-03-17 RX ORDER — LOSARTAN POTASSIUM 100 MG/1
100 TABLET ORAL
Qty: 30 TABLET | Refills: 11 | Status: SHIPPED | OUTPATIENT
Start: 2022-03-17

## 2022-03-17 RX ORDER — ALLOPURINOL 300 MG/1
300 TABLET ORAL DAILY
Qty: 30 TABLET | Refills: 11 | Status: SHIPPED | OUTPATIENT
Start: 2022-03-17

## 2022-03-17 RX ORDER — FLUOXETINE 10 MG/1
10 CAPSULE ORAL DAILY
Qty: 30 CAPSULE | Refills: 11 | Status: SHIPPED | OUTPATIENT
Start: 2022-03-17

## 2022-03-17 NOTE — PROGRESS NOTES
Leslie Parks is a 82 y.o. female here for follow up for kidney cancer and anemia. She is receiving Retacrit today. Pt states she can feel lumps in her arms and legs and hurts when she presses on it. Just noticed this in last few weeks. 1. Have you been to the ER, urgent care clinic since your last visit? Hospitalized since your last visit? no    2. Have you seen or consulted any other health care providers outside of the [address] [place] [address] since your last visit? Include any pap smears or colon screening.   no

## 2022-03-18 PROBLEM — H54.61 VISION LOSS, RIGHT EYE: Status: ACTIVE | Noted: 2018-09-29

## 2022-03-19 PROBLEM — N18.30 STAGE 3 CHRONIC KIDNEY DISEASE (HCC): Status: ACTIVE | Noted: 2018-02-22

## 2022-03-19 PROBLEM — J45.909 ASTHMA: Status: ACTIVE | Noted: 2018-02-22

## 2022-03-19 PROBLEM — I10 HTN (HYPERTENSION): Status: ACTIVE | Noted: 2018-02-22

## 2022-03-19 PROBLEM — K21.9 GERD (GASTROESOPHAGEAL REFLUX DISEASE): Status: ACTIVE | Noted: 2018-02-22

## 2022-03-20 PROBLEM — K92.2 GI BLEED: Status: ACTIVE | Noted: 2018-02-15

## 2022-03-20 PROBLEM — D64.9 ANEMIA: Status: ACTIVE | Noted: 2018-02-22

## 2022-03-20 PROBLEM — K25.3 ACUTE GASTRIC ULCER: Status: ACTIVE | Noted: 2018-02-22

## 2022-03-21 ENCOUNTER — OFFICE VISIT (OUTPATIENT)
Dept: ONCOLOGY | Age: 87
End: 2022-03-21
Payer: MEDICARE

## 2022-03-21 ENCOUNTER — HOSPITAL ENCOUNTER (OUTPATIENT)
Dept: INFUSION THERAPY | Age: 87
Discharge: HOME | End: 2022-03-21
Payer: MEDICARE

## 2022-03-21 VITALS
HEIGHT: 64 IN | BODY MASS INDEX: 30.22 KG/M2 | RESPIRATION RATE: 18 BRPM | WEIGHT: 177 LBS | DIASTOLIC BLOOD PRESSURE: 80 MMHG | HEART RATE: 68 BPM | TEMPERATURE: 97.8 F | SYSTOLIC BLOOD PRESSURE: 142 MMHG

## 2022-03-21 VITALS
HEART RATE: 68 BPM | SYSTOLIC BLOOD PRESSURE: 142 MMHG | DIASTOLIC BLOOD PRESSURE: 80 MMHG | TEMPERATURE: 97.8 F | RESPIRATION RATE: 18 BRPM

## 2022-03-21 DIAGNOSIS — N18.9 HISTORY OF ANEMIA DUE TO CHRONIC KIDNEY DISEASE: Primary | ICD-10-CM

## 2022-03-21 DIAGNOSIS — D64.9 ANEMIA, UNSPECIFIED TYPE: Primary | ICD-10-CM

## 2022-03-21 DIAGNOSIS — Z86.2 HISTORY OF ANEMIA DUE TO CHRONIC KIDNEY DISEASE: Primary | ICD-10-CM

## 2022-03-21 DIAGNOSIS — D64.9 NORMOCYTIC ANEMIA: ICD-10-CM

## 2022-03-21 LAB
FERRITIN SERPL-MCNC: 323 NG/ML (ref 8–252)
HGB BLD-MCNC: 11.3 G/DL (ref 11.5–16)
IRON SATN MFR SERPL: 34 % (ref 20–50)
IRON SERPL-MCNC: 57 UG/DL (ref 35–150)
TIBC SERPL-MCNC: 169 UG/DL (ref 250–450)

## 2022-03-21 PROCEDURE — G8427 DOCREV CUR MEDS BY ELIG CLIN: HCPCS | Performed by: STUDENT IN AN ORGANIZED HEALTH CARE EDUCATION/TRAINING PROGRAM

## 2022-03-21 PROCEDURE — 82728 ASSAY OF FERRITIN: CPT

## 2022-03-21 PROCEDURE — G8754 DIAS BP LESS 90: HCPCS | Performed by: STUDENT IN AN ORGANIZED HEALTH CARE EDUCATION/TRAINING PROGRAM

## 2022-03-21 PROCEDURE — G8536 NO DOC ELDER MAL SCRN: HCPCS | Performed by: STUDENT IN AN ORGANIZED HEALTH CARE EDUCATION/TRAINING PROGRAM

## 2022-03-21 PROCEDURE — G8399 PT W/DXA RESULTS DOCUMENT: HCPCS | Performed by: STUDENT IN AN ORGANIZED HEALTH CARE EDUCATION/TRAINING PROGRAM

## 2022-03-21 PROCEDURE — 1101F PT FALLS ASSESS-DOCD LE1/YR: CPT | Performed by: STUDENT IN AN ORGANIZED HEALTH CARE EDUCATION/TRAINING PROGRAM

## 2022-03-21 PROCEDURE — G8432 DEP SCR NOT DOC, RNG: HCPCS | Performed by: STUDENT IN AN ORGANIZED HEALTH CARE EDUCATION/TRAINING PROGRAM

## 2022-03-21 PROCEDURE — 36415 COLL VENOUS BLD VENIPUNCTURE: CPT

## 2022-03-21 PROCEDURE — G8753 SYS BP > OR = 140: HCPCS | Performed by: STUDENT IN AN ORGANIZED HEALTH CARE EDUCATION/TRAINING PROGRAM

## 2022-03-21 PROCEDURE — 85018 HEMOGLOBIN: CPT

## 2022-03-21 PROCEDURE — 83540 ASSAY OF IRON: CPT

## 2022-03-21 PROCEDURE — 1090F PRES/ABSN URINE INCON ASSESS: CPT | Performed by: STUDENT IN AN ORGANIZED HEALTH CARE EDUCATION/TRAINING PROGRAM

## 2022-03-21 PROCEDURE — 99214 OFFICE O/P EST MOD 30 MIN: CPT | Performed by: STUDENT IN AN ORGANIZED HEALTH CARE EDUCATION/TRAINING PROGRAM

## 2022-03-21 PROCEDURE — G8419 CALC BMI OUT NRM PARAM NOF/U: HCPCS | Performed by: STUDENT IN AN ORGANIZED HEALTH CARE EDUCATION/TRAINING PROGRAM

## 2022-03-21 NOTE — PROGRESS NOTES
[facility] Progress Note    1120  Pt arrived in stable condition and in no distress for Retacrit (HELD - hgb 11.3). Assessment completed; pt states she noticed lumps 3 weeks ago on her thighs bilaterally - to discuss with MD. Patient denied having any symptoms of COVID-19, i.e. SOB, coughing, fever, or generally not feeling well. Also denies having been exposed to COVID-19 recently or having had any recent contact with family/friend that has a pending COVID test.    Labs obtained per order and sent for processing. Patient Vitals for the past 12 hrs:   Temp Pulse Resp BP   03/21/22 1120 97.8 °F (36.6 °C) 68 18 (!) 142/80        Recent Results (from the past 12 hour(s))   POC HEMOGLOBIN    Collection Time: 03/21/22 11:30 AM   Result Value Ref Range    Hemoglobin (POC) 11.3 (L) 11.5 - 16.0 g/dL        1130  Pt discharged in no acute distress.  Next appointment:    Future Appointments   Date Time Provider Rosales Messer   3/24/2022 11:00 AM Ariana Keith MD NEU BS AMB   4/4/2022  1:00 PM VINSON FASTRACK 2 [facility]   4/18/2022 11:00 AM VINSON FASTRACK 6 [facility]   5/2/2022 11:00 AM VINSON FASTRACK 2 [facility]   5/27/2022 12:10 PM Michael Ruiz MD RDE JIAN 332 BS AMB   6/15/2022 11:00 AM Daniel Putnam MD [facility] BS AMB

## 2022-03-21 NOTE — PROGRESS NOTES
[address] [facility] at [address] [facility] [address]  W: [phone] F: [phone]      Reason for Visit:   Lara Suh is a 82 y.o. female who is being seen in follow-up for normocytic anemia in the setting of chronic kidney disease. Hematology / Oncology Treatment History:     Hematological/Oncological Diagnosis: Anemia of  Chronic kidney disease    Date of Diagnosis: Chronic    Treatment course: Retacrit 40,000 units every 2 weeks - started on 2/21/22. History of Present Illness:     Very pleasant 82 y.o. female with a relevant medical history most significant for hyperparathyroidism with hypercalcemia, diabetes mellitus type 2, hypertension, history of DVT in 2013, history of renal cell carcinoma status post right partial nephrectomy in 2005, hypertension, GERD, presents for evaluation and treatment of anemia associated with chronic kidney disease has been longstanding. The patient presents to us as a transfer of care from [place] [id number]. Overall, the patient reports clinical stability. She was recently started on a new medication cinacalcet for management of hyperparathyroidism. She is tolerating that medication well and has had some improvement in her confusion, mentation, and improvement in energy level since starting this medication. Most recent labs reviewed from outside source, notable for last hemoglobin on January 25, 2022 of 10.6 g/dL, platelet count of 933, white blood cell count of 4.0, last creatinine of 1.6, last iron studies done in December 2021 showing ferritin of 516, iron saturation of 26%. Patient has a history of dementia and is on donezepil. Interval history:  Patient is here today with her son. She started retacrit on 2/21/22. She has been feeling more energetic since starting the retacrit.  She notes chronic swelling in her ankles but otherwise has no new complaints. No other current clinical symptoms. She denies any bleeding, bruising, focal bone pain. No other constitutional symptoms of concern for malignancy. Family history reviewed, noncontributory  Social history reviewed, also noncontributory      Review of Systems: A complete review of systems was obtained, negative except as described above.     Past Medical History:   Diagnosis Date    Adverse effect of anesthesia     passed out during EGD/stopped breathing    Arrhythmia     has pacemaker for low HR    Arthritis     Asthma     Cancer ([address]     right kidney - surgery    Chronic pain     right side    Deep vein thrombosis (DVT) during current hospitalization ([address]     history of DVT was on coumadin in the past    Diabetes ([address]     diet controlled    GERD (gastroesophageal reflux disease)     H/O acute pancreatitis     Hiatal hernia     Hypertension     MARLEN (obstructive sleep apnea)     does not use CPAP/pt states she has not used since a pacemaker inserted    Other ill-defined conditions(799.89)     lymph node enlargement - left chest - benign bx - watching    Other ill-defined conditions(799.89)     wheezes    Psychiatric disorder     depression    PUD (peptic ulcer disease)     hx of    Thromboembolus ([address]     Unspecified adverse effect of anesthesia     passed out during EGD per pt - stopped breathing per pt per MD      Past Surgical History:   Procedure Laterality Date    HX APPENDECTOMY      HX CHOLECYSTECTOMY      HX GYN          HX HEENT Right     laser eye surgery to stop bleeding in back of eye - multiple laser surgeries    HX KNEE ARTHROSCOPY      left knee; cartilage repair    HX ORTHOPAEDIC      right hip replacement    HX ORTHOPAEDIC      lower back surgery    HX ORTHOPAEDIC      straighten toe - 2nd toe on right    HX ORTHOPAEDIC Bilateral     carpal tunnel release    HX PACEMAKER      not defibrillator    HX UROLOGICAL      implant in hip to control urine and then removed    HX UROLOGICAL      Right kidney partial nephrectomy      Social History     Tobacco Use    Smoking status: Former Smoker     Packs/day: 0.25     Years: 20.00     Pack years: 5.00     Quit date: 1971     Years since quittin.6    Smokeless tobacco: Never Used   Substance Use Topics    Alcohol use: No      Family History   Problem Relation Age of Onset    Stroke Mother         brain aneurysm    Hypertension Father     Heart Disease Father     Cancer Sister         breast    Cancer Brother         lung and prostate    Cancer Sister         breast    SLE Other         half siblings (5+) - lupus    Hypertension Daughter     Diabetes Daughter      Current Outpatient Medications   Medication Sig    pantoprazole (PROTONIX) 40 mg tablet Take 1 Tablet by mouth daily.  allopurinoL (ZYLOPRIM) 300 mg tablet Take 1 Tablet by mouth daily.  metoprolol succinate (TOPROL-XL) 50 mg XL tablet Take 1 Tablet by mouth daily.  amLODIPine (NORVASC) 10 mg tablet Take 1 Tablet by mouth daily.  losartan (COZAAR) 100 mg tablet Take 1 Tablet by mouth nightly.  FLUoxetine (PROzac) 10 mg capsule Take 1 Capsule by mouth daily.  amitriptyline (ELAVIL) 25 mg tablet Take 1 Tablet by mouth nightly.  donepeziL (ARICEPT) 10 mg tablet Take 1 Tablet by mouth nightly.  cinacalcet (SENSIPAR) 30 mg tablet Take 1 Tablet by mouth daily.  aspirin 81 mg chewable tablet Take 81 mg by mouth daily.  metformin HCl (METFORMIN PO) Take  by mouth. (Patient not taking: Reported on 2022)    hydrOXYzine pamoate (VISTARIL) 25 mg capsule Take 1 Cap by mouth nightly as needed for Itching.  famotidine (PEPCID) 20 mg tablet Take 1 Tab by mouth nightly. Indications: ZOLLINGER-ROBERSON SYNDROME    b complex vitamins tablet Take 1 Tab by mouth daily. (Patient not taking: Reported on 2022)    FERROUS SULFATE PO Take 325 mg by mouth daily.     pravastatin (PRAVACHOL) 20 mg tablet Take 1 Tab by mouth nightly.  PROAIR HFA 90 mcg/actuation inhaler Take 2 Puffs by inhalation four (4) times daily as needed.  furosemide (LASIX) 40 mg tablet Take 40 mg by mouth daily as needed.  montelukast (SINGULAIR) 10 mg tablet Take 10 mg by mouth nightly. No current facility-administered medications for this visit. Allergies   Allergen Reactions    Pcn [Penicillins] Rash     But can take cephalosporins. Allergic to all \"cillins\".  Codeine Other (comments)     Hallucinations, takes hydrocodone at home for pain    Contrast Dye [Iodine] Other (comments)     Not to take due to kidney function    Prednisone Other (comments)     \"makes my pancreas numbers go way up\"            Physical Exam:     Visit Vitals  BP (!) 142/80   Pulse 68   Temp 97.8 °F (36.6 °C)   Resp 18   Ht 5' 4\" (1.626 m)   BMI 30.45 kg/m²        Pain Scale: 0 - No pain/10      ECOG PS: 1  General: No distress  Eyes: PERRLA, anicteric sclerae  HENT: Atraumatic with normal appearance of ears and nose; OP clear  Neck: Supple; no visualized JVD  Lymphatic: No cervical, or supraclavicular lymphadenopathy. Respiratory: CTAB, normal respiratory effort  CV: Normal rate, regular rhythm, no murmurs, no peripheral edema  GI: Soft, nontender, nondistended, no palpable masses, no hepatomegaly, no splenomegaly  MS: Normal gait and station. Digits without clubbing or cyanosis. Skin: No rashes, ecchymoses, or petechiae. Normal temperature, turgor, and texture. Neuro/Psych: Alert, oriented, appropriate affect, normal judgment/insight      Results:     Lab Results   Component Value Date/Time    WBC 5.6 07/24/2021 07:56 PM    HGB 9.9 (L) 07/24/2021 07:56 PM    HCT 31.9 (L) 07/24/2021 07:56 PM    PLATELET 300 (L) 58/29/6551 07:56 PM    MCV 88.6 07/24/2021 07:56 PM    ABS.  NEUTROPHILS 3.3 07/24/2021 07:56 PM    Hemoglobin (POC) 11.3 (L) 03/21/2022 11:30 AM     Lab Results   Component Value Date/Time    Sodium 140 07/24/2021 07:56 PM    Potassium 4.7 07/24/2021 07:56 PM    Chloride 115 (H) 07/24/2021 07:56 PM    CO2 21 07/24/2021 07:56 PM    Glucose 91 07/24/2021 07:56 PM    BUN 30 (H) 07/24/2021 07:56 PM    Creatinine 1.66 (H) 07/24/2021 07:56 PM    GFR est AA 36 (L) 07/24/2021 07:56 PM    GFR est non-AA 30 (L) 07/24/2021 07:56 PM    Calcium 10.3 (H) 07/24/2021 07:56 PM    Glucose (POC) 206 (H) 10/03/2018 11:37 AM    Creatinine (POC) 1.7 (H) 09/27/2019 05:49 PM     Lab Results   Component Value Date/Time    Bilirubin, total 0.5 07/24/2021 07:56 PM    ALT (SGPT) 22 07/24/2021 07:56 PM    Alk. phosphatase 64 07/24/2021 07:56 PM    Protein, total 6.4 07/24/2021 07:56 PM    Albumin 3.3 (L) 07/24/2021 07:56 PM    Globulin 3.1 07/24/2021 07:56 PM         Assessment and Recommendations:     # Anemia of chronic kidney disease stage III  -The patient has been taking Retacrit administered [facility] [address] [place] [address]. She wishes to transfer her care closer to her home here in [place].    -Chart reviewed, no iron deficiency seen in February 2022    -Continue Retacrit 40,000 units every 2 weeks with hold parameters for holding if hemoglobin greater than 11 g/dL. # Distant history of renal cell carcinoma  -No current clinical symptoms of be concerning for disease recurrence. The patient reports that she continues to follow with urology    Plan for follow-up in 3 months with Retacrit administration every 2 weeks. Signed By: Maria Schilder, MD    I have personally seen and evaluated the patient in conjunction with Sharifa Herman NP. I find the patient's history and physical exam are consistent with the NP's documentation. I agree with the above assessment and plan, which I have modified as needed. Patient doing well with retacrit. No graded toxicity. Last Hg >11. She is in good spirits. Plan for follow up in 3 months.        Maria Schilder, MD    Attending [id number]

## 2022-03-25 RX ORDER — AMLODIPINE BESYLATE 10 MG/1
10 TABLET ORAL DAILY
Qty: 30 TABLET | Refills: 0 | Status: SHIPPED | OUTPATIENT
Start: 2022-03-25 | End: 2022-04-28

## 2022-04-04 ENCOUNTER — HOSPITAL ENCOUNTER (OUTPATIENT)
Dept: INFUSION THERAPY | Age: 87
Discharge: HOME | End: 2022-04-04
Payer: MEDICARE

## 2022-04-04 ENCOUNTER — TELEPHONE (OUTPATIENT)
Dept: INTERNAL MEDICINE CLINIC | Age: 87
End: 2022-04-04

## 2022-04-04 VITALS
RESPIRATION RATE: 16 BRPM | SYSTOLIC BLOOD PRESSURE: 123 MMHG | TEMPERATURE: 97.4 F | OXYGEN SATURATION: 99 % | DIASTOLIC BLOOD PRESSURE: 76 MMHG | WEIGHT: 174.6 LBS | HEART RATE: 79 BPM | HEIGHT: 64 IN | BODY MASS INDEX: 29.81 KG/M2

## 2022-04-04 DIAGNOSIS — D64.9 ANEMIA, UNSPECIFIED TYPE: Primary | ICD-10-CM

## 2022-04-04 LAB — HGB BLD-MCNC: 10.1 G/DL (ref 11.5–16)

## 2022-04-04 PROCEDURE — 74011250636 HC RX REV CODE- 250/636: Performed by: STUDENT IN AN ORGANIZED HEALTH CARE EDUCATION/TRAINING PROGRAM

## 2022-04-04 PROCEDURE — 36416 COLLJ CAPILLARY BLOOD SPEC: CPT

## 2022-04-04 PROCEDURE — 85018 HEMOGLOBIN: CPT

## 2022-04-04 PROCEDURE — 96372 THER/PROPH/DIAG INJ SC/IM: CPT

## 2022-04-04 RX ADMIN — EPOETIN ALFA-EPBX 40000 UNITS: 40000 INJECTION, SOLUTION INTRAVENOUS; SUBCUTANEOUS at 13:22

## 2022-04-04 NOTE — TELEPHONE ENCOUNTER
Spoke w/ pt's son, I offered pt the appt on 4/15 a VV as Dr. Mendenhall Parents is not in office this week and I cannot schedule an in office appt. They would like to know if there can be a NV or another way for pt to be helped with the lumps on the arms and legs. Please advise.

## 2022-04-04 NOTE — TELEPHONE ENCOUNTER
----- Message from Genoveva Shaver sent at 4/4/2022  1:37 PM EDT -----  Subject: Appointment Request    Reason for Call: Semi-Routine Skin Problem    QUESTIONS  Type of Appointment? Established Patient  Reason for appointment request? Available appointments did not meet   patient need  Additional Information for Provider? Patient's son Chelita Loza called to   schedule an appt regarding pt having lumps on her arms and legs. Chelita Loza   states that pt was at her hematology appt and that provider suggested that   pt should be seen for these lumps by PCP. Chelita Loza denied scheduling VV   appt and wants to know if Dr. Lennie Jacobs can fit pt in for an in person appt. Please call Chelita Loza back regarding an appt. Thank you!  ---------------------------------------------------------------------------  --------------  CALL BACK INFO  What is the best way for the office to contact you? OK to leave message on   voicemail  Preferred Call Back Phone Number? [phone]  ---------------------------------------------------------------------------  --------------  SCRIPT ANSWERS  Relationship to Patient? Other  Representative Name? Wilman  Additional information verified (besides Name and Date of Birth)? Phone   Number  Is this follow up request related to routine Diabetes Management? No  Are you having swelling in your throat or face? No  Are you having difficulty breathing? No  Have the symptoms worsened or spread in the last day? No  Are you having fevers (100.4), chills or sweats? No  Have you recently (14 days) seen a provider for this issue? No  Have you been diagnosed with, awaiting test results for, or told that you   are suspected of having COVID-19 (Coronavirus)? (If patient has tested   negative or was tested as a requirement for work, school, or travel and   not based on symptoms, answer no)?  No  Within the past 10 days have you developed any of the following symptoms   (answer no if symptoms have been present longer than 10 days or began   more than 10 days ago)? Fever or Chills, Cough, Shortness of breath or   difficulty breathing, Loss of taste or smell, Sore throat, Nasal   congestion, Sneezing or runny nose, Fatigue or generalized body aches   (answer no if pain is specific to a body part e.g. back pain), Diarrhea,   Headache? No  Have you had close contact with someone with COVID-19 in the last 7 days? No  (Service Expert  click yes below to proceed with Search Initiatives As Usual   Scheduling)?  Yes

## 2022-04-04 NOTE — PROGRESS NOTES
8000 [facility] Consult Note:  Arrived - 1300    Patient denied having any symptoms of COVID-19, i.e. SOB, coughing, fever, or generally not feeling well. Also denies having been exposed to COVID-19 recently or having had any recent contact with family/friend that has a pending COVID test.    Patient Vitals for the past 12 hrs:   Temp Pulse Resp BP SpO2   04/04/22 1309 97.4 °F (36.3 °C) 79 16 123/76 99 %           Assessment - unchanged except pt noticed lumps in bilateral arms and thighs 3 weeks ago. She states she has not seen a doctor for this. Encouraged her and her son to follow up with her PCP. Hgb - 10.1    Retacrit 40,000 units SQ slowly in left arm.    1325 - Tolerated well. Pt denies any acute problems/changes. Discharged from [facility] ambulatory. No distress.  Next appt: 4/18/22

## 2022-04-06 NOTE — TELEPHONE ENCOUNTER
#[phone]  Son, Philomena Zavala states he does not want a VV. He would like his mom seen in person as he wants to know how else these lumps would be able to be examined. Philomena Zavala is asking for a call back today.

## 2022-04-18 ENCOUNTER — HOSPITAL ENCOUNTER (OUTPATIENT)
Dept: INFUSION THERAPY | Age: 87
Discharge: HOME | End: 2022-04-18
Payer: MEDICARE

## 2022-04-18 VITALS
DIASTOLIC BLOOD PRESSURE: 76 MMHG | SYSTOLIC BLOOD PRESSURE: 156 MMHG | TEMPERATURE: 98.3 F | BODY MASS INDEX: 30.19 KG/M2 | HEIGHT: 64 IN | RESPIRATION RATE: 18 BRPM | OXYGEN SATURATION: 97 % | WEIGHT: 176.8 LBS | HEART RATE: 77 BPM

## 2022-04-18 DIAGNOSIS — D64.9 ANEMIA, UNSPECIFIED TYPE: Primary | ICD-10-CM

## 2022-04-18 LAB
FERRITIN SERPL-MCNC: 299 NG/ML (ref 8–252)
HGB BLD-MCNC: 11.8 G/DL (ref 11.5–16)
IRON SATN MFR SERPL: 21 % (ref 20–50)
IRON SERPL-MCNC: 35 UG/DL (ref 35–150)
TIBC SERPL-MCNC: 164 UG/DL (ref 250–450)

## 2022-04-18 PROCEDURE — 85018 HEMOGLOBIN: CPT

## 2022-04-18 PROCEDURE — 36416 COLLJ CAPILLARY BLOOD SPEC: CPT

## 2022-04-18 PROCEDURE — 82728 ASSAY OF FERRITIN: CPT

## 2022-04-18 PROCEDURE — 83540 ASSAY OF IRON: CPT

## 2022-04-18 PROCEDURE — 36415 COLL VENOUS BLD VENIPUNCTURE: CPT

## 2022-04-18 NOTE — PROGRESS NOTES
8000 [facility] Consult Note:  Arrived - 1100    Patient denied having any symptoms of COVID-19, i.e. SOB, coughing, fever, or generally not feeling well. Also denies having been exposed to COVID-19 recently or having had any recent contact with family/friend that has a pending COVID test.    Patient Vitals for the past 12 hrs:   Temp Pulse Resp BP SpO2   04/18/22 1106 98.3 °F (36.8 °C) 77 18 (!) 156/76 97 %           Assessment - unchanged. Labs drawn peripherally from L AC- Hgb and iron studies. Hgb - 11.8. Retacrit HELD today. MD office notified. 1120 - Tolerated well. Pt denies any acute problems/changes. Discharged from [facility] ambulatory. No distress.  Next appt: 5/2/22

## 2022-04-26 ENCOUNTER — HOSPITAL ENCOUNTER (OUTPATIENT)
Dept: ULTRASOUND IMAGING | Age: 87
Discharge: HOME | End: 2022-04-26
Attending: INTERNAL MEDICINE
Payer: MEDICARE

## 2022-04-26 ENCOUNTER — OFFICE VISIT (OUTPATIENT)
Dept: INTERNAL MEDICINE CLINIC | Age: 87
End: 2022-04-26
Attending: INTERNAL MEDICINE
Payer: MEDICARE

## 2022-04-26 ENCOUNTER — TRANSCRIBE ORDER (OUTPATIENT)
Dept: SCHEDULING | Age: 87
End: 2022-04-26

## 2022-04-26 VITALS
HEIGHT: 64 IN | WEIGHT: 178 LBS | SYSTOLIC BLOOD PRESSURE: 110 MMHG | BODY MASS INDEX: 30.39 KG/M2 | DIASTOLIC BLOOD PRESSURE: 75 MMHG | OXYGEN SATURATION: 97 % | TEMPERATURE: 97.3 F | HEART RATE: 69 BPM | RESPIRATION RATE: 16 BRPM

## 2022-04-26 DIAGNOSIS — R22.9 SUBCUTANEOUS NODULES: ICD-10-CM

## 2022-04-26 DIAGNOSIS — R60.9 EDEMA, UNSPECIFIED TYPE: ICD-10-CM

## 2022-04-26 DIAGNOSIS — R60.9 EDEMA, UNSPECIFIED TYPE: Primary | ICD-10-CM

## 2022-04-26 DIAGNOSIS — R60.9 EDEMA: Primary | ICD-10-CM

## 2022-04-26 PROCEDURE — 1090F PRES/ABSN URINE INCON ASSESS: CPT | Performed by: INTERNAL MEDICINE

## 2022-04-26 PROCEDURE — G8427 DOCREV CUR MEDS BY ELIG CLIN: HCPCS | Performed by: INTERNAL MEDICINE

## 2022-04-26 PROCEDURE — G8399 PT W/DXA RESULTS DOCUMENT: HCPCS | Performed by: INTERNAL MEDICINE

## 2022-04-26 PROCEDURE — 99213 OFFICE O/P EST LOW 20 MIN: CPT | Performed by: INTERNAL MEDICINE

## 2022-04-26 PROCEDURE — 1101F PT FALLS ASSESS-DOCD LE1/YR: CPT | Performed by: INTERNAL MEDICINE

## 2022-04-26 PROCEDURE — G8510 SCR DEP NEG, NO PLAN REQD: HCPCS | Performed by: INTERNAL MEDICINE

## 2022-04-26 PROCEDURE — G8417 CALC BMI ABV UP PARAM F/U: HCPCS | Performed by: INTERNAL MEDICINE

## 2022-04-26 PROCEDURE — G8754 DIAS BP LESS 90: HCPCS | Performed by: INTERNAL MEDICINE

## 2022-04-26 PROCEDURE — G8536 NO DOC ELDER MAL SCRN: HCPCS | Performed by: INTERNAL MEDICINE

## 2022-04-26 PROCEDURE — G8752 SYS BP LESS 140: HCPCS | Performed by: INTERNAL MEDICINE

## 2022-04-26 PROCEDURE — 93970 EXTREMITY STUDY: CPT

## 2022-04-26 NOTE — PROGRESS NOTES
PROGRESS NOTE  Name: Gayla Mcmahan   : 1935       ASSESSMENT/ PLAN:     Diagnoses and all orders for this visit:    Edema, unspecified type: Rule out blockage of venous return. Vasodilatory effect of Ca blocker is a possiblity, though she has been on amlodipine for years. F/u also with Dr. Shawn Diallo.   -     Twilla Conine EXT VENOUS BILAT; Future    Subcutaneous nodules: Don't seem clearly vascular. DDx of subcutaneous nodules includes soft tissue tumors, phleboliths, rheumatoid nodules, gout, calcinosis cutis, amyloidoma, and foreign body reaction.   -     REFERRAL TO DERMATOLOGY  -     SED RATE (ESR); Future    Loss of taste could be medication related. It is a rare but reported side effect of cinacalcet. Keep  appt. I have reviewed the patient's medications and risks/side effects/benefits were discussed. Diagnosis(-es) explained to patient and questions answered. Literature provided where appropriate. SUBJECTIVE  Ms. Gayla Mcmahan presents today acutely for     Chief Complaint   Patient presents with    Follow-up     still has no taste or smell , poor appetite    Skin Problem     \"lumps under skin for 1 month\"    Peripheral Edema       She has small \"lumps under skin\" for about a month. These are small, round, and tender. She lost her sense of taste and smell, around 2022. She has had swelling in legs for about a month. She is on lasix. Dr. Real Stringer put her on a calcium tablet, and \"That's when she was really sick. \" --confused, weak, couldn't get out of bed. \"Once he put her on the medicine, she got a lot better. \" On review of his note, she had hyperparathyroidism, causing high calcium. He put her on cinacalcet.      OBJECTIVE  Visit Vitals  /75 (BP 1 Location: Left arm, BP Patient Position: Sitting, BP Cuff Size: Adult)   Pulse 69   Temp 97.3 °F (36.3 °C) (Temporal)   Resp 16   Ht 5' 4\" (1.626 m)   Wt 178 lb (80.7 kg)   SpO2 97%   BMI 30.55 kg/m²     Gen: Pleasant 82 y.o.  female in NAD. HEENT: PERRLA. EOMI. OP moist and pink. Neck: Supple. No LAD. HEART: RRR, No M/G/R.   LUNGS: CTAB No W/R. ABDOMEN: S, NT, ND, BS+. EXTREMITIES: Warm. No C/C/E. MUSCULOSKELETAL: Normal ROM, muscle strength 5/5 all groups. NEURO: Alert and oriented x 3. Cranial nerves grossly intact. No focal sensory or motor deficits noted. SKIN: Warm. Dry. No rashes or other lesions noted.

## 2022-04-26 NOTE — PROGRESS NOTES
1. Have you been to the ER, urgent care clinic since your last visit? Hospitalized since your last visit? No    2. Have you seen or consulted any other health care providers outside of the [address] [place] [address] since your last visit? Include any pap smears or colon screening.  Yes When: last week\  Saw renal dr Zacarias Lazcano lpn

## 2022-04-27 LAB
ALBUMIN SERPL-MCNC: 3.3 G/DL (ref 3.5–5)
ALBUMIN/GLOB SERPL: 1 {RATIO} (ref 1.1–2.2)
ALP SERPL-CCNC: 88 U/L (ref 45–117)
ALT SERPL-CCNC: 22 U/L (ref 12–78)
ANION GAP SERPL CALC-SCNC: 8 MMOL/L (ref 5–15)
AST SERPL-CCNC: 27 U/L (ref 15–37)
BASOPHILS # BLD: 0 K/UL (ref 0–0.1)
BASOPHILS NFR BLD: 1 % (ref 0–1)
BILIRUB SERPL-MCNC: 0.3 MG/DL (ref 0.2–1)
BUN SERPL-MCNC: 38 MG/DL (ref 6–20)
BUN/CREAT SERPL: 22 (ref 12–20)
CALCIUM SERPL-MCNC: 7.8 MG/DL (ref 8.5–10.1)
CHLORIDE SERPL-SCNC: 108 MMOL/L (ref 97–108)
CO2 SERPL-SCNC: 23 MMOL/L (ref 21–32)
CREAT SERPL-MCNC: 1.71 MG/DL (ref 0.55–1.02)
DIFFERENTIAL METHOD BLD: ABNORMAL
EOSINOPHIL # BLD: 0.2 K/UL (ref 0–0.4)
EOSINOPHIL NFR BLD: 2 % (ref 0–7)
ERYTHROCYTE [DISTWIDTH] IN BLOOD BY AUTOMATED COUNT: 17.2 % (ref 11.5–14.5)
ERYTHROCYTE [SEDIMENTATION RATE] IN BLOOD: 33 MM/HR (ref 0–30)
GLOBULIN SER CALC-MCNC: 3.2 G/DL (ref 2–4)
GLUCOSE SERPL-MCNC: 85 MG/DL (ref 65–100)
HCT VFR BLD AUTO: 36.5 % (ref 35–47)
HGB BLD-MCNC: 10.8 G/DL (ref 11.5–16)
IMM GRANULOCYTES # BLD AUTO: 0 K/UL (ref 0–0.04)
IMM GRANULOCYTES NFR BLD AUTO: 1 % (ref 0–0.5)
LYMPHOCYTES # BLD: 1.9 K/UL (ref 0.8–3.5)
LYMPHOCYTES NFR BLD: 29 % (ref 12–49)
MCH RBC QN AUTO: 27.8 PG (ref 26–34)
MCHC RBC AUTO-ENTMCNC: 29.6 G/DL (ref 30–36.5)
MCV RBC AUTO: 94.1 FL (ref 80–99)
MONOCYTES # BLD: 1.3 K/UL (ref 0–1)
MONOCYTES NFR BLD: 19 % (ref 5–13)
NEUTS SEG # BLD: 3.2 K/UL (ref 1.8–8)
NEUTS SEG NFR BLD: 48 % (ref 32–75)
NRBC # BLD: 0 K/UL (ref 0–0.01)
NRBC BLD-RTO: 0 PER 100 WBC
PLATELET # BLD AUTO: 174 K/UL (ref 150–400)
PMV BLD AUTO: 11.7 FL (ref 8.9–12.9)
POTASSIUM SERPL-SCNC: 4.3 MMOL/L (ref 3.5–5.1)
PROT SERPL-MCNC: 6.5 G/DL (ref 6.4–8.2)
RBC # BLD AUTO: 3.88 M/UL (ref 3.8–5.2)
SODIUM SERPL-SCNC: 139 MMOL/L (ref 136–145)
WBC # BLD AUTO: 6.6 K/UL (ref 3.6–11)

## 2022-04-27 NOTE — PROGRESS NOTES
Spoke with patients son Stuart Platt   Two pt identifiers confirmed. Stuart Platt advised that patients lower extremity duplex was normal.  Stuart Platt verbalized understanding of information discussed w/ no further questions at this time.    Jacek Stovall, LPN

## 2022-04-28 RX ORDER — AMLODIPINE BESYLATE 10 MG/1
10 TABLET ORAL DAILY
Qty: 30 TABLET | Refills: 0 | Status: SHIPPED | OUTPATIENT
Start: 2022-04-28 | End: 2022-05-23

## 2022-05-02 ENCOUNTER — HOSPITAL ENCOUNTER (OUTPATIENT)
Dept: INFUSION THERAPY | Age: 87
Discharge: HOME | End: 2022-05-02
Payer: MEDICARE

## 2022-05-02 VITALS
WEIGHT: 178.1 LBS | SYSTOLIC BLOOD PRESSURE: 125 MMHG | BODY MASS INDEX: 30.57 KG/M2 | HEART RATE: 67 BPM | RESPIRATION RATE: 18 BRPM | TEMPERATURE: 97.8 F | DIASTOLIC BLOOD PRESSURE: 66 MMHG

## 2022-05-02 DIAGNOSIS — D64.9 ANEMIA, UNSPECIFIED TYPE: Primary | ICD-10-CM

## 2022-05-02 LAB — HGB BLD-MCNC: 11.3 G/DL (ref 11.5–16)

## 2022-05-02 PROCEDURE — 85018 HEMOGLOBIN: CPT

## 2022-05-02 PROCEDURE — 36416 COLLJ CAPILLARY BLOOD SPEC: CPT

## 2022-05-02 NOTE — PROGRESS NOTES
8000 [facility] Consult Note:  Arrived - 12    Patient denied having any symptoms of COVID-19, i.e. SOB, coughing, fever, or generally not feeling well. Also denies having been exposed to COVID-19 recently or having had any recent contact with family/friend that has a pending COVID test.     Visit Vitals  /66 (BP 1 Location: Right upper arm, BP Patient Position: Sitting)   Pulse 67   Temp 97.8 °F (36.6 °C)   Resp 18   Wt 80.8 kg (178 lb 1.6 oz)   BMI 30.57 kg/m²       Assessment completed. Pt is having some right sciatic hip/leg pain. Hgb - 11.3 via fingerstick hemocue    Recent Results (from the past 12 hour(s))   POC HEMOGLOBIN    Collection Time: 05/02/22 11:14 AM   Result Value Ref Range    Hemoglobin (POC) 11.3 (L) 11.5 - 16.0 g/dL     Retacrit held per order parameters. 1120 - Tolerated well. Pt denies any acute problems/changes. Discharged from [facility] ambulatory. No distress. Next appt: 5/16/22 @ 1130.

## 2022-05-04 ENCOUNTER — OFFICE VISIT (OUTPATIENT)
Dept: INTERNAL MEDICINE CLINIC | Age: 87
End: 2022-05-04
Payer: MEDICARE

## 2022-05-04 VITALS
HEIGHT: 64 IN | RESPIRATION RATE: 16 BRPM | DIASTOLIC BLOOD PRESSURE: 74 MMHG | OXYGEN SATURATION: 99 % | TEMPERATURE: 96.9 F | HEART RATE: 73 BPM | WEIGHT: 181 LBS | SYSTOLIC BLOOD PRESSURE: 130 MMHG | BODY MASS INDEX: 30.9 KG/M2

## 2022-05-04 DIAGNOSIS — M54.50 LUMBAR PAIN: Primary | ICD-10-CM

## 2022-05-04 PROCEDURE — G8399 PT W/DXA RESULTS DOCUMENT: HCPCS | Performed by: INTERNAL MEDICINE

## 2022-05-04 PROCEDURE — G8417 CALC BMI ABV UP PARAM F/U: HCPCS | Performed by: INTERNAL MEDICINE

## 2022-05-04 PROCEDURE — G8510 SCR DEP NEG, NO PLAN REQD: HCPCS | Performed by: INTERNAL MEDICINE

## 2022-05-04 PROCEDURE — G8752 SYS BP LESS 140: HCPCS | Performed by: INTERNAL MEDICINE

## 2022-05-04 PROCEDURE — 1101F PT FALLS ASSESS-DOCD LE1/YR: CPT | Performed by: INTERNAL MEDICINE

## 2022-05-04 PROCEDURE — G8427 DOCREV CUR MEDS BY ELIG CLIN: HCPCS | Performed by: INTERNAL MEDICINE

## 2022-05-04 PROCEDURE — 99213 OFFICE O/P EST LOW 20 MIN: CPT | Performed by: INTERNAL MEDICINE

## 2022-05-04 PROCEDURE — G8754 DIAS BP LESS 90: HCPCS | Performed by: INTERNAL MEDICINE

## 2022-05-04 PROCEDURE — G8536 NO DOC ELDER MAL SCRN: HCPCS | Performed by: INTERNAL MEDICINE

## 2022-05-04 PROCEDURE — 1090F PRES/ABSN URINE INCON ASSESS: CPT | Performed by: INTERNAL MEDICINE

## 2022-05-04 RX ORDER — IBUPROFEN 600 MG/1
600 TABLET ORAL
Qty: 60 TABLET | Refills: 1 | Status: SHIPPED | OUTPATIENT
Start: 2022-05-04 | End: 2022-05-27

## 2022-05-04 RX ORDER — METHOCARBAMOL 750 MG/1
750 TABLET, FILM COATED ORAL 4 TIMES DAILY
Qty: 60 TABLET | Refills: 1 | Status: SHIPPED | OUTPATIENT
Start: 2022-05-04 | End: 2022-05-27

## 2022-05-04 RX ORDER — HYDROCODONE BITARTRATE AND ACETAMINOPHEN 5; 325 MG/1; MG/1
1 TABLET ORAL
Qty: 20 TABLET | Refills: 0 | Status: SHIPPED | OUTPATIENT
Start: 2022-05-04 | End: 2022-05-09

## 2022-05-04 NOTE — PROGRESS NOTES
PROGRESS NOTE  Name: Carlita Alarcon   : 1935       ASSESSMENT/ PLAN:     Diagnoses and all orders for this visit:    Lumbar pain  -     ibuprofen (MOTRIN) 600 mg tablet; Take 1 Tablet by mouth every six (6) hours as needed for Pain., Normal, Disp-60 Tablet, R-1  -     HYDROcodone-acetaminophen (NORCO) 5-325 mg per tablet; Take 1 Tablet by mouth every six (6) hours as needed for Pain for up to 5 days. Max Daily Amount: 4 Tablets., Normal, Disp-20 Tablet, R-0  -     REFERRAL TO ORTHOPEDICS  -     methocarbamoL (ROBAXIN) 750 mg tablet; Take 1 Tablet by mouth four (4) times daily. , Normal, Disp-60 Tablet, R-1      Follow-up and Dispositions  ·   Return in about 3 months (around 2022) for HTN; May reschedule the  appointment. I have reviewed the patient's medications and risks/side effects/benefits were discussed. Diagnosis(-es) explained to patient and questions answered. Literature provided where appropriate. SUBJECTIVE  Ms. Carlita Alarcon presents today acutely for     Chief Complaint   Patient presents with   [address] ED    Hip Pain     Right    Leg Swelling     She was seen at [facility] on :     Chief Complaint   Patient presents with    Hip Pain   Pt c/o right hip pain that started yesterday, denies injury     Sachin Mijares is a 82 y.o. female who p/w Patient presents with:  Hip Pain: Pt c/o right hip pain that started yesterday, denies injury   . Context: negative direct trauma negative Injury. Pain location: right lower back  Pain radiates to: right hip  Duration: x1 days prior to arrival  Chronicity: new    negative History of kidney stones. negative History of aortic disease  negative History of cancer      Now, she is back home. Her pain is a little better. She was given 5 hydrocodone tablets; has 1 left. She also has a cyst, about 3 cm, in back of neck.        OBJECTIVE  Visit Vitals  /74 Pulse 73   Temp 96.9 °F (36.1 °C) (Temporal)   Resp 16   Ht 5' 4\" (1.626 m)   Wt 181 lb (82.1 kg)   SpO2 99%   BMI 31.07 kg/m²     Gen: Pleasant 82 y.o.  female in NAD. HEENT: PERRLA. EOMI. OP moist and pink. Neck: Supple. No LAD. HEART: RRR, No M/G/R.   LUNGS: CTAB No W/R. ABDOMEN: S, NT, ND, BS+. EXTREMITIES: Warm. No C/C/E. MUSCULOSKELETAL: Normal ROM, muscle strength 5/5 all groups. NEURO: Alert and oriented x 3. Cranial nerves grossly intact. No focal sensory or motor deficits noted. SKIN: Warm. Dry. No rashes or other lesions noted. Lumbar x-ray:   1. Lumbar spondylosis. 2. No evidence of fracture. Xray R hip:   Right total hip replacement. No evidence of fracture or dislocation.

## 2022-05-04 NOTE — PROGRESS NOTES
1. \"Have you been to the ER, urgent care clinic since your last visit? Hospitalized since your last visit? \" Yes Reason for visit: [facility] ED for right hip pain    2. \"Have you seen or consulted any other health care providers outside of the [address] [place] [address] since your last visit? \" Yes Reason for visit:      3. For patients aged 39-70: Has the patient had a colonoscopy / FIT/ Cologuard? NA - based on age      If the patient is female:    4. For patients aged 41-77: Has the patient had a mammogram within the past 2 years? NA - based on age or sex      11. For patients aged 21-65: Has the patient had a pap smear?  NA - based on age or sex

## 2022-05-16 ENCOUNTER — HOSPITAL ENCOUNTER (OUTPATIENT)
Dept: INFUSION THERAPY | Age: 87
Discharge: HOME | End: 2022-05-16
Payer: MEDICARE

## 2022-05-16 VITALS
DIASTOLIC BLOOD PRESSURE: 76 MMHG | SYSTOLIC BLOOD PRESSURE: 121 MMHG | OXYGEN SATURATION: 97 % | BODY MASS INDEX: 31.48 KG/M2 | WEIGHT: 183.4 LBS | TEMPERATURE: 97.1 F | HEART RATE: 81 BPM | RESPIRATION RATE: 18 BRPM

## 2022-05-16 DIAGNOSIS — D64.9 ANEMIA, UNSPECIFIED TYPE: Primary | ICD-10-CM

## 2022-05-16 LAB
FERRITIN SERPL-MCNC: 344 NG/ML (ref 8–252)
HGB BLD-MCNC: 10.4 G/DL (ref 11.5–16)
IRON SATN MFR SERPL: 37 % (ref 20–50)
IRON SERPL-MCNC: 59 UG/DL (ref 35–150)
TIBC SERPL-MCNC: 161 UG/DL (ref 250–450)

## 2022-05-16 PROCEDURE — 96372 THER/PROPH/DIAG INJ SC/IM: CPT

## 2022-05-16 PROCEDURE — 36415 COLL VENOUS BLD VENIPUNCTURE: CPT

## 2022-05-16 PROCEDURE — 85018 HEMOGLOBIN: CPT

## 2022-05-16 PROCEDURE — 82728 ASSAY OF FERRITIN: CPT

## 2022-05-16 PROCEDURE — 36416 COLLJ CAPILLARY BLOOD SPEC: CPT

## 2022-05-16 PROCEDURE — 74011250636 HC RX REV CODE- 250/636: Performed by: STUDENT IN AN ORGANIZED HEALTH CARE EDUCATION/TRAINING PROGRAM

## 2022-05-16 PROCEDURE — 83540 ASSAY OF IRON: CPT

## 2022-05-16 RX ADMIN — EPOETIN ALFA-EPBX 40000 UNITS: 40000 INJECTION, SOLUTION INTRAVENOUS; SUBCUTANEOUS at 11:23

## 2022-05-16 NOTE — PROGRESS NOTES
Outpatient Infusion Center Progress Note    3473  Pt arrived in stable condition and in no distress for Retacrit    Assessment completed, no new complaints. Patient denied having any symptoms of COVID-19, i.e. SOB, coughing, fever, or generally not feeling well. Also denies having been exposed to COVID-19 recently or having had any recent contact with family/friend that has a pending COVID test.    Labs obtained per order and sent for processing. Patient Vitals for the past 12 hrs:   Temp Pulse Resp BP SpO2   05/16/22 1058 97.1 °F (36.2 °C) 81 18 121/76 97 %        Recent Results (from the past 12 hour(s))   POC HEMOGLOBIN    Collection Time: 05/16/22 11:19 AM   Result Value Ref Range    Hemoglobin (POC) 10.4 (L) 11.5 - 16.0 g/dL        The following medications administered:  Medications Administered     epoetin gigi-epbx (RETACRIT) injection 40,000 Units     Admin Date  05/16/2022 Action  Given Dose  40,000 Units Route  SubCUTAneous Administered By  La Domínguez RN                Pt tolerated treatment well. No s/s of adverse reaction noted. Pt provided with education on possible side effects of medication along with discharge instructions. Pt verbalized understanding. 1125  Pt discharged in no acute distress.  Next appointment:    Future Appointments   Date Time Provider Rosales Messer   5/27/2022 12:10 PM Laisha Connelly MD RDE JIAN 332 BS AMB   5/31/2022  1:00 PM VINSON FASTRACK 2 [facility] REG   6/2/2022  1:00 PM Namrata Keith MD NEU BS AMB   6/13/2022 11:00 AM VINSON FASTRACK 2 [facility] REG   6/21/2022 10:00 AM Stepan Ashley NP ONCMR BS AMB   6/27/2022 11:00 AM VINSON FASTRACK 2 [facility] REG   7/11/2022 11:00 AM VINSON FASTRACK 2 [facility] REG   7/25/2022 11:00 AM VINSON FASTRACK 6 [facility] REG   8/4/2022 11:00 AM Sona Chowdary MD [facility] BS AMB   8/8/2022 11:00 AM VINSON FASTRACK 6 [facility] REG   8/22/2022 11:00 AM KAREL MERCADO 2 [facility] REG

## 2022-05-20 ENCOUNTER — TELEPHONE (OUTPATIENT)
Dept: INTERNAL MEDICINE CLINIC | Age: 87
End: 2022-05-20

## 2022-05-20 RX ORDER — FUROSEMIDE 40 MG/1
TABLET ORAL
Qty: 30 TABLET | Refills: 1 | Status: SHIPPED | OUTPATIENT
Start: 2022-05-20 | End: 2022-06-13 | Stop reason: SDUPTHER

## 2022-05-20 NOTE — TELEPHONE ENCOUNTER
----- Message from Katja Catalina sent at 5/19/2022  4:35 PM EDT -----  Subject: Message to Provider    QUESTIONS  Information for Provider? Pt son he got a call but no vm. Not sure what it   was in regards too. Please call pt back to assit hi about his mother. ---------------------------------------------------------------------------  --------------  Lalo Nintu Oy INFO  What is the best way for the office to contact you? OK to leave message on   voicemail  Preferred Call Back Phone Number? [phone]  ---------------------------------------------------------------------------  --------------  SCRIPT ANSWERS  Relationship to Patient? Other  Representative Name? concha  Is the Representative on the appropriate HIPAA document in Epic?  Yes

## 2022-05-20 NOTE — TELEPHONE ENCOUNTER
----- Message from Wilmar Sloan sent at 5/19/2022  4:35 PM EDT -----  Subject: Message to Provider    QUESTIONS  Information for Provider? Pt son he got a call but no vm. Not sure what it   was in regards too. Please call pt back to assit hi about his mother. ---------------------------------------------------------------------------  --------------  Suzan RENNER  What is the best way for the office to contact you? OK to leave message on   voicemail  Preferred Call Back Phone Number? [phone]  ---------------------------------------------------------------------------  --------------  SCRIPT ANSWERS  Relationship to Patient? Other  Representative Name? concha  Is the Representative on the appropriate HIPAA document in Epic?  Yes

## 2022-05-23 RX ORDER — AMLODIPINE BESYLATE 10 MG/1
10 TABLET ORAL DAILY
Qty: 30 TABLET | Refills: 0 | Status: SHIPPED | OUTPATIENT
Start: 2022-05-23 | End: 2022-06-24

## 2022-05-27 ENCOUNTER — OFFICE VISIT (OUTPATIENT)
Dept: ENDOCRINOLOGY | Age: 87
End: 2022-05-27
Payer: MEDICARE

## 2022-05-27 VITALS
WEIGHT: 176 LBS | DIASTOLIC BLOOD PRESSURE: 63 MMHG | BODY MASS INDEX: 30.05 KG/M2 | HEART RATE: 63 BPM | SYSTOLIC BLOOD PRESSURE: 100 MMHG | HEIGHT: 64 IN

## 2022-05-27 DIAGNOSIS — E21.3 HYPERPARATHYROIDISM (HCC): Primary | ICD-10-CM

## 2022-05-27 PROCEDURE — 1090F PRES/ABSN URINE INCON ASSESS: CPT | Performed by: INTERNAL MEDICINE

## 2022-05-27 PROCEDURE — G8536 NO DOC ELDER MAL SCRN: HCPCS | Performed by: INTERNAL MEDICINE

## 2022-05-27 PROCEDURE — G8752 SYS BP LESS 140: HCPCS | Performed by: INTERNAL MEDICINE

## 2022-05-27 PROCEDURE — G8417 CALC BMI ABV UP PARAM F/U: HCPCS | Performed by: INTERNAL MEDICINE

## 2022-05-27 PROCEDURE — 1123F ACP DISCUSS/DSCN MKR DOCD: CPT | Performed by: INTERNAL MEDICINE

## 2022-05-27 PROCEDURE — G8427 DOCREV CUR MEDS BY ELIG CLIN: HCPCS | Performed by: INTERNAL MEDICINE

## 2022-05-27 PROCEDURE — 99214 OFFICE O/P EST MOD 30 MIN: CPT | Performed by: INTERNAL MEDICINE

## 2022-05-27 PROCEDURE — G8399 PT W/DXA RESULTS DOCUMENT: HCPCS | Performed by: INTERNAL MEDICINE

## 2022-05-27 PROCEDURE — G8754 DIAS BP LESS 90: HCPCS | Performed by: INTERNAL MEDICINE

## 2022-05-27 PROCEDURE — G8432 DEP SCR NOT DOC, RNG: HCPCS | Performed by: INTERNAL MEDICINE

## 2022-05-27 PROCEDURE — 1101F PT FALLS ASSESS-DOCD LE1/YR: CPT | Performed by: INTERNAL MEDICINE

## 2022-05-27 NOTE — LETTER
5/27/2022    Patient: Evita Huggins   YOB: 1935   Date of Visit: 5/27/2022     Leanne Sofia MD  [address]  [facility] [address]    Dear Leanne Sofia MD,      Thank you for referring Ms. Evita Huggins to [address] [place] [address] DIABETES AND ENDOCRINOLOGY for evaluation. My notes for this consultation are attached. If you have questions, please do not hesitate to call me. I look forward to following your patient along with you.       Sincerely,    Salena Zendejas MD

## 2022-05-27 NOTE — PROGRESS NOTES
Chief Complaint   Patient presents with    Elevated Blood Calcium     pcp and pharmacy verified     History of Present Illness: Carlita Alarcon is a 82 y.o. female here for follow up of Hyperparathyroidism. Will request records from Mr. Muñizeloisehector Solis. Per pt's grand daughter she was first told about the high Ca in 2019. Pt has hx of renal surgery in 2000 for RCC. No hx or renal stones. No hx of bone fractures or osteoporosis. Pt is on medication for dementia and depression. Per her son Duran Mckenna is aching all the time, particularly in her neck. She does not want to get out of bed, she complains of feeling achy and tired all day. There are times she will not get out of bed all day\". Per her son and granddaughter she is not on HCTZ or any thiazide diuretics. Looking through her records, I see evidence of elevated Ca levels as far back as 2016. Her Ca levels have been in the range of 10.5-10.9. No known family hx of osteoporosis, renal stones or hypercalcemia. Her PCP did a very good work up to evaluate the high calcium levels and with the high PTH and high Ca and the Vitamin D in a sufficient range, these are strong evidence of hyperparathyroidism. Her family is not interested in surgery of any kind for the hyperparathyroidism, but would like to start some form of treatment to see if this will help with her deteriorating AMS. I started her on Cinacalcet 30mg daily. She had labs by her PCP in March 2022 and her Ca was down to 7.8, but with an Albumin of 3.3 that corrects to 8.4. Pt's son notes that she has been in the hospital to Essence Fat knot removed from the back of her left neck\". She has not had any falls or fractures. Her son notes she has had some swelling in her ankles and he will be taking her to see Dr. Molly Jones next week (Cardiology). Pt has been taking the cinacalcet 30mg per day.  \"The kidney doctor wanted to lower the dosage but we wanted to hear from you first.\"    Her son notes that Brian Mosqueda has had a lot of improvement in her memory and mood. She is now back in the kitchen cooking and is going grocery shopping on her own. \"    She denies any issues of CP, SOB, palpitations. Pt reports that she feels \"lumps in my arms and legs and the dermatologist does not know what they could be. \"    Current Outpatient Medications   Medication Sig    amLODIPine (NORVASC) 10 mg tablet Take 1 tablet by mouth daily.  furosemide (LASIX) 40 mg tablet Take 40 mg by mouth once a day for 2-3 days, as needed for swelling. (Patient taking differently: 40 mg daily.)    pantoprazole (PROTONIX) 40 mg tablet Take 1 Tablet by mouth daily.  allopurinoL (ZYLOPRIM) 300 mg tablet Take 1 Tablet by mouth daily.  metoprolol succinate (TOPROL-XL) 50 mg XL tablet Take 1 Tablet by mouth daily.  losartan (COZAAR) 100 mg tablet Take 1 Tablet by mouth nightly.  FLUoxetine (PROzac) 10 mg capsule Take 1 Capsule by mouth daily.  amitriptyline (ELAVIL) 25 mg tablet Take 1 Tablet by mouth nightly.  donepeziL (ARICEPT) 10 mg tablet Take 1 Tablet by mouth nightly.  cinacalcet (SENSIPAR) 30 mg tablet Take 1 Tablet by mouth daily.  aspirin 81 mg chewable tablet Take 81 mg by mouth daily.  pravastatin (PRAVACHOL) 20 mg tablet Take 1 Tab by mouth nightly.  PROAIR HFA 90 mcg/actuation inhaler Take 2 Puffs by inhalation four (4) times daily as needed. No current facility-administered medications for this visit. Allergies   Allergen Reactions    Pcn [Penicillins] Rash     But can take cephalosporins. Allergic to all \"cillins\".     Codeine Other (comments)     Hallucinations, takes hydrocodone at home for pain    Contrast Dye [Iodine] Other (comments)     Not to take due to kidney function    Prednisone Other (comments)     \"makes my pancreas numbers go way up\"     Review of Systems:  - Cardiovascular: no chest pain  - Neurological: no tremors  - Integumentary: skin is normal    Physical Examination:  Blood pressure 100/63, pulse 63, height 5' 4\" (1.626 m), weight 176 lb (79.8 kg). - General: pleasant, no distress, good eye contact   - Neck: no thyromegaly or thyroid bruits  - Cardiovascular: regular, normal rate, nl s1 and s2, no m/r/g   - Integumentary: skin is normal, no edema  - Neurological: reflexes 2+ at biceps, no tremors  - Psychiatric: normal mood and affect    Data Reviewed:   Component      Latest Ref Rng & Units 4/26/2022          11:32 AM   Sodium      136 - 145 mmol/L 139   Potassium      3.5 - 5.1 mmol/L 4.3   Chloride      97 - 108 mmol/L 108   CO2      21 - 32 mmol/L 23   Anion gap      5 - 15 mmol/L 8   Glucose      65 - 100 mg/dL 85   BUN      6 - 20 MG/DL 38 (H)   Creatinine      0.55 - 1.02 MG/DL 1.71 (H)   BUN/Creatinine ratio      12 - 20   22 (H)   GFR est AA      >60 ml/min/1.73m2 34 (L)   GFR est non-AA      >60 ml/min/1.73m2 28 (L)   Calcium      8.5 - 10.1 MG/DL 7.8 (L)   Bilirubin, total      0.2 - 1.0 MG/DL 0.3   ALT      12 - 78 U/L 22   AST      15 - 37 U/L 27   Alk. phosphatase      45 - 117 U/L 88   Protein, total      6.4 - 8.2 g/dL 6.5   Albumin      3.5 - 5.0 g/dL 3.3 (L)   Globulin      2.0 - 4.0 g/dL 3.2   A-G Ratio      1.1 - 2.2   1.0 (L)       Assessment/Plan:   1) Hyperparathyroidism > Pt has improved mentally as her Ca levels have normalized. I will order a PTH, Renal panel and 1,25-OH Vitamin D level today and we can adjust her Cinacalcet dose as needed. Per her son the prescription for the Cinacalcet needs to go to [facility] in [place], because the cost is less expensive. Her Son is requesting I send her [employer] messages to his account \"Wilman Coley\". Pt voices understanding and agreement with the plan.     RTC 4 months    Copy sent to:  Dr. Rosendo Oconnor

## 2022-05-31 ENCOUNTER — HOSPITAL ENCOUNTER (OUTPATIENT)
Dept: INFUSION THERAPY | Age: 87
Discharge: HOME | End: 2022-05-31
Payer: MEDICARE

## 2022-05-31 ENCOUNTER — PATIENT MESSAGE (OUTPATIENT)
Dept: ENDOCRINOLOGY | Age: 87
End: 2022-05-31

## 2022-05-31 VITALS
SYSTOLIC BLOOD PRESSURE: 118 MMHG | DIASTOLIC BLOOD PRESSURE: 69 MMHG | RESPIRATION RATE: 18 BRPM | HEART RATE: 60 BPM | OXYGEN SATURATION: 93 %

## 2022-05-31 LAB — HGB BLD-MCNC: 11.3 G/DL (ref 11.5–16)

## 2022-05-31 PROCEDURE — 36416 COLLJ CAPILLARY BLOOD SPEC: CPT

## 2022-05-31 PROCEDURE — 85018 HEMOGLOBIN: CPT

## 2022-05-31 NOTE — PROGRESS NOTES
Outpatient Infusion Center Progress Note    1300  Pt arrived in stable condition and in no distress for Retacrit (HELD)    Assessment completed, patient reporting increase in fatigue, shortness of breath and weakness. Notified office via OPIC Manager:  Patient should followup with Cardiologist due to patients hemoglobin being adequate, fatigue may be unrelated to anemia. Patient verbalized understanding of this plan. Labs obtained per order and sent for processing. HGB = 11.3, HOLD Retacrit per order parameter. Patient Vitals for the past 12 hrs:   Pulse Resp BP SpO2   05/31/22 1305 60 18 118/69 93 %        Recent Results (from the past 12 hour(s))   POC HEMOGLOBIN    Collection Time: 05/31/22  1:11 PM   Result Value Ref Range    Hemoglobin (POC) 11.3 (L) 11.5 - 16.0 g/dL        The following medications administered:  None    Pt provided with education on possible side effects of medication along with discharge instructions. Pt verbalized understanding. 1330  Pt discharged in no acute distress.  Next appointment:    Future Appointments   Date Time Provider Rosales Ayde   6/2/2022  1:00 PM Malka Keith MD NEU BS AMB   6/13/2022 11:00 AM VINSON FASTRACK 2 [facility] REG   6/21/2022 10:00 AM Anastacia Ashley NP ONCMR BS AMB   6/27/2022 11:00 AM VINSON FASTRACK 2 [facility] REG   7/11/2022 11:00 AM VINSON FASTRACK 2 [facility] REG   7/25/2022 11:00 AM VINSON FASTRACK 6 [facility] REG   8/4/2022 11:00 AM Abimael Bartholomew MD [facility] BS AMB   8/8/2022 11:00 AM VINSON FASTRACK 6 [facility] REG   8/22/2022 11:00 AM VINSON FASTRACK 2 [facility] REG   10/13/2022 11:30 AM Mindy Eaton MD RDE JIAN 332 BS AMB

## 2022-06-13 ENCOUNTER — HOSPITAL ENCOUNTER (OUTPATIENT)
Dept: INFUSION THERAPY | Age: 87
Discharge: HOME | End: 2022-06-13
Payer: MEDICARE

## 2022-06-13 VITALS
BODY MASS INDEX: 30.28 KG/M2 | HEART RATE: 71 BPM | SYSTOLIC BLOOD PRESSURE: 103 MMHG | RESPIRATION RATE: 16 BRPM | TEMPERATURE: 98.3 F | WEIGHT: 176.4 LBS | DIASTOLIC BLOOD PRESSURE: 61 MMHG | OXYGEN SATURATION: 96 %

## 2022-06-13 DIAGNOSIS — D64.9 ANEMIA, UNSPECIFIED TYPE: Primary | ICD-10-CM

## 2022-06-13 LAB
FERRITIN SERPL-MCNC: 452 NG/ML (ref 26–388)
HGB BLD-MCNC: 11.4 G/DL (ref 11.5–16)
IRON SATN MFR SERPL: 35 % (ref 20–50)
IRON SERPL-MCNC: 50 UG/DL (ref 35–150)
TIBC SERPL-MCNC: 141 UG/DL (ref 250–450)

## 2022-06-13 PROCEDURE — 82728 ASSAY OF FERRITIN: CPT

## 2022-06-13 PROCEDURE — 85018 HEMOGLOBIN: CPT

## 2022-06-13 PROCEDURE — 83540 ASSAY OF IRON: CPT

## 2022-06-13 PROCEDURE — 36416 COLLJ CAPILLARY BLOOD SPEC: CPT

## 2022-06-13 PROCEDURE — 36415 COLL VENOUS BLD VENIPUNCTURE: CPT

## 2022-06-13 NOTE — PROGRESS NOTES
8000 [facility] Visit Note    6596- Pt arrived at [facility] ambulatory and in no distress for Retacrit. Assessment completed, no new complaints voiced except patient continues to feel very fatigued. Patient denied having any symptoms of COVID-19, i.e. SOB, coughing, fever, or generally not feeling well. Also denies having been exposed to COVID-19 recently or having had any recent contact with family/friend that has a pending COVID test.     Labs Obtained - Hemocue, Iron Profile, Ferritin  Recent Results (from the past 12 hour(s))   POC HEMOGLOBIN    Collection Time: 06/13/22 11:14 AM   Result Value Ref Range    Hemoglobin (POC) 11.4 (L) 11.5 - 16.0 g/dL       Visit Vitals  /61   Pulse 71   Temp 98.3 °F (36.8 °C)   Resp 16   Wt 80 kg (176 lb 6.4 oz)   SpO2 96%   Breastfeeding No   BMI 30.28 kg/m²       Medications received:  None - Retacrit HELD for hemoglobin 11.4    1125- Tolerated treatment well, no adverse reaction noted. D/Cd from [facility] ambulatory and in no distress accompanied by family member.   Next appt 6/27/22

## 2022-06-20 ENCOUNTER — TELEPHONE (OUTPATIENT)
Dept: ONCOLOGY | Age: 87
End: 2022-06-20

## 2022-06-20 NOTE — TELEPHONE ENCOUNTER
Patient's son called inquiring about his mom's appt tomorrow. It is a 3m f/u under Michele. Should I move to the chemo date 6/27/22?

## 2022-06-21 ENCOUNTER — TELEPHONE (OUTPATIENT)
Dept: INTERNAL MEDICINE CLINIC | Age: 87
End: 2022-06-21

## 2022-06-21 ENCOUNTER — OFFICE VISIT (OUTPATIENT)
Dept: ONCOLOGY | Age: 87
End: 2022-06-21
Payer: MEDICARE

## 2022-06-21 VITALS
DIASTOLIC BLOOD PRESSURE: 60 MMHG | HEIGHT: 64 IN | TEMPERATURE: 97.8 F | OXYGEN SATURATION: 96 % | BODY MASS INDEX: 30.49 KG/M2 | RESPIRATION RATE: 16 BRPM | SYSTOLIC BLOOD PRESSURE: 106 MMHG | WEIGHT: 178.6 LBS | HEART RATE: 63 BPM

## 2022-06-21 DIAGNOSIS — D64.9 NORMOCYTIC ANEMIA: Primary | ICD-10-CM

## 2022-06-21 DIAGNOSIS — Z86.2 HISTORY OF ANEMIA DUE TO CHRONIC KIDNEY DISEASE: ICD-10-CM

## 2022-06-21 DIAGNOSIS — N18.9 HISTORY OF ANEMIA DUE TO CHRONIC KIDNEY DISEASE: ICD-10-CM

## 2022-06-21 DIAGNOSIS — N18.4 CKD (CHRONIC KIDNEY DISEASE) STAGE 4, GFR 15-29 ML/MIN (HCC): ICD-10-CM

## 2022-06-21 PROCEDURE — G8427 DOCREV CUR MEDS BY ELIG CLIN: HCPCS | Performed by: STUDENT IN AN ORGANIZED HEALTH CARE EDUCATION/TRAINING PROGRAM

## 2022-06-21 PROCEDURE — G8510 SCR DEP NEG, NO PLAN REQD: HCPCS | Performed by: STUDENT IN AN ORGANIZED HEALTH CARE EDUCATION/TRAINING PROGRAM

## 2022-06-21 PROCEDURE — 1090F PRES/ABSN URINE INCON ASSESS: CPT | Performed by: STUDENT IN AN ORGANIZED HEALTH CARE EDUCATION/TRAINING PROGRAM

## 2022-06-21 PROCEDURE — G8399 PT W/DXA RESULTS DOCUMENT: HCPCS | Performed by: STUDENT IN AN ORGANIZED HEALTH CARE EDUCATION/TRAINING PROGRAM

## 2022-06-21 PROCEDURE — G8752 SYS BP LESS 140: HCPCS | Performed by: STUDENT IN AN ORGANIZED HEALTH CARE EDUCATION/TRAINING PROGRAM

## 2022-06-21 PROCEDURE — G8417 CALC BMI ABV UP PARAM F/U: HCPCS | Performed by: STUDENT IN AN ORGANIZED HEALTH CARE EDUCATION/TRAINING PROGRAM

## 2022-06-21 PROCEDURE — 99214 OFFICE O/P EST MOD 30 MIN: CPT | Performed by: STUDENT IN AN ORGANIZED HEALTH CARE EDUCATION/TRAINING PROGRAM

## 2022-06-21 PROCEDURE — G8754 DIAS BP LESS 90: HCPCS | Performed by: STUDENT IN AN ORGANIZED HEALTH CARE EDUCATION/TRAINING PROGRAM

## 2022-06-21 PROCEDURE — G8536 NO DOC ELDER MAL SCRN: HCPCS | Performed by: STUDENT IN AN ORGANIZED HEALTH CARE EDUCATION/TRAINING PROGRAM

## 2022-06-21 PROCEDURE — 1123F ACP DISCUSS/DSCN MKR DOCD: CPT | Performed by: STUDENT IN AN ORGANIZED HEALTH CARE EDUCATION/TRAINING PROGRAM

## 2022-06-21 PROCEDURE — 1101F PT FALLS ASSESS-DOCD LE1/YR: CPT | Performed by: STUDENT IN AN ORGANIZED HEALTH CARE EDUCATION/TRAINING PROGRAM

## 2022-06-21 NOTE — PROGRESS NOTES
[address] [facility] [address] [facility] [address] [facility]  W: [phone] F: [phone]      Reason for Visit:   Gianfranco Mas is a 82 y.o. female who is being seen in follow-up for normocytic anemia in the setting of chronic kidney disease. Hematology / Oncology Treatment History:     Hematological/Oncological Diagnosis: Anemia of  Chronic kidney disease    Date of Diagnosis: Chronic    Treatment course: Retacrit 40,000 units every 2 weeks - started on 2/21/22. History of Present Illness:     Very pleasant 82 y.o. female with a relevant medical history most significant for hyperparathyroidism with hypercalcemia, diabetes mellitus type 2, hypertension, history of DVT in 2013, history of renal cell carcinoma status post right partial nephrectomy in 2005, hypertension, GERD, presents for evaluation and treatment of anemia associated with chronic kidney disease has been longstanding. The patient presents to us as a transfer of care from [place] [id number]. Overall, the patient reports clinical stability. She was recently started on a new medication cinacalcet for management of hyperparathyroidism. She is tolerating that medication well and has had some improvement in her confusion, mentation, and improvement in energy level since starting this medication. Most recent labs reviewed from outside source, notable for last hemoglobin on January 25, 2022 of 10.6 g/dL, platelet count of 510, white blood cell count of 4.0, last creatinine of 1.6, last iron studies done in December 2021 showing ferritin of 516, iron saturation of 26%. Patient has a history of dementia and is on donezepil. She started retacrit on 2/21/22. Interval history:  Patient is here today with her son. She is on retacrit every 2 weeks. She reports fatigue, poor appetite, and bilateral lower extremity swelling.  She is following with her PCP and cardiologist to address these issues. Hgb has remained > 11 g/dL for the last month. Denies bleeding or bruising. No other current clinical symptoms. She denies any bleeding, bruising, focal bone pain. No other constitutional symptoms of concern for malignancy. Family history reviewed, noncontributory  Social history reviewed, also noncontributory      Review of Systems: A complete review of systems was obtained, negative except as described above.     Past Medical History:   Diagnosis Date    Adverse effect of anesthesia     passed out during EGD/stopped breathing    Arrhythmia     has pacemaker for low HR    Arthritis     Asthma     Cancer ([address]     right kidney - surgery    Chronic pain     right side    Deep vein thrombosis (DVT) during current hospitalization ([facility]     history of DVT was on coumadin in the past    Diabetes ([address]     diet controlled    GERD (gastroesophageal reflux disease)     H/O acute pancreatitis     Hiatal hernia     Hypertension     MARLEN (obstructive sleep apnea)     does not use CPAP/pt states she has not used since a pacemaker inserted    Other ill-defined conditions(799.89)     lymph node enlargement - left chest - benign bx - watching    Other ill-defined conditions(799.89)     wheezes    Psychiatric disorder     depression    PUD (peptic ulcer disease)     hx of    Thromboembolus ([address]     Unspecified adverse effect of anesthesia     passed out during EGD per pt - stopped breathing per pt per MD      Past Surgical History:   Procedure Laterality Date    HX APPENDECTOMY      HX CHOLECYSTECTOMY      HX GYN          HX HEENT Right     laser eye surgery to stop bleeding in back of eye - multiple laser surgeries    HX KNEE ARTHROSCOPY      left knee; cartilage repair    HX ORTHOPAEDIC      right hip replacement    HX ORTHOPAEDIC      lower back surgery    HX ORTHOPAEDIC      straighten toe - 2nd toe on right    HX ORTHOPAEDIC Bilateral     carpal tunnel release    HX PACEMAKER      not defibrillator    HX UROLOGICAL      implant in hip to control urine and then removed    HX UROLOGICAL      Right kidney partial nephrectomy      Social History     Tobacco Use    Smoking status: Former Smoker     Packs/day: 0.25     Years: 20.00     Pack years: 5.00     Quit date: 1971     Years since quittin.8    Smokeless tobacco: Never Used   Substance Use Topics    Alcohol use: No      Family History   Problem Relation Age of Onset    Stroke Mother         brain aneurysm    Hypertension Father     Heart Disease Father     Cancer Sister         breast    Cancer Brother         lung and prostate    Cancer Sister         breast    SLE Other         half siblings (5+) - lupus    Hypertension Daughter     Diabetes Daughter      Current Outpatient Medications   Medication Sig    furosemide (LASIX) 40 mg tablet Take 1 Tablet by mouth daily.  amLODIPine (NORVASC) 10 mg tablet Take 1 tablet by mouth daily.  pantoprazole (PROTONIX) 40 mg tablet Take 1 Tablet by mouth daily.  allopurinoL (ZYLOPRIM) 300 mg tablet Take 1 Tablet by mouth daily.  metoprolol succinate (TOPROL-XL) 50 mg XL tablet Take 1 Tablet by mouth daily.  losartan (COZAAR) 100 mg tablet Take 1 Tablet by mouth nightly.  FLUoxetine (PROzac) 10 mg capsule Take 1 Capsule by mouth daily.  amitriptyline (ELAVIL) 25 mg tablet Take 1 Tablet by mouth nightly.  donepeziL (ARICEPT) 10 mg tablet Take 1 Tablet by mouth nightly.  cinacalcet (SENSIPAR) 30 mg tablet Take 1 Tablet by mouth daily.  aspirin 81 mg chewable tablet Take 81 mg by mouth daily.  pravastatin (PRAVACHOL) 20 mg tablet Take 1 Tab by mouth nightly.  PROAIR HFA 90 mcg/actuation inhaler Take 2 Puffs by inhalation four (4) times daily as needed. No current facility-administered medications for this visit.       Allergies   Allergen Reactions    Pcn [Penicillins] Rash But can take cephalosporins. Allergic to all \"cillins\".  Codeine Other (comments)     Hallucinations, takes hydrocodone at home for pain    Contrast Dye [Iodine] Other (comments)     Not to take due to kidney function    Prednisone Other (comments)     \"makes my pancreas numbers go way up\"            Physical Exam:     Visit Vitals  /60 (BP 1 Location: Left upper arm, BP Patient Position: Sitting)   Pulse 63   Temp 97.8 °F (36.6 °C) (Oral)   Resp 16   Ht 5' 4\" (1.626 m)   Wt 178 lb 9.6 oz (81 kg)   SpO2 96%   BMI 30.66 kg/m²        Pain Scale: 0 - No pain/10      ECOG PS: 1  General: No distress  Eyes: PERRLA, anicteric sclerae  HENT: Atraumatic with normal appearance of ears and nose; OP clear  Neck: Supple; no visualized JVD  Lymphatic: No cervical, or supraclavicular lymphadenopathy. Respiratory: CTAB, normal respiratory effort  CV: Normal rate, regular rhythm, no murmurs, no peripheral edema  GI: Soft, nontender, nondistended, no palpable masses, no hepatomegaly, no splenomegaly  MS: Normal gait and station. Digits without clubbing or cyanosis. Skin: No rashes, ecchymoses, or petechiae. Normal temperature, turgor, and texture. Neuro/Psych: Alert, oriented, appropriate affect, normal judgment/insight      Results:     Lab Results   Component Value Date/Time    WBC 6.6 04/26/2022 11:32 AM    HGB 10.8 (L) 04/26/2022 11:32 AM    HCT 36.5 04/26/2022 11:32 AM    PLATELET 754 48/84/6853 11:32 AM    MCV 94.1 04/26/2022 11:32 AM    ABS.  NEUTROPHILS 3.2 04/26/2022 11:32 AM    Hemoglobin (POC) 11.4 (L) 06/13/2022 11:14 AM     Lab Results   Component Value Date/Time    Sodium 135 (L) 05/27/2022 12:59 PM    Potassium 4.8 05/27/2022 12:59 PM    Chloride 104 05/27/2022 12:59 PM    CO2 24 05/27/2022 12:59 PM    Glucose 96 05/27/2022 12:59 PM    BUN 64 (H) 05/27/2022 12:59 PM    Creatinine 2.53 (H) 05/27/2022 12:59 PM    GFR est AA 22 (L) 05/27/2022 12:59 PM    GFR est non-AA 18 (L) 05/27/2022 12:59 PM Calcium 7.3 (L) 05/27/2022 12:59 PM    Calcium 7.5 (L) 05/27/2022 12:59 PM    Glucose (POC) 206 (H) 10/03/2018 11:37 AM    Creatinine (POC) 1.7 (H) 09/27/2019 05:49 PM     Lab Results   Component Value Date/Time    Bilirubin, total 0.3 04/26/2022 11:32 AM    ALT (SGPT) 22 04/26/2022 11:32 AM    Alk. phosphatase 88 04/26/2022 11:32 AM    Protein, total 6.5 04/26/2022 11:32 AM    Albumin 3.6 05/27/2022 12:59 PM    Globulin 3.2 04/26/2022 11:32 AM         Assessment and Recommendations:     # Anemia of chronic kidney disease stage III  -The patient has been taking Retacrit administered by [facility]. She wishes to transfer her care closer to her home here in [facility].    -Chart reviewed, no iron deficiency seen in February 2022    -Change Retacrit 40,000 units to every 4 weeks with hold parameters for holding if hemoglobin greater than 11 g/dL. # Distant history of renal cell carcinoma  -No current clinical symptoms of be concerning for disease recurrence. The patient reports that she continues to follow with urology    Plan for follow-up in 6 months. Change Retacrit administration to every 4 weeks. Signed By: Karin Cope NP    I have personally seen and evaluated the patient in conjunction with Lawrence Hope NP. I find the patient's history and physical exam are consistent with the NP's documentation. I agree with the above assessment and plan, which I have modified as needed. Chart reviewed, patient has not been requiring Retacrit every other week. We will change dosing to every 4 weeks with holding parameters to hold if hemoglobin greater than 11 g/dL.       Plan for follow-up in 6 months      Ezio Krishna MD    Attending [address] [place] [address]

## 2022-06-21 NOTE — PROGRESS NOTES
Chano Donnelly is a 82 y.o. female  For Anemia. Pt receiving Retacrit every 2 weeks. Patient denies falls. V/s stable. Pt denies active bleeding. Pt denies any recent endoscopy or colonoscopy. Swelling noted to b/l lower extremities. Chief Complaint   Patient presents with    Follow-up    Anemia     1. Have you been to the ER, urgent care clinic since your last visit? Hospitalized since your last visit? No    2. Have you seen or consulted any other health care providers outside of the [address] [place] [address] since your last visit? Include any pap smears or colon screening.  No

## 2022-06-24 RX ORDER — AMLODIPINE BESYLATE 10 MG/1
10 TABLET ORAL DAILY
Qty: 30 TABLET | Refills: 0 | Status: SHIPPED | OUTPATIENT
Start: 2022-06-24 | End: 2022-07-26

## 2022-07-11 ENCOUNTER — HOSPITAL ENCOUNTER (OUTPATIENT)
Dept: INFUSION THERAPY | Age: 87
Discharge: HOME | End: 2022-07-11
Payer: MEDICARE

## 2022-07-11 ENCOUNTER — APPOINTMENT (OUTPATIENT)
Dept: INFUSION THERAPY | Age: 87
End: 2022-07-11

## 2022-07-11 VITALS
OXYGEN SATURATION: 98 % | WEIGHT: 175.1 LBS | BODY MASS INDEX: 30.06 KG/M2 | DIASTOLIC BLOOD PRESSURE: 65 MMHG | RESPIRATION RATE: 18 BRPM | HEART RATE: 72 BPM | SYSTOLIC BLOOD PRESSURE: 108 MMHG | TEMPERATURE: 97.3 F

## 2022-07-11 DIAGNOSIS — D64.9 ANEMIA, UNSPECIFIED TYPE: Primary | ICD-10-CM

## 2022-07-11 LAB
FERRITIN SERPL-MCNC: 544 NG/ML (ref 8–252)
HGB BLD-MCNC: 10.5 G/DL (ref 11.5–16)
IRON SATN MFR SERPL: 38 % (ref 20–50)
IRON SERPL-MCNC: 61 UG/DL (ref 35–150)
TIBC SERPL-MCNC: 159 UG/DL (ref 250–450)

## 2022-07-11 PROCEDURE — 74011250636 HC RX REV CODE- 250/636: Performed by: STUDENT IN AN ORGANIZED HEALTH CARE EDUCATION/TRAINING PROGRAM

## 2022-07-11 PROCEDURE — 96372 THER/PROPH/DIAG INJ SC/IM: CPT

## 2022-07-11 PROCEDURE — 36416 COLLJ CAPILLARY BLOOD SPEC: CPT

## 2022-07-11 PROCEDURE — 83540 ASSAY OF IRON: CPT

## 2022-07-11 PROCEDURE — 82728 ASSAY OF FERRITIN: CPT

## 2022-07-11 PROCEDURE — 85018 HEMOGLOBIN: CPT

## 2022-07-11 PROCEDURE — 36415 COLL VENOUS BLD VENIPUNCTURE: CPT

## 2022-07-11 RX ADMIN — EPOETIN ALFA-EPBX 40000 UNITS: 40000 INJECTION, SOLUTION INTRAVENOUS; SUBCUTANEOUS at 12:00

## 2022-07-11 NOTE — PROGRESS NOTES
[facility] Consult Note:  Arrived - [id number]    Patient denied having any symptoms of COVID-19, i.e. SOB, coughing, fever, or generally not feeling well. Also denies having been exposed to COVID-19 recently or having had any recent contact with family/friend that has a pending COVID test.     Visit Vitals  /65 (BP 1 Location: Left upper arm, BP Patient Position: Sitting)   Pulse 72   Temp 97.3 °F (36.3 °C)   Resp 18   SpO2 98%       Assessment unremarkable except dyspnea with exertion, diarrhea, 2+ edema in bilateral ankles, eczema to hands, weakness and fatigue. Labs drawn- Hemocue, Iron Profile, and Ferritin  Hgb - 10.5    Recent Results (from the past 12 hour(s))   FERRITIN    Collection Time: 07/11/22 11:31 AM   Result Value Ref Range    Ferritin 544 (H) 8 - 252 NG/ML   IRON PROFILE    Collection Time: 07/11/22 11:31 AM   Result Value Ref Range    Iron 61 35 - 150 ug/dL    TIBC 159 (L) 250 - 450 ug/dL    Iron % saturation 38 20 - 50 %   POC HEMOGLOBIN    Collection Time: 07/11/22 11:31 AM   Result Value Ref Range    Hemoglobin (POC) 10.5 (L) 11.5 - 16.0 g/dL     Medication given:  Retacrit 40,000 units SQ in left arm. 1145 - Tolerated well. Pt denies any acute problems/changes. Discharged from [facility] ambulatory with walker. No distress. Next appt: 8/8/22 @ 1100.

## 2022-07-25 ENCOUNTER — APPOINTMENT (OUTPATIENT)
Dept: INFUSION THERAPY | Age: 87
End: 2022-07-25

## 2022-07-26 RX ORDER — AMLODIPINE BESYLATE 10 MG/1
10 TABLET ORAL DAILY
Qty: 30 TABLET | Refills: 0 | Status: SHIPPED | OUTPATIENT
Start: 2022-07-26 | End: 2022-08-23

## 2022-08-08 ENCOUNTER — HOSPITAL ENCOUNTER (OUTPATIENT)
Dept: INFUSION THERAPY | Age: 87
Discharge: HOME | End: 2022-08-08
Payer: MEDICARE

## 2022-08-08 ENCOUNTER — APPOINTMENT (OUTPATIENT)
Dept: INFUSION THERAPY | Age: 87
End: 2022-08-08

## 2022-08-08 VITALS
HEART RATE: 61 BPM | SYSTOLIC BLOOD PRESSURE: 109 MMHG | RESPIRATION RATE: 18 BRPM | DIASTOLIC BLOOD PRESSURE: 62 MMHG | OXYGEN SATURATION: 97 % | BODY MASS INDEX: 29.61 KG/M2 | WEIGHT: 172.5 LBS | TEMPERATURE: 97.8 F

## 2022-08-08 DIAGNOSIS — D64.9 ANEMIA, UNSPECIFIED TYPE: Primary | ICD-10-CM

## 2022-08-08 LAB
FERRITIN SERPL-MCNC: 475 NG/ML (ref 26–388)
HGB BLD-MCNC: 10.2 G/DL (ref 11.5–16)
IRON SATN MFR SERPL: 38 % (ref 20–50)
IRON SERPL-MCNC: 58 UG/DL (ref 35–150)
TIBC SERPL-MCNC: 151 UG/DL (ref 250–450)

## 2022-08-08 PROCEDURE — 83540 ASSAY OF IRON: CPT

## 2022-08-08 PROCEDURE — 74011250636 HC RX REV CODE- 250/636: Performed by: STUDENT IN AN ORGANIZED HEALTH CARE EDUCATION/TRAINING PROGRAM

## 2022-08-08 PROCEDURE — 96372 THER/PROPH/DIAG INJ SC/IM: CPT

## 2022-08-08 PROCEDURE — 36416 COLLJ CAPILLARY BLOOD SPEC: CPT

## 2022-08-08 PROCEDURE — 85018 HEMOGLOBIN: CPT

## 2022-08-08 PROCEDURE — 36415 COLL VENOUS BLD VENIPUNCTURE: CPT

## 2022-08-08 PROCEDURE — 82728 ASSAY OF FERRITIN: CPT

## 2022-08-08 RX ADMIN — EPOETIN ALFA-EPBX 40000 UNITS: 40000 INJECTION, SOLUTION INTRAVENOUS; SUBCUTANEOUS at 11:24

## 2022-08-22 ENCOUNTER — VIRTUAL VISIT (OUTPATIENT)
Dept: INTERNAL MEDICINE CLINIC | Age: 87
End: 2022-08-22
Payer: MEDICARE

## 2022-08-22 ENCOUNTER — TELEPHONE (OUTPATIENT)
Dept: INTERNAL MEDICINE CLINIC | Age: 87
End: 2022-08-22

## 2022-08-22 ENCOUNTER — APPOINTMENT (OUTPATIENT)
Dept: INFUSION THERAPY | Age: 87
End: 2022-08-22

## 2022-08-22 DIAGNOSIS — L30.9 ECZEMA, UNSPECIFIED TYPE: Primary | ICD-10-CM

## 2022-08-22 PROCEDURE — 99213 OFFICE O/P EST LOW 20 MIN: CPT | Performed by: INTERNAL MEDICINE

## 2022-08-22 PROCEDURE — G8427 DOCREV CUR MEDS BY ELIG CLIN: HCPCS | Performed by: INTERNAL MEDICINE

## 2022-08-22 PROCEDURE — G8536 NO DOC ELDER MAL SCRN: HCPCS | Performed by: INTERNAL MEDICINE

## 2022-08-22 PROCEDURE — 1090F PRES/ABSN URINE INCON ASSESS: CPT | Performed by: INTERNAL MEDICINE

## 2022-08-22 PROCEDURE — G8417 CALC BMI ABV UP PARAM F/U: HCPCS | Performed by: INTERNAL MEDICINE

## 2022-08-22 PROCEDURE — 1101F PT FALLS ASSESS-DOCD LE1/YR: CPT | Performed by: INTERNAL MEDICINE

## 2022-08-22 PROCEDURE — G8399 PT W/DXA RESULTS DOCUMENT: HCPCS | Performed by: INTERNAL MEDICINE

## 2022-08-22 PROCEDURE — G8756 NO BP MEASURE DOC: HCPCS | Performed by: INTERNAL MEDICINE

## 2022-08-22 PROCEDURE — G8432 DEP SCR NOT DOC, RNG: HCPCS | Performed by: INTERNAL MEDICINE

## 2022-08-22 RX ORDER — TRIAMCINOLONE ACETONIDE 5 MG/G
CREAM TOPICAL 2 TIMES DAILY
Qty: 15 G | Refills: 0 | Status: SHIPPED | OUTPATIENT
Start: 2022-08-22 | End: 2022-09-07 | Stop reason: SDUPTHER

## 2022-08-22 NOTE — PROGRESS NOTES
Rene Curling is a 82 y.o. female who was seen by synchronous (real-time) audio-video technology on 2022 for Dry Skin (X 2 months both hands)        Progress Note         PROGRESS NOTE  Name: Rene Curling   : 1935       ASSESSMENT/ PLAN:     Diagnoses and all orders for this visit:    Eczema, unspecified type  -     REFERRAL TO DERMATOLOGY  -     triamcinolone (ARISTOCORT) 0.5 % topical cream; Apply  to affected area two (2) times a day. use thin layer, Normal, Disp-15 g, R-0    F/U as planned in September. I have reviewed the patient's medications and risks/side effects/benefits were discussed. Diagnosis(-es) explained to patient and questions answered. Literature provided where appropriate. SUBJECTIVE  Ms. Rene Curling presents today acutely for     Chief Complaint   Patient presents with    Dry Skin     X 2 months both hands       \"My eczema is acting up. \" She has scaling, itching, and cracking involving digits of both hands. OBJECTIVE  There were no vitals taken for this visit. Gen: Pleasant 82 y.o.  female in NAD.            Objective:     Patient-Reported Vitals 2022   Patient-Reported Pulse 63   Patient-Reported Systolic  403   Patient-Reported Diastolic 56        [INSTRUCTIONS:  \"[x]\" Indicates a positive item  \"[]\" Indicates a negative item  -- DELETE ALL ITEMS NOT EXAMINED]    Constitutional: [x] Appears well-developed and well-nourished [x] No apparent distress      [] Abnormal -     Mental status: [x] Alert and awake  [x] Oriented to person/place/time [x] Able to follow commands    [] Abnormal -     Eyes:   EOM    [x]  Normal    [] Abnormal -   Sclera  [x]  Normal    [] Abnormal -          Discharge [x]  None visible   [] Abnormal -     HENT: [x] Normocephalic, atraumatic  [] Abnormal -   [x] Mouth/Throat: Mucous membranes are moist    External Ears [x] Normal  [] Abnormal -    Neck: [x] No visualized mass [] Abnormal -     Pulmonary/Chest: [x] Respiratory effort normal   [x] No visualized signs of difficulty breathing or respiratory distress        [] Abnormal -      Musculoskeletal:   [x] Normal gait with no signs of ataxia         [x] Normal range of motion of neck        [] Abnormal -     Neurological:        [x] No Facial Asymmetry (Cranial nerve 7 motor function) (limited exam due to video visit)          [x] No gaze palsy        [] Abnormal -          Skin:        [x] No significant exanthematous lesions or discoloration noted on facial skin         [] Abnormal -            Psychiatric:       [x] Normal Affect [] Abnormal -       Other pertinent observable physical exam findings:-        We discussed the expected course, resolution and complications of the diagnosis(es) in detail. Medication risks, benefits, costs, interactions, and alternatives were discussed as indicated. I advised her to contact the office if her condition worsens, changes or fails to improve as anticipated. She expressed understanding with the diagnosis(es) and plan. Ronna Hemphill, who was evaluated through a patient-initiated, synchronous (real-time) audio-video encounter, and/or her healthcare decision maker, is aware that it is a billable service, with coverage as determined by her insurance carrier. She provided verbal consent to proceed: YES, and patient identification was verified. It was conducted pursuant to the emergency declaration under the [address] [place] [address] authority and the [employer] [place] Act. A caregiver was present when appropriate. Ability to conduct physical exam was limited. I was not in office. The patient was at home or otherwise outside the office.       Deo Avitia MD

## 2022-08-22 NOTE — TELEPHONE ENCOUNTER
Called, spoke with Patient's son Emma Clancy (HIPAA)  Two pt identifiers confirmed.     Son offered and accepted a VV today with Dr. Gerald Casanova at 11:45 am

## 2022-08-22 NOTE — PROGRESS NOTES
1. \"Have you been to the ER, urgent care clinic since your last visit? Hospitalized since your last visit? \" No    2. \"Have you seen or consulted any other health care providers outside of the [address] [place] [address] since your last visit? See care team     3. For patients aged 39-70: Has the patient had a colonoscopy / FIT/ Cologuard? NA - based on age      If the patient is female:    4. For patients aged 41-77: Has the patient had a mammogram within the past 2 years? NA - based on age or sex      11. For patients aged 21-65: Has the patient had a pap smear?  NA - based on age or sex

## 2022-08-22 NOTE — TELEPHONE ENCOUNTER
States wants a referral to dermatology  (Says closed referral was already completed, that was something different)    States cracks on patients hands, skin very dry, bleeding     Both hands, but worse on right      Please call as soon as possible to advise regarding referral  [phone]

## 2022-09-06 ENCOUNTER — APPOINTMENT (OUTPATIENT)
Dept: INFUSION THERAPY | Age: 87
End: 2022-09-06

## 2022-09-06 ENCOUNTER — HOSPITAL ENCOUNTER (OUTPATIENT)
Dept: INFUSION THERAPY | Age: 87
Discharge: HOME | End: 2022-09-06
Payer: MEDICARE

## 2022-09-06 VITALS
TEMPERATURE: 97.1 F | RESPIRATION RATE: 17 BRPM | HEART RATE: 68 BPM | OXYGEN SATURATION: 97 % | DIASTOLIC BLOOD PRESSURE: 76 MMHG | SYSTOLIC BLOOD PRESSURE: 121 MMHG

## 2022-09-06 DIAGNOSIS — D64.9 ANEMIA, UNSPECIFIED TYPE: Primary | ICD-10-CM

## 2022-09-06 LAB
FERRITIN SERPL-MCNC: 433 NG/ML (ref 8–252)
HGB BLD-MCNC: 10.9 G/DL (ref 11.5–16)
IRON SATN MFR SERPL: 33 % (ref 20–50)
IRON SERPL-MCNC: 56 UG/DL (ref 35–150)
TIBC SERPL-MCNC: 168 UG/DL (ref 250–450)

## 2022-09-06 PROCEDURE — 82728 ASSAY OF FERRITIN: CPT

## 2022-09-06 PROCEDURE — 36415 COLL VENOUS BLD VENIPUNCTURE: CPT

## 2022-09-06 PROCEDURE — 74011250636 HC RX REV CODE- 250/636: Performed by: STUDENT IN AN ORGANIZED HEALTH CARE EDUCATION/TRAINING PROGRAM

## 2022-09-06 PROCEDURE — 83540 ASSAY OF IRON: CPT

## 2022-09-06 PROCEDURE — 96372 THER/PROPH/DIAG INJ SC/IM: CPT

## 2022-09-06 PROCEDURE — 85018 HEMOGLOBIN: CPT

## 2022-09-06 RX ADMIN — EPOETIN ALFA-EPBX 40000 UNITS: 20000 INJECTION, SOLUTION INTRAVENOUS; SUBCUTANEOUS at 11:45

## 2022-09-06 NOTE — PROGRESS NOTES
[facility] Visit Note    8721  Pt arrived at [facility] ambulatory and in no distress for Retacrit. Assessment comleted, no new complaints voiced. Patient denied having any symptoms of COVID-19, i.e. SOB, coughing, fever, or generally not feeling well. Also denies having been exposed to COVID-19 recently or having had any recent contact with family/friend that has a pending COVID test.     Visit Vitals  /76 (BP 1 Location: Left upper arm, BP Patient Position: Sitting)   Pulse 68   Temp 97.1 °F (36.2 °C)   Resp 17   SpO2 97%     Recent Results (from the past 12 hour(s))   POC HEMOGLOBIN    Collection Time: 09/06/22 11:39 AM   Result Value Ref Range    Hemoglobin (POC) 10.9 (L) 11.5 - 16.0 g/dL      See [facility] care for pended lab results    Medications received:  Medications Administered       epoetin gigi-epbx (RETACRIT) injection 40,000 Units       Admin Date  09/06/2022 Action  Given Dose  40,000 Units Route  SubCUTAneous Administered By  Amy Martinez, RN                  8900 Tolerated treatment well, no adverse reaction noted. D/Cd from [facility] ambulatory and in no distress accompanied by family.   Next appt 10/3

## 2022-09-07 ENCOUNTER — OFFICE VISIT (OUTPATIENT)
Dept: INTERNAL MEDICINE CLINIC | Age: 87
End: 2022-09-07
Payer: MEDICARE

## 2022-09-07 VITALS
HEART RATE: 77 BPM | DIASTOLIC BLOOD PRESSURE: 58 MMHG | RESPIRATION RATE: 18 BRPM | WEIGHT: 175.2 LBS | HEIGHT: 64 IN | BODY MASS INDEX: 29.91 KG/M2 | TEMPERATURE: 97.9 F | SYSTOLIC BLOOD PRESSURE: 109 MMHG

## 2022-09-07 DIAGNOSIS — E11.8 CONTROLLED DIABETES MELLITUS TYPE 2 WITH COMPLICATIONS, UNSPECIFIED WHETHER LONG TERM INSULIN USE (HCC): ICD-10-CM

## 2022-09-07 DIAGNOSIS — I10 PRIMARY HYPERTENSION: Primary | ICD-10-CM

## 2022-09-07 DIAGNOSIS — L30.9 ECZEMA, UNSPECIFIED TYPE: ICD-10-CM

## 2022-09-07 DIAGNOSIS — N18.30 STAGE 3 CHRONIC KIDNEY DISEASE, UNSPECIFIED WHETHER STAGE 3A OR 3B CKD (HCC): ICD-10-CM

## 2022-09-07 DIAGNOSIS — R60.9 EDEMA, UNSPECIFIED TYPE: ICD-10-CM

## 2022-09-07 PROCEDURE — 1101F PT FALLS ASSESS-DOCD LE1/YR: CPT | Performed by: INTERNAL MEDICINE

## 2022-09-07 PROCEDURE — G8399 PT W/DXA RESULTS DOCUMENT: HCPCS | Performed by: INTERNAL MEDICINE

## 2022-09-07 PROCEDURE — G8536 NO DOC ELDER MAL SCRN: HCPCS | Performed by: INTERNAL MEDICINE

## 2022-09-07 PROCEDURE — 1090F PRES/ABSN URINE INCON ASSESS: CPT | Performed by: INTERNAL MEDICINE

## 2022-09-07 PROCEDURE — G8752 SYS BP LESS 140: HCPCS | Performed by: INTERNAL MEDICINE

## 2022-09-07 PROCEDURE — G8754 DIAS BP LESS 90: HCPCS | Performed by: INTERNAL MEDICINE

## 2022-09-07 PROCEDURE — G8417 CALC BMI ABV UP PARAM F/U: HCPCS | Performed by: INTERNAL MEDICINE

## 2022-09-07 PROCEDURE — G8427 DOCREV CUR MEDS BY ELIG CLIN: HCPCS | Performed by: INTERNAL MEDICINE

## 2022-09-07 PROCEDURE — G8510 SCR DEP NEG, NO PLAN REQD: HCPCS | Performed by: INTERNAL MEDICINE

## 2022-09-07 PROCEDURE — 99214 OFFICE O/P EST MOD 30 MIN: CPT | Performed by: INTERNAL MEDICINE

## 2022-09-07 RX ORDER — TRIAMCINOLONE ACETONIDE 5 MG/G
CREAM TOPICAL 2 TIMES DAILY
Qty: 30 G | Refills: 1 | Status: SHIPPED | OUTPATIENT
Start: 2022-09-07

## 2022-09-07 NOTE — PROGRESS NOTES
PROGRESS NOTE  Name: Cuco Wesley   : 1935       ASSESSMENT/ PLAN:       Hypertension: BP OK today. Continue current meds for now. Controlled diabetes mellitus type 2 with complications, unspecified whether long term insulin use ([facility]): We'll defer labs as she has been seeing an endocrinologist.  Memory loss, likely dementia. Hyperparathyroidism: Per endocrinology, Dr. Prabha Dahl. Stage 3 chronic kidney disease, unspecified whether stage 3a or 3b CKD [facility]): She is seeing nephrology, Dr. Bella Barrett. Sinus node dysfunction [facility]): s/p pacemaker. Sees cardiologist, Dr. Carlin Manning. Asthma: Controlled at this time. Arthritis / Chronic pain: Stable. Hx of Deep vein thrombosis (DVT): Not currently on blood thinner. GERD (gastroesophageal reflux disease) / Hiatal hernia / History of PUD: On famotidine. MARLEN (obstructive sleep apnea)  History of depression. Mild. Eczema hands: The triamcinolone helped--refilled. Follow-up and Dispositions    Return in about 6 months (around 3/7/2023) for HTN. I have reviewed the patient's medications and risks/side effects/benefits were discussed. Diagnosis(-es) explained to patient and questions answered. Literature provided where appropriate. SUBJECTIVE:   Ms. Cuco Wesley is a 82 y.o. female who is here for follow up of routine medical issues. Chief Complaint   Patient presents with    Follow-up     3 month fu       She is here with son, who does most of the talking. She answers some questions, but seems confused at times. \"I have no energy. \"     She is getting retacrit infusions for anemia. She sees hematologist, Dr. Urvashi Domínguez. Dr. Prabha Dahl has seen her last week, for hyperparathyroidism. She has diet controlled DM. She sees Dr. Carlin Manning. She has had pacemaker placed. She sees nephrologist, Dr. Bella Barrett. At this time, she is otherwise doing well and has brought no other complaints to my attention today. For a list of the medical issues addressed today, see the assessment and plan below. PMH:   Past Medical History:   Diagnosis Date    Adverse effect of anesthesia     passed out during EGD/stopped breathing    Arrhythmia     has pacemaker for low HR    Arthritis     Asthma     Cancer ([facility]     right kidney - surgery    Chronic pain     right side    Deep vein thrombosis (DVT) during current hospitalization ([facility]     history of DVT was on coumadin in the past    Diabetes ([facility]     diet controlled    GERD (gastroesophageal reflux disease)     H/O acute pancreatitis     Hiatal hernia     Hypertension     MARLEN (obstructive sleep apnea)     does not use CPAP/pt states she has not used since a pacemaker inserted    Other ill-defined conditions(799.89)     lymph node enlargement - left chest - benign bx - watching    Other ill-defined conditions(799.89)     wheezes    Psychiatric disorder     depression    PUD (peptic ulcer disease)     hx of    Thromboembolus ([facility]     Unspecified adverse effect of anesthesia     passed out during EGD per pt - stopped breathing per pt per MD       Past Surgical History:   Procedure Laterality Date    HX APPENDECTOMY      HX CHOLECYSTECTOMY      HX GYN          HX HEENT Right     laser eye surgery to stop bleeding in back of eye - multiple laser surgeries    HX KNEE ARTHROSCOPY      left knee; cartilage repair    HX ORTHOPAEDIC      right hip replacement    HX ORTHOPAEDIC      lower back surgery    HX ORTHOPAEDIC      straighten toe - 2nd toe on right    HX ORTHOPAEDIC Bilateral     carpal tunnel release    HX PACEMAKER      not defibrillator    HX UROLOGICAL      implant in hip to control urine and then removed    HX UROLOGICAL      Right kidney partial nephrectomy       All: She is allergic to pcn [penicillins], codeine, contrast dye [iodine], and prednisone. Current Outpatient Medications   Medication Sig    amLODIPine (NORVASC) 10 mg tablet Take 1 tablet by mouth daily. triamcinolone (ARISTOCORT) 0.5 % topical cream Apply  to affected area two (2) times a day. use thin layer    cinacalcet (SENSIPAR) 30 mg tablet TAKE ONE TABLET BY MOUTH DAILY    furosemide (LASIX) 40 mg tablet Take 1 Tablet by mouth daily. pantoprazole (PROTONIX) 40 mg tablet Take 1 Tablet by mouth daily. allopurinoL (ZYLOPRIM) 300 mg tablet Take 1 Tablet by mouth daily. metoprolol succinate (TOPROL-XL) 50 mg XL tablet Take 1 Tablet by mouth daily. losartan (COZAAR) 100 mg tablet Take 1 Tablet by mouth nightly. FLUoxetine (PROzac) 10 mg capsule Take 1 Capsule by mouth daily. amitriptyline (ELAVIL) 25 mg tablet Take 1 Tablet by mouth nightly. donepeziL (ARICEPT) 10 mg tablet Take 1 Tablet by mouth nightly. aspirin 81 mg chewable tablet Take 81 mg by mouth daily. pravastatin (PRAVACHOL) 20 mg tablet Take 1 Tab by mouth nightly. PROAIR HFA 90 mcg/actuation inhaler Take 2 Puffs by inhalation four (4) times daily as needed. No current facility-administered medications for this visit. FH: Her family history includes Cancer in her brother, sister, and sister; Diabetes in her daughter; Heart Disease in her father; Hypertension in her daughter and father; SLE in an other family member; Stroke in her mother. SH:  reports that she quit smoking about 51 years ago. Her smoking use included cigarettes. She has a 5.00 pack-year smoking history. She has never used smokeless tobacco. She reports that she does not drink alcohol and does not use drugs. ROS: See above; Complete ROS otherwise negative. OBJECTIVE:   Vitals:   Visit Vitals  BP (!) 109/58 (BP 1 Location: Left upper arm, BP Patient Position: Sitting, BP Cuff Size: Adult)   Pulse 77   Temp 97.9 °F (36.6 °C) (Temporal)   Resp 18   Ht 5' 4\" (1.626 m)   Wt 175 lb 3.2 oz (79.5 kg)   BMI 30.07 kg/m²      Gen: Pleasant 82 y.o.  female in NAD. HEENT: PERRLA. EOMI. OP moist and pink. Neck: Supple. No LAD.  HEART: RRR, No M/G/R. LUNGS: CTAB No W/R. ABDOMEN: S, NT, ND, BS+. EXTREMITIES: Warm. No C/C/E.  MUSCULOSKELETAL: Normal ROM, muscle strength 5/5 all groups. NEURO: Alert and oriented x 3. Cranial nerves grossly intact. No focal sensory or motor deficits noted. SKIN: Warm. Dry. No rashes or other lesions noted. Lab Results   Component Value Date/Time    Sodium 135 (L) 05/27/2022 12:59 PM    Potassium 4.8 05/27/2022 12:59 PM    Chloride 104 05/27/2022 12:59 PM    CO2 24 05/27/2022 12:59 PM    Anion gap 7 05/27/2022 12:59 PM    Glucose 96 05/27/2022 12:59 PM    BUN 64 (H) 05/27/2022 12:59 PM    Creatinine 2.53 (H) 05/27/2022 12:59 PM    BUN/Creatinine ratio 25 (H) 05/27/2022 12:59 PM    GFR est AA 22 (L) 05/27/2022 12:59 PM    GFR est non-AA 18 (L) 05/27/2022 12:59 PM    Calcium 7.3 (L) 05/27/2022 12:59 PM    Calcium 7.5 (L) 05/27/2022 12:59 PM    Bilirubin, total 0.3 04/26/2022 11:32 AM    ALT (SGPT) 22 04/26/2022 11:32 AM    Alk.  phosphatase 88 04/26/2022 11:32 AM    Protein, total 6.5 04/26/2022 11:32 AM    Albumin 3.6 05/27/2022 12:59 PM    Globulin 3.2 04/26/2022 11:32 AM    A-G Ratio 1.0 (L) 04/26/2022 11:32 AM       Lab Results   Component Value Date/Time    Triglyceride 87 02/24/2014 02:30 AM        Lab Results   Component Value Date/Time    Hemoglobin A1c 6.4 (H) 12/19/2016 03:06 PM       Lab Results   Component Value Date/Time    WBC 6.6 04/26/2022 11:32 AM    Hemoglobin (POC) 10.9 (L) 09/06/2022 11:39 AM    HGB 10.8 (L) 04/26/2022 11:32 AM    HCT 36.5 04/26/2022 11:32 AM    PLATELET 902 26/79/8505 11:32 AM    MCV 94.1 04/26/2022 11:32 AM

## 2022-09-07 NOTE — PROGRESS NOTES
1. \"Have you been to the ER, urgent care clinic since your last visit? Hospitalized since your last visit? \" No    2. \"Have you seen or consulted any other health care providers outside of the [address] [place] [address] since your last visit? \" No     3. For patients aged 39-70: Has the patient had a colonoscopy / FIT/ Cologuard? NA - based on age      If the patient is female:    4. For patients aged 41-77: Has the patient had a mammogram within the past 2 years? NA - based on age or sex      11. For patients aged 21-65: Has the patient had a pap smear?  NA - based on age or sex

## 2022-09-12 ENCOUNTER — HOSPITAL ENCOUNTER (OUTPATIENT)
Dept: INFUSION THERAPY | Age: 87
End: 2022-09-12

## 2022-09-14 ENCOUNTER — TELEPHONE (OUTPATIENT)
Dept: ONCOLOGY | Age: 87
End: 2022-09-14

## 2022-09-14 NOTE — TELEPHONE ENCOUNTER
Patients son Angelita Villeda called & left a vm stating that he informed the infusion clinic that he can't accept any more late apt for pt because of work. He stated he has left 4 messages & no one has returned his call.  Please call Angelita Villeda @ [phone]

## 2022-09-19 ENCOUNTER — APPOINTMENT (OUTPATIENT)
Dept: INFUSION THERAPY | Age: 87
End: 2022-09-19

## 2022-10-03 ENCOUNTER — HOSPITAL ENCOUNTER (OUTPATIENT)
Dept: INFUSION THERAPY | Age: 87
Discharge: HOME | End: 2022-10-03
Payer: MEDICARE

## 2022-10-03 VITALS
WEIGHT: 174.2 LBS | BODY MASS INDEX: 29.74 KG/M2 | OXYGEN SATURATION: 96 % | TEMPERATURE: 97.5 F | HEIGHT: 64 IN | DIASTOLIC BLOOD PRESSURE: 69 MMHG | RESPIRATION RATE: 18 BRPM | HEART RATE: 64 BPM | SYSTOLIC BLOOD PRESSURE: 121 MMHG

## 2022-10-03 DIAGNOSIS — D64.9 ANEMIA, UNSPECIFIED TYPE: Primary | ICD-10-CM

## 2022-10-03 LAB
FERRITIN SERPL-MCNC: 468 NG/ML (ref 26–388)
HGB BLD-MCNC: 8.3 G/DL (ref 11.5–16)
IRON SATN MFR SERPL: 40 % (ref 20–50)
IRON SERPL-MCNC: 59 UG/DL (ref 35–150)
TIBC SERPL-MCNC: 148 UG/DL (ref 250–450)

## 2022-10-03 PROCEDURE — 36415 COLL VENOUS BLD VENIPUNCTURE: CPT

## 2022-10-03 PROCEDURE — 96372 THER/PROPH/DIAG INJ SC/IM: CPT

## 2022-10-03 PROCEDURE — 82728 ASSAY OF FERRITIN: CPT

## 2022-10-03 PROCEDURE — 83540 ASSAY OF IRON: CPT

## 2022-10-03 PROCEDURE — 85018 HEMOGLOBIN: CPT

## 2022-10-03 PROCEDURE — 74011250636 HC RX REV CODE- 250/636: Performed by: STUDENT IN AN ORGANIZED HEALTH CARE EDUCATION/TRAINING PROGRAM

## 2022-10-03 RX ADMIN — EPOETIN ALFA-EPBX 40000 UNITS: 40000 INJECTION, SOLUTION INTRAVENOUS; SUBCUTANEOUS at 11:16

## 2022-10-03 NOTE — PROGRESS NOTES
1 Pt arrived at [facility] ambulatory and in no acute distress for Retacrit injection. Assessment complete. Pt reports recent decreased appetite, \"nothing tastes good. \" Encouraged small frequent meals and increased hydration. Labs drawn peripherally without difficulty. Patient Vitals for the past 12 hrs:   Temp Pulse Resp BP SpO2   10/03/22 1103 97.5 °F (36.4 °C) 64 18 121/69 96 %     Recent Results (from the past 12 hour(s))   POC HEMOGLOBIN    Collection Time: 10/03/22 11:11 AM   Result Value Ref Range    Hemoglobin (POC) 8.3 (L) 11.5 - 16.0 g/dL       Medications Administered       epoetin gigi-epbx (RETACRIT) injection 40,000 Units       Admin Date  10/03/2022 Action  Given Dose  40,000 Units Route  SubCUTAneous Administered By  Stef Huizar, RN                  1120 Pt tolerated injection well, no adverse reaction noted. D/Cd from [facility] ambulatory and in no acute distress. Pt aware of next appointment.     Future Appointments   Date Time Provider Rosales Messer   10/13/2022 11:30 AM Talita Worthy MD RDE JIAN [address] BS AMB   10/31/2022 10:00 AM VINSON FASTRACK 2 [facility] REG   11/28/2022 10:00 AM VINSON FASTRACK 2 [facility] REG   12/21/2022 10:00 AM Bria Gonzalez MD ONC BS AMB   12/28/2022 10:00 AM VINSON FASTRACK 6 [facility] REG   3/7/2023  9:30 AM Isa Cardona MD [facility] BS AMB

## 2022-10-13 ENCOUNTER — OFFICE VISIT (OUTPATIENT)
Dept: ENDOCRINOLOGY | Age: 87
End: 2022-10-13
Payer: MEDICARE

## 2022-10-13 VITALS
SYSTOLIC BLOOD PRESSURE: 105 MMHG | BODY MASS INDEX: 29.09 KG/M2 | WEIGHT: 170.4 LBS | DIASTOLIC BLOOD PRESSURE: 63 MMHG | HEART RATE: 65 BPM | HEIGHT: 64 IN

## 2022-10-13 DIAGNOSIS — E55.9 HYPOVITAMINOSIS D: ICD-10-CM

## 2022-10-13 DIAGNOSIS — E83.52 HYPERCALCEMIA: ICD-10-CM

## 2022-10-13 DIAGNOSIS — R53.83 FATIGUE, UNSPECIFIED TYPE: ICD-10-CM

## 2022-10-13 DIAGNOSIS — E21.3 HYPERPARATHYROIDISM (HCC): Primary | ICD-10-CM

## 2022-10-13 DIAGNOSIS — E53.8 VITAMIN B 12 DEFICIENCY: ICD-10-CM

## 2022-10-13 PROCEDURE — G8432 DEP SCR NOT DOC, RNG: HCPCS | Performed by: INTERNAL MEDICINE

## 2022-10-13 PROCEDURE — 1101F PT FALLS ASSESS-DOCD LE1/YR: CPT | Performed by: INTERNAL MEDICINE

## 2022-10-13 PROCEDURE — 1090F PRES/ABSN URINE INCON ASSESS: CPT | Performed by: INTERNAL MEDICINE

## 2022-10-13 PROCEDURE — G8754 DIAS BP LESS 90: HCPCS | Performed by: INTERNAL MEDICINE

## 2022-10-13 PROCEDURE — G8536 NO DOC ELDER MAL SCRN: HCPCS | Performed by: INTERNAL MEDICINE

## 2022-10-13 PROCEDURE — G8752 SYS BP LESS 140: HCPCS | Performed by: INTERNAL MEDICINE

## 2022-10-13 PROCEDURE — 99214 OFFICE O/P EST MOD 30 MIN: CPT | Performed by: INTERNAL MEDICINE

## 2022-10-13 PROCEDURE — G8417 CALC BMI ABV UP PARAM F/U: HCPCS | Performed by: INTERNAL MEDICINE

## 2022-10-13 PROCEDURE — 1123F ACP DISCUSS/DSCN MKR DOCD: CPT | Performed by: INTERNAL MEDICINE

## 2022-10-13 PROCEDURE — G8399 PT W/DXA RESULTS DOCUMENT: HCPCS | Performed by: INTERNAL MEDICINE

## 2022-10-13 PROCEDURE — G8427 DOCREV CUR MEDS BY ELIG CLIN: HCPCS | Performed by: INTERNAL MEDICINE

## 2022-10-13 RX ORDER — ACETAMINOPHEN 500 MG
500 TABLET ORAL EVERY EVENING
COMMUNITY

## 2022-10-13 NOTE — LETTER
10/13/2022    Patient: Ron Hua   YOB: 1935   Date of Visit: 10/13/2022     Latosha Gates MD  [address]  [address]  [facility] [place]    Dear Latosha Gates MD,      Thank you for referring Ms. Ron Hua to [facility] for evaluation. My notes for this consultation are attached. If you have questions, please do not hesitate to call me. I look forward to following your patient along with you.       Sincerely,    Marty Lomax MD

## 2022-10-13 NOTE — PROGRESS NOTES
Chief Complaint   Patient presents with    Elevated Blood Calcium     Pcp and pharmacy verified     History of Present Illness: Jazmine Barrera is a 82 y.o. female here for follow up of Hyperparathyroidism. Per pt's grand daughter she was first told about the high Ca in 2019. Pt has hx of renal surgery in 2000 for RCC. No hx or renal stones. No hx of bone fractures or osteoporosis. Pt is on medication for dementia and depression. Per her son Lolis Kirkpatrick is aching all the time, particularly in her neck. She does not want to get out of bed, she complains of feeling achy and tired all day. There are times she will not get out of bed all day\". Per her son and granddaughter she is not on HCTZ or any thiazide diuretics. Her PCP did a very good work up to evaluate the high calcium levels and with the high PTH and high Ca and the Vitamin D in a sufficient range, these are strong evidence of hyperparathyroidism. Her family is not interested in surgery of any kind for the hyperparathyroidism, but would like to start some form of treatment to see if this will help with her deteriorating AMS. I started her on Cinacalcet 30mg daily. At our last visit in May 2022 she has been improving mentally and her Ca had dropped to 7.5, though her PTH was still high at 118. I had pt decrease her Cinacalcet to 15mg daily. Pt is seeing Dr. Nader Newman of Hematology and she has been receiving monthly infusion of iron. She is still taking the Cinacalcet 15mg every day. She has not had any falls or fractures. She denies issues of hematuria, dysuria or renal stones. \"I just don't have any energy to do anything and I don't have a taste for anything. \"  Her son notes that her appetite and PO intake has been poor. Her weight today was 170 pounds.     She last saw Dr. Tony Umanzor in June 2022, Lolis Kirkpatrick has follow up soon, he says that she has vessels in her legs that are thinning out, but she is not wearing the compression stockings she is supposed to wear. \"    She is still seeing a nephrologist Dr. Carly Taylro said everything was going well. \"    Pt feels her memory is not doing very well, but her son feels that things are doing better. \"She is still in the kitchen cooking and is going grocery shopping on her own. \"    She denies any issues of CP, SOB, palpitations. Pt reports that she feels \"lumps in my arms and legs and the dermatologist does not know what they could be. \"    Current Outpatient Medications   Medication Sig    acetaminophen (TYLENOL) 500 mg tablet Take 500 mg by mouth every evening. triamcinolone (ARISTOCORT) 0.5 % topical cream Apply  to affected area two (2) times a day. use thin layer    amLODIPine (NORVASC) 10 mg tablet Take 1 tablet by mouth daily. cinacalcet (SENSIPAR) 30 mg tablet TAKE ONE TABLET BY MOUTH DAILY    furosemide (LASIX) 40 mg tablet Take 1 Tablet by mouth daily. pantoprazole (PROTONIX) 40 mg tablet Take 1 Tablet by mouth daily. allopurinoL (ZYLOPRIM) 300 mg tablet Take 1 Tablet by mouth daily. metoprolol succinate (TOPROL-XL) 50 mg XL tablet Take 1 Tablet by mouth daily. losartan (COZAAR) 100 mg tablet Take 1 Tablet by mouth nightly. FLUoxetine (PROzac) 10 mg capsule Take 1 Capsule by mouth daily. amitriptyline (ELAVIL) 25 mg tablet Take 1 Tablet by mouth nightly. donepeziL (ARICEPT) 10 mg tablet Take 1 Tablet by mouth nightly. aspirin 81 mg chewable tablet Take 81 mg by mouth daily. pravastatin (PRAVACHOL) 20 mg tablet Take 1 Tab by mouth nightly. PROAIR HFA 90 mcg/actuation inhaler Take 2 Puffs by inhalation four (4) times daily as needed. No current facility-administered medications for this visit. Allergies   Allergen Reactions    Pcn [Penicillins] Rash     But can take cephalosporins. Allergic to all \"cillins\".     Codeine Other (comments)     Hallucinations, takes hydrocodone at home for pain    Contrast Dye [Iodine] Other (comments)     Not to take due to kidney function    Prednisone Other (comments)     \"makes my pancreas numbers go way up\"     Review of Systems:  - Cardiovascular: no chest pain  - Neurological: no tremors  - Integumentary: skin is normal    Physical Examination:  Blood pressure 105/63, pulse 65, height 5' 4\" (1.626 m), weight 170 lb 6.4 oz (77.3 kg). General: pleasant, no distress, good eye contact   Neck: no thyromegaly or thyroid bruits  Cardiovascular: regular, normal rate, nl s1 and s2, no m/r/g   Integumentary: skin is normal, no edema  Neurological: reflexes 2+ at biceps, no tremors  Psychiatric: normal mood and affect    Data Reviewed:   None  Assessment/Plan:   1) Hyperparathyroidism > Pt has improved mentally, I will check her Ca and PTH level and adjust her Cinacalcet dose as needed. 2) Fatigue > I suspect her ELGIN is a strong role in her fatigue issues, but I will test TFT, Vitamin D and B-12 levels today to ensure there are not other issues explaining her fatigue. Her Son is requesting I send her [employer] messages to his account \"Wilman Coley\". Pt voices understanding and agreement with the plan.     RTC 4 months    Copy sent to:  Dr. Brando Owusu

## 2022-10-18 ENCOUNTER — PATIENT MESSAGE (OUTPATIENT)
Dept: ENDOCRINOLOGY | Age: 87
End: 2022-10-18

## 2022-10-31 ENCOUNTER — HOSPITAL ENCOUNTER (OUTPATIENT)
Dept: INFUSION THERAPY | Age: 87
Discharge: HOME | End: 2022-10-31
Payer: MEDICARE

## 2022-10-31 VITALS
TEMPERATURE: 97.5 F | OXYGEN SATURATION: 99 % | HEART RATE: 61 BPM | DIASTOLIC BLOOD PRESSURE: 72 MMHG | RESPIRATION RATE: 16 BRPM | SYSTOLIC BLOOD PRESSURE: 131 MMHG

## 2022-10-31 DIAGNOSIS — D64.9 ANEMIA, UNSPECIFIED TYPE: Primary | ICD-10-CM

## 2022-10-31 LAB
FERRITIN SERPL-MCNC: 461 NG/ML (ref 8–252)
HGB BLD-MCNC: 10.6 G/DL (ref 11.5–16)
IRON SATN MFR SERPL: 33 % (ref 20–50)
IRON SERPL-MCNC: 45 UG/DL (ref 35–150)
TIBC SERPL-MCNC: 137 UG/DL (ref 250–450)

## 2022-10-31 PROCEDURE — 83540 ASSAY OF IRON: CPT

## 2022-10-31 PROCEDURE — 74011250636 HC RX REV CODE- 250/636: Performed by: STUDENT IN AN ORGANIZED HEALTH CARE EDUCATION/TRAINING PROGRAM

## 2022-10-31 PROCEDURE — 36416 COLLJ CAPILLARY BLOOD SPEC: CPT

## 2022-10-31 PROCEDURE — 36415 COLL VENOUS BLD VENIPUNCTURE: CPT

## 2022-10-31 PROCEDURE — 82728 ASSAY OF FERRITIN: CPT

## 2022-10-31 PROCEDURE — 85018 HEMOGLOBIN: CPT

## 2022-10-31 PROCEDURE — 96372 THER/PROPH/DIAG INJ SC/IM: CPT

## 2022-10-31 RX ADMIN — EPOETIN ALFA-EPBX 40000 UNITS: 40000 INJECTION, SOLUTION INTRAVENOUS; SUBCUTANEOUS at 10:10

## 2022-10-31 NOTE — PROGRESS NOTES
[address] [place] Visit Note    773- Pt arrived to [facility] ambulatory in no acute distress for Retacrit. Assessment unremarkable except tingling to hands, dyspnea with exertion, fatigue, and eczema to hands. Visit Vitals  /72   Pulse 61   Temp 97.5 °F (36.4 °C)   Resp 16   SpO2 99%   Breastfeeding No     Labs obtained - Hemocue, Ferritin, Iron Profile  Recent Results (from the past 12 hour(s))   POC HEMOGLOBIN    Collection Time: 10/31/22 10:03 AM   Result Value Ref Range    Hemoglobin (POC) 10.6 (L) 11.5 - 16.0 g/dL     The following medications administered:  Medications Administered       epoetin gigi-epbx (RETACRIT) injection 40,000 Units       Admin Date  10/31/2022 Action  Given Dose  40,000 Units Route  SubCUTAneous Administered By  Margie Connolly RN                  1371- Pt tolerated treatment well. Pt discharged ambulatory in no acute distress, accompanied by family member. Next appointment 11/28/22.

## 2022-11-05 ENCOUNTER — APPOINTMENT (OUTPATIENT)
Dept: NUCLEAR MEDICINE | Age: 87
End: 2022-11-05
Attending: EMERGENCY MEDICINE
Payer: MEDICARE

## 2022-11-05 ENCOUNTER — APPOINTMENT (OUTPATIENT)
Dept: GENERAL RADIOLOGY | Age: 87
End: 2022-11-05
Attending: STUDENT IN AN ORGANIZED HEALTH CARE EDUCATION/TRAINING PROGRAM
Payer: MEDICARE

## 2022-11-05 ENCOUNTER — HOSPITAL ENCOUNTER (EMERGENCY)
Age: 87
Discharge: HOME | End: 2022-11-05
Attending: EMERGENCY MEDICINE
Payer: MEDICARE

## 2022-11-05 VITALS
RESPIRATION RATE: 18 BRPM | HEART RATE: 60 BPM | HEIGHT: 64 IN | OXYGEN SATURATION: 98 % | WEIGHT: 170.42 LBS | DIASTOLIC BLOOD PRESSURE: 68 MMHG | BODY MASS INDEX: 29.09 KG/M2 | TEMPERATURE: 97.5 F | SYSTOLIC BLOOD PRESSURE: 113 MMHG

## 2022-11-05 DIAGNOSIS — J20.9 ACUTE BRONCHITIS, UNSPECIFIED ORGANISM: ICD-10-CM

## 2022-11-05 DIAGNOSIS — R07.81 PLEURITIC CHEST PAIN: Primary | ICD-10-CM

## 2022-11-05 LAB
ALBUMIN SERPL-MCNC: 3.1 G/DL (ref 3.5–5)
ALBUMIN/GLOB SERPL: 0.8 {RATIO} (ref 1.1–2.2)
ALP SERPL-CCNC: 80 U/L (ref 45–117)
ALT SERPL-CCNC: 13 U/L (ref 12–78)
ANION GAP SERPL CALC-SCNC: 8 MMOL/L (ref 5–15)
AST SERPL-CCNC: 24 U/L (ref 15–37)
BASOPHILS # BLD: 0 K/UL (ref 0–0.1)
BASOPHILS NFR BLD: 0 % (ref 0–1)
BILIRUB SERPL-MCNC: 0.3 MG/DL (ref 0.2–1)
BNP SERPL-MCNC: 456 PG/ML
BUN SERPL-MCNC: 72 MG/DL (ref 6–20)
BUN/CREAT SERPL: 22 (ref 12–20)
CALCIUM SERPL-MCNC: 8 MG/DL (ref 8.5–10.1)
CHLORIDE SERPL-SCNC: 110 MMOL/L (ref 97–108)
CO2 SERPL-SCNC: 20 MMOL/L (ref 21–32)
CREAT SERPL-MCNC: 3.27 MG/DL (ref 0.55–1.02)
D DIMER PPP FEU-MCNC: 1.15 MG/L FEU (ref 0–0.65)
DIFFERENTIAL METHOD BLD: ABNORMAL
EOSINOPHIL # BLD: 0.1 K/UL (ref 0–0.4)
EOSINOPHIL NFR BLD: 2 % (ref 0–7)
ERYTHROCYTE [DISTWIDTH] IN BLOOD BY AUTOMATED COUNT: 18.2 % (ref 11.5–14.5)
FLUAV AG NPH QL IA: NEGATIVE
FLUBV AG NOSE QL IA: NEGATIVE
GLOBULIN SER CALC-MCNC: 3.7 G/DL (ref 2–4)
GLUCOSE SERPL-MCNC: 122 MG/DL (ref 65–100)
HCT VFR BLD AUTO: 33.7 % (ref 35–47)
HGB BLD-MCNC: 10.4 G/DL (ref 11.5–16)
IMM GRANULOCYTES # BLD AUTO: 0 K/UL (ref 0–0.04)
IMM GRANULOCYTES NFR BLD AUTO: 1 % (ref 0–0.5)
LYMPHOCYTES # BLD: 1.5 K/UL (ref 0.8–3.5)
LYMPHOCYTES NFR BLD: 24 % (ref 12–49)
MCH RBC QN AUTO: 27.4 PG (ref 26–34)
MCHC RBC AUTO-ENTMCNC: 30.9 G/DL (ref 30–36.5)
MCV RBC AUTO: 88.9 FL (ref 80–99)
MONOCYTES # BLD: 1.1 K/UL (ref 0–1)
MONOCYTES NFR BLD: 18 % (ref 5–13)
NEUTS SEG # BLD: 3.4 K/UL (ref 1.8–8)
NEUTS SEG NFR BLD: 55 % (ref 32–75)
NRBC # BLD: 0.09 K/UL (ref 0–0.01)
NRBC BLD-RTO: 1.4 PER 100 WBC
PLATELET # BLD AUTO: 156 K/UL (ref 150–400)
PMV BLD AUTO: 10.2 FL (ref 8.9–12.9)
POTASSIUM SERPL-SCNC: 4.7 MMOL/L (ref 3.5–5.1)
PROT SERPL-MCNC: 6.8 G/DL (ref 6.4–8.2)
RBC # BLD AUTO: 3.79 M/UL (ref 3.8–5.2)
SODIUM SERPL-SCNC: 138 MMOL/L (ref 136–145)
TROPONIN-HIGH SENSITIVITY: 17 NG/L (ref 0–51)
TROPONIN-HIGH SENSITIVITY: 18 NG/L (ref 0–51)
WBC # BLD AUTO: 6.2 K/UL (ref 3.6–11)

## 2022-11-05 PROCEDURE — 85025 COMPLETE CBC W/AUTO DIFF WBC: CPT

## 2022-11-05 PROCEDURE — 85379 FIBRIN DEGRADATION QUANT: CPT

## 2022-11-05 PROCEDURE — 80053 COMPREHEN METABOLIC PANEL: CPT

## 2022-11-05 PROCEDURE — 93005 ELECTROCARDIOGRAM TRACING: CPT

## 2022-11-05 PROCEDURE — 36415 COLL VENOUS BLD VENIPUNCTURE: CPT

## 2022-11-05 PROCEDURE — 83880 ASSAY OF NATRIURETIC PEPTIDE: CPT

## 2022-11-05 PROCEDURE — 87804 INFLUENZA ASSAY W/OPTIC: CPT

## 2022-11-05 PROCEDURE — A9540 TC99M MAA: HCPCS

## 2022-11-05 PROCEDURE — 71045 X-RAY EXAM CHEST 1 VIEW: CPT

## 2022-11-05 PROCEDURE — 99285 EMERGENCY DEPT VISIT HI MDM: CPT

## 2022-11-05 PROCEDURE — 96374 THER/PROPH/DIAG INJ IV PUSH: CPT

## 2022-11-05 PROCEDURE — 84484 ASSAY OF TROPONIN QUANT: CPT

## 2022-11-05 PROCEDURE — U0005 INFEC AGEN DETEC AMPLI PROBE: HCPCS

## 2022-11-05 RX ORDER — DOXYCYCLINE HYCLATE 100 MG
100 TABLET ORAL 2 TIMES DAILY
Qty: 14 TABLET | Refills: 0 | Status: SHIPPED | OUTPATIENT
Start: 2022-11-05 | End: 2022-11-12

## 2022-11-05 NOTE — ED NOTES
81 yo A/Ox4 female in NAD c/o 2 day hx of chest and back pain on deep inhalation, denies n/v, lightheadedness, dizziness, chest pain radiation; hx significant for cardiomyopathy and pacemaker placement; pain alleviated by shallow breathing, no provoking factors

## 2022-11-05 NOTE — ED PROVIDER NOTES
EMERGENCY DEPARTMENT HISTORY AND PHYSICAL EXAM      Date: 11/5/2022  Patient Name: Cheyenne Wong  Patient Age and Sex: 82 y.o. female     History of Presenting Illness     Chief Complaint   Patient presents with    Shortness of Breath    Chest Pain     Two days of chest pain through to her back and worse with a deep breath with hard to take a deep breath; it is hurting around her pacemaker       History Provided By: Patient    HPI: Cheyenne Wong is an 82year-old history of below presenting with chest pain cough loose stool. She reports 3 to 4 days of chest pain she describes as sharp constant mild however worsening with taking a deep breath and with coughing. She has had associated mild dyspnea. No nausea no vomiting no headache no fever. She feels like she has something in her chest she is unable to cough up. She localizes the pain to the left side around her pacemaker. No palpitations. No leg swelling no hemoptysis. There are no other complaints, changes, or physical findings at this time. PCP: Renetta Gaytan MD    No current facility-administered medications on file prior to encounter. Current Outpatient Medications on File Prior to Encounter   Medication Sig Dispense Refill    acetaminophen (TYLENOL) 500 mg tablet Take 500 mg by mouth every evening. triamcinolone (ARISTOCORT) 0.5 % topical cream Apply  to affected area two (2) times a day. use thin layer 30 g 1    amLODIPine (NORVASC) 10 mg tablet Take 1 tablet by mouth daily. 30 Tablet 11    cinacalcet (SENSIPAR) 30 mg tablet TAKE ONE TABLET BY MOUTH DAILY 30 Tablet 5    furosemide (LASIX) 40 mg tablet Take 1 Tablet by mouth daily. 30 Tablet 5    pantoprazole (PROTONIX) 40 mg tablet Take 1 Tablet by mouth daily. 30 Tablet 11    allopurinoL (ZYLOPRIM) 300 mg tablet Take 1 Tablet by mouth daily. 30 Tablet 11    metoprolol succinate (TOPROL-XL) 50 mg XL tablet Take 1 Tablet by mouth daily.  30 Tablet 11    losartan (COZAAR) 100 mg tablet Take 1 Tablet by mouth nightly. 30 Tablet 11    FLUoxetine (PROzac) 10 mg capsule Take 1 Capsule by mouth daily. 30 Capsule 11    amitriptyline (ELAVIL) 25 mg tablet Take 1 Tablet by mouth nightly. 30 Tablet 11    donepeziL (ARICEPT) 10 mg tablet Take 1 Tablet by mouth nightly. 30 Tablet 11    aspirin 81 mg chewable tablet Take 81 mg by mouth daily. pravastatin (PRAVACHOL) 20 mg tablet Take 1 Tab by mouth nightly. 30 Tab 11    PROAIR HFA 90 mcg/actuation inhaler Take 2 Puffs by inhalation four (4) times daily as needed.        Past History   Past Medical History:  Past Medical History:   Diagnosis Date    Adverse effect of anesthesia     passed out during EGD/stopped breathing    Arrhythmia     has pacemaker for low HR    Arthritis     Asthma     Cancer ([facility] [address]     right kidney - surgery    Chronic pain     right side    Deep vein thrombosis (DVT) during current hospitalization ([facility] [address]     history of DVT was on coumadin in the past    Diabetes ([facility] [address]     diet controlled    GERD (gastroesophageal reflux disease)     H/O acute pancreatitis     Hiatal hernia     Hypertension     MARLEN (obstructive sleep apnea)     does not use CPAP/pt states she has not used since a pacemaker inserted    Other ill-defined conditions(799.89)     lymph node enlargement - left chest - benign bx - watching    Other ill-defined conditions(799.89)     wheezes    Psychiatric disorder     depression    PUD (peptic ulcer disease)     hx of    Thromboembolus ([facility] [address]     Unspecified adverse effect of anesthesia     passed out during EGD per pt - stopped breathing per pt per MD     Past Surgical History:  Past Surgical History:   Procedure Laterality Date    HX APPENDECTOMY      HX CHOLECYSTECTOMY      HX GYN          HX HEENT Right     laser eye surgery to stop bleeding in back of eye - multiple laser surgeries    HX KNEE ARTHROSCOPY      left knee; cartilage repair    HX ORTHOPAEDIC      right hip replacement    HX ORTHOPAEDIC lower back surgery    HX ORTHOPAEDIC      straighten toe - 2nd toe on right    HX ORTHOPAEDIC Bilateral     carpal tunnel release    HX PACEMAKER      not defibrillator    HX UROLOGICAL      implant in hip to control urine and then removed    HX UROLOGICAL      Right kidney partial nephrectomy       Family History:  Family History   Problem Relation Age of Onset    Stroke Mother         brain aneurysm    Hypertension Father     Heart Disease Father     Cancer Sister         breast    Cancer Brother         lung and prostate    Cancer Sister         breast    SLE Other         half siblings (5+) - lupus    Hypertension Daughter     Diabetes Daughter        Social History:  Social History     Tobacco Use    Smoking status: Former     Packs/day: 0.25     Years: 20.00     Pack years: 5.00     Types: Cigarettes     Quit date: 1971     Years since quittin.2    Smokeless tobacco: Never   Vaping Use    Vaping Use: Never used   Substance Use Topics    Alcohol use: No    Drug use: No       Allergies: Allergies   Allergen Reactions    Pcn [Penicillins] Rash     But can take cephalosporins. Allergic to all \"cillins\". Codeine Other (comments)     Hallucinations, takes hydrocodone at home for pain    Contrast Dye [Iodine] Other (comments)     Not to take due to kidney function    Prednisone Other (comments)     \"makes my pancreas numbers go way up\"         Review of Systems   Review of Systems   Constitutional:  Negative for chills and fever. HENT:  Negative for congestion and rhinorrhea. Respiratory:  Positive for cough and shortness of breath. Cardiovascular:  Positive for chest pain. Gastrointestinal:  Positive for diarrhea. Negative for abdominal pain, nausea and vomiting. Genitourinary:  Negative for dysuria and frequency. Musculoskeletal:  Negative for myalgias. All other systems reviewed and are negative. Physical Exam   Physical Exam  Vitals and nursing note reviewed.    Constitutional: General: She is not in acute distress. Appearance: Normal appearance. She is not ill-appearing. HENT:      Head: Normocephalic. Mouth/Throat:      Mouth: Mucous membranes are moist.   Eyes:      Conjunctiva/sclera: Conjunctivae normal.   Cardiovascular:      Rate and Rhythm: Normal rate and regular rhythm. Pulses: Normal pulses. Pulmonary:      Effort: Pulmonary effort is normal.      Breath sounds: Normal breath sounds. No decreased breath sounds, wheezing or rhonchi. Abdominal:      General: Abdomen is flat. Palpations: Abdomen is soft. Musculoskeletal:         General: No deformity. Right lower leg: No edema. Left lower leg: No edema. Skin:     General: Skin is warm and dry. Neurological:      Mental Status: She is alert and oriented to person, place, and time. Mental status is at baseline. Psychiatric:         Behavior: Behavior normal.         Thought Content: Thought content normal.        Diagnostic Study Results     Labs -     Recent Results (from the past 12 hour(s))   CBC WITH AUTOMATED DIFF    Collection Time: 11/05/22  3:45 PM   Result Value Ref Range    WBC 6.2 3.6 - 11.0 K/uL    RBC 3.79 (L) 3.80 - 5.20 M/uL    HGB 10.4 (L) 11.5 - 16.0 g/dL    HCT 33.7 (L) 35.0 - 47.0 %    MCV 88.9 80.0 - 99.0 FL    MCH 27.4 26.0 - 34.0 PG    MCHC 30.9 30.0 - 36.5 g/dL    RDW 18.2 (H) 11.5 - 14.5 %    PLATELET 832 503 - 478 K/uL    MPV 10.2 8.9 - 12.9 FL    NRBC 1.4 (H) 0  WBC    ABSOLUTE NRBC 0.09 (H) 0.00 - 0.01 K/uL    NEUTROPHILS 55 32 - 75 %    LYMPHOCYTES 24 12 - 49 %    MONOCYTES 18 (H) 5 - 13 %    EOSINOPHILS 2 0 - 7 %    BASOPHILS 0 0 - 1 %    IMMATURE GRANULOCYTES 1 (H) 0.0 - 0.5 %    ABS. NEUTROPHILS 3.4 1.8 - 8.0 K/UL    ABS. LYMPHOCYTES 1.5 0.8 - 3.5 K/UL    ABS. MONOCYTES 1.1 (H) 0.0 - 1.0 K/UL    ABS. EOSINOPHILS 0.1 0.0 - 0.4 K/UL    ABS. BASOPHILS 0.0 0.0 - 0.1 K/UL    ABS. IMM.  GRANS. 0.0 0.00 - 0.04 K/UL    DF AUTOMATED     METABOLIC PANEL, COMPREHENSIVE    Collection Time: 11/05/22  3:45 PM   Result Value Ref Range    Sodium 138 136 - 145 mmol/L    Potassium 4.7 3.5 - 5.1 mmol/L    Chloride 110 (H) 97 - 108 mmol/L    CO2 20 (L) 21 - 32 mmol/L    Anion gap 8 5 - 15 mmol/L    Glucose 122 (H) 65 - 100 mg/dL    BUN 72 (H) 6 - 20 MG/DL    Creatinine 3.27 (H) 0.55 - 1.02 MG/DL    BUN/Creatinine ratio 22 (H) 12 - 20      eGFR 13 (L) >60 ml/min/1.73m2    Calcium 8.0 (L) 8.5 - 10.1 MG/DL    Bilirubin, total 0.3 0.2 - 1.0 MG/DL    ALT (SGPT) 13 12 - 78 U/L    AST (SGOT) 24 15 - 37 U/L    Alk. phosphatase 80 45 - 117 U/L    Protein, total 6.8 6.4 - 8.2 g/dL    Albumin 3.1 (L) 3.5 - 5.0 g/dL    Globulin 3.7 2.0 - 4.0 g/dL    A-G Ratio 0.8 (L) 1.1 - 2.2     NT-PRO BNP    Collection Time: 11/05/22  3:45 PM   Result Value Ref Range    NT pro- (H) <450 PG/ML   TROPONIN-HIGH SENSITIVITY    Collection Time: 11/05/22  3:45 PM   Result Value Ref Range    Troponin-High Sensitivity 18 0 - 51 ng/L   D DIMER    Collection Time: 11/05/22  4:58 PM   Result Value Ref Range    D-dimer 1.15 (H) 0.00 - 0.65 mg/L FEU   INFLUENZA A+B VIRAL AGS    Collection Time: 11/05/22  4:58 PM   Result Value Ref Range    Influenza A Antigen Negative NEG      Influenza B Antigen Negative NEG     TROPONIN-HIGH SENSITIVITY    Collection Time: 11/05/22  5:31 PM   Result Value Ref Range    Troponin-High Sensitivity 17 0 - 51 ng/L       Radiologic Studies -   NM LUNG SCAN PERF         XR CHEST PORT   Final Result   No Acute Disease. CT Results  (Last 48 hours)      None          CXR Results  (Last 48 hours)                 11/05/22 1602  XR CHEST PORT Final result    Impression:  No Acute Disease. Narrative:  EXAM: Portable CXR. 1551 hours. INDICATION: Chest pain. FINDINGS:   The lungs appear clear. Heart is normal in size. There is no pulmonary edema. There is no evident pneumothorax or pleural effusion. Pacemaker is present.                      Medical Decision Making   I am the first provider for this patient. I reviewed the vital signs, available nursing notes, past medical history, past surgical history, family history and social history. Vital Signs-Reviewed the patient's vital signs. Patient Vitals for the past 12 hrs:   Temp Pulse Resp BP SpO2   11/05/22 2130 -- 60 18 113/68 98 %   11/05/22 2030 -- 66 16 111/69 97 %   11/05/22 2000 -- 60 17 114/61 98 %   11/05/22 1930 -- 65 17 (!) 111/59 97 %   11/05/22 1900 -- 67 17 (!) 111/59 97 %   11/05/22 1830 -- 60 17 108/63 98 %   11/05/22 1800 -- 60 18 111/63 --   11/05/22 1730 -- 65 16 108/64 --   11/05/22 1715 -- 64 19 (!) 113/58 97 %   11/05/22 1700 -- 69 16 111/63 --   11/05/22 1645 -- 64 -- 107/60 97 %   11/05/22 1630 -- 67 12 106/61 98 %   11/05/22 1615 -- 60 19 107/60 98 %   11/05/22 1600 -- 66 15 111/61 96 %   11/05/22 1532 97.5 °F (36.4 °C) 63 18 (!) 111/56 100 %       Records Reviewed: Nursing Notes and Old Medical Records    Provider Notes (Medical Decision Making):   Patient is well perfused mentating well no respiratory distress, borderline blood pressure 111/56 not tachycardic not tachypneic    Consider pneumonia bronchitis, consider PE however low risk, will obtain D-dimer. EKG troponin CBC CMP reevaluate. ED Course:   Initial assessment performed. The patients presenting problems have been discussed, and they are in agreement with the care plan formulated and outlined with them. I have encouraged them to ask questions as they arise throughout their visit.     ED Course as of 11/05/22 2255   Sat Nov 05, 2022   1613 Chest x-ray shows no acute disease CBC with anemia otherwise normal counts [WB]   1648  troponin 18, will trend [WB]   1648 Creatinine is 3.3 from 3.13 weeks ago mild nongap acidosis with bicarb 20 BUN 72 [WB]   1807 D-dimer is elevated at 1.15 [WB]   2004 I spoke with radiology, will contact to perform VQ scan [WB]   7182 Low probability PE [WB]      ED Course User Index  [WB] Meagan Putnam MD     Disposition:  Discharge Note:  The patient has been re-evaluated and is ready for discharge. Reviewed available results with patient. Counseled patient on diagnosis and care plan. Patient has expressed understanding, and all questions have been answered. Patient agrees with plan and agrees to follow up as recommended, or to return to the ED if their symptoms worsen. Discharge instructions have been provided and explained to the patient, along with reasons to return to the ED. PLAN:  Current Discharge Medication List        2. Follow-up Information    None       3. Return to ED if worse     Diagnosis     Clinical Impression:   1. Pleuritic chest pain        Attestations:    Micky Bernal M.D. Please note that this dictation was completed with [employer], the computer voice recognition software. Quite often unanticipated grammatical, syntax, homophones, and other interpretive errors are inadvertently transcribed by the computer software. Please disregard these errors. Please excuse any errors that have escaped final proofreading. Thank you.

## 2022-11-06 LAB
ATRIAL RATE: 234 BPM
CALCULATED R AXIS, ECG10: -24 DEGREES
CALCULATED T AXIS, ECG11: 124 DEGREES
DIAGNOSIS, 93000: NORMAL
P-R INTERVAL, ECG05: 234 MS
Q-T INTERVAL, ECG07: 426 MS
QRS DURATION, ECG06: 82 MS
QTC CALCULATION (BEZET), ECG08: 432 MS
SARS-COV-2, XPLCVT: NOT DETECTED
SOURCE, COVRS: NORMAL
VENTRICULAR RATE, ECG03: 62 BPM

## 2022-11-06 NOTE — DISCHARGE INSTRUCTIONS
Chest pain is likely due to inflammation of your lungs called pleurisy. You may take over-the-counter medications for this including ibuprofen or naproxen. Your COVID should result in the next 1 to 2 days. If it is negative and if you still feel poorly the next few days, please fill prescribed doxycycline as your acute illness may be due to a bacterial cause. Return to this emergency department for worsening symptoms.

## 2022-11-21 ENCOUNTER — OFFICE VISIT (OUTPATIENT)
Dept: INTERNAL MEDICINE CLINIC | Age: 87
End: 2022-11-21

## 2022-11-21 VITALS
RESPIRATION RATE: 16 BRPM | HEART RATE: 55 BPM | OXYGEN SATURATION: 92 % | DIASTOLIC BLOOD PRESSURE: 60 MMHG | SYSTOLIC BLOOD PRESSURE: 96 MMHG | TEMPERATURE: 97.4 F | HEIGHT: 64 IN | WEIGHT: 163 LBS | BODY MASS INDEX: 27.83 KG/M2

## 2022-11-21 DIAGNOSIS — R53.83 FATIGUE, UNSPECIFIED TYPE: ICD-10-CM

## 2022-11-21 DIAGNOSIS — Z23 ENCOUNTER FOR IMMUNIZATION: ICD-10-CM

## 2022-11-21 DIAGNOSIS — N18.5 ACUTE RENAL FAILURE SUPERIMPOSED ON STAGE 5 CHRONIC KIDNEY DISEASE, NOT ON CHRONIC DIALYSIS, UNSPECIFIED ACUTE RENAL FAILURE TYPE (HCC): Primary | ICD-10-CM

## 2022-11-21 DIAGNOSIS — N17.9 ACUTE RENAL FAILURE SUPERIMPOSED ON STAGE 5 CHRONIC KIDNEY DISEASE, NOT ON CHRONIC DIALYSIS, UNSPECIFIED ACUTE RENAL FAILURE TYPE (HCC): Primary | ICD-10-CM

## 2022-11-21 DIAGNOSIS — R11.0 NAUSEA: ICD-10-CM

## 2022-11-21 DIAGNOSIS — Z23 NEEDS FLU SHOT: ICD-10-CM

## 2022-11-21 PROCEDURE — 90694 VACC AIIV4 NO PRSRV 0.5ML IM: CPT | Performed by: STUDENT IN AN ORGANIZED HEALTH CARE EDUCATION/TRAINING PROGRAM

## 2022-11-21 PROCEDURE — G0008 ADMIN INFLUENZA VIRUS VAC: HCPCS | Performed by: STUDENT IN AN ORGANIZED HEALTH CARE EDUCATION/TRAINING PROGRAM

## 2022-11-21 PROCEDURE — 99213 OFFICE O/P EST LOW 20 MIN: CPT | Performed by: STUDENT IN AN ORGANIZED HEALTH CARE EDUCATION/TRAINING PROGRAM

## 2022-11-21 RX ORDER — ONDANSETRON 4 MG/1
4 TABLET, ORALLY DISINTEGRATING ORAL
Qty: 20 TABLET | Refills: 0 | Status: SHIPPED | OUTPATIENT
Start: 2022-11-21

## 2022-11-21 NOTE — PROGRESS NOTES
1. \"Have you been to the ER, urgent care clinic since your last visit? Hospitalized since your last visit? \" Yes Urgent care, chest discomfort    2. \"Have you seen or consulted any other health care providers outside of the [address] [place] [address] since your last visit? \" No     3. For patients aged 39-70: Has the patient had a colonoscopy / FIT/ Cologuard? NA - based on age      If the patient is female:    4. For patients aged 41-77: Has the patient had a mammogram within the past 2 years? NA - based on age or sex      11. For patients aged 21-65: Has the patient had a pap smear?  NA - based on age or sex

## 2022-11-21 NOTE — PROGRESS NOTES
[facility]   Internal Medicine  [address], [address] [facility]  [address]  [place], [address] [facility]  [phone]      Primary Care Visit Note    Assessment/Plan:     1. Acute renal failure superimposed on stage 5 chronic kidney disease, not on chronic dialysis, unspecified acute renal failure type ([address]  -     METABOLIC PANEL, COMPREHENSIVE; Future  -     PHOSPHORUS; Future  -     ondansetron (ZOFRAN ODT) 4 mg disintegrating tablet; Take 1 Tablet by mouth every eight (8) hours as needed for Nausea or Vomiting.  - given information and coupons for nutritional supplements  - pt is making urgent appt with her kidney doctor. - advised to go to ED immediately for any worsening of breathing or worsening LE edema. 2. Nausea  -     ondansetron (ZOFRAN ODT) 4 mg disintegrating tablet; Take 1 Tablet by mouth every eight (8) hours as needed for Nausea or Vomiting. 3. Fatigue: likely due to renal failure. Pt denies feeling of depression. TSH was slightly elevated at last check but T4 stable, following with endocrine. - tx renal failure as above          Damaso Wesley MD    CC:     Chief Complaint   Patient presents with    Decreased Appetite     Low energy  Loss of taste       HPI:     Nilda Mcmullen is a 82 y.o. female who presents for evaluation fatigue, weight loss, loss of appetite and nausea. Patient has not had any appetite or energy ongoing for over a month. Pt is losing weight because she is not eating much. She is having nausea with a lot of food, does eat some yogurt. She has not had any vomiting and not taking any nausea medications. She saw her kidney doctor Venkat in May or June, never discussed uremia or dialysis.   - she has has     Depression:  - phq9 is 25 but pt states that she has never had depression and doesn't really feel depressed, she is more just chronically fatigued and frustrated by       ROS:   All 10 point review of systems negative except those mentioned in the HPI. Past Medical History: Active Ambulatory Problems     Diagnosis Date Noted    Sinus node dysfunction ([facility] [address] 08/13/2013    Localized primary osteoarthritis of left lower leg 06/27/2016    Primary localized osteoarthritis of left knee 06/27/2016    GI bleed 02/15/2018    Anemia 02/22/2018    GERD (gastroesophageal reflux disease) 02/22/2018    Stage 3 chronic kidney disease ([facility] [address] 02/22/2018    Asthma 02/22/2018    HTN (hypertension) 02/22/2018    Acute gastric ulcer 02/22/2018    Vision loss, right eye 09/29/2018    CKD (chronic kidney disease) stage 4, GFR 15-29 ml/min ([facility] [address] 06/21/2022     Resolved Ambulatory Problems     Diagnosis Date Noted    Nausea and vomiting 02/23/2014    BENSON (acute kidney injury) ([facility] [address] 02/23/2014     Past Medical History:   Diagnosis Date    Adverse effect of anesthesia     Arrhythmia     Arthritis     Cancer ([facility] [address]     Chronic pain     Deep vein thrombosis (DVT) during current hospitalization ([facility] [address]     Diabetes ([facility] [address]     H/O acute pancreatitis     Hiatal hernia     Hypertension     MARLEN (obstructive sleep apnea)     Other ill-defined conditions(799.89)     Other ill-defined conditions(799.89)     Psychiatric disorder     PUD (peptic ulcer disease)     Thromboembolus ([address]     Unspecified adverse effect of anesthesia           Current Medications:     Current Outpatient Medications:     acetaminophen (TYLENOL) 500 mg tablet, Take 500 mg by mouth every evening., Disp: , Rfl:     triamcinolone (ARISTOCORT) 0.5 % topical cream, Apply  to affected area two (2) times a day. use thin layer, Disp: 30 g, Rfl: 1    amLODIPine (NORVASC) 10 mg tablet, Take 1 tablet by mouth daily. , Disp: 30 Tablet, Rfl: 11    cinacalcet (SENSIPAR) 30 mg tablet, TAKE ONE TABLET BY MOUTH DAILY, Disp: 30 Tablet, Rfl: 5    furosemide (LASIX) 40 mg tablet, Take 1 Tablet by mouth daily. , Disp: 30 Tablet, Rfl: 5    pantoprazole (PROTONIX) 40 mg tablet, Take 1 Tablet by mouth daily. , Disp: 30 Tablet, Rfl: 11    allopurinoL (ZYLOPRIM) 300 mg tablet, Take 1 Tablet by mouth daily. , Disp: 30 Tablet, Rfl: 11    metoprolol succinate (TOPROL-XL) 50 mg XL tablet, Take 1 Tablet by mouth daily. , Disp: 30 Tablet, Rfl: 11    losartan (COZAAR) 100 mg tablet, Take 1 Tablet by mouth nightly., Disp: 30 Tablet, Rfl: 11    FLUoxetine (PROzac) 10 mg capsule, Take 1 Capsule by mouth daily. , Disp: 30 Capsule, Rfl: 11    amitriptyline (ELAVIL) 25 mg tablet, Take 1 Tablet by mouth nightly., Disp: 30 Tablet, Rfl: 11    donepeziL (ARICEPT) 10 mg tablet, Take 1 Tablet by mouth nightly., Disp: 30 Tablet, Rfl: 11    aspirin 81 mg chewable tablet, Take 81 mg by mouth daily. , Disp: , Rfl:     pravastatin (PRAVACHOL) 20 mg tablet, Take 1 Tab by mouth nightly., Disp: 30 Tab, Rfl: 11    PROAIR HFA 90 mcg/actuation inhaler, Take 2 Puffs by inhalation four (4) times daily as needed. , Disp: , Rfl:       Past Surgical History:     Past Surgical History:   Procedure Laterality Date    HX APPENDECTOMY      HX CHOLECYSTECTOMY      HX GYN          HX HEENT Right     laser eye surgery to stop bleeding in back of eye - multiple laser surgeries    HX KNEE ARTHROSCOPY      left knee; cartilage repair    HX ORTHOPAEDIC      right hip replacement    HX ORTHOPAEDIC      lower back surgery    HX ORTHOPAEDIC      straighten toe - 2nd toe on right    HX ORTHOPAEDIC Bilateral     carpal tunnel release    HX PACEMAKER      not defibrillator    HX UROLOGICAL      implant in hip to control urine and then removed    HX UROLOGICAL      Right kidney partial nephrectomy         Family History:     Family History   Problem Relation Age of Onset    Stroke Mother         brain aneurysm    Hypertension Father     Heart Disease Father     Cancer Sister         breast    Cancer Brother         lung and prostate    Cancer Sister         breast    SLE Other         half siblings (5+) - lupus    Hypertension Daughter     Diabetes Daughter Social History:     Social History     Socioeconomic History    Marital status:      Spouse name: Not on file    Number of children: Not on file    Years of education: Not on file    Highest education level: Not on file   Occupational History    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   Tobacco Use    Smoking status: Former     Packs/day: 0.25     Years: 20.00     Pack years: 5.00     Types: Cigarettes     Quit date: 1971     Years since quittin.3    Smokeless tobacco: Never   Vaping Use    Vaping Use: Never used   Substance and Sexual Activity    Alcohol use: No    Drug use: No    Sexual activity: Never   Other Topics Concern    Not on file   Social History Narrative    Not on file     Social Determinants of Health     Financial Resource Strain: Not on file   Food Insecurity: Not on file   Transportation Needs: Not on file   Physical Activity: Not on file   Stress: Not on file   Social Connections: Not on file   Intimate Partner Violence: Not on file   Housing Stability: Not on file            Visit Vitals  BP 96/60   Pulse (!) 55   Temp 97.4 °F (36.3 °C) (Temporal)   Resp 16   Ht 5' 4\" (1.626 m)   Wt 163 lb (73.9 kg)   SpO2 92%   BMI 27.98 kg/m²       Physical Exam:   General - alert but fatigued  HEENT - eomi, non-icteric slcera  Neck - no JVD  Pulm - mild crackles at the bases  Cardio - RRR  Abd - soft, nontender, no masses, no HSM  Extrem - mild  LE edema  Neuro-  Alert and oriented, No focal deficits           Labs/Imaging:     Labs and imaging reviewed by me and significant for:    Lab Results   Component Value Date/Time    Sodium 138 2022 03:45 PM    Potassium 4.7 2022 03:45 PM    Chloride 110 (H) 2022 03:45 PM    CO2 20 (L) 2022 03:45 PM    Anion gap 8 2022 03:45 PM    Glucose 122 (H) 2022 03:45 PM    BUN 72 (H) 2022 03:45 PM    Creatinine 3.27 (H) 2022 03:45 PM    BUN/Creatinine ratio 22 (H) 2022 03:45 PM    GFR est AA 22 (L) 05/27/2022 12:59 PM    GFR est non-AA 18 (L) 05/27/2022 12:59 PM    Calcium 8.0 (L) 11/05/2022 03:45 PM    Bilirubin, total 0.3 11/05/2022 03:45 PM    Alk.  phosphatase 80 11/05/2022 03:45 PM    Protein, total 6.8 11/05/2022 03:45 PM    Albumin 3.1 (L) 11/05/2022 03:45 PM    Globulin 3.7 11/05/2022 03:45 PM    A-G Ratio 0.8 (L) 11/05/2022 03:45 PM    ALT (SGPT) 13 11/05/2022 03:45 PM    AST (SGOT) 24 11/05/2022 03:45 PM     Lab Results   Component Value Date/Time    WBC 6.2 11/05/2022 03:45 PM    Hemoglobin (POC) 10.6 (L) 10/31/2022 10:03 AM    HGB 10.4 (L) 11/05/2022 03:45 PM    HCT 33.7 (L) 11/05/2022 03:45 PM    PLATELET 266 54/97/9641 03:45 PM    MCV 88.9 11/05/2022 03:45 PM     Lab Results   Component Value Date/Time    Hemoglobin A1c 6.4 (H) 12/19/2016 03:06 PM     Lab Results   Component Value Date/Time    TSH 4.47 (H) 10/13/2022 12:25 PM

## 2022-11-21 NOTE — PATIENT INSTRUCTIONS
If you have any significant change in your breathing or increased swelling in your legs, go to the ER immediately.

## 2022-11-22 ENCOUNTER — HOSPITAL ENCOUNTER (INPATIENT)
Age: 87
LOS: 4 days | Discharge: HOME HEALTH | DRG: 683 | End: 2022-11-27
Attending: EMERGENCY MEDICINE | Admitting: STUDENT IN AN ORGANIZED HEALTH CARE EDUCATION/TRAINING PROGRAM
Payer: MEDICARE

## 2022-11-22 ENCOUNTER — TELEPHONE (OUTPATIENT)
Dept: INTERNAL MEDICINE CLINIC | Age: 87
End: 2022-11-22

## 2022-11-22 DIAGNOSIS — E86.1 HYPOTENSION DUE TO HYPOVOLEMIA: ICD-10-CM

## 2022-11-22 DIAGNOSIS — R62.51 FAILURE TO THRIVE (CHILD): ICD-10-CM

## 2022-11-22 DIAGNOSIS — R63.0 POOR APPETITE: ICD-10-CM

## 2022-11-22 DIAGNOSIS — N28.9 ACUTE ON CHRONIC RENAL INSUFFICIENCY: Primary | ICD-10-CM

## 2022-11-22 DIAGNOSIS — N18.9 ACUTE ON CHRONIC RENAL INSUFFICIENCY: Primary | ICD-10-CM

## 2022-11-22 DIAGNOSIS — E86.0 DEHYDRATION: ICD-10-CM

## 2022-11-22 DIAGNOSIS — Z71.89 COUNSELING REGARDING ADVANCE CARE PLANNING AND GOALS OF CARE: ICD-10-CM

## 2022-11-22 DIAGNOSIS — I95.89 HYPOTENSION DUE TO HYPOVOLEMIA: ICD-10-CM

## 2022-11-22 DIAGNOSIS — Z51.5 PALLIATIVE CARE BY SPECIALIST: ICD-10-CM

## 2022-11-22 LAB
ALBUMIN SERPL-MCNC: 3.5 G/DL (ref 3.5–5)
ALBUMIN/GLOB SERPL: 1.1 {RATIO} (ref 1.1–2.2)
ALP SERPL-CCNC: 98 U/L (ref 45–117)
ALT SERPL-CCNC: 15 U/L (ref 12–78)
ANION GAP SERPL CALC-SCNC: 12 MMOL/L (ref 5–15)
AST SERPL-CCNC: 26 U/L (ref 15–37)
BILIRUB SERPL-MCNC: 0.4 MG/DL (ref 0.2–1)
BUN SERPL-MCNC: 105 MG/DL (ref 6–20)
BUN/CREAT SERPL: 28 (ref 12–20)
CALCIUM SERPL-MCNC: 8 MG/DL (ref 8.5–10.1)
CHLORIDE SERPL-SCNC: 105 MMOL/L (ref 97–108)
CO2 SERPL-SCNC: 21 MMOL/L (ref 21–32)
CREAT SERPL-MCNC: 3.79 MG/DL (ref 0.55–1.02)
GLOBULIN SER CALC-MCNC: 3.2 G/DL (ref 2–4)
GLUCOSE SERPL-MCNC: 117 MG/DL (ref 65–100)
POTASSIUM SERPL-SCNC: 4.3 MMOL/L (ref 3.5–5.1)
PROT SERPL-MCNC: 6.7 G/DL (ref 6.4–8.2)
SODIUM SERPL-SCNC: 138 MMOL/L (ref 136–145)

## 2022-11-22 PROCEDURE — 96374 THER/PROPH/DIAG INJ IV PUSH: CPT

## 2022-11-22 PROCEDURE — 93005 ELECTROCARDIOGRAM TRACING: CPT

## 2022-11-22 PROCEDURE — 99285 EMERGENCY DEPT VISIT HI MDM: CPT

## 2022-11-22 PROCEDURE — 96361 HYDRATE IV INFUSION ADD-ON: CPT

## 2022-11-22 NOTE — TELEPHONE ENCOUNTER
Most recent kidney labs discussed with the patient's son, kidney function continues to worsen w rising BUN. They have appt with nephrology next week, encouraged them to forward labs to them and see if they can get in this week. All questioned answered.        Tigist Fan MD

## 2022-11-23 ENCOUNTER — APPOINTMENT (OUTPATIENT)
Dept: GENERAL RADIOLOGY | Age: 87
DRG: 683 | End: 2022-11-23
Attending: PHYSICIAN ASSISTANT
Payer: MEDICARE

## 2022-11-23 ENCOUNTER — APPOINTMENT (OUTPATIENT)
Dept: NON INVASIVE DIAGNOSTICS | Age: 87
DRG: 683 | End: 2022-11-23
Attending: STUDENT IN AN ORGANIZED HEALTH CARE EDUCATION/TRAINING PROGRAM
Payer: MEDICARE

## 2022-11-23 ENCOUNTER — APPOINTMENT (OUTPATIENT)
Dept: GENERAL RADIOLOGY | Age: 87
DRG: 683 | End: 2022-11-23
Attending: EMERGENCY MEDICINE
Payer: MEDICARE

## 2022-11-23 ENCOUNTER — APPOINTMENT (OUTPATIENT)
Dept: ULTRASOUND IMAGING | Age: 87
DRG: 683 | End: 2022-11-23
Attending: INTERNAL MEDICINE
Payer: MEDICARE

## 2022-11-23 ENCOUNTER — APPOINTMENT (OUTPATIENT)
Dept: CT IMAGING | Age: 87
DRG: 683 | End: 2022-11-23
Attending: PHYSICIAN ASSISTANT
Payer: MEDICARE

## 2022-11-23 PROBLEM — Z51.5 PALLIATIVE CARE BY SPECIALIST: Status: ACTIVE | Noted: 2022-11-23

## 2022-11-23 PROBLEM — R63.0 POOR APPETITE: Status: ACTIVE | Noted: 2022-11-23

## 2022-11-23 PROBLEM — R63.0 POOR APPETITE: Status: ACTIVE | Noted: 2022-01-01

## 2022-11-23 PROBLEM — A41.9 SEPSIS (HCC): Status: ACTIVE | Noted: 2022-01-01

## 2022-11-23 PROBLEM — E86.0 DEHYDRATION: Status: ACTIVE | Noted: 2022-01-01

## 2022-11-23 PROBLEM — A41.9 SEPSIS (HCC): Status: ACTIVE | Noted: 2022-11-23

## 2022-11-23 PROBLEM — E86.0 DEHYDRATION: Status: ACTIVE | Noted: 2022-11-23

## 2022-11-23 PROBLEM — Z51.5 PALLIATIVE CARE BY SPECIALIST: Status: ACTIVE | Noted: 2022-01-01

## 2022-11-23 LAB
ALBUMIN SERPL-MCNC: 3.1 G/DL (ref 3.5–5)
ALBUMIN/GLOB SERPL: 0.8 {RATIO} (ref 1.1–2.2)
ALP SERPL-CCNC: 92 U/L (ref 45–117)
ALT SERPL-CCNC: 14 U/L (ref 12–78)
ANION GAP SERPL CALC-SCNC: 10 MMOL/L (ref 5–15)
APPEARANCE UR: CLEAR
AST SERPL-CCNC: 26 U/L (ref 15–37)
ATRIAL RATE: 64 BPM
BACTERIA URNS QL MICRO: ABNORMAL /HPF
BASE DEFICIT BLD-SCNC: 8.1 MMOL/L
BASOPHILS # BLD: 0 K/UL (ref 0–0.1)
BASOPHILS NFR BLD: 0 % (ref 0–1)
BILIRUB SERPL-MCNC: 0.5 MG/DL (ref 0.2–1)
BILIRUB UR QL: NEGATIVE
BNP SERPL-MCNC: 439 PG/ML
BUN SERPL-MCNC: 119 MG/DL (ref 6–20)
BUN/CREAT SERPL: 24 (ref 12–20)
CA-I BLD-MCNC: 1.07 MMOL/L (ref 1.12–1.32)
CALCIUM SERPL-MCNC: 8.4 MG/DL (ref 8.5–10.1)
CALCULATED P AXIS, ECG09: 23 DEGREES
CALCULATED R AXIS, ECG10: -17 DEGREES
CALCULATED T AXIS, ECG11: 109 DEGREES
CAOX CRY URNS QL MICRO: ABNORMAL
CHLORIDE BLD-SCNC: 106 MMOL/L (ref 100–108)
CHLORIDE SERPL-SCNC: 104 MMOL/L (ref 97–108)
CO2 BLD-SCNC: 18 MMOL/L (ref 19–24)
CO2 SERPL-SCNC: 18 MMOL/L (ref 21–32)
COLOR UR: ABNORMAL
COMMENT, HOLDF: NORMAL
CREAT SERPL-MCNC: 4.92 MG/DL (ref 0.55–1.02)
CREAT UR-MCNC: 5 MG/DL (ref 0.6–1.3)
DIAGNOSIS, 93000: NORMAL
DIFFERENTIAL METHOD BLD: ABNORMAL
EOSINOPHIL # BLD: 0 K/UL (ref 0–0.4)
EOSINOPHIL NFR BLD: 0 % (ref 0–7)
EPITH CASTS URNS QL MICRO: ABNORMAL /LPF
ERYTHROCYTE [DISTWIDTH] IN BLOOD BY AUTOMATED COUNT: 18.4 % (ref 11.5–14.5)
GLOBULIN SER CALC-MCNC: 3.8 G/DL (ref 2–4)
GLUCOSE BLD STRIP.AUTO-MCNC: 191 MG/DL (ref 74–106)
GLUCOSE SERPL-MCNC: 171 MG/DL (ref 65–100)
GLUCOSE UR STRIP.AUTO-MCNC: NEGATIVE MG/DL
HCO3 BLDA-SCNC: 19 MMOL/L
HCT VFR BLD AUTO: 33.8 % (ref 35–47)
HGB BLD-MCNC: 10.6 G/DL (ref 11.5–16)
HGB UR QL STRIP: NEGATIVE
IMM GRANULOCYTES # BLD AUTO: 0.1 K/UL (ref 0–0.04)
IMM GRANULOCYTES NFR BLD AUTO: 1 % (ref 0–0.5)
KETONES UR QL STRIP.AUTO: NEGATIVE MG/DL
LACTATE BLD-SCNC: 1.21 MMOL/L (ref 0.4–2)
LACTATE BLD-SCNC: 1.28 MMOL/L (ref 0.4–2)
LACTATE SERPL-SCNC: 1.2 MMOL/L (ref 0.4–2)
LEUKOCYTE ESTERASE UR QL STRIP.AUTO: ABNORMAL
LYMPHOCYTES # BLD: 0.8 K/UL (ref 0.8–3.5)
LYMPHOCYTES NFR BLD: 9 % (ref 12–49)
MCH RBC QN AUTO: 27.2 PG (ref 26–34)
MCHC RBC AUTO-ENTMCNC: 31.4 G/DL (ref 30–36.5)
MCV RBC AUTO: 86.9 FL (ref 80–99)
MONOCYTES # BLD: 1.2 K/UL (ref 0–1)
MONOCYTES NFR BLD: 12 % (ref 5–13)
NEUTS SEG # BLD: 7.5 K/UL (ref 1.8–8)
NEUTS SEG NFR BLD: 78 % (ref 32–75)
NITRITE UR QL STRIP.AUTO: NEGATIVE
NRBC # BLD: 0 K/UL (ref 0–0.01)
NRBC BLD-RTO: 0 PER 100 WBC
P-R INTERVAL, ECG05: 222 MS
PCO2 BLDV: 43.2 MMHG (ref 41–51)
PH BLDV: 7.25 [PH] (ref 7.32–7.42)
PH UR STRIP: 5 [PH] (ref 5–8)
PLATELET # BLD AUTO: 101 K/UL (ref 150–400)
PMV BLD AUTO: ABNORMAL FL (ref 8.9–12.9)
PO2 BLDV: 24 MMHG (ref 25–40)
POTASSIUM BLD-SCNC: 5.7 MMOL/L (ref 3.5–5.5)
POTASSIUM SERPL-SCNC: 4.8 MMOL/L (ref 3.5–5.1)
PROT SERPL-MCNC: 6.9 G/DL (ref 6.4–8.2)
PROT UR STRIP-MCNC: NEGATIVE MG/DL
Q-T INTERVAL, ECG07: 464 MS
QRS DURATION, ECG06: 86 MS
QTC CALCULATION (BEZET), ECG08: 478 MS
RBC # BLD AUTO: 3.89 M/UL (ref 3.8–5.2)
RBC #/AREA URNS HPF: ABNORMAL /HPF (ref 0–5)
SAMPLES BEING HELD,HOLD: NORMAL
SERVICE CMNT-IMP: ABNORMAL
SODIUM BLD-SCNC: 131 MMOL/L (ref 136–145)
SODIUM SERPL-SCNC: 132 MMOL/L (ref 136–145)
SP GR UR REFRACTOMETRY: 1.01
SPECIMEN SITE: ABNORMAL
TROPONIN-HIGH SENSITIVITY: 20 NG/L (ref 0–51)
UA: UC IF INDICATED,UAUC: ABNORMAL
UROBILINOGEN UR QL STRIP.AUTO: 1 EU/DL (ref 0.2–1)
VENTRICULAR RATE, ECG03: 64 BPM
WBC # BLD AUTO: 9.5 K/UL (ref 3.6–11)
WBC URNS QL MICRO: ABNORMAL /HPF (ref 0–4)

## 2022-11-23 PROCEDURE — 85025 COMPLETE CBC W/AUTO DIFF WBC: CPT

## 2022-11-23 PROCEDURE — 74011000250 HC RX REV CODE- 250: Performed by: INTERNAL MEDICINE

## 2022-11-23 PROCEDURE — 36415 COLL VENOUS BLD VENIPUNCTURE: CPT

## 2022-11-23 PROCEDURE — 74011250637 HC RX REV CODE- 250/637: Performed by: STUDENT IN AN ORGANIZED HEALTH CARE EDUCATION/TRAINING PROGRAM

## 2022-11-23 PROCEDURE — 83880 ASSAY OF NATRIURETIC PEPTIDE: CPT

## 2022-11-23 PROCEDURE — 74011250636 HC RX REV CODE- 250/636: Performed by: STUDENT IN AN ORGANIZED HEALTH CARE EDUCATION/TRAINING PROGRAM

## 2022-11-23 PROCEDURE — 74176 CT ABD & PELVIS W/O CONTRAST: CPT

## 2022-11-23 PROCEDURE — 76770 US EXAM ABDO BACK WALL COMP: CPT

## 2022-11-23 PROCEDURE — 71045 X-RAY EXAM CHEST 1 VIEW: CPT

## 2022-11-23 PROCEDURE — 74220 X-RAY XM ESOPHAGUS 1CNTRST: CPT

## 2022-11-23 PROCEDURE — 99223 1ST HOSP IP/OBS HIGH 75: CPT | Performed by: NURSE PRACTITIONER

## 2022-11-23 PROCEDURE — 83605 ASSAY OF LACTIC ACID: CPT

## 2022-11-23 PROCEDURE — 84484 ASSAY OF TROPONIN QUANT: CPT

## 2022-11-23 PROCEDURE — 80053 COMPREHEN METABOLIC PANEL: CPT

## 2022-11-23 PROCEDURE — 74011250636 HC RX REV CODE- 250/636: Performed by: EMERGENCY MEDICINE

## 2022-11-23 PROCEDURE — 82947 ASSAY GLUCOSE BLOOD QUANT: CPT

## 2022-11-23 PROCEDURE — 81001 URINALYSIS AUTO W/SCOPE: CPT

## 2022-11-23 PROCEDURE — 89055 LEUKOCYTE ASSESSMENT FECAL: CPT

## 2022-11-23 PROCEDURE — 65270000046 HC RM TELEMETRY

## 2022-11-23 PROCEDURE — 87040 BLOOD CULTURE FOR BACTERIA: CPT

## 2022-11-23 RX ORDER — ACETAMINOPHEN 325 MG/1
650 TABLET ORAL
Status: DISCONTINUED | OUTPATIENT
Start: 2022-11-23 | End: 2022-11-27 | Stop reason: HOSPADM

## 2022-11-23 RX ORDER — METOPROLOL SUCCINATE 50 MG/1
50 TABLET, EXTENDED RELEASE ORAL DAILY
Status: DISCONTINUED | OUTPATIENT
Start: 2022-11-23 | End: 2022-11-27 | Stop reason: HOSPADM

## 2022-11-23 RX ORDER — AMITRIPTYLINE HYDROCHLORIDE 25 MG/1
25 TABLET, FILM COATED ORAL
Status: DISCONTINUED | OUTPATIENT
Start: 2022-11-23 | End: 2022-11-27 | Stop reason: HOSPADM

## 2022-11-23 RX ORDER — SODIUM BICARBONATE 1 MEQ/ML
50 SYRINGE (ML) INTRAVENOUS ONCE
Status: COMPLETED | OUTPATIENT
Start: 2022-11-23 | End: 2022-11-23

## 2022-11-23 RX ORDER — LOSARTAN POTASSIUM 50 MG/1
100 TABLET ORAL
Status: DISCONTINUED | OUTPATIENT
Start: 2022-11-23 | End: 2022-11-27 | Stop reason: HOSPADM

## 2022-11-23 RX ORDER — ONDANSETRON 2 MG/ML
4 INJECTION INTRAMUSCULAR; INTRAVENOUS
Status: DISCONTINUED | OUTPATIENT
Start: 2022-11-23 | End: 2022-11-23

## 2022-11-23 RX ORDER — ALBUTEROL SULFATE 0.83 MG/ML
2.5 SOLUTION RESPIRATORY (INHALATION)
Status: DISCONTINUED | OUTPATIENT
Start: 2022-11-23 | End: 2022-11-27 | Stop reason: HOSPADM

## 2022-11-23 RX ORDER — FUROSEMIDE 40 MG/1
40 TABLET ORAL EVERY OTHER DAY
Status: DISCONTINUED | OUTPATIENT
Start: 2022-11-23 | End: 2022-11-27 | Stop reason: HOSPADM

## 2022-11-23 RX ORDER — GUAIFENESIN 100 MG/5ML
81 LIQUID (ML) ORAL DAILY
Status: DISCONTINUED | OUTPATIENT
Start: 2022-11-23 | End: 2022-11-27 | Stop reason: HOSPADM

## 2022-11-23 RX ORDER — ONDANSETRON 4 MG/1
4 TABLET, ORALLY DISINTEGRATING ORAL
Status: DISCONTINUED | OUTPATIENT
Start: 2022-11-23 | End: 2022-11-27 | Stop reason: HOSPADM

## 2022-11-23 RX ORDER — CINACALCET 30 MG/1
30 TABLET, FILM COATED ORAL DAILY
Status: DISCONTINUED | OUTPATIENT
Start: 2022-11-23 | End: 2022-11-27 | Stop reason: HOSPADM

## 2022-11-23 RX ORDER — ONDANSETRON 2 MG/ML
4 INJECTION INTRAMUSCULAR; INTRAVENOUS
Status: COMPLETED | OUTPATIENT
Start: 2022-11-23 | End: 2022-11-23

## 2022-11-23 RX ORDER — SODIUM CHLORIDE 9 MG/ML
75 INJECTION, SOLUTION INTRAVENOUS CONTINUOUS
Status: DISPENSED | OUTPATIENT
Start: 2022-11-23 | End: 2022-11-26

## 2022-11-23 RX ORDER — POLYETHYLENE GLYCOL 3350 17 G/17G
17 POWDER, FOR SOLUTION ORAL DAILY PRN
Status: DISCONTINUED | OUTPATIENT
Start: 2022-11-23 | End: 2022-11-27 | Stop reason: HOSPADM

## 2022-11-23 RX ORDER — TRIAMCINOLONE ACETONIDE 1 MG/G
CREAM TOPICAL 2 TIMES DAILY
Status: DISCONTINUED | OUTPATIENT
Start: 2022-11-23 | End: 2022-11-27 | Stop reason: HOSPADM

## 2022-11-23 RX ORDER — PANTOPRAZOLE SODIUM 40 MG/1
40 TABLET, DELAYED RELEASE ORAL DAILY
Status: DISCONTINUED | OUTPATIENT
Start: 2022-11-23 | End: 2022-11-27 | Stop reason: HOSPADM

## 2022-11-23 RX ORDER — DONEPEZIL HYDROCHLORIDE 5 MG/1
10 TABLET, FILM COATED ORAL
Status: DISCONTINUED | OUTPATIENT
Start: 2022-11-23 | End: 2022-11-27 | Stop reason: HOSPADM

## 2022-11-23 RX ORDER — BARIUM SULFATE 20 MG/ML
900 SUSPENSION ORAL
Status: ACTIVE | OUTPATIENT
Start: 2022-11-23 | End: 2022-11-23

## 2022-11-23 RX ORDER — ACETAMINOPHEN 325 MG/1
650 TABLET ORAL
Status: DISCONTINUED | OUTPATIENT
Start: 2022-11-23 | End: 2022-11-23

## 2022-11-23 RX ORDER — ALLOPURINOL 300 MG/1
300 TABLET ORAL DAILY
Status: DISCONTINUED | OUTPATIENT
Start: 2022-11-23 | End: 2022-11-27 | Stop reason: HOSPADM

## 2022-11-23 RX ADMIN — CINACALCET 30 MG: 30 TABLET, FILM COATED ORAL at 18:05

## 2022-11-23 RX ADMIN — SODIUM CHLORIDE 125 ML/HR: 9 INJECTION, SOLUTION INTRAVENOUS at 04:44

## 2022-11-23 RX ADMIN — SODIUM CHLORIDE 1000 ML: 9 INJECTION, SOLUTION INTRAVENOUS at 01:01

## 2022-11-23 RX ADMIN — FUROSEMIDE 40 MG: 40 TABLET ORAL at 10:38

## 2022-11-23 RX ADMIN — DONEPEZIL HYDROCHLORIDE 10 MG: 5 TABLET, FILM COATED ORAL at 22:00

## 2022-11-23 RX ADMIN — SODIUM CHLORIDE 125 ML/HR: 9 INJECTION, SOLUTION INTRAVENOUS at 18:08

## 2022-11-23 RX ADMIN — ALLOPURINOL 300 MG: 300 TABLET ORAL at 10:38

## 2022-11-23 RX ADMIN — ONDANSETRON 4 MG: 2 INJECTION INTRAMUSCULAR; INTRAVENOUS at 01:01

## 2022-11-23 RX ADMIN — PANTOPRAZOLE SODIUM 40 MG: 40 TABLET, DELAYED RELEASE ORAL at 10:38

## 2022-11-23 RX ADMIN — ASPIRIN 81 MG: 81 TABLET, CHEWABLE ORAL at 10:37

## 2022-11-23 RX ADMIN — SODIUM CHLORIDE 1000 ML: 9 INJECTION, SOLUTION INTRAVENOUS at 02:43

## 2022-11-23 RX ADMIN — METOPROLOL SUCCINATE 50 MG: 50 TABLET, EXTENDED RELEASE ORAL at 10:37

## 2022-11-23 RX ADMIN — SODIUM BICARBONATE 50 MEQ: 84 INJECTION INTRAVENOUS at 18:05

## 2022-11-23 NOTE — CONSULTS
Palliative Medicine Consult  [place]: [phone] ([phone])    Patient Name: Leslie Parks  YOB: 1935    Date of Initial Consult: 11/23/22  Reason for Consult: End Stage Disease  Requesting Provider: Gwyn Hicks MD   Primary Care Physician: Raymond Velasquez MD     SUMMARY:   Leslie Parks is a 82 y.o. with a past history of hypertension, diabetes, reflux, peptic ulcer disease, depression, asthma, DVT, who was admitted on 11/22/2022 from home with a diagnosis of sepsis. HPI:  presented to ED with c/o hypotension. Pt was at her nephrologist's office and was noted to have low BP as well as generalized body aches with abdominal pain, n/v and poor appetite. Admission course: In ED, Bun/Cr 119/4.92    Current medical issues leading to Palliative Medicine involvement include: poor appetite, dehydration, hypotension, worsening renal failure, not a good candidate for dialysis, due to frailty. Psychosocial:  Surrogate Decision Maker: Tramaine Villalobos [phone]  Patient's daughter is an MA with Dr. Sheryl Hargrove at [facility]. Pt has an AMD on file. PALLIATIVE DIAGNOSES:   Hypotension due to hypovolemia   Poor appetite   Fatigue  N/v   Abdominal pain  BENSON/CKD5  Dehydration   Dementia   Care decisions     PLAN:   Met with pt, 2 daughters and her : counseled family about pt's condition, poor appetite and fluid intake causes dehydration which can worsen renal failure, that this will be a chronic process given her overall condition. Family asking about eligibility for transplant, discussed pt is too frail. Family wants to speak with Nephrology about whether or not there are other options. Will continue to follow.    Initial consult note routed to primary continuity provider and/or primary health care team members  Communicated plan of care with: Palliative Ilda PRAKASH 192 Team     GOALS OF CARE / TREATMENT PREFERENCES:     GOALS OF CARE:  Patient/Health Care Proxy Stated Goals: Prolong life    TREATMENT PREFERENCES:   Code Status: Full Code        Advance Care Planning:  [x] [facility] Interdisciplinary Team has updated the ACP Navigator with Health Care Decision Maker and Patient Capacity      Primary Decision Maker: Jame Lesches Son - [phone]    Secondary Decision Maker: Simon Prakash - Daughter - [phone]    Secondary Decision Maker: Kacey Stewart - Child - [phone]  Advance Care Planning 11/23/2022   Patient's Healthcare Decision Maker is: Named in scanned ACP document   Primary Decision Maker Name -   Primary Decision Maker Phone Number -   Primary Decision Maker Relationship to Patient -   Secondary Decision Maker Name -   Secondary Decision [facility] Phone Number -   Secondary Decision Maker Relationship to Patient -   Confirm Advance Directive Yes, on file   Patient Would Like to Complete Advance Directive -   Does the patient have other document types -       Medical Interventions: Full interventions       Other:    As far as possible, the palliative care team has discussed with patient / health care proxy about goals of care / treatment preferences for patient.      HISTORY:     History obtained from: chart, family     The patient is:   [x] Verbal and participatory  [] Non-participatory due to:          Clinical Pain Assessment (nonverbal scale for severity on nonverbal patients):   Clinical Pain Assessment  Severity: 0     Activity (Movement): Lying quietly, normal position    Duration: for how long has pt been experiencing pain (e.g., 2 days, 1 month, years)  Frequency: how often pain is an issue (e.g., several times per day, once every few days, constant)     FUNCTIONAL ASSESSMENT:     Palliative Performance Scale (PPS):  PPS: 20       PSYCHOSOCIAL/SPIRITUAL SCREENING:     Palliative IDT has assessed this patient for cultural preferences / practices and a referral made as appropriate to needs (Cultural Services, Patient Advocacy, Ethics, etc.)    Any spiritual / Christian concerns:  [] Yes /  [x] No   If \"Yes\" to discuss with pastoral care during IDT     Does caregiver feel burdened by caring for their loved one:   [] Yes /  [x] No /  [] No Caregiver Present    If \"Yes\" to discuss with social work during IDT    Anticipatory grief assessment:   [x] Normal  / [] Maladaptive     If \"Maladaptive\" to discuss with social work during IDT    ESAS Anxiety: Anxiety: 0    ESAS Depression: Depression: 0        REVIEW OF SYSTEMS:     Positive and pertinent negative findings in ROS are noted above in HPI. The following systems were [x] reviewed / [] unable to be reviewed as noted in HPI  Other findings are noted below. Systems: constitutional, ears/nose/mouth/throat, respiratory, gastrointestinal, genitourinary, musculoskeletal, integumentary, neurologic, psychiatric, endocrine. Positive findings noted below. Modified ESAS Completed by: provider   Fatigue: 6 Drowsiness: 3   Depression: 0 Pain: 0   Anxiety: 0 Nausea: 2   Anorexia: 10 Dyspnea: 0     Constipation: No              PHYSICAL EXAM:     From RN flowsheet:  Wt Readings from Last 3 Encounters:   11/22/22 155 lb (70.3 kg)   11/21/22 163 lb (73.9 kg)   11/05/22 170 lb 6.7 oz (77.3 kg)     Blood pressure (!) 104/57, pulse 60, temperature 98.6 °F (37 °C), resp. rate 16, height 5' 6\" (1.676 m), weight 155 lb (70.3 kg), SpO2 96 %.     Pain Scale 1: Numeric (0 - 10)  Pain Intensity 1: 0     Pain Location 1: Back           Last bowel movement, if known:     Constitutional: elderly, frail  Eyes: pupils equal, anicteric  ENMT: no nasal discharge, moist mucous membranes  Cardiovascular: regular rhythm, distal pulses intact  Respiratory: breathing not labored, symmetric  Gastrointestinal: soft non-tender, +bowel sounds  Musculoskeletal: no deformity, no tenderness to palpation  Skin: warm, dry  Neurologic: following commands, moving all extremities  Psychiatric: full affect,           HISTORY:     Active Problems:    Sepsis ([facility] [address] (2022)    Past Medical History:   Diagnosis Date    Adverse effect of anesthesia     passed out during EGD/stopped breathing    Arrhythmia     has pacemaker for low HR    Arthritis     Asthma     Cancer ([facility] [address]     right kidney - surgery    Chronic pain     right side    Deep vein thrombosis (DVT) during current hospitalization ([facility] [facility]     history of DVT was on coumadin in the past    Diabetes ([facility] [address]     diet controlled    GERD (gastroesophageal reflux disease)     H/O acute pancreatitis     Hiatal hernia     Hypertension     MARLEN (obstructive sleep apnea)     does not use CPAP/pt states she has not used since a pacemaker inserted    Other ill-defined conditions(799.89)     lymph node enlargement - left chest - benign bx - watching    Other ill-defined conditions(799.89)     wheezes    Psychiatric disorder     depression    PUD (peptic ulcer disease)     hx of    Thromboembolus ([facility]     Unspecified adverse effect of anesthesia     passed out during EGD per pt - stopped breathing per pt per MD      Past Surgical History:   Procedure Laterality Date    HX APPENDECTOMY      HX CHOLECYSTECTOMY      HX GYN          HX HEENT Right     laser eye surgery to stop bleeding in back of eye - multiple laser surgeries    HX KNEE ARTHROSCOPY      left knee; cartilage repair    HX ORTHOPAEDIC      right hip replacement    HX ORTHOPAEDIC      lower back surgery    HX ORTHOPAEDIC      straighten toe - 2nd toe on right    HX ORTHOPAEDIC Bilateral     carpal tunnel release    HX PACEMAKER      not defibrillator    HX UROLOGICAL      implant in hip to control urine and then removed    HX UROLOGICAL      Right kidney partial nephrectomy      Family History   Problem Relation Age of Onset    Stroke Mother         brain aneurysm    Hypertension Father     Heart Disease Father     Cancer Sister         breast    Cancer Brother         lung and prostate    Cancer Sister         breast    SLE Other half siblings (5+) - lupus    Hypertension Daughter     Diabetes Daughter       History reviewed, no pertinent family history. Social History     Tobacco Use    Smoking status: Former     Packs/day: 0.25     Years: 20.00     Pack years: 5.00     Types: Cigarettes     Quit date: 1971     Years since quittin.3    Smokeless tobacco: Never   Substance Use Topics    Alcohol use: No     Allergies   Allergen Reactions    Pcn [Penicillins] Rash     But can take cephalosporins. Allergic to all \"cillins\".     Codeine Other (comments)     Hallucinations, takes hydrocodone at home for pain    Contrast Dye [Iodine] Other (comments)     Not to take due to kidney function    Prednisone Other (comments)     \"makes my pancreas numbers go way up\"      Current Facility-Administered Medications   Medication Dose Route Frequency    0.9% sodium chloride infusion  125 mL/hr IntraVENous CONTINUOUS    [Held by provider] allopurinoL (ZYLOPRIM) tablet 300 mg  300 mg Oral DAILY    [Held by provider] amitriptyline (ELAVIL) tablet 25 mg  25 mg Oral QHS    [Held by provider] aspirin chewable tablet 81 mg  81 mg Oral DAILY    cinacalcet (SENSIPAR) tablet 30 mg  30 mg Oral DAILY    donepeziL (ARICEPT) tablet 10 mg  10 mg Oral QHS    [Held by provider] furosemide (LASIX) tablet 40 mg  40 mg Oral EVERY OTHER DAY    [Held by provider] metoprolol succinate (TOPROL-XL) XL tablet 50 mg  50 mg Oral DAILY    ondansetron (ZOFRAN ODT) tablet 4 mg  4 mg Oral Q8H PRN    pantoprazole (PROTONIX) tablet 40 mg  40 mg Oral DAILY    albuterol (PROVENTIL VENTOLIN) nebulizer solution 2.5 mg  2.5 mg Nebulization Q6H PRN    triamcinolone acetonide (KENALOG) 0.025 % cream   Topical BID    [Held by provider] losartan (COZAAR) tablet 100 mg  100 mg Oral QHS    acetaminophen (TYLENOL) tablet 650 mg  650 mg Oral Q6H PRN    polyethylene glycol (MIRALAX) packet 17 g  17 g Oral DAILY PRN    barium sulfate (READICAT) 2.1 % (w/v), 2.0 % (w/w) oral suspension 900 mL  900 mL Oral RAD ONCE    sodium bicarbonate 8.4 % (1 mEq/mL) injection 50 mEq  50 mEq IntraVENous ONCE          LAB AND IMAGING FINDINGS:     Lab Results   Component Value Date/Time    WBC 9.5 11/23/2022 01:11 AM    HGB 10.6 (L) 11/23/2022 01:11 AM    PLATELET 938 (L) 17/31/6766 01:11 AM     Lab Results   Component Value Date/Time    Sodium 132 (L) 11/23/2022 01:11 AM    Potassium 4.8 11/23/2022 01:11 AM    Chloride 104 11/23/2022 01:11 AM    CO2 18 (L) 11/23/2022 01:11 AM     (H) 11/23/2022 01:11 AM    Creatinine 4.92 (H) 11/23/2022 01:11 AM    Calcium 8.4 (L) 11/23/2022 01:11 AM    Magnesium 1.3 (L) 07/24/2021 07:56 PM    Phosphorus 4.8 (H) 10/13/2022 12:25 PM      Lab Results   Component Value Date/Time    Alk. phosphatase 92 11/23/2022 01:11 AM    Protein, total 6.9 11/23/2022 01:11 AM    Albumin 3.1 (L) 11/23/2022 01:11 AM    Globulin 3.8 11/23/2022 01:11 AM     Lab Results   Component Value Date/Time    INR 1.4 (H) 07/03/2016 11:45 PM    Prothrombin time 14.5 (H) 07/03/2016 11:45 PM    aPTT 56.3 (H) 05/13/2015 12:57 AM      Lab Results   Component Value Date/Time    Iron 45 10/31/2022 09:58 AM    TIBC 137 (L) 10/31/2022 09:58 AM    Iron % saturation 33 10/31/2022 09:58 AM    Ferritin 461 (H) 10/31/2022 09:58 AM      No results found for: PH, PCO2, PO2  No components found for: [place]   Lab Results   Component Value Date/Time     (H) 07/03/2016 11:45 PM    CK - MB 1.3 07/03/2016 11:45 PM                Total time: 65  Counseling / coordination time, spent as noted above: 35  > 50% counseling / coordination?: y    Prolonged service was provided for  []30 min   []75 min in face to face time in the presence of the patient, spent as noted above. Time Start:   Time End:   Note: this can only be billed with 84642 (initial) or 92059 (follow up). If multiple start / stop times, list each separately.

## 2022-11-23 NOTE — PROGRESS NOTES
Physical Therapy    Order acknowledged, chart reviewed. Pt unavailable for PT evaluation due to off floor at this time. Will con't to follow.     Natalie Humphrey, PT, MPT

## 2022-11-23 NOTE — ED NOTES
Comprehensive SBAR given to receiving nurse, Juany Arambula RN by sending nurse RAPHAEL Desir. All questions answered. Transportation arranged.

## 2022-11-23 NOTE — ED PROVIDER NOTES
EMERGENCY DEPARTMENT HISTORY AND PHYSICAL EXAM     ----------------------------------------------------------------------------  Please note that this dictation was completed with Tragara, the computer voice recognition software. Quite often unanticipated grammatical, syntax, homophones, and other interpretive errors are inadvertently transcribed by the computer software. Please disregard these errors. Please excuse any errors that have escaped final proofreading  ----------------------------------------------------------------------------      Date: 11/22/2022  Patient Name: Surya Edouard    History of Presenting Illness     Chief Complaint   Patient presents with    Hypotension     Son stated the patient has low blood pressure and they got a call from her PCP about her kidney function being off. They advised that the patient has had decreased appetite. She was given Glycerna for her appetite and she has had diarrhea ever since consuming that. Patient's blood pressure at home was 90/56. Daughter said she has had an episode or two of hypotension but this is the first time that she has remained low. Patient complaining of fatigue and lightheadedness. Denies any chest pain or dyspnea. History Provided By:  Patient    HPI: Surya Edouard is a 82 y.o. female, with significant pmhx of hypertension, diabetes, reflux, peptic ulcer disease, depression, asthma, DVT, who presents via private vehicle  to the ED with c/o hypotension at home. Patient family reports having gone to the nephrologist earlier today and was noted to have low blood pressure at that time. Due to changes in her medication management with elimination of one of her blood pressure medications and changing her furosemide to every other day. Patient has been having generalized body aches with abdominal pain, nausea and vomiting. Family notes that she has had not much to eat for the last several days due to decreased appetite.   Family denies recent fever, cough, shortness of breath, chest pain or urinary symptoms. Social Hx: denies tobacco  denies EtOH , denies recreational/Illicit Drugs    There are no other complaints, changes, or physical findings at this time. PCP: Raymond Velasquez MD    Allergies   Allergen Reactions    Pcn [Penicillins] Rash     But can take cephalosporins. Allergic to all \"cillins\". Codeine Other (comments)     Hallucinations, takes hydrocodone at home for pain    Contrast Dye [Iodine] Other (comments)     Not to take due to kidney function    Prednisone Other (comments)     \"makes my pancreas numbers go way up\"       Current Facility-Administered Medications   Medication Dose Route Frequency Provider Last Rate Last Admin    acetaminophen (TYLENOL) tablet 650 mg  650 mg Oral Q6H PRN June Im, MD        ondansetron [facility]) injection 4 mg  4 mg IntraVENous Q4H PRN June Im, MD        0.9% sodium chloride infusion  125 mL/hr IntraVENous CONTINUOUS June Im, MD         Current Outpatient Medications   Medication Sig Dispense Refill    ondansetron (ZOFRAN ODT) 4 mg disintegrating tablet Take 1 Tablet by mouth every eight (8) hours as needed for Nausea or Vomiting. 20 Tablet 0    acetaminophen (TYLENOL) 500 mg tablet Take 500 mg by mouth every evening. triamcinolone (ARISTOCORT) 0.5 % topical cream Apply  to affected area two (2) times a day. use thin layer 30 g 1    amLODIPine (NORVASC) 10 mg tablet Take 1 tablet by mouth daily. 30 Tablet 11    cinacalcet (SENSIPAR) 30 mg tablet TAKE ONE TABLET BY MOUTH DAILY 30 Tablet 5    furosemide (LASIX) 40 mg tablet Take 1 Tablet by mouth daily. 30 Tablet 5    pantoprazole (PROTONIX) 40 mg tablet Take 1 Tablet by mouth daily. 30 Tablet 11    allopurinoL (ZYLOPRIM) 300 mg tablet Take 1 Tablet by mouth daily. 30 Tablet 11    metoprolol succinate (TOPROL-XL) 50 mg XL tablet Take 1 Tablet by mouth daily.  30 Tablet 11    losartan (COZAAR) 100 mg tablet Take 1 Tablet by mouth nightly. 30 Tablet 11    FLUoxetine (PROzac) 10 mg capsule Take 1 Capsule by mouth daily. 30 Capsule 11    amitriptyline (ELAVIL) 25 mg tablet Take 1 Tablet by mouth nightly. 30 Tablet 11    donepeziL (ARICEPT) 10 mg tablet Take 1 Tablet by mouth nightly. 30 Tablet 11    aspirin 81 mg chewable tablet Take 81 mg by mouth daily. pravastatin (PRAVACHOL) 20 mg tablet Take 1 Tab by mouth nightly. 30 Tab 11    PROAIR HFA 90 mcg/actuation inhaler Take 2 Puffs by inhalation four (4) times daily as needed.          Past History     Past Medical History:  Past Medical History:   Diagnosis Date    Adverse effect of anesthesia     passed out during EGD/stopped breathing    Arrhythmia     has pacemaker for low HR    Arthritis     Asthma     Cancer ([facility] [address]     right kidney - surgery    Chronic pain     right side    Deep vein thrombosis (DVT) during current hospitalization ([facility] [address]     history of DVT was on coumadin in the past    Diabetes ([facility] [address]     diet controlled    GERD (gastroesophageal reflux disease)     H/O acute pancreatitis     Hiatal hernia     Hypertension     MARLEN (obstructive sleep apnea)     does not use CPAP/pt states she has not used since a pacemaker inserted    Other ill-defined conditions(799.89)     lymph node enlargement - left chest - benign bx - watching    Other ill-defined conditions(799.89)     wheezes    Psychiatric disorder     depression    PUD (peptic ulcer disease)     hx of    Thromboembolus ([facility] [address]     Unspecified adverse effect of anesthesia     passed out during EGD per pt - stopped breathing per pt per MD       Past Surgical History:  Past Surgical History:   Procedure Laterality Date    HX APPENDECTOMY      HX CHOLECYSTECTOMY      HX GYN          HX HEENT Right     laser eye surgery to stop bleeding in back of eye - multiple laser surgeries    HX KNEE ARTHROSCOPY      left knee; cartilage repair    HX ORTHOPAEDIC      right hip replacement    HX ORTHOPAEDIC      lower back surgery    HX ORTHOPAEDIC      straighten toe - 2nd toe on right    HX ORTHOPAEDIC Bilateral     carpal tunnel release    HX PACEMAKER      not defibrillator    HX UROLOGICAL      implant in hip to control urine and then removed    HX UROLOGICAL      Right kidney partial nephrectomy       Family History:  Family History   Problem Relation Age of Onset    Stroke Mother         brain aneurysm    Hypertension Father     Heart Disease Father     Cancer Sister         breast    Cancer Brother         lung and prostate    Cancer Sister         breast    SLE Other         half siblings (5+) - lupus    Hypertension Daughter     Diabetes Daughter        Social History:  Social History     Tobacco Use    Smoking status: Former     Packs/day: 0.25     Years: 20.00     Pack years: 5.00     Types: Cigarettes     Quit date: 1971     Years since quittin.3    Smokeless tobacco: Never   Vaping Use    Vaping Use: Never used   Substance Use Topics    Alcohol use: No    Drug use: No       Allergies: Allergies   Allergen Reactions    Pcn [Penicillins] Rash     But can take cephalosporins. Allergic to all \"cillins\". Codeine Other (comments)     Hallucinations, takes hydrocodone at home for pain    Contrast Dye [Iodine] Other (comments)     Not to take due to kidney function    Prednisone Other (comments)     \"makes my pancreas numbers go way up\"         Review of Systems   Review of Systems   Constitutional:  Positive for activity change, appetite change and fatigue. Negative for fever. Eyes: Negative. Respiratory: Negative. Negative for shortness of breath. Cardiovascular:  Negative for chest pain. Gastrointestinal:  Positive for abdominal pain, nausea and vomiting. Endocrine: Negative. Genitourinary: Negative. Negative for difficulty urinating, dysuria and hematuria. Musculoskeletal: Negative. Skin: Negative. Neurological: Negative. Psychiatric/Behavioral:  Negative for suicidal ideas. Physical Exam   Physical Exam  Vitals and nursing note reviewed. Constitutional:       General: She is not in acute distress. Appearance: She is well-developed. She is not diaphoretic. HENT:      Head: Normocephalic and atraumatic. Nose: Nose normal.   Eyes:      General: No scleral icterus. Conjunctiva/sclera: Conjunctivae normal.   Neck:      Trachea: No tracheal deviation. Cardiovascular:      Rate and Rhythm: Regular rhythm. Bradycardia present. Heart sounds: Normal heart sounds. No murmur heard. No friction rub. Pulmonary:      Effort: Pulmonary effort is normal. No respiratory distress. Breath sounds: Normal breath sounds. No stridor. No wheezing or rales. Abdominal:      General: Bowel sounds are normal. There is no distension. Palpations: Abdomen is soft. Tenderness: There is no abdominal tenderness. Musculoskeletal:         General: No tenderness. Normal range of motion. Cervical back: Normal range of motion. Skin:     General: Skin is warm and dry. Findings: No rash. Neurological:      Mental Status: She is alert and oriented to person, place, and time. Cranial Nerves: No cranial nerve deficit. Psychiatric:         Behavior: Behavior normal.         Thought Content:  Thought content normal.         Judgment: Judgment normal.         Diagnostic Study Results     Labs -     Recent Results (from the past 12 hour(s))   EKG, 12 LEAD, INITIAL    Collection Time: 11/22/22 11:55 PM   Result Value Ref Range    Ventricular Rate 64 BPM    Atrial Rate 64 BPM    P-R Interval 222 ms    QRS Duration 86 ms    Q-T Interval 464 ms    QTC Calculation (Bezet) 478 ms    Calculated P Axis 23 degrees    Calculated R Axis -17 degrees    Calculated T Axis 109 degrees    Diagnosis       Atrial-paced rhythm with prolonged AV conduction  Left ventricular hypertrophy with repolarization abnormality  When compared with ECG of 05-NOV-2022 15:39,  No significant change was found     TROPONIN-HIGH SENSITIVITY    Collection Time: 11/23/22 12:05 AM   Result Value Ref Range    Troponin-High Sensitivity 20 0 - 51 ng/L   POC LACTIC ACID    Collection Time: 11/23/22 12:27 AM   Result Value Ref Range    Lactic Acid (POC) 1.21 0.40 - 2.00 mmol/L   LACTIC ACID    Collection Time: 11/23/22  1:11 AM   Result Value Ref Range    Lactic acid 1.2 0.4 - 2.0 MMOL/L   CBC WITH AUTOMATED DIFF    Collection Time: 11/23/22  1:11 AM   Result Value Ref Range    WBC 9.5 3.6 - 11.0 K/uL    RBC 3.89 3.80 - 5.20 M/uL    HGB 10.6 (L) 11.5 - 16.0 g/dL    HCT 33.8 (L) 35.0 - 47.0 %    MCV 86.9 80.0 - 99.0 FL    MCH 27.2 26.0 - 34.0 PG    MCHC 31.4 30.0 - 36.5 g/dL    RDW 18.4 (H) 11.5 - 14.5 %    PLATELET 259 (L) 079 - 400 K/uL    MPV ABNORMAL 8.9 - 12.9 FL    NRBC 0.0 0  WBC    ABSOLUTE NRBC 0.00 0.00 - 0.01 K/uL    NEUTROPHILS 78 (H) 32 - 75 %    LYMPHOCYTES 9 (L) 12 - 49 %    MONOCYTES 12 5 - 13 %    EOSINOPHILS 0 0 - 7 %    BASOPHILS 0 0 - 1 %    IMMATURE GRANULOCYTES 1 (H) 0.0 - 0.5 %    ABS. NEUTROPHILS 7.5 1.8 - 8.0 K/UL    ABS. LYMPHOCYTES 0.8 0.8 - 3.5 K/UL    ABS. MONOCYTES 1.2 (H) 0.0 - 1.0 K/UL    ABS. EOSINOPHILS 0.0 0.0 - 0.4 K/UL    ABS. BASOPHILS 0.0 0.0 - 0.1 K/UL    ABS. IMM. GRANS. 0.1 (H) 0.00 - 0.04 K/UL    DF AUTOMATED     METABOLIC PANEL, COMPREHENSIVE    Collection Time: 11/23/22  1:11 AM   Result Value Ref Range    Sodium 132 (L) 136 - 145 mmol/L    Potassium 4.8 3.5 - 5.1 mmol/L    Chloride 104 97 - 108 mmol/L    CO2 18 (L) 21 - 32 mmol/L    Anion gap 10 5 - 15 mmol/L    Glucose 171 (H) 65 - 100 mg/dL     (H) 6 - 20 MG/DL    Creatinine 4.92 (H) 0.55 - 1.02 MG/DL    BUN/Creatinine ratio 24 (H) 12 - 20      eGFR 8 (L) >60 ml/min/1.73m2    Calcium 8.4 (L) 8.5 - 10.1 MG/DL    Bilirubin, total 0.5 0.2 - 1.0 MG/DL    ALT (SGPT) 14 12 - 78 U/L    AST (SGOT) 26 15 - 37 U/L    Alk.  phosphatase 92 45 - 117 U/L    Protein, total 6.9 6.4 - 8.2 g/dL    Albumin 3.1 (L) 3.5 - 5.0 g/dL    Globulin 3.8 2.0 - 4.0 g/dL    A-G Ratio 0.8 (L) 1.1 - 2.2     NT-PRO BNP    Collection Time: 11/23/22  1:11 AM   Result Value Ref Range    NT pro- <450 PG/ML   SAMPLES BEING HELD    Collection Time: 11/23/22  1:11 AM   Result Value Ref Range    SAMPLES BEING HELD RED,PST,BLUE     COMMENT        Add-on orders for these samples will be processed based on acceptable specimen integrity and analyte stability, which may vary by analyte. BLOOD GAS,CHEM8,LACTIC ACID POC    Collection Time: 11/23/22  1:18 AM   Result Value Ref Range    Calcium, ionized (POC) 1.07 (L) 1.12 - 1.32 mmol/L    BICARBONATE 19 mmol/L    Base deficit (POC) 8.1 mmol/L    Sample source VENOUS BLOOD      CO2, POC 18 (L) 19 - 24 MMOL/L    Sodium,  (L) 136 - 145 MMOL/L    Potassium, POC 5.7 (H) 3.5 - 5.5 MMOL/L    Chloride,  100 - 108 MMOL/L    Glucose,  (H) 74 - 106 MG/DL    Creatinine, POC 5.0 (H) 0.6 - 1.3 MG/DL    Lactic Acid (POC) 1.28 0.40 - 2.00 mmol/L    Critical value read back ANAMARIA     pH, venous (POC) 7.25 (L) 7.32 - 7.42      pCO2, venous (POC) 43.2 41 - 51 MMHG    pO2, venous (POC) 24 (L) 25 - 40 mmHg   URINALYSIS W/ REFLEX CULTURE    Collection Time: 11/23/22  2:48 AM    Specimen: Urine   Result Value Ref Range    Color DARK YELLOW      Appearance CLEAR CLEAR      Specific gravity 1.015      pH (UA) 5.0 5.0 - 8.0      Protein Negative NEG mg/dL    Glucose Negative NEG mg/dL    Ketone Negative NEG mg/dL    Bilirubin Negative NEG      Blood Negative NEG      Urobilinogen 1.0 0.2 - 1.0 EU/dL    Nitrites Negative NEG      Leukocyte Esterase SMALL (A) NEG      WBC 5-10 0 - 4 /hpf    RBC 0-5 0 - 5 /hpf    Epithelial cells FEW FEW /lpf    Bacteria 1+ (A) NEG /hpf    UA:UC IF INDICATED CULTURE NOT INDICATED BY UA RESULT CNI      CA Oxalate crystals 1+ (A) NEG       Radiologic Studies -   XR CHEST PORT   Final Result      Mild bibasilar opacities may represent atelectasis, edema, or infection.            CT Results  (Last 48 hours)      None          CXR Results  (Last 48 hours)                 11/23/22 0051  XR CHEST PORT Final result    Impression:      Mild bibasilar opacities may represent atelectasis, edema, or infection. Narrative:  EXAM:  XR CHEST PORT       INDICATION: Chest pain       COMPARISON: 11/5/2022       TECHNIQUE: Portable AP semiupright chest view at 0047 hours       FINDINGS: The left chest ICD and wires are stable. The cardiomediastinal   contours are stable. The pulmonary vasculature is within normal limits. There are mild bibasilar opacities. There is no pleural effusion or   pneumothorax. The bones and upper abdomen are stable. Medical Decision Making   I am the first provider for this patient. I reviewed the vital signs, available nursing notes, past medical history, past surgical history, family history and social history. Vital Signs-Reviewed the patient's vital signs. Patient Vitals for the past 12 hrs:   Temp Pulse Resp BP SpO2   11/23/22 0428 -- 60 17 (!) 114/59 100 %   11/23/22 0348 -- 60 17 (!) 113/57 100 %   11/23/22 0343 -- 60 17 (!) 111/57 100 %   11/23/22 0243 -- 64 15 102/64 100 %   11/23/22 0158 -- 65 15 107/60 100 %   11/23/22 0130 -- 60 14 (!) 105/54 100 %   11/23/22 0058 -- 65 16 (!) 102/57 100 %   11/23/22 0043 -- -- -- (!) 90/54 --   11/23/22 0022 -- -- -- -- 100 %   11/23/22 0018 -- -- -- (!) 93/59 100 %   11/22/22 2343 97.6 °F (36.4 °C) (!) 52 16 (!) 83/46 96 %       Pulse Oximetry Analysis - 96% on RA, normal  Rate: 52 bpm  Rhythm: Bradycardia    ED Course:   Initial assessment performed. The patients presenting problems have been discussed, and they are in agreement with the care plan formulated and outlined with them. I have encouraged them to ask questions as they arise throughout their visit.     I reviewed the nursing notes and and vital signs from today's visit, as well as the electronic medical record system for any past medical records that were available that may contribute to the patients current condition, including previous evaluation for pleuritic chest pain in early November    Nursing notes will be reviewed as they become available in realtime while the pt has been in the ED. Macho Banks MD    EKG interpretation 7763: atrial paced, L Axis, rate 64; KY 64, , QTc 86; 478 NO STEMI; interpreted by Macho Banks MD    I personally reviewed/interpreted pt's imaging. Agree with official read by radiology as noted above. Macho Banks MD        Provider Notes (Medical Decision Making):     DDX:  Hyperkalemia, acute on chronic renal failure, UTI, dehydration, electrolyte derangement, failure to thrive    Impression/ Plan:  Patient is an 79-year-old female brought in by family for concerns over decreased p.o. intake, hypotension at home and reported worsening renal function per nephrology visit earlier today. Patient is having some associated nausea and vomiting but no other complaints at time of my evaluation. Have concerns for her worsening renal function with potential for electrolyte derangement. EKG is nonischemic appearing and noted to have negative troponin. Urine without signs of UTI. Continues to make urine and have less concern for obstructive uropathy. PROGRESS NOTE:  4:32 AM  Pt has improved blood pressure after IV fluids. No concerning signs for infection but noted to have upward trending creatinine with normal potassium. Will admit to hospitalist for further evaluation and management  Macho Banks MD    CONSULT NOTE:   4:39 AM  Macho Banks MD spoke with Dr. Jack Duff,   Specialty: [facility] Sat Dr. Clifm Meth due to dehydration, acute on chronic renal failure, failure to thrive. Discussed pt's HPI and available diagnostic results thus far. Expressed concerns for needed admission. Consultant will evaluate for admission.   Macho Banks MD      ADMISSION NOTE:  4:39 AM  Patient is being admitted to the hospital by Dr. Lucy Richard. The results of their tests and reasons for their admission have been discussed with them and/or available family. They convey agreement and understanding for the need to be admitted and for their admission diagnosis. Randolph Jennings MD           Critical Care Time:     none      Diagnosis     Clinical Impression:   1. Acute on chronic renal insufficiency    2. Dehydration    3. Hypotension due to hypovolemia    4. Failure to thrive (child)        PLAN:  1.  Admit to hospitalist

## 2022-11-23 NOTE — CONSULTS
Nephrology Consult Note     Claudio Cano                Phone - [phone]   Patient: Dasha Michael   YOB: 1935    Date- 11/23/2022  MRN: [id number]             REASON FOR CONSULTATION: kun   CONSULTING PHYSICIAN:     ADMIT DATE:11/22/2022 PATIENT PCP:Brook Hodges MD     IMPRESSION & PLAN:   Lexii Ellerman due to pre renal etiology, low bp, ATN- POST RENAL obstruction needs to be ruled out    Hyperkalmia  Hyponatremia likely due to pre renal etiology  Ckd due to DM AND HTN  Ckd 4 - cr 2.5 in may 2022 f/b nephrologist in [place]  Hypotension  H/o partial nephrectomy-left  H/o hypertension  Anemia of ckd  H/o renal cancer  Sec. Hyperpara   H/o hypercalcemia  diarrhea    PLAN-  Start ivf nacl 125 ml/hr  Give iv bicarb x 1  Hold lasix, losartan, norvasc  Check bmp in am  Check urine lytes  Check renal usg  With her ongoing hypotension- she is not a good dialysis candidate-- d/w family  Check renal usg  Check echo       Active Problems:    Sepsis ([facility] [address] (11/23/2022)        [x] High complexity decision making was performed  [x] Patient is at high-risk of decompensation with multiple organ involvement    Subjective:   HPI: Dasha Michael is a 82 y.o. female is admitted with   Chief Complaint   Patient presents with    Hypotension     Son stated the patient has low blood pressure and they got a call from her PCP about her kidney function being off. They advised that the patient has had decreased appetite. She was given Glycerna for her appetite and she has had diarrhea ever since consuming that. Patient's blood pressure at home was 90/56. Daughter said she has had an episode or two of hypotension but this is the first time that she has remained low. Patient complaining of fatigue and lightheadedness. Denies any chest pain or dyspnea. .  She was seen by her nephrologist yesterday in [facility].   She has kun and hyperkalemia.  K 5.7, cr 4.9, bun 119  She has been hypotensive  She has diarrhea for last few days  She is on lasix, losartan, norvasc at home  She is on sensipar  She has h/o renal ca requiring left partial nephrectomy    Review of Systems:    Reports poor po intake  C/o diarrhea  No c/o sob,  No c/o chest pain,   No c/o nausea or vomiting, No c/o  fever. A 11 point review of system was performed today. Pertinent positives and negatives are mentioned in the HPI. The reminder of the ROS is negative and noncontributory.     Past Medical History:   Diagnosis Date    Adverse effect of anesthesia     passed out during EGD/stopped breathing    Arrhythmia     has pacemaker for low HR    Arthritis     Asthma     Cancer ([address]     right kidney - surgery    Chronic pain     right side    Deep vein thrombosis (DVT) during current hospitalization ([facility] [address]     history of DVT was on coumadin in the past    Diabetes ([address]     diet controlled    GERD (gastroesophageal reflux disease)     H/O acute pancreatitis     Hiatal hernia     Hypertension     MARLEN (obstructive sleep apnea)     does not use CPAP/pt states she has not used since a pacemaker inserted    Other ill-defined conditions(799.89)     lymph node enlargement - left chest - benign bx - watching    Other ill-defined conditions(799.89)     wheezes    Psychiatric disorder     depression    PUD (peptic ulcer disease)     hx of    Thromboembolus ([address]     Unspecified adverse effect of anesthesia     passed out during EGD per pt - stopped breathing per pt per MD      Past Surgical History:   Procedure Laterality Date    HX APPENDECTOMY      HX CHOLECYSTECTOMY      HX GYN          HX HEENT Right     laser eye surgery to stop bleeding in back of eye - multiple laser surgeries    HX KNEE ARTHROSCOPY      left knee; cartilage repair    HX ORTHOPAEDIC      right hip replacement    HX ORTHOPAEDIC      lower back surgery    HX ORTHOPAEDIC      straighten toe - 2nd toe on right    HX ORTHOPAEDIC Bilateral     carpal tunnel release    HX PACEMAKER      not defibrillator    HX UROLOGICAL      implant in hip to control urine and then removed    HX UROLOGICAL      Right kidney partial nephrectomy      Prior to Admission medications    Medication Sig Start Date End Date Taking? Authorizing Provider   ondansetron (ZOFRAN ODT) 4 mg disintegrating tablet Take 1 Tablet by mouth every eight (8) hours as needed for Nausea or Vomiting. 11/21/22  Yes Doc Melendez MD   acetaminophen (TYLENOL) 500 mg tablet Take 500 mg by mouth every evening. Yes Provider, Historical   triamcinolone (ARISTOCORT) 0.5 % topical cream Apply  to affected area two (2) times a day. use thin layer 9/7/22  Yes Richard Ellington MD   cinacalcet (SENSIPAR) 30 mg tablet TAKE ONE TABLET BY MOUTH DAILY 7/21/22  Yes Tyler Veloz MD   furosemide (LASIX) 40 mg tablet Take 1 Tablet by mouth daily. Patient taking differently: Take 40 mg by mouth every other day. 6/13/22  Yes Richard Ellington MD   pantoprazole (PROTONIX) 40 mg tablet Take 1 Tablet by mouth daily. 3/17/22  Yes Richard Ellington MD   allopurinoL (ZYLOPRIM) 300 mg tablet Take 1 Tablet by mouth daily. 3/17/22  Yes Richard Ellington MD   metoprolol succinate (TOPROL-XL) 50 mg XL tablet Take 1 Tablet by mouth daily. 3/17/22  Yes Richard Ellington MD   losartan (COZAAR) 100 mg tablet Take 1 Tablet by mouth nightly. 3/17/22  Yes Richard Ellington MD   amitriptyline (ELAVIL) 25 mg tablet Take 1 Tablet by mouth nightly. 3/17/22  Yes Richard Ellington MD   donepeziL (ARICEPT) 10 mg tablet Take 1 Tablet by mouth nightly. 3/17/22  Yes Richard Ellington MD   aspirin 81 mg chewable tablet Take 81 mg by mouth daily. Yes Provider, Historical   PROAIR HFA 90 mcg/actuation inhaler Take 2 Puffs by inhalation four (4) times daily as needed. 10/4/16  Yes Provider, Historical     Allergies   Allergen Reactions    Pcn [Penicillins] Rash     But can take cephalosporins. Allergic to all \"cillins\".     Codeine Other (comments)     Hallucinations, takes hydrocodone at home for pain    Contrast Dye [Iodine] Other (comments)     Not to take due to kidney function    Prednisone Other (comments)     \"makes my pancreas numbers go way up\"      Social History     Tobacco Use    Smoking status: Former     Packs/day: 0.25     Years: 20.00     Pack years: 5.00     Types: Cigarettes     Quit date: 1971     Years since quittin.3    Smokeless tobacco: Never   Substance Use Topics    Alcohol use: No      Family History   Problem Relation Age of Onset    Stroke Mother         brain aneurysm    Hypertension Father     Heart Disease Father     Cancer Sister         breast    Cancer Brother         lung and prostate    Cancer Sister         breast    SLE Other         half siblings (5+) - lupus    Hypertension Daughter     Diabetes Daughter         Objective:    Patient Vitals for the past 24 hrs:   Temp Pulse Resp BP SpO2   22 1128 98.6 °F (37 °C) 60 16 (!) 104/57 96 %   22 0713 -- 64 20 (!) 102/53 100 %   22 0615 98.6 °F (37 °C) 60 17 (!) 101/51 100 %   22 0558 -- 60 16 103/61 100 %   22 0428 -- 60 17 (!) 114/59 100 %   22 0348 -- 60 17 (!) 113/57 100 %   22 0343 -- 60 17 (!) 111/57 100 %   22 0243 -- 64 15 102/64 100 %   22 0158 -- 65 15 107/60 100 %   22 0130 -- 60 14 (!) 105/54 100 %   22 0058 -- 65 16 (!) 102/57 100 %   22 0043 -- -- -- (!) 90/54 --   22 0022 -- -- -- -- 100 %   22 0018 -- -- -- (!) 93/59 100 %   22 2343 97.6 °F (36.4 °C) (!) 52 16 (!) 83/46 96 %     No intake/output data recorded.   Last 3 Recorded Weights in this Encounter    22   Weight: 70.3 kg (155 lb)      Physical Exam:  General:Alert, No distress,   Eyes:No scleral icterus, No conjunctival pallor  Neck:Supple,no mass palpable,no thyromegaly  Lungs:Clears to auscultation Bilaterally, normal respiratory effort  CVS:RRR, S1 S2 normal,  No rub,  Abdomen:Soft, Non tender, No hepatosplenomegaly  Extremities: trace LE edema  Skin:No rash or lesions, Warm and DRY   Psych: Can't assess due to patient's current condition   :  no craft  Musculoskeletal : no redness, no joint tenderness  NEURO: Normal Speech, Non focal        CODE STATUS:  FULL  Care Plan discussed with:  PATIENT, FAMILY, RN     Chart reviewed. Lab and Radiology Data Personally Reviewed: (see below)    Recent Labs     11/23/22  0111 11/21/22  1149   * 138   K 4.8 4.3    105   CO2 18* 21   * 105*   CREA 4.92* 3.79*   * 117*   CA 8.4* 8.0*     Recent Labs     11/23/22  0111   WBC 9.5   HGB 10.6*   HCT 33.8*   *     No results for input(s): FE, TIBC, PSAT, FERR in the last 72 hours.    Lab Results   Component Value Date/Time    Hemoglobin A1c 6.4 (H) 12/19/2016 03:06 PM    Hemoglobin A1c 6.9 (H) 06/14/2016 10:11 AM      Lab Results   Component Value Date/Time    Culture result: NO GROWTH AFTER 7 HOURS 11/23/2022 12:04 AM    Culture result:  10/02/2018 08:41 PM     NO ROUTINE ENTERIC PATHOGENS ISOLATED INCLUDING SALMONELLA, SHIGELLA, YERSINIA, VIBRIO OR E. COLI 0157:H7    Culture result: MARKEDLY REDUCED COLIFORMS NOTED 10/02/2018 08:41 PM     No results found for: MCACR, MCA1, MCA2, MCA3, MCAU, MCAU2, MCALPOCT  Lab Results   Component Value Date/Time    Color DARK YELLOW 11/23/2022 02:48 AM    Appearance CLEAR 11/23/2022 02:48 AM    Specific gravity 1.015 11/23/2022 02:48 AM    pH (UA) 5.0 11/23/2022 02:48 AM    Protein Negative 11/23/2022 02:48 AM    Glucose Negative 11/23/2022 02:48 AM    Ketone Negative 11/23/2022 02:48 AM    Bilirubin Negative 11/23/2022 02:48 AM    Urobilinogen 1.0 11/23/2022 02:48 AM    Nitrites Negative 11/23/2022 02:48 AM    Leukocyte Esterase SMALL (A) 11/23/2022 02:48 AM    Epithelial cells FEW 11/23/2022 02:48 AM    Bacteria 1+ (A) 11/23/2022 02:48 AM    WBC 5-10 11/23/2022 02:48 AM    RBC 0-5 11/23/2022 02:48 AM     Lab Results   Component Value Date/Time    NT pro- 11/23/2022 01:11 AM    NT pro- (H) 11/05/2022 03:45 PM    NT pro- 04/20/2015 10:50 AM     US Results (most recent):  Results from [place] encounter on 12/03/18    US ENDO ENDOSCOPIC ULTRASOUND    Narrative  Portable ultrasound was utilized in the Endoscopy Suite. Impression  IMPRESSION:  Portable ultrasound during endoscopy procedure. Please see endoscopy record for further information. vk     XR CHEST PORT  Narrative: EXAM:  XR CHEST PORT    INDICATION: Chest pain    COMPARISON: 11/5/2022    TECHNIQUE: Portable AP semiupright chest view at 0047 hours    FINDINGS: The left chest ICD and wires are stable. The cardiomediastinal  contours are stable. The pulmonary vasculature is within normal limits. There are mild bibasilar opacities. There is no pleural effusion or  pneumothorax. The bones and upper abdomen are stable. Impression: Mild bibasilar opacities may represent atelectasis, edema, or infection. Prior to Admission Medications   Prescriptions Last Dose Informant Patient Reported? Taking? PROAIR HFA 90 mcg/actuation inhaler 11/22/2022  Yes Yes   Sig: Take 2 Puffs by inhalation four (4) times daily as needed. acetaminophen (TYLENOL) 500 mg tablet 11/22/2022  Yes Yes   Sig: Take 500 mg by mouth every evening. allopurinoL (ZYLOPRIM) 300 mg tablet 11/22/2022  No Yes   Sig: Take 1 Tablet by mouth daily. amitriptyline (ELAVIL) 25 mg tablet 11/22/2022  No Yes   Sig: Take 1 Tablet by mouth nightly. aspirin 81 mg chewable tablet 11/22/2022  Yes Yes   Sig: Take 81 mg by mouth daily. cinacalcet (SENSIPAR) 30 mg tablet 11/22/2022  No Yes   Sig: TAKE ONE TABLET BY MOUTH DAILY   donepeziL (ARICEPT) 10 mg tablet 11/22/2022  No Yes   Sig: Take 1 Tablet by mouth nightly. furosemide (LASIX) 40 mg tablet 11/16/2022  No Yes   Sig: Take 1 Tablet by mouth daily. Patient taking differently: Take 40 mg by mouth every other day.    losartan (COZAAR) 100 mg tablet 11/22/2022  No Yes   Sig: Take 1 Tablet by mouth nightly. metoprolol succinate (TOPROL-XL) 50 mg XL tablet 11/22/2022  No Yes   Sig: Take 1 Tablet by mouth daily. ondansetron (ZOFRAN ODT) 4 mg disintegrating tablet 11/22/2022  No Yes   Sig: Take 1 Tablet by mouth every eight (8) hours as needed for Nausea or Vomiting. pantoprazole (PROTONIX) 40 mg tablet 11/22/2022  No Yes   Sig: Take 1 Tablet by mouth daily. triamcinolone (ARISTOCORT) 0.5 % topical cream 11/22/2022  No Yes   Sig: Apply  to affected area two (2) times a day. use thin layer      Facility-Administered Medications: None         Imaging:    Medications list Personally Reviewed   [x]      Yes     []               No    Thank you for allowing us to participate in the care this patient. We will follow patient with you.   Signed By: Michael Banegas MD  [facility]  [facility] [address], [facility], [address] [place]  Phone - [phone]         Fax - [phone] [facility] Office  [address] [place] [address]  Phone - [phone]        Fax - [phone]

## 2022-11-23 NOTE — ED NOTES
Pt to bedside commode with one person assist; strong enough to stand but unsteady. BM diarrhea again.

## 2022-11-23 NOTE — ED NOTES
Ramila Flynn states receiving nurse Joycelyn Major RN is currently unavailable due to emergency. States she will call me back on [phone] in ED. Given Darleen's extension for direct contact.

## 2022-11-23 NOTE — ED NOTES
POC Chem 8 completed. Data not transferring from [facility] to [facility]. Results printed and included in Progress Notes on Pt's door.

## 2022-11-23 NOTE — H&P
Hospitalist Admission Note    NAME: Jazmine Barrera   :  1935   MRN:  [id number]     Date/Time:  2022 9:50 AM    Patient PCP: Sonia Cabezas MD  ______________________________________________________________________  Given the patient's current clinical presentation, I have a high level of concern for decompensation if discharged from the emergency department. Complex decision making was performed, which includes reviewing the patient's available past medical records, laboratory results, and x-ray films. My assessment of this patient's clinical condition and my plan of care is as follows. Assessment / Plan: BENSON  CKD V  H/o DM and HTN. H/o partial nephrectomy. Gradual worsening of CKD over the past year. Worsening malaise, N/V, and hypotension. Per last note  \"Discussed conservative care with pt/family, vs dialysis, which would be less likely to produce a desirable QOL/life extension at patient's age if pt is progressing towards ESRD. \"  Plan  Admit to floor  Nephrology consulted, recs appreciated  Echo ordered  Maintain SBP >110  Gentle IVF  Palliative care consulted    Hypotension  GIB  H/o Schatzki's ring s/p dilation   Pt reports episodes of hypotension at home with hematochezia noted day prior to admission. She notes right sided abdominal pain and inability to swallow with nausea, vomiting and diarrhea. Plan  Gentle IVF  Hold antihypertensive medications  Hold ASA  Clears today  GI Consulted, recs appreciated  Hb goal >7, pt does not want blood products  Hb stable for now, continue to monitor  PPI  Zofran prn nausea  Consider NM scan if Hb has significant drop or significant GIB    DM  AM HbA1C  DM diet when diet resumed    HTN  Hold antihypertensives and ASA    Asthma  Nebs prn    Code Status: Full  Surrogate Decision Maker: Tramaine Michelle [phone]  Patient's daughter is an MA with Dr. Aroldo Jang at [employer].     DVT Prophylaxis: SCDs  GI Prophylaxis: not indicated    Baseline: Home, independent  RUSTY: 11/28      Subjective:   CHIEF COMPLAINT: Malaise, nausea, decreased po intake    HISTORY OF PRESENT ILLNESS:     Ron Hua is a 82 y.o.  female with PMH of arrhythmia s/p pacemaker, arthritis, asthma, right renal ca s/p resection, h/o DVT, DM, pancreatitis, HTN, MARLEN, depression, CKD, and PUD who presents with nausea and inability to eat. Patient presents with family at bedside to assist with history. Over the weekend it was noted that patient was not able to eat. She would chew food, then spit it out secondary to nausea. She reports she does not have the sensation of a food bolus stuck in her esophagus. She continues to have an appetite. She was seen by her PCP on Monday who recommended Glucerna supplementation and prescribed zofran. She also reports noticing hematuria over the past 2 days. She was informed by the PCP she should follow up with nephrology after some blood work. She was seen by nephrology yesterday and she and her family were very distressed to hear the degree of her CKD has advanced. Medication changes were made and family has been keeping track of her blood pressure. Family specifically requested Dr. Lloyd Briceno/partners on board this admission. The reason for her admission per the patient is due to diarrhea and hematochezia that she has noticed over the past several days. She also endorses generalized body aches, abdominal pain, nausea, and vomiting. Her family reports with any po intake, she has diarrhea with spots of bright red blood. Additionally her family was able to take her blood pressure yesterday noted episodes of hypotension as low as 60/43 verified by two different machines on both arms. Patient denies fever, chills, cough, SOB, CP, urinary frequency, and dysuria.     ED Course  Afebrile HR 52 BP 83/46 RR 16 SpO2 100% on RA  Labs: Hb 10.6, Plt 100, UA with small leukocyte esterase and 1+ bacteria, Na 132, K 4.8, Cr 4.92   Imaging: EKG with atrial paced rhythm, CXR: mild bibasilar opacities atelectasis vs edema vs infection  S/p IVF    We were asked to admit for work up and evaluation of the above problems.      Past Medical History:   Diagnosis Date    Adverse effect of anesthesia     passed out during EGD/stopped breathing    Arrhythmia     has pacemaker for low HR    Arthritis     Asthma     Cancer ([facility] [address]     right kidney - surgery    Chronic pain     right side    Deep vein thrombosis (DVT) during current hospitalization ([facility] [address]     history of DVT was on coumadin in the past    Diabetes ([facility] [address]     diet controlled    GERD (gastroesophageal reflux disease)     H/O acute pancreatitis     Hiatal hernia     Hypertension     MARLEN (obstructive sleep apnea)     does not use CPAP/pt states she has not used since a pacemaker inserted    Other ill-defined conditions(799.89)     lymph node enlargement - left chest - benign bx - watching    Other ill-defined conditions(799.89)     wheezes    Psychiatric disorder     depression    PUD (peptic ulcer disease)     hx of    Thromboembolus ([facility] [address]     Unspecified adverse effect of anesthesia     passed out during EGD per pt - stopped breathing per pt per MD        Past Surgical History:   Procedure Laterality Date    HX APPENDECTOMY      HX CHOLECYSTECTOMY      HX GYN          HX HEENT Right     laser eye surgery to stop bleeding in back of eye - multiple laser surgeries    HX KNEE ARTHROSCOPY      left knee; cartilage repair    HX ORTHOPAEDIC      right hip replacement    HX ORTHOPAEDIC      lower back surgery    HX ORTHOPAEDIC      straighten toe - 2nd toe on right    HX ORTHOPAEDIC Bilateral     carpal tunnel release    HX PACEMAKER      not defibrillator    HX UROLOGICAL      implant in hip to control urine and then removed    HX UROLOGICAL      Right kidney partial nephrectomy       Social History     Tobacco Use    Smoking status: Former     Packs/day: 0.25     Years: 20.00     Pack years: 5.00     Types: Cigarettes     Quit date: 1971     Years since quittin.3    Smokeless tobacco: Never   Substance Use Topics    Alcohol use: No        Family History   Problem Relation Age of Onset    Stroke Mother         brain aneurysm    Hypertension Father     Heart Disease Father     Cancer Sister         breast    Cancer Brother         lung and prostate    Cancer Sister         breast    SLE Other         half siblings (5+) - lupus    Hypertension Daughter     Diabetes Daughter      Allergies   Allergen Reactions    Pcn [Penicillins] Rash     But can take cephalosporins. Allergic to all \"cillins\". Codeine Other (comments)     Hallucinations, takes hydrocodone at home for pain    Contrast Dye [Iodine] Other (comments)     Not to take due to kidney function    Prednisone Other (comments)     \"makes my pancreas numbers go way up\"        Prior to Admission medications    Medication Sig Start Date End Date Taking? Authorizing Provider   ondansetron (ZOFRAN ODT) 4 mg disintegrating tablet Take 1 Tablet by mouth every eight (8) hours as needed for Nausea or Vomiting. 22  Yes Virgen Lynch MD   acetaminophen (TYLENOL) 500 mg tablet Take 500 mg by mouth every evening. Yes Provider, Historical   triamcinolone (ARISTOCORT) 0.5 % topical cream Apply  to affected area two (2) times a day. use thin layer 22  Yes Argenis Denson MD   cinacalcet (SENSIPAR) 30 mg tablet TAKE ONE TABLET BY MOUTH DAILY 22  Yes Lauro Roach MD   furosemide (LASIX) 40 mg tablet Take 1 Tablet by mouth daily. Patient taking differently: Take 40 mg by mouth every other day. 22  Yes Argenis Denson MD   pantoprazole (PROTONIX) 40 mg tablet Take 1 Tablet by mouth daily. 3/17/22  Yes Argenis Denson MD   allopurinoL (ZYLOPRIM) 300 mg tablet Take 1 Tablet by mouth daily.  3/17/22  Yes Argenis Denson MD   metoprolol succinate (TOPROL-XL) 50 mg XL tablet Take 1 Tablet by mouth daily. 3/17/22  Yes Aileen Talamantes MD   losartan (COZAAR) 100 mg tablet Take 1 Tablet by mouth nightly. 3/17/22  Yes Aileen Talamantes MD   amitriptyline (ELAVIL) 25 mg tablet Take 1 Tablet by mouth nightly. 3/17/22  Yes Aileen Talamantes MD   donepeziL (ARICEPT) 10 mg tablet Take 1 Tablet by mouth nightly. 3/17/22  Yes Aileen Talamantes MD   aspirin 81 mg chewable tablet Take 81 mg by mouth daily. Yes Provider, Historical   PROAIR HFA 90 mcg/actuation inhaler Take 2 Puffs by inhalation four (4) times daily as needed. 10/4/16  Yes Provider, Historical       REVIEW OF SYSTEMS:     I am not able to complete the review of systems because:    The patient is intubated and sedated    The patient has altered mental status due to his acute medical problems    The patient has baseline aphasia from prior stroke(s)    The patient has baseline dementia and is not reliable historian    The patient is in acute medical distress and unable to provide information           Total of 12 systems reviewed as follows:       POSITIVE= underlined text  Negative = text not underlined  General:  fever, chills, sweats, generalized weakness, weight loss/gain,      loss of appetite   Eyes:    blurred vision, eye pain, loss of vision, double vision  ENT:    rhinorrhea, pharyngitis   Respiratory:   cough, sputum production, SOB, LORENZO, wheezing, pleuritic pain   Cardiology:   chest pain, palpitations, orthopnea, PND, edema, syncope   Gastrointestinal:  abdominal pain , N/V, diarrhea, dysphagia, constipation, bleeding   Genitourinary:  frequency, urgency, dysuria, hematuria, incontinence   Muskuloskeletal :  arthralgia, myalgia, back pain  Hematology:  easy bruising, nose or gum bleeding, lymphadenopathy   Dermatological: rash, ulceration, pruritis, color change / jaundice  Endocrine:   hot flashes or polydipsia   Neurological:  headache, dizziness, confusion, focal weakness, paresthesia,     Speech difficulties, memory loss, gait difficulty  Psychological: Feelings of anxiety, depression, agitation    Objective:   VITALS:    Visit Vitals  BP (!) 102/53   Pulse 64   Temp 98.6 °F (37 °C)   Resp 20   Ht 5' 6\" (1.676 m)   Wt 70.3 kg (155 lb)   SpO2 100%   BMI 25.02 kg/m²       PHYSICAL EXAM:    General:    Alert, cooperative, no distress, appears stated age. HEENT: Atraumatic, anicteric sclerae, pink conjunctivae     No oral ulcers, mucosa moist, throat clear, dentition fair  Neck:  Supple, symmetrical,  thyroid: non tender  Lungs:   Clear to auscultation bilaterally. No Wheezing or Rhonchi. No rales. Chest wall:  No tenderness  No Accessory muscle use. Heart:   Regular  rhythm,  Murmur   No edema  Abdomen:   Soft. Not distended. Bowel sounds normal. Right side of abdomen diffusely tender. Toure's negative. Extremities: No cyanosis. No clubbing,      Skin turgor normal, Capillary refill normal, Radial dial pulse 2+  Skin:     Not pale. Not Jaundiced  No rashes   Psych:  Good insight. Not depressed. Not anxious or agitated. Neurologic: EOMs intact. No facial asymmetry. No aphasia or slurred speech. Symmetrical strength, Sensation grossly intact.  Alert and oriented X 4.     _______________________________________________________________________  Care Plan discussed with:    Comments   Patient x    Family  x    RN x    Care Manager                    Consultant:      _______________________________________________________________________  Expected  Disposition:   Home with Family    HH/PT/OT/RN x   SNF/LTC    MINGO    ________________________________________________________________________  TOTAL TIME:  54 Minutes    Critical Care Provided     Minutes non procedure based      Comments    x Reviewed previous records   >50% of visit spent in counseling and coordination of care x Discussion with patient and/or family and questions answered       ________________________________________________________________________  Signed: Casie Reddy Kelsy Bernal MD    Procedures: see electronic medical records for all procedures/Xrays and details which were not copied into this note but were reviewed prior to creation of Plan.     LAB DATA REVIEWED:    Recent Results (from the past 24 hour(s))   EKG, 12 LEAD, INITIAL    Collection Time: 11/22/22 11:55 PM   Result Value Ref Range    Ventricular Rate 64 BPM    Atrial Rate 64 BPM    P-R Interval 222 ms    QRS Duration 86 ms    Q-T Interval 464 ms    QTC Calculation (Bezet) 478 ms    Calculated P Axis 23 degrees    Calculated R Axis -17 degrees    Calculated T Axis 109 degrees    Diagnosis       Atrial-paced rhythm with prolonged AV conduction  Left ventricular hypertrophy with repolarization abnormality  When compared with ECG of 05-NOV-2022 15:39,  No significant change was found     CULTURE, BLOOD, PAIRED    Collection Time: 11/23/22 12:04 AM    Specimen: Blood   Result Value Ref Range    Special Requests: NO SPECIAL REQUESTS      Culture result: NO GROWTH AFTER 7 HOURS     TROPONIN-HIGH SENSITIVITY    Collection Time: 11/23/22 12:05 AM   Result Value Ref Range    Troponin-High Sensitivity 20 0 - 51 ng/L   POC LACTIC ACID    Collection Time: 11/23/22 12:27 AM   Result Value Ref Range    Lactic Acid (POC) 1.21 0.40 - 2.00 mmol/L   LACTIC ACID    Collection Time: 11/23/22  1:11 AM   Result Value Ref Range    Lactic acid 1.2 0.4 - 2.0 MMOL/L   CBC WITH AUTOMATED DIFF    Collection Time: 11/23/22  1:11 AM   Result Value Ref Range    WBC 9.5 3.6 - 11.0 K/uL    RBC 3.89 3.80 - 5.20 M/uL    HGB 10.6 (L) 11.5 - 16.0 g/dL    HCT 33.8 (L) 35.0 - 47.0 %    MCV 86.9 80.0 - 99.0 FL    MCH 27.2 26.0 - 34.0 PG    MCHC 31.4 30.0 - 36.5 g/dL    RDW 18.4 (H) 11.5 - 14.5 %    PLATELET 817 (L) 399 - 400 K/uL    MPV ABNORMAL 8.9 - 12.9 FL    NRBC 0.0 0  WBC    ABSOLUTE NRBC 0.00 0.00 - 0.01 K/uL    NEUTROPHILS 78 (H) 32 - 75 %    LYMPHOCYTES 9 (L) 12 - 49 %    MONOCYTES 12 5 - 13 %    EOSINOPHILS 0 0 - 7 %    BASOPHILS 0 0 - 1 % IMMATURE GRANULOCYTES 1 (H) 0.0 - 0.5 %    ABS. NEUTROPHILS 7.5 1.8 - 8.0 K/UL    ABS. LYMPHOCYTES 0.8 0.8 - 3.5 K/UL    ABS. MONOCYTES 1.2 (H) 0.0 - 1.0 K/UL    ABS. EOSINOPHILS 0.0 0.0 - 0.4 K/UL    ABS. BASOPHILS 0.0 0.0 - 0.1 K/UL    ABS. IMM. GRANS. 0.1 (H) 0.00 - 0.04 K/UL    DF AUTOMATED     METABOLIC PANEL, COMPREHENSIVE    Collection Time: 11/23/22  1:11 AM   Result Value Ref Range    Sodium 132 (L) 136 - 145 mmol/L    Potassium 4.8 3.5 - 5.1 mmol/L    Chloride 104 97 - 108 mmol/L    CO2 18 (L) 21 - 32 mmol/L    Anion gap 10 5 - 15 mmol/L    Glucose 171 (H) 65 - 100 mg/dL     (H) 6 - 20 MG/DL    Creatinine 4.92 (H) 0.55 - 1.02 MG/DL    BUN/Creatinine ratio 24 (H) 12 - 20      eGFR 8 (L) >60 ml/min/1.73m2    Calcium 8.4 (L) 8.5 - 10.1 MG/DL    Bilirubin, total 0.5 0.2 - 1.0 MG/DL    ALT (SGPT) 14 12 - 78 U/L    AST (SGOT) 26 15 - 37 U/L    Alk. phosphatase 92 45 - 117 U/L    Protein, total 6.9 6.4 - 8.2 g/dL    Albumin 3.1 (L) 3.5 - 5.0 g/dL    Globulin 3.8 2.0 - 4.0 g/dL    A-G Ratio 0.8 (L) 1.1 - 2.2     NT-PRO BNP    Collection Time: 11/23/22  1:11 AM   Result Value Ref Range    NT pro- <450 PG/ML   SAMPLES BEING HELD    Collection Time: 11/23/22  1:11 AM   Result Value Ref Range    SAMPLES BEING HELD RED,PST,BLUE     COMMENT        Add-on orders for these samples will be processed based on acceptable specimen integrity and analyte stability, which may vary by analyte.    BLOOD GAS,CHEM8,LACTIC ACID POC    Collection Time: 11/23/22  1:18 AM   Result Value Ref Range    Calcium, ionized (POC) 1.07 (L) 1.12 - 1.32 mmol/L    BICARBONATE 19 mmol/L    Base deficit (POC) 8.1 mmol/L    Sample source VENOUS BLOOD      CO2, POC 18 (L) 19 - 24 MMOL/L    Sodium,  (L) 136 - 145 MMOL/L    Potassium, POC 5.7 (H) 3.5 - 5.5 MMOL/L    Chloride,  100 - 108 MMOL/L    Glucose,  (H) 74 - 106 MG/DL    Creatinine, POC 5.0 (H) 0.6 - 1.3 MG/DL    Lactic Acid (POC) 1.28 0.40 - 2.00 mmol/L Critical value read back ANAMARIA     pH, venous (POC) 7.25 (L) 7.32 - 7.42      pCO2, venous (POC) 43.2 41 - 51 MMHG    pO2, venous (POC) 24 (L) 25 - 40 mmHg   URINALYSIS W/ REFLEX CULTURE    Collection Time: 11/23/22  2:48 AM    Specimen: Urine   Result Value Ref Range    Color DARK YELLOW      Appearance CLEAR CLEAR      Specific gravity 1.015      pH (UA) 5.0 5.0 - 8.0      Protein Negative NEG mg/dL    Glucose Negative NEG mg/dL    Ketone Negative NEG mg/dL    Bilirubin Negative NEG      Blood Negative NEG      Urobilinogen 1.0 0.2 - 1.0 EU/dL    Nitrites Negative NEG      Leukocyte Esterase SMALL (A) NEG      WBC 5-10 0 - 4 /hpf    RBC 0-5 0 - 5 /hpf    Epithelial cells FEW FEW /lpf    Bacteria 1+ (A) NEG /hpf    UA:UC IF INDICATED CULTURE NOT INDICATED BY UA RESULT CNI      CA Oxalate crystals 1+ (A) NEG

## 2022-11-23 NOTE — ED NOTES
This RN attempted to call report again. Called . It rang and then went to busy signal. Called again and it rang without answer. Called  and spoke directly with Tracy Redd who stated she is \"in a bind with three other patients and will have to call back. \"

## 2022-11-23 NOTE — ED NOTES
Patient resting in semi-fowlers, AOx4, vital signs stable, no acute distress noted and denies onset of new signs/symptoms. Pt has not been up to bedside commode as much in last 120 minutes Side rails x2, call light on lap, family at bedside, indicates no needs at this time.

## 2022-11-23 NOTE — CONSULTS
Gastroenterology Consult  ([place], [place] for Dr. Ileana Degroot)     Referring Physician: Dr. Tung Leon Date: 11/23/2022     Subjective:     Chief Complaint: low BP    History of Present Illness: Surya Edouard is a 82 y.o. female with CKD, DM, PUD and dementia who is seen in consultation for poor appetite, abd pain, diarrhea, rectal bleeding. She was sent to the ER due to hypotension. BP on arrival 83/46. Improved to 102/53 without pressors. History is obtained from daughter. She has had a poor appetite and was advised to drink Glucerna. Since then she has had diarrhea. Has a stool every time she \"eats\" or drinks Glucerna. She is lactose intolerant and Glucerna's ingredients state that it contains milk. Reports there has been a \"little bit\" of BRB in her stool. She recently took an antibiotic for bronchitis. She has had upper abd discomfort. No N/V. No NSAID use. No fevers. All of her GI sxs started a few days ago. WBC normal.  Hgb at baseline. Crt increasing. LFT's normal.  Lactic acid normal.  Is s/p CCY. GI Hx significant for pelvic floor dyssynergia, benign pancreatic cyst, hiatal hernia, Schatzi's ring, gastric ulcer.      Past Medical History:   Diagnosis Date    Adverse effect of anesthesia     passed out during EGD/stopped breathing    Arrhythmia     has pacemaker for low HR    Arthritis     Asthma     Cancer ([address]     right kidney - surgery    Chronic pain     right side    Deep vein thrombosis (DVT) during current hospitalization ([address]     history of DVT was on coumadin in the past    Diabetes ([address]     diet controlled    GERD (gastroesophageal reflux disease)     H/O acute pancreatitis     Hiatal hernia     Hypertension     MARLEN (obstructive sleep apnea)     does not use CPAP/pt states she has not used since a pacemaker inserted    Other ill-defined conditions(799.89)     lymph node enlargement - left chest - benign bx - watching    Other ill-defined conditions(799.89)     wheezes    Psychiatric disorder     depression    PUD (peptic ulcer disease)     hx of    Thromboembolus ([facility]     Unspecified adverse effect of anesthesia     passed out during EGD per pt - stopped breathing per pt per MD     Past Surgical History:   Procedure Laterality Date    HX APPENDECTOMY      HX CHOLECYSTECTOMY      HX GYN          HX HEENT Right     laser eye surgery to stop bleeding in back of eye - multiple laser surgeries    HX KNEE ARTHROSCOPY      left knee; cartilage repair    HX ORTHOPAEDIC      right hip replacement    HX ORTHOPAEDIC      lower back surgery    HX ORTHOPAEDIC      straighten toe - 2nd toe on right    HX ORTHOPAEDIC Bilateral     carpal tunnel release    HX PACEMAKER      not defibrillator    HX UROLOGICAL      implant in hip to control urine and then removed    HX UROLOGICAL      Right kidney partial nephrectomy      Family History   Problem Relation Age of Onset    Stroke Mother         brain aneurysm    Hypertension Father     Heart Disease Father     Cancer Sister         breast    Cancer Brother         lung and prostate    Cancer Sister         breast    SLE Other         half siblings (5+) - lupus    Hypertension Daughter     Diabetes Daughter      Social History     Tobacco Use    Smoking status: Former     Packs/day: 0.25     Years: 20.00     Pack years: 5.00     Types: Cigarettes     Quit date: 1971     Years since quittin.3    Smokeless tobacco: Never   Substance Use Topics    Alcohol use: No      Allergies   Allergen Reactions    Pcn [Penicillins] Rash     But can take cephalosporins. Allergic to all \"cillins\".     Codeine Other (comments)     Hallucinations, takes hydrocodone at home for pain    Contrast Dye [Iodine] Other (comments)     Not to take due to kidney function    Prednisone Other (comments)     \"makes my pancreas numbers go way up\"     Current Facility-Administered Medications   Medication Dose Route Frequency    0.9% sodium chloride infusion  125 mL/hr IntraVENous CONTINUOUS    allopurinoL (ZYLOPRIM) tablet 300 mg  300 mg Oral DAILY    amitriptyline (ELAVIL) tablet 25 mg  25 mg Oral QHS    aspirin chewable tablet 81 mg  81 mg Oral DAILY    cinacalcet (SENSIPAR) tablet 30 mg  30 mg Oral DAILY    donepeziL (ARICEPT) tablet 10 mg  10 mg Oral QHS    furosemide (LASIX) tablet 40 mg  40 mg Oral EVERY OTHER DAY    metoprolol succinate (TOPROL-XL) XL tablet 50 mg  50 mg Oral DAILY    ondansetron (ZOFRAN ODT) tablet 4 mg  4 mg Oral Q8H PRN    pantoprazole (PROTONIX) tablet 40 mg  40 mg Oral DAILY    albuterol (PROVENTIL VENTOLIN) nebulizer solution 2.5 mg  2.5 mg Nebulization Q6H PRN    triamcinolone acetonide (KENALOG) 0.025 % cream   Topical BID    losartan (COZAAR) tablet 100 mg  100 mg Oral QHS    acetaminophen (TYLENOL) tablet 650 mg  650 mg Oral Q6H PRN    polyethylene glycol (MIRALAX) packet 17 g  17 g Oral DAILY PRN        Review of Systems:  A detailed review of systems was performed as follows:  Constitutional:  Negative  Eyes:  No ocular sensitivity to the sun, blurred vision or double vision. ENMT:  No nose or sinus problems. Respiratory: No coughing, wheezing or sob  Cardiac:  No chest pain, exertional chest pain or palpitations  Gastrointestinal:  See history of the present illness  :   +CKD  Musculoskeletal:  No arthritis or hot swollen joints. Endocrine:  +diabetes  Psychiatric: No depression or feeling blue  Integumentary:  No skin rash or sensitivity to the sun. Neurologic:  +dementia  Heme-Lymphatic:  +chronic anemia      Objective:     Physical Exam:  Visit Vitals  BP (!) 102/53   Pulse 64   Temp 98.6 °F (37 °C)   Resp 20   Ht 5' 6\" (1.676 m)   Wt 70.3 kg (155 lb)   SpO2 100%   BMI 25.02 kg/m²        Gen: elderly bf in nad, family at bedside  Skin:  Extremities and face reveal no rashes. HEENT: Sclerae anicteric. No abnormal pigmentation of the lips. Cardiovascular: Regular rate and rhythm.  No murmurs, gallops, or rubs. Respiratory:  Comfortable breathing with no accessory muscle use. Clear breath sounds with no wheezes, rales, or rhonchi. GI:  Abdomen nondistended, soft. Normal active bowel sounds. Mild to moderate tenderness in epigastrium and RUQ. No guarding or rebounding. No enlargement of the liver or spleen. No masses palpable. Rectal:  Deferred  Musculoskeletal:  No pitting edema of the lower legs. Neurological:  Memory is impaired  Psychiatric:  Mood appears appropriate with judgement intact. Lymphatic:  No cervical or supraclavicular adenopathy. Lab/Data Review:  CMP:   Lab Results   Component Value Date/Time     (L) 11/23/2022 01:11 AM    K 4.8 11/23/2022 01:11 AM     11/23/2022 01:11 AM    CO2 18 (L) 11/23/2022 01:11 AM    AGAP 10 11/23/2022 01:11 AM     (H) 11/23/2022 01:11 AM     (H) 11/23/2022 01:11 AM    CREA 4.92 (H) 11/23/2022 01:11 AM    CA 8.4 (L) 11/23/2022 01:11 AM    ALB 3.1 (L) 11/23/2022 01:11 AM    TP 6.9 11/23/2022 01:11 AM    GLOB 3.8 11/23/2022 01:11 AM    AGRAT 0.8 (L) 11/23/2022 01:11 AM    ALT 14 11/23/2022 01:11 AM     CBC:   Lab Results   Component Value Date/Time    WBC 9.5 11/23/2022 01:11 AM    HGB 10.6 (L) 11/23/2022 01:11 AM    HCT 33.8 (L) 11/23/2022 01:11 AM     (L) 11/23/2022 01:11 AM         Assessment/Plan:     Hypotension  CKD  Diarrhea  Rectal bleeding  Abd pain  Poor appetite     81 y/o female referred to the ER for hypotension and admitted. She has worsening kidney failure. Was told she is too weak for dialysis. We are consulted for diarrhea, rectal bleeding, abd pain and poor appetite. All of her sxs started a few days ago when she started drinking Glucerna. The family wonders if the diarrhea is from lactose intolerance as the Glucerna label states that it contains milk. I suggest a non contrast CT abd/pelvis and stool studies for further evaluation.   We need to rule out infectious diarrhea, especially given her hx of recent antibiotics, and colitis as she's had hypotension and at risk for ischemic colitis.     THALIA Ruiz  11/23/22  10:45 AM

## 2022-11-24 LAB
ALBUMIN SERPL-MCNC: 2.5 G/DL (ref 3.5–5)
ANION GAP SERPL CALC-SCNC: 9 MMOL/L (ref 5–15)
BUN SERPL-MCNC: 105 MG/DL (ref 6–20)
BUN/CREAT SERPL: 29 (ref 12–20)
CALCIUM SERPL-MCNC: 7.9 MG/DL (ref 8.5–10.1)
CHLORIDE SERPL-SCNC: 110 MMOL/L (ref 97–108)
CO2 SERPL-SCNC: 19 MMOL/L (ref 21–32)
CREAT SERPL-MCNC: 3.59 MG/DL (ref 0.55–1.02)
ERYTHROCYTE [DISTWIDTH] IN BLOOD BY AUTOMATED COUNT: 17.9 % (ref 11.5–14.5)
EST. AVERAGE GLUCOSE BLD GHB EST-MCNC: 111 MG/DL
GLUCOSE SERPL-MCNC: 68 MG/DL (ref 65–100)
HBA1C MFR BLD: 5.5 % (ref 4–5.6)
HCT VFR BLD AUTO: 28.4 % (ref 35–47)
HGB BLD-MCNC: 9.1 G/DL (ref 11.5–16)
MCH RBC QN AUTO: 27.3 PG (ref 26–34)
MCHC RBC AUTO-ENTMCNC: 32 G/DL (ref 30–36.5)
MCV RBC AUTO: 85.3 FL (ref 80–99)
NRBC # BLD: 0 K/UL (ref 0–0.01)
NRBC BLD-RTO: 0 PER 100 WBC
PHOSPHATE SERPL-MCNC: 3.3 MG/DL (ref 2.6–4.7)
PLATELET # BLD AUTO: 81 K/UL (ref 150–400)
PMV BLD AUTO: ABNORMAL FL (ref 8.9–12.9)
POTASSIUM SERPL-SCNC: 4.3 MMOL/L (ref 3.5–5.1)
RBC # BLD AUTO: 3.33 M/UL (ref 3.8–5.2)
SODIUM SERPL-SCNC: 138 MMOL/L (ref 136–145)
WBC # BLD AUTO: 6.9 K/UL (ref 3.6–11)

## 2022-11-24 PROCEDURE — 65270000046 HC RM TELEMETRY

## 2022-11-24 PROCEDURE — 74011250636 HC RX REV CODE- 250/636: Performed by: STUDENT IN AN ORGANIZED HEALTH CARE EDUCATION/TRAINING PROGRAM

## 2022-11-24 PROCEDURE — 74011250637 HC RX REV CODE- 250/637: Performed by: STUDENT IN AN ORGANIZED HEALTH CARE EDUCATION/TRAINING PROGRAM

## 2022-11-24 PROCEDURE — 36415 COLL VENOUS BLD VENIPUNCTURE: CPT

## 2022-11-24 PROCEDURE — 85027 COMPLETE CBC AUTOMATED: CPT

## 2022-11-24 PROCEDURE — 80069 RENAL FUNCTION PANEL: CPT

## 2022-11-24 PROCEDURE — 83036 HEMOGLOBIN GLYCOSYLATED A1C: CPT

## 2022-11-24 RX ADMIN — SODIUM CHLORIDE 125 ML/HR: 9 INJECTION, SOLUTION INTRAVENOUS at 17:25

## 2022-11-24 RX ADMIN — PANTOPRAZOLE SODIUM 40 MG: 40 TABLET, DELAYED RELEASE ORAL at 08:42

## 2022-11-24 RX ADMIN — SODIUM CHLORIDE 125 ML/HR: 9 INJECTION, SOLUTION INTRAVENOUS at 01:26

## 2022-11-24 RX ADMIN — DONEPEZIL HYDROCHLORIDE 10 MG: 5 TABLET, FILM COATED ORAL at 21:33

## 2022-11-24 RX ADMIN — SODIUM CHLORIDE 125 ML/HR: 9 INJECTION, SOLUTION INTRAVENOUS at 08:42

## 2022-11-24 RX ADMIN — CINACALCET 30 MG: 30 TABLET, FILM COATED ORAL at 08:42

## 2022-11-24 NOTE — PROGRESS NOTES
Saida of Shift Note    Bedside shift change report given to  Hernandez Mas RN(oncoming nurse) by Jennifer Saleem (offgoing nurse). Report included the following information SBAR, Kardex, Intake/Output, MAR, and Recent Results    Shift worked:    0770-2564     Shift summary and any significant changes:     Patient remains weak has been up to the bathroom with minimal assistance. No c/o of pain or discomfort noted. Family is at the bedside. Concerns for physician to address:  CT scan/Barium swallow       Zone phone for oncoming shift:     [phone]       Activity:  Activity Level: Up with Assistance  Number times ambulated in hallways past shift: 0  Number of times OOB to chair past shift: 0    Cardiac:   Cardiac Monitoring: Yes           Access:  Current line(s): PIV     Genitourinary:   Urinary status: voiding    Respiratory:   O2 Device: None (Room air)  Chronic home O2 use?: NO  Incentive spirometer at bedside: NO       GI:  Last Bowel Movement Date: 11/23/22  Current diet:  ADULT DIET Clear Liquid  Passing flatus: YES  Tolerating current diet: YES       Pain Management:   Patient states pain is manageable on current regimen: YES    Skin:  Anson Score: 20  Interventions: increase time out of bed and nutritional support     Patient Safety:  Fall Score:  Total Score: 3  Interventions: bed/chair alarm, assistive device (walker, cane, etc), gripper socks, pt to call before getting OOB, and stay with me (per policy)  High Fall Risk: Yes    Length of Stay:  Expected LOS: - - -  Actual LOS: Bushra 1466 LPN

## 2022-11-24 NOTE — PROGRESS NOTES
End of Shift Note    Bedside shift change report given to BRAXTON Odom(oncoming nurse) by Santiago Siu RN (offgoing nurse). Report included the following information SBAR    Shift worked:  1618-5620     Shift summary and any significant changes:        Concerns for physician to address:       Zone phone for oncoming shift:       Activity:  Activity Level: Up with Assistance  Number times ambulated in hallways past shift: 0  Number of times OOB to chair past shift: 0    Cardiac:   Cardiac Monitoring: Yes           Access:  Current line(s): PIV     Genitourinary:   Urinary status: voiding    Respiratory:   O2 Device: None (Room air)  Chronic home O2 use?: NO  Incentive spirometer at bedside: NO       GI:  Last Bowel Movement Date: 11/24/22  Current diet:  ADULT DIET Dysphagia - Soft & Bite Sized  Passing flatus: YES  Tolerating current diet: YES       Pain Management:   Patient states pain is manageable on current regimen: Yes     Skin:  Anson Score: 20  Interventions: float heels    Patient Safety:  Fall Score:  Total Score: 3  Interventions: gripper socks  High Fall Risk: Yes    Length of Stay:  Expected LOS: - - -  Actual LOS: 1      Santiago Siu, RN

## 2022-11-24 NOTE — PROGRESS NOTES
End of Shift Note    Bedside shift change report given to Sara (oncoming nurse) by Billie Shah RN (offgoing nurse). Report included the following information SBAR    Shift worked:  7264-9555     Shift summary and any significant changes:     Pt. Has had family present in the room during the day. Education provided throughout day for family and patient. No c/o pain or distress. Concerns for physician to address:  no     Zone phone for oncoming shift:       Activity:  Activity Level: Dangle Side of Bed, Ambulate X (#)  Number times ambulated in hallways past shift: 0  Number of times OOB to chair past shift: 0    Cardiac:   Cardiac Monitoring: Yes           Access:  Current line(s): PIV     Genitourinary:   Urinary status: voiding    Respiratory:   O2 Device: None (Room air)  Chronic home O2 use?: NO  Incentive spirometer at bedside: NO       GI:  Last Bowel Movement Date: 11/23/22  Current diet:  ADULT DIET Clear Liquid  Passing flatus: YES  Tolerating current diet: YES       Pain Management:   Patient states pain is manageable on current regimen: Yes     Skin:  Anson Score: 20  Interventions: float heels    Patient Safety:  Fall Score:  Total Score: 3  Interventions: gripper socks  High Fall Risk: Yes    Length of Stay:  Expected LOS: - - -  Actual LOS: 0      Billie Shah RN

## 2022-11-24 NOTE — PROGRESS NOTES
Problem: Falls - Risk of  Goal: *Absence of Falls  Description: Document Trip Hindss Fall Risk and appropriate interventions in the flowsheet.   Outcome: Progressing Towards Goal  Note: Fall Risk Interventions:  Mobility Interventions: Communicate number of staff needed for ambulation/transfer, Bed/chair exit alarm         Medication Interventions: Evaluate medications/consider consulting pharmacy, Patient to call before getting OOB    Elimination Interventions: Elevated toilet seat, Call light in reach              Problem: Patient Education: Go to Patient Education Activity  Goal: Patient/Family Education  Outcome: Progressing Towards Goal     Problem: Patient Education:  Go to Education Activity  Goal: Patient/Family Education  Outcome: Progressing Towards Goal

## 2022-11-24 NOTE — PROGRESS NOTES
Nephrology Progress Note  Ron Hua  Date of Admission : 11/22/2022    CC:  Follow up for ROLY on CKD       Assessment and Plan     Roly- volume depletion vs possible ATN from hypotension, U/S neg  Hyperkalmia  Hyponatremia likely due to pre renal etiology  Ckd due to DM AND HTN  Ckd 4 - cr 2.5 in may 2022 f/b nephrologist in [place]  Hypotension  H/o partial nephrectomy-left  H/o hypertension  Anemia of ckd  H/o renal cancer  Sec. Hyperpara   H/o hypercalcemia  Diarrhea    Plan:  Cont IVF for now  Hold BP meds and diuretics  Daily labs and strict I/Os  No need for RRT at this time       Interval History:  Seen and examined. Cr better. UOP not all recorded. Feeling better. Asking for food. No cp, sob, n/v reported at this time. Current Medications: all current  Medications have been eviewed in EPIC  Review of Systems: Pertinent items are noted in HPI. Objective:  Vitals:    Vitals:    11/23/22 1950 11/23/22 2258 11/24/22 0409 11/24/22 0838   BP: (!) 116/55 (!) 94/48 (!) 97/54 (!) 105/53   Pulse: 61 60 60 67   Resp: 16 16 16 16   Temp: 97.9 °F (36.6 °C) 98.8 °F (37.1 °C) 98.4 °F (36.9 °C) 98.4 °F (36.9 °C)   SpO2: 99% 96% 95% 98%   Weight:       Height:         Intake and Output:  No intake/output data recorded. 11/22 1901 - 11/24 0700  In: 1020 [P.O.:120; I.V.:900]  Out: 200 [Urine:200]    Physical Examination:    General: NAD,Conversant   Neck:  Supple, no mass  Resp:  Lungs CTA B/L, no wheezing , normal respiratory effort  CV:  RRR,  no murmur or rub,no LE edema  GI:  Soft, NT, + Bowel sounds, no hepatosplenomegaly  Neurologic:  Non focal  Psych:             AAO x 3 appropriate affect   Skin:  No Rash    []    High complexity decision making was performed  []    Patient is at high-risk of decompensation with multiple organ involvement    Lab Data Personally Reviewed: I have reviewed all the pertinent labs, microbiology data and radiology studies during assessment.     Recent Labs 11/24/22  0243 11/23/22  0111 11/21/22  1149    132* 138   K 4.3 4.8 4.3   * 104 105   CO2 19* 18* 21   GLU 68 171* 117*   * 119* 105*   CREA 3.59* 4.92* 3.79*   CA 7.9* 8.4* 8.0*   PHOS 3.3  --   --    ALB 2.5* 3.1* 3.5   ALT  --  14 15     Recent Labs     11/24/22 0243 11/23/22  0111   WBC 6.9 9.5   HGB 9.1* 10.6*   HCT 28.4* 33.8*   PLT 81* 101*     Lab Results   Component Value Date/Time    Specimen Description: URINE 02/23/2014 01:02 PM    Specimen Description: BLOOD 04/09/2010 07:31 PM    Specimen Description: BLOOD 04/09/2010 07:31 PM     Lab Results   Component Value Date/Time    Culture result: NO GROWTH 1 DAY 11/23/2022 12:04 AM    Culture result:  10/02/2018 08:41 PM     NO ROUTINE ENTERIC PATHOGENS ISOLATED INCLUDING SALMONELLA, SHIGELLA, YERSINIA, VIBRIO OR E. COLI 0157:H7    Culture result: MARKEDLY REDUCED COLIFORMS NOTED 10/02/2018 08:41 PM     Recent Results (from the past 24 hour(s))   CBC W/O DIFF    Collection Time: 11/24/22  2:43 AM   Result Value Ref Range    WBC 6.9 3.6 - 11.0 K/uL    RBC 3.33 (L) 3.80 - 5.20 M/uL    HGB 9.1 (L) 11.5 - 16.0 g/dL    HCT 28.4 (L) 35.0 - 47.0 %    MCV 85.3 80.0 - 99.0 FL    MCH 27.3 26.0 - 34.0 PG    MCHC 32.0 30.0 - 36.5 g/dL    RDW 17.9 (H) 11.5 - 14.5 %    PLATELET 81 (L) 478 - 400 K/uL    MPV ABNORMAL 8.9 - 12.9 FL    NRBC 0.0 0  WBC    ABSOLUTE NRBC 0.00 0.00 - 0.01 K/uL   RENAL FUNCTION PANEL    Collection Time: 11/24/22  2:43 AM   Result Value Ref Range    Sodium 138 136 - 145 mmol/L    Potassium 4.3 3.5 - 5.1 mmol/L    Chloride 110 (H) 97 - 108 mmol/L    CO2 19 (L) 21 - 32 mmol/L    Anion gap 9 5 - 15 mmol/L    Glucose 68 65 - 100 mg/dL     (H) 6 - 20 MG/DL    Creatinine 3.59 (H) 0.55 - 1.02 MG/DL    BUN/Creatinine ratio 29 (H) 12 - 20      eGFR 12 (L) >60 ml/min/1.73m2    Calcium 7.9 (L) 8.5 - 10.1 MG/DL    Phosphorus 3.3 2.6 - 4.7 MG/DL    Albumin 2.5 (L) 3.5 - 5.0 g/dL                 MD Spencer Paige [facility]  [address] [address] , [facility]  Phone - [phone]   Fax - [phone]  [email or web address]

## 2022-11-25 ENCOUNTER — APPOINTMENT (OUTPATIENT)
Dept: NON INVASIVE DIAGNOSTICS | Age: 87
DRG: 683 | End: 2022-11-25
Attending: STUDENT IN AN ORGANIZED HEALTH CARE EDUCATION/TRAINING PROGRAM
Payer: MEDICARE

## 2022-11-25 ENCOUNTER — TELEPHONE (OUTPATIENT)
Dept: INTERNAL MEDICINE CLINIC | Age: 87
End: 2022-11-25

## 2022-11-25 LAB
ALBUMIN SERPL-MCNC: 2.4 G/DL (ref 3.5–5)
ANION GAP SERPL CALC-SCNC: 7 MMOL/L (ref 5–15)
BUN SERPL-MCNC: 79 MG/DL (ref 6–20)
BUN/CREAT SERPL: 28 (ref 12–20)
CALCIUM SERPL-MCNC: 7.4 MG/DL (ref 8.5–10.1)
CHLORIDE SERPL-SCNC: 114 MMOL/L (ref 97–108)
CO2 SERPL-SCNC: 19 MMOL/L (ref 21–32)
CREAT SERPL-MCNC: 2.86 MG/DL (ref 0.55–1.02)
ECHO AV AREA PEAK VELOCITY: 2 CM2
ECHO AV AREA VTI: 2.1 CM2
ECHO AV AREA/BSA PEAK VELOCITY: 1.1 CM2/M2
ECHO AV AREA/BSA VTI: 1.2 CM2/M2
ECHO AV MEAN GRADIENT: 15 MMHG
ECHO AV MEAN VELOCITY: 1.8 M/S
ECHO AV PEAK GRADIENT: 26 MMHG
ECHO AV PEAK VELOCITY: 2.5 M/S
ECHO AV VELOCITY RATIO: 0.56
ECHO AV VTI: 55.6 CM
ECHO LA DIAMETER INDEX: 2.07 CM/M2
ECHO LA DIAMETER: 3.7 CM
ECHO LV EDV A4C: 149 ML
ECHO LV EDV INDEX A4C: 83 ML/M2
ECHO LV EJECTION FRACTION A4C: 70 %
ECHO LV ESV A4C: 44 ML
ECHO LV ESV INDEX A4C: 25 ML/M2
ECHO LV FRACTIONAL SHORTENING: 37 % (ref 28–44)
ECHO LV INTERNAL DIMENSION DIASTOLE INDEX: 2.4 CM/M2
ECHO LV INTERNAL DIMENSION DIASTOLIC: 4.3 CM (ref 3.9–5.3)
ECHO LV INTERNAL DIMENSION SYSTOLIC INDEX: 1.51 CM/M2
ECHO LV INTERNAL DIMENSION SYSTOLIC: 2.7 CM
ECHO LV IVSD: 1.2 CM (ref 0.6–0.9)
ECHO LV MASS 2D: 207.8 G (ref 67–162)
ECHO LV MASS INDEX 2D: 116.1 G/M2 (ref 43–95)
ECHO LV POSTERIOR WALL DIASTOLIC: 1.4 CM (ref 0.6–0.9)
ECHO LV RELATIVE WALL THICKNESS RATIO: 0.65
ECHO LVOT AREA: 3.5 CM2
ECHO LVOT AV VTI INDEX: 0.58
ECHO LVOT DIAM: 2.1 CM
ECHO LVOT MEAN GRADIENT: 5 MMHG
ECHO LVOT PEAK GRADIENT: 8 MMHG
ECHO LVOT PEAK VELOCITY: 1.4 M/S
ECHO LVOT STROKE VOLUME INDEX: 62.1 ML/M2
ECHO LVOT SV: 111.1 ML
ECHO LVOT VTI: 32.1 CM
ECHO MV AREA VTI: 2.8 CM2
ECHO MV LVOT VTI INDEX: 1.26
ECHO MV MAX VELOCITY: 1.4 M/S
ECHO MV MEAN GRADIENT: 3 MMHG
ECHO MV MEAN VELOCITY: 0.8 M/S
ECHO MV PEAK GRADIENT: 8 MMHG
ECHO MV VTI: 40.3 CM
ECHO PULMONARY ARTERY END DIASTOLIC PRESSURE: 4 MMHG
ECHO PV REGURGITANT MAX VELOCITY: 1 M/S
ECHO RV INTERNAL DIMENSION: 4.5 CM
ECHO RV TAPSE: 2.8 CM (ref 1.7–?)
ECHO TV REGURGITANT MAX VELOCITY: 3.07 M/S
ECHO TV REGURGITANT PEAK GRADIENT: 38 MMHG
GLUCOSE SERPL-MCNC: 87 MG/DL (ref 65–100)
PHOSPHATE SERPL-MCNC: 2.7 MG/DL (ref 2.6–4.7)
POTASSIUM SERPL-SCNC: 4.1 MMOL/L (ref 3.5–5.1)
SODIUM SERPL-SCNC: 140 MMOL/L (ref 136–145)

## 2022-11-25 PROCEDURE — 74011250637 HC RX REV CODE- 250/637: Performed by: STUDENT IN AN ORGANIZED HEALTH CARE EDUCATION/TRAINING PROGRAM

## 2022-11-25 PROCEDURE — 65270000046 HC RM TELEMETRY

## 2022-11-25 PROCEDURE — 97165 OT EVAL LOW COMPLEX 30 MIN: CPT

## 2022-11-25 PROCEDURE — 99233 SBSQ HOSP IP/OBS HIGH 50: CPT | Performed by: NURSE PRACTITIONER

## 2022-11-25 PROCEDURE — 74011250636 HC RX REV CODE- 250/636: Performed by: STUDENT IN AN ORGANIZED HEALTH CARE EDUCATION/TRAINING PROGRAM

## 2022-11-25 PROCEDURE — 80069 RENAL FUNCTION PANEL: CPT

## 2022-11-25 PROCEDURE — 97116 GAIT TRAINING THERAPY: CPT

## 2022-11-25 PROCEDURE — 97161 PT EVAL LOW COMPLEX 20 MIN: CPT

## 2022-11-25 PROCEDURE — 93308 TTE F-UP OR LMTD: CPT

## 2022-11-25 PROCEDURE — 74011250636 HC RX REV CODE- 250/636: Performed by: INTERNAL MEDICINE

## 2022-11-25 PROCEDURE — 97535 SELF CARE MNGMENT TRAINING: CPT

## 2022-11-25 PROCEDURE — 97530 THERAPEUTIC ACTIVITIES: CPT

## 2022-11-25 PROCEDURE — 51798 US URINE CAPACITY MEASURE: CPT

## 2022-11-25 PROCEDURE — 36415 COLL VENOUS BLD VENIPUNCTURE: CPT

## 2022-11-25 RX ADMIN — CINACALCET 30 MG: 30 TABLET, FILM COATED ORAL at 09:27

## 2022-11-25 RX ADMIN — IRON SUCROSE 200 MG: 20 INJECTION, SOLUTION INTRAVENOUS at 14:19

## 2022-11-25 RX ADMIN — TRIAMCINOLONE ACETONIDE: 1 CREAM TOPICAL at 17:22

## 2022-11-25 RX ADMIN — DONEPEZIL HYDROCHLORIDE 10 MG: 5 TABLET, FILM COATED ORAL at 22:37

## 2022-11-25 RX ADMIN — EPOETIN ALFA-EPBX 20000 UNITS: 10000 INJECTION, SOLUTION INTRAVENOUS; SUBCUTANEOUS at 22:37

## 2022-11-25 RX ADMIN — TRIAMCINOLONE ACETONIDE: 1 CREAM TOPICAL at 10:33

## 2022-11-25 RX ADMIN — PANTOPRAZOLE SODIUM 40 MG: 40 TABLET, DELAYED RELEASE ORAL at 09:26

## 2022-11-25 RX ADMIN — SODIUM CHLORIDE 125 ML/HR: 9 INJECTION, SOLUTION INTRAVENOUS at 04:42

## 2022-11-25 NOTE — PROGRESS NOTES
Hospitalist Progress Note    NAME: Jose Luis Roach   :  1935   MRN:  [id number]       Assessment / Plan: BENSON  CKD V      Nephrology consulted, recs appreciated  Echo show EF 55 to 60%  Maintain SBP >110  Gentle IVF  Palliative care consulted     Hypotension  GIB  H/o Schatzki's ring s/p dilation   Pt reports episodes of hypotension at home with hematochezia noted day prior to admission. She notes right sided abdominal pain and inability to swallow with nausea, vomiting and diarrhea. Gentle IVF  Hold antihypertensive medications  Hold ASA  GI Consulted, recs appreciated  Hb goal >7, pt does not want blood products  Hb stable for now, continue to monitor  PPI  Zofran prn nausea  Consider NM scan if Hb has significant drop or significant GIB     DM  AM HbA1C  DM diet when diet resumed     HTN  Hold antihypertensives and ASA     Asthma  Nebs prn    25.0 - 29.9 Overweight / Body mass index is 25.02 kg/m². Estimated discharge date:   Barriers:    Code status: Full  Prophylaxis: SCD's  Recommended Disposition: Home w/Family     Subjective:     Chief Complaint / Reason for Physician Visit  \"\". Discussed with RN events overnight. Seen and examined, renal function improving, continue IV fluid    Review of Systems:  Symptom Y/N Comments  Symptom Y/N Comments   Fever/Chills n   Chest Pain n    Poor Appetite    Edema     Cough    Abdominal Pain     Sputum    Joint Pain     SOB/LORENZO n   Pruritis/Rash     Nausea/vomit    Tolerating PT/OT     Diarrhea    Tolerating Diet y    Constipation    Other       Could NOT obtain due to:      Objective:     VITALS:   Last 24hrs VS reviewed since prior progress note.  Most recent are:  Patient Vitals for the past 24 hrs:   Temp Pulse Resp BP SpO2   22 1512 97.9 °F (36.6 °C) 67 18 116/60 100 %   22 1111 -- -- -- (!) 99/48 --   22 0734 99.3 °F (37.4 °C) 68 18 (!) 99/48 100 %   22 2353 98.6 °F (37 °C) 68 18 (!) 101/50 97 %   22 1922 99.7 °F (37.6 °C) 64 16 (!) 101/43 96 %       Intake/Output Summary (Last 24 hours) at 11/25/2022 1726  Last data filed at 11/25/2022 0522  Gross per 24 hour   Intake 3725.41 ml   Output 500 ml   Net 3225.41 ml        I had a face to face encounter and independently examined this patient on 11/25/2022, as outlined below:  PHYSICAL EXAM:  General: WD, WN. Alert, cooperative, no acute distress    EENT:  EOMI. Anicteric sclerae. MMM  Resp:  CTA bilaterally, no wheezing or rales. No accessory muscle use  CV:  Regular  rhythm,  No edema  GI:  Soft, Non distended, Non tender. +Bowel sounds  Neurologic:  Alert and oriented X 3, normal speech,   Psych:   Good insight. Not anxious nor agitated  Skin:  No rashes. No jaundice    Reviewed most current lab test results and cultures  YES  Reviewed most current radiology test results   YES  Review and summation of old records today    NO  Reviewed patient's current orders and MAR    YES  PMH/ reviewed - no change compared to H&P  ________________________________________________________________________  Care Plan discussed with:    Comments   Patient     Family      RN     Care Manager     Consultant                        Multidiciplinary team rounds were held today with , nursing, pharmacist and clinical coordinator. Patient's plan of care was discussed; medications were reviewed and discharge planning was addressed. ________________________________________________________________________  Total NON critical care TIME:  35   Minutes    Total CRITICAL CARE TIME Spent:   Minutes non procedure based      Comments   >50% of visit spent in counseling and coordination of care     ________________________________________________________________________  Krupa Adler MD     Procedures: see electronic medical records for all procedures/Xrays and details which were not copied into this note but were reviewed prior to creation of Plan.       LABS:  I reviewed today's most current labs and imaging studies. Pertinent labs include:  Recent Labs     11/24/22 0243 11/23/22  0111   WBC 6.9 9.5   HGB 9.1* 10.6*   HCT 28.4* 33.8*   PLT 81* 101*     Recent Labs     11/25/22  0437 11/24/22 0243 11/23/22  0111    138 132*   K 4.1 4.3 4.8   * 110* 104   CO2 19* 19* 18*   GLU 87 68 171*   BUN 79* 105* 119*   CREA 2.86* 3.59* 4.92*   CA 7.4* 7.9* 8.4*   PHOS 2.7 3.3  --    ALB 2.4* 2.5* 3.1*   TBILI  --   --  0.5   ALT  --   --  14       Signed:  Jerad Rahman MD

## 2022-11-25 NOTE — PROGRESS NOTES
Transition of Care Plan  RUR: 23%  DISPOSITION: The disposition plan is pending medical progression  F/U with PCP/Specialist    Transport: family         Reason for Admission:   sepsis                 RUR Score:  23%         PCP: First and Last name:  Lennie Montero MD     Name of Practice:   Are you a current patient: Yes/No:   Approximate date of last visit:    Can you do a virtual visit with your PCP:              Resources/supports as identified by patient/family:   family                Top Challenges facing patient (as identified by patient/family and CM):  none                     Finances/Medication cost?                    Transportation? Support system or lack thereof? Living arrangements? Self-care/ADLs/Cognition? Current Advanced Directive/Advance Care Plan:  Full Code      Healthcare Decision Maker:   Click here to complete HealthCare Decision Makers including selection of the Healthcare Decision Maker Relationship (ie \"Primary\")      Primary Decision MakerDcora Sotomayor - [phone]    Secondary Decision Maker: Chani Faith - Daughter - [phone]    Secondary Decision Maker: Zeeshan Bennett - Child - [phone]    Payor Source Payor: Jaron Garcia / Plan: Alexis Swartz PPO / Product Type: Managed Care Medicare /                             Plan for utilizing home health:    TBD                 Transition of Care Plan:                    Patient lives with her  in a home with 2 steps to enter. Patient is independent in her ADLs/IADLs. Patient has walker, shower chair, and cane. Care Management Interventions  PCP Verified by CM: Yes  Palliative Care Criteria Met (RRAT>21 & CHF Dx)?: No  Mode of Transport at Discharge:  Other (see comment) (family)  Transition of Care Consult (CM Consult): Discharge Planning  MyChart Signup: No  Discharge Durable Medical Equipment: No  Health Maintenance Reviewed: Yes  Physical Therapy Consult: Yes  Occupational Therapy Consult: Yes  Speech Therapy Consult: No  Support Systems: Spouse/Significant Other, Child(levi)  Confirm Follow Up Transport: Family  [employer] Information Provided?: No  Discharge Location  Patient Expects to be Discharged to[de-identified] Home with home health    2:28 PM  TRISTIAN Peña

## 2022-11-25 NOTE — PROGRESS NOTES
.adEnd of Shift Note    Bedside shift change report given to ? ??(oncoming nurse) by Ronn Ramirez RN (offgoing nurse). Report included the following information SBAR    Shift worked:  [id number]     Shift summary and any significant changes:     Pt. Tolerated all medication during day shift. Echo completed. PT/OT worked with patient. Will continue to monitor. Concerns for physician to address:  no     Zone phone for oncoming shift:       Activity:  Activity Level: Up with Assistance  Number times ambulated in hallways past shift: 0  Number of times OOB to chair past shift: 3    Cardiac:   Cardiac Monitoring: Yes           Access:  Current line(s): PIV     Genitourinary:   Urinary status: voiding    Respiratory:   O2 Device: None (Room air)  Chronic home O2 use?: NO  Incentive spirometer at bedside: NO       GI:  Last Bowel Movement Date: 11/24/22  Current diet:  ADULT DIET Dysphagia - Soft & Bite Sized  Passing flatus: YES  Tolerating current diet: YES       Pain Management:   Patient states pain is manageable on current regimen: YES    Skin:  Anson Score: 19  Interventions: float heels    Patient Safety:  Fall Score:  Total Score: 3  Interventions: gripper socks  High Fall Risk: Yes    Length of Stay:  Expected LOS: 3d 2h  Actual LOS: 2      Ronn Ramirez, RN

## 2022-11-25 NOTE — PROGRESS NOTES
Palliative Medicine Consult  [place]: [phone] ([phone])    Patient Name: Irais Elizondo  YOB: 1935    Date of Initial Consult: 11/23/22  Reason for Consult: End Stage Disease  Requesting Provider: Merline Ferraris, MD   Primary Care Physician: Dl Mcintosh MD     SUMMARY:   Irais Elizondo is a 82 y.o. with a past history of hypertension, diabetes, reflux, peptic ulcer disease, depression, asthma, DVT, who was admitted on 11/22/2022 from home with a diagnosis of sepsis. HPI:  presented to ED with c/o hypotension. Pt was at her nephrologist's office and was noted to have low BP as well as generalized body aches with abdominal pain, n/v and poor appetite. Admission course: In ED, Bun/Cr 119/4. 92.  responding to fluids. BUN/Cr 79/2. 86.    Current medical issues leading to Palliative Medicine involvement include: poor appetite, dehydration, hypotension, worsening renal failure, not a good candidate for dialysis, due to frailty. Psychosocial:  Surrogate Decision Maker: Tramaine Cain [phone]  Patient's daughter is an MA with Dr. Patton Session at [facility]. Pt has an AMD on file. PALLIATIVE DIAGNOSES:   Hypotension due to hypovolemia   Poor appetite   Fatigue  N/V  Abdominal pain  BENSON/CKD5/ATN/hypotension  Anemia of chronic disease  Dehydration   Dementia   Care decisions     PLAN:   Prior to visit, I completed an extensive review of patient's medical records, including medical documentation, vital signs, MARs, and results of various labs and other diagnostics. Introduced Palliative Medicine as a support for pt and families dealing with life limiting disease, to help with symptom management, education and understanding disease process and treatment options, and to discuss goals of care, what pt wants in terms of treatment as well as code status.   Long family meeting including , son, dtrs, other family members (family asked not to include pt in today's meeting as they did not want to upset her). They report that the doctors told them \"there is nothing more to offer\" but pt's PCP told them \"there's other things that can be done, ask the nephrologist.\"  Counseled family that both are right; there is nothing more to offer in regards to aggressive treatment like dialysis and transplant because of her frailty, but there are things we can to to preserve her kidney function as long as possible, such as good hydration, preventing hypotension, avoiding nephrotoxic medications. Discussed how cardiac output affects the kidneys, and kidney function affects the heart, discussed why she would not do well on dialysis. Discussed that this will most likely be an ongoing issue where we can \"tune her up\" and send her home, where she will slowly become dehydrated again and she will end up back in the hospital for another tune up. You do your best at home to keep her hydrated, but most likely she will not be able to keep up with the fluid demands and ultimately she will be readmitted. At some point she will get tired of frequent admissions and tell you she's tired. Family was very engaged in this discussion, they demonstrated insight, voiced that they will do whatever she needs to keep her alive as long as she has quality of life, and that if/when she tells them she's done, they plan to honor her wishes and will focus on her comfort. Family wants to speak with Nephrology about whether or not there are other options. ( notified). Will continue to follow.    Initial consult note routed to primary continuity provider and/or primary health care team members  Communicated plan of care with: Ilda Cortez 192 Team     GOALS OF CARE / TREATMENT PREFERENCES:     GOALS OF CARE:  Patient/Health Care Proxy Stated Goals: Prolong life    TREATMENT PREFERENCES:   Code Status: Full Code        Advance Care Planning:  [x] The [employer] Interdisciplinary Team has updated the ACP Navigator with Health Care Decision Maker and Patient Capacity      Primary Decision Maker: Norman James Son - [phone]    Secondary Decision Maker: Vikram Young - Daughter - [phone]    Secondary Decision Maker: Urbano Arteaga - [phone]  Advance Care Planning 11/23/2022   Patient's Healthcare Decision Maker is: Named in scanned ACP document   Primary Decision Maker Name -   Primary Decision Maker Phone Number -   Primary Decision Maker Relationship to Patient -   Secondary Decision Maker Name -   Secondary Decision Maker Phone Number -   Secondary Decision Maker Relationship to Patient -   Confirm Advance Directive Yes, on file   Patient Would Like to Complete Advance Directive -   Does the patient have other document types -       Medical Interventions: Full interventions       Other:    As far as possible, the palliative care team has discussed with patient / health care proxy about goals of care / treatment preferences for patient.      HISTORY:     History obtained from: chart, family     The patient is:   [x] Verbal and participatory  [] Non-participatory due to:          Clinical Pain Assessment (nonverbal scale for severity on nonverbal patients):   Clinical Pain Assessment  Severity: 0     Activity (Movement): Lying quietly, normal position    Duration: for how long has pt been experiencing pain (e.g., 2 days, 1 month, years)  Frequency: how often pain is an issue (e.g., several times per day, once every few days, constant)     FUNCTIONAL ASSESSMENT:     Palliative Performance Scale (PPS):  PPS: 30       PSYCHOSOCIAL/SPIRITUAL SCREENING:     Palliative IDT has assessed this patient for cultural preferences / practices and a referral made as appropriate to needs (Cultural Services, Patient Advocacy, Ethics, etc.)    Any spiritual / Catholic concerns:  [] Yes /  [x] No   If \"Yes\" to discuss with pastoral care during IDT     Does caregiver feel burdened by caring for their loved one:   [] Yes /  [x] No /  [] No Caregiver Present    If \"Yes\" to discuss with social work during IDT    Anticipatory grief assessment:   [x] Normal  / [] Maladaptive     If \"Maladaptive\" to discuss with social work during IDT    ESAS Anxiety: Anxiety: 0    ESAS Depression: Depression: 0        REVIEW OF SYSTEMS:     Positive and pertinent negative findings in ROS are noted above in HPI. The following systems were [x] reviewed / [] unable to be reviewed as noted in HPI  Other findings are noted below. Systems: constitutional, ears/nose/mouth/throat, respiratory, gastrointestinal, genitourinary, musculoskeletal, integumentary, neurologic, psychiatric, endocrine. Positive findings noted below. Modified ESAS Completed by: provider   Fatigue: 5 Drowsiness: 0   Depression: 0 Pain: 0   Anxiety: 0 Nausea: 0   Anorexia: 5 Dyspnea: 0     Constipation: No     Stool Occurrence(s): 1        PHYSICAL EXAM:     From RN flowsheet:  Wt Readings from Last 3 Encounters:   11/25/22 155 lb (70.3 kg)   11/21/22 163 lb (73.9 kg)   11/05/22 170 lb 6.7 oz (77.3 kg)     Blood pressure (!) 99/48, pulse 68, temperature 99.3 °F (37.4 °C), resp. rate 18, height 5' 6\" (1.676 m), weight 155 lb (70.3 kg), SpO2 100 %.     Pain Scale 1: Numeric (0 - 10)  Pain Intensity 1: 0     Pain Location 1: Back           Last bowel movement, if known:     Constitutional: elderly, frail  Eyes: pupils equal, anicteric  Neurologic: following commands, moving all extremities  Psychiatric: full affect          HISTORY:     Active Problems:    Sepsis ([address] (11/23/2022)      Poor appetite (11/23/2022)      Palliative care by specialist (11/23/2022)      Dehydration (11/23/2022)    Past Medical History:   Diagnosis Date    Adverse effect of anesthesia     passed out during EGD/stopped breathing    Arrhythmia     has pacemaker for low HR    Arthritis     Asthma     Cancer ([facility]     right kidney - surgery    Chronic pain     right side    Deep vein thrombosis (DVT) during current hospitalization ([facility]     history of DVT was on coumadin in the past    Diabetes ([facility] [address]     diet controlled    GERD (gastroesophageal reflux disease)     H/O acute pancreatitis     Hiatal hernia     Hypertension     MARLEN (obstructive sleep apnea)     does not use CPAP/pt states she has not used since a pacemaker inserted    Other ill-defined conditions(799.89)     lymph node enlargement - left chest - benign bx - watching    Other ill-defined conditions(799.89)     wheezes    Psychiatric disorder     depression    PUD (peptic ulcer disease)     hx of    Thromboembolus ([facility] [address]     Unspecified adverse effect of anesthesia     passed out during EGD per pt - stopped breathing per pt per MD      Past Surgical History:   Procedure Laterality Date    HX APPENDECTOMY      HX CHOLECYSTECTOMY      HX GYN          HX HEENT Right     laser eye surgery to stop bleeding in back of eye - multiple laser surgeries    HX KNEE ARTHROSCOPY      left knee; cartilage repair    HX ORTHOPAEDIC      right hip replacement    HX ORTHOPAEDIC      lower back surgery    HX ORTHOPAEDIC      straighten toe - 2nd toe on right    HX ORTHOPAEDIC Bilateral     carpal tunnel release    HX PACEMAKER      not defibrillator    HX UROLOGICAL      implant in hip to control urine and then removed    HX UROLOGICAL      Right kidney partial nephrectomy      Family History   Problem Relation Age of Onset    Stroke Mother         brain aneurysm    Hypertension Father     Heart Disease Father     Cancer Sister         breast    Cancer Brother         lung and prostate    Cancer Sister         breast    SLE Other         half siblings (5+) - lupus    Hypertension Daughter     Diabetes Daughter       History reviewed, no pertinent family history.   Social History     Tobacco Use    Smoking status: Former     Packs/day: 0.25     Years: 20.00     Pack years: 5.00     Types: Cigarettes     Quit date: 1971     Years since quittin.3    Smokeless tobacco: Never   Substance Use Topics    Alcohol use: No     Allergies   Allergen Reactions    Pcn [Penicillins] Rash     But can take cephalosporins. Allergic to all \"cillins\".     Codeine Other (comments)     Hallucinations, takes hydrocodone at home for pain    Contrast Dye [Iodine] Other (comments)     Not to take due to kidney function    Prednisone Other (comments)     \"makes my pancreas numbers go way up\"      Current Facility-Administered Medications   Medication Dose Route Frequency    epoetin gigi-epbx (RETACRIT) injection 20,000 Units  20,000 Units SubCUTAneous Q MON, WED & FRI    0.9% sodium chloride infusion  75 mL/hr IntraVENous CONTINUOUS    [Held by provider] allopurinoL (ZYLOPRIM) tablet 300 mg  300 mg Oral DAILY    [Held by provider] amitriptyline (ELAVIL) tablet 25 mg  25 mg Oral QHS    [Held by provider] aspirin chewable tablet 81 mg  81 mg Oral DAILY    cinacalcet (SENSIPAR) tablet 30 mg  30 mg Oral DAILY    donepeziL (ARICEPT) tablet 10 mg  10 mg Oral QHS    [Held by provider] furosemide (LASIX) tablet 40 mg  40 mg Oral EVERY OTHER DAY    [Held by provider] metoprolol succinate (TOPROL-XL) XL tablet 50 mg  50 mg Oral DAILY    ondansetron (ZOFRAN ODT) tablet 4 mg  4 mg Oral Q8H PRN    pantoprazole (PROTONIX) tablet 40 mg  40 mg Oral DAILY    albuterol (PROVENTIL VENTOLIN) nebulizer solution 2.5 mg  2.5 mg Nebulization Q6H PRN    triamcinolone acetonide (KENALOG) 0.1 % cream   Topical BID    [Held by provider] losartan (COZAAR) tablet 100 mg  100 mg Oral QHS    acetaminophen (TYLENOL) tablet 650 mg  650 mg Oral Q6H PRN    polyethylene glycol (MIRALAX) packet 17 g  17 g Oral DAILY PRN          LAB AND IMAGING FINDINGS:     Lab Results   Component Value Date/Time    WBC 6.9 2022 02:43 AM    HGB 9.1 (L) 2022 02:43 AM    PLATELET 81 (L)  02:43 AM     Lab Results   Component Value Date/Time    Sodium 140 2022 04:37 AM    Potassium 4.1 2022 04:37 AM    Chloride 114 (H) 11/25/2022 04:37 AM    CO2 19 (L) 11/25/2022 04:37 AM    BUN 79 (H) 11/25/2022 04:37 AM    Creatinine 2.86 (H) 11/25/2022 04:37 AM    Calcium 7.4 (L) 11/25/2022 04:37 AM    Magnesium 1.3 (L) 07/24/2021 07:56 PM    Phosphorus 2.7 11/25/2022 04:37 AM      Lab Results   Component Value Date/Time    Alk. phosphatase 92 11/23/2022 01:11 AM    Protein, total 6.9 11/23/2022 01:11 AM    Albumin 2.4 (L) 11/25/2022 04:37 AM    Globulin 3.8 11/23/2022 01:11 AM     Lab Results   Component Value Date/Time    INR 1.4 (H) 07/03/2016 11:45 PM    Prothrombin time 14.5 (H) 07/03/2016 11:45 PM    aPTT 56.3 (H) 05/13/2015 12:57 AM      Lab Results   Component Value Date/Time    Iron 45 10/31/2022 09:58 AM    TIBC 137 (L) 10/31/2022 09:58 AM    Iron % saturation 33 10/31/2022 09:58 AM    Ferritin 461 (H) 10/31/2022 09:58 AM      No results found for: PH, PCO2, PO2  No components found for: [place]   Lab Results   Component Value Date/Time     (H) 07/03/2016 11:45 PM    CK - MB 1.3 07/03/2016 11:45 PM                Total time: 65  Counseling / coordination time, spent as noted above: 55  > 50% counseling / coordination?: y    Prolonged service was provided for  []30 min   []75 min in face to face time in the presence of the patient, spent as noted above. Time Start:   Time End:   Note: this can only be billed with 18574 (initial) or 52330 (follow up). If multiple start / stop times, list each separately.

## 2022-11-25 NOTE — PROGRESS NOTES
OCCUPATIONAL THERAPY EVALUATION/DISCHARGE  Patient: Jameson Elena (33 y.o. female)  Date: 11/25/2022  Primary Diagnosis: Sepsis ([facility] [A41.9]       Precautions:        ASSESSMENT  Based on the objective data described below, the patient presents with decreased higher level mobility/balance/activity tolerance; however, she is demonstrating a high level of safe functional independence benefiting from SBA for hands-free dynamic ADL challenges. Pt reports DME needs fulfilled within home, as well as, good PRN assist from family. Given above mentioned, as well as, physical therapy following higher level mobility/balance deficits, no other acute OT needs identified, with OT answering pt's questions/concerns and pt thanking therapist for his efforts. Current Level of Function (ADLs/self-care): SBA    Functional Outcome Measure: The patient scored 55/100 on the Barthel Index outcome measure. Other factors to consider for discharge: none     PLAN :  Recommend with staff: OOB meals, Active ADL engagement    Recommendation for discharge: (in order for the patient to meet his/her long term goals)  No skilled occupational therapy/ follow up rehabilitation needs identified at this time. This discharge recommendation:  Has not yet been discussed the attending provider and/or case management    IF patient discharges home will need the following DME: patient owns DME required for discharge       SUBJECTIVE:   Patient stated I use the walker if I feel like I need it but I normally don't.     OBJECTIVE DATA SUMMARY:   HISTORY:   Past Medical History:   Diagnosis Date    Adverse effect of anesthesia     passed out during EGD/stopped breathing    Arrhythmia     has pacemaker for low HR    Arthritis     Asthma     Cancer ([facility]     right kidney - surgery    Chronic pain     right side    Deep vein thrombosis (DVT) during current hospitalization ([facility]     history of DVT was on coumadin in the past    Diabetes ([facility] diet controlled    GERD (gastroesophageal reflux disease)     H/O acute pancreatitis     Hiatal hernia     Hypertension     MARLEN (obstructive sleep apnea)     does not use CPAP/pt states she has not used since a pacemaker inserted    Other ill-defined conditions(799.89)     lymph node enlargement - left chest - benign bx - watching    Other ill-defined conditions(799.89)     wheezes    Psychiatric disorder     depression    PUD (peptic ulcer disease)     hx of    Thromboembolus ([address]     Unspecified adverse effect of anesthesia     passed out during EGD per pt - stopped breathing per pt per MD     Past Surgical History:   Procedure Laterality Date    HX APPENDECTOMY      HX CHOLECYSTECTOMY      HX GYN          HX HEENT Right     laser eye surgery to stop bleeding in back of eye - multiple laser surgeries    HX KNEE ARTHROSCOPY      left knee; cartilage repair    HX ORTHOPAEDIC      right hip replacement    HX ORTHOPAEDIC      lower back surgery    HX ORTHOPAEDIC      straighten toe - 2nd toe on right    HX ORTHOPAEDIC Bilateral     carpal tunnel release    HX PACEMAKER      not defibrillator    HX UROLOGICAL      implant in hip to control urine and then removed    HX UROLOGICAL      Right kidney partial nephrectomy       Prior Level of Function/Environment/Context: Independent vs Mod Indep at [employer]  Expanded or extensive additional review of patient history:   Home Situation  Home Environment: Private residence  # Steps to Enter: 2  Rails to Enter: No  One/Two Story Residence: One story  Living Alone: No  Support Systems: Spouse/Significant Other, Child(levi)  Patient Expects to be Discharged to[de-identified] Home with home health  Current DME Used/Available at Home: Walker, rolling, Shower chair, Cane, straight  Tub or Shower Type: Shower    Hand dominance: Right    EXAMINATION OF PERFORMANCE DEFICITS:  Cognitive/Behavioral Status:  Neurologic State: Alert  Orientation Level: Oriented X4  Cognition: Appropriate decision making; Appropriate for age attention/concentration; Appropriate safety awareness  Perception: Appears intact  Perseveration: No perseveration noted  Safety/Judgement: Awareness of environment;Home safety; Insight into deficits    Hearing: Auditory  Auditory Impairment: None    Vision/Perceptual:                                Corrective Lenses: Glasses    Range of Motion:  AROM: Within functional limits                         Strength:  Strength: Generally decreased, functional                Coordination:  Coordination: Within functional limits  Fine Motor Skills-Upper: Left Intact; Right Intact    Gross Motor Skills-Upper: Left Intact; Right Intact    Tone & Sensation:  Tone: Normal  Sensation: Intact                      Balance:  Sitting: Intact  Standing: Intact    Functional Mobility and Transfers for ADLs:  Bed Mobility:  Rolling: Stand-by assistance  Supine to Sit: Stand-by assistance    Transfers:  Sit to Stand: Stand-by assistance  Stand to Sit: Stand-by assistance  Bed to Chair: Contact guard assistance  Bathroom Mobility: Stand-by assistance (SBA>CGA)  Toilet Transfer : Stand-by assistance    ADL Assessment:  Feeding: Independent    Oral Facial Hygiene/Grooming: Stand-by assistance    Bathing: Stand-by assistance    Type of Bath: Chlorhexidine (CHG)    Upper Body Dressing: Independent    Lower Body Dressing: Stand-by assistance    Toileting: Stand by assistance      ADL Intervention and task modifications:  Patient instructed and indicated understanding the benefits of maintaining activity tolerance, functional mobility, and independence with self care tasks during acute stay  to ensure safe return home and to baseline. Encouraged patient to increase frequency and duration OOB, be out of bed for all meals, perform daily ADLs (as approved by RN/MD regarding bathing etc), and performing functional mobility to/from bathroom.     Pt educated on safe transfer techniques, with specific emphasis on proper hand placement to push up from seated surface rather than attempt to pull self up, fully positioning self in-front of desired seated location, feeling chair on back of legs and reaching back with 1-2 UE to slowly lower self to seated position. Cognitive Retraining  Safety/Judgement: Awareness of environment;Home safety; Insight into deficits    Functional Measure:    Barthel Index:  Bathin  Bladder: 10  Bowels: 10  Groomin  Dressin  Feedin  Mobility: 0  Stairs: 5  Toilet Use: 5  Transfer (Bed to Chair and Back): 10  Total: 55/100      The Barthel ADL Index: Guidelines  1. The index should be used as a record of what a patient does, not as a record of what a patient could do. 2. The main aim is to establish degree of independence from any help, physical or verbal, however minor and for whatever reason. 3. The need for supervision renders the patient not independent. 4. A patient's performance should be established using the best available evidence. Asking the patient, friends/relatives and nurses are the usual sources, but direct observation and common sense are also important. However direct testing is not needed. 5. Usually the patient's performance over the preceding 24-48 hours is important, but occasionally longer periods will be relevant. 6. Middle categories imply that the patient supplies over 50 per cent of the effort. 7. Use of aids to be independent is allowed. Score Interpretation (from [address] [address])    Independent   60-79 Minimally independent   40-59 Partially dependent   20-39 Very dependent   <20 Totally dependent     -Deondre Hayes., Barthel, D.W. (1965). Functional evaluation: the Barthel Index. [facility] ([address] (1997). The Barthel activities of daily living index: self-reporting versus actual performance in the old (> or = 75 years).  Journal of [address] [place] [address] 45(7), [facility], JADAN, Kellie Funk., Jessica Meneses (1999). Measuring the change in disability after inpatient rehabilitation; comparison of the responsiveness of the Barthel Index and Functional Christmas Measure. Journal of Neurology, Neurosurgery, and Psychiatry, 66(4), 766-002. SHANELLE Figueroa, NATALIIA Vargas, & Александр Koo M.A. (2004) Assessment of post-stroke quality of life in cost-effectiveness studies: The usefulness of the Barthel Index and the EuroQoL-5D. Quality of Life Research, 15, 411-69        Occupational Therapy Evaluation Charge Determination   History Examination Decision-Making   LOW Complexity : Brief history review  LOW Complexity : 1-3 performance deficits relating to physical, cognitive , or psychosocial skils that result in activity limitations and / or participation restrictions  LOW Complexity : No comorbidities that affect functional and no verbal or physical assistance needed to complete eval tasks       Based on the above components, the patient evaluation is determined to be of the following complexity level: LOW   Pain Rating:  No c/o pain    Activity Tolerance:   Fair and requires rest breaks    After treatment patient left in no apparent distress:    Sitting in chair, Call bell within reach, and RN notified and following    COMMUNICATION/EDUCATION:   The patients plan of care was discussed with: Physical therapist and Registered nurse.      Thank you for this referral.  Tommie Paige OT  Time Calculation: 21 mins

## 2022-11-25 NOTE — PROGRESS NOTES
Nephrology Progress Note  [facility] / [address] [facility], [address]  Phone - [phone]  Fax - [phone]                 Patient: Casey Carrillo                   YOB: 1935        Date- 11/25/2022                      Admit Date: 11/22/2022  CC: Follow up for benson on ckd          IMPRESSION & PLAN:   BENSON -volume depletion vs possible ATN from hypotension, U/S neg  Hyperkalmia  Anemia of ckd  Hyponatremia likely due to pre renal etiology  Ckd due to DM AND HTN  Ckd 4 - cr 2.5 in may 2022 f/b nephrologist in [place]  Hypotension  H/o partial nephrectomy-left  H/o hypertension  Anemia of ckd  H/o renal cancer  Sec. Hyperpara   H/o hypercalcemia  Diarrhea    PLAN-  CONTINUE IVF - lower rate to 75 ml/hr  Hold losartan  Hold lasix  Follow bmp  Give epogen and venofer  Continue sensipar     Subjective: Interval History:   -  cr 2.86 bun 79  K normal  She is on room air  No sob  Bp low    Objective:   Vitals:    11/24/22 1922 11/24/22 2353 11/25/22 0734 11/25/22 1111   BP: (!) 101/43 (!) 101/50 (!) 99/48 (!) 99/48   Pulse: 64 68 68    Resp: 16 18 18    Temp: 99.7 °F (37.6 °C) 98.6 °F (37 °C) 99.3 °F (37.4 °C)    SpO2: 96% 97% 100%    Weight:    70.3 kg (155 lb)   Height:    5' 6\" (1.676 m)      11/24 0701 - 11/25 0700  In: 3965.4 [P.O.:730; I.V.:3235.4]  Out: 600 [Urine:600]  Last 3 Recorded Weights in this Encounter    11/22/22 2343 11/25/22 1111   Weight: 70.3 kg (155 lb) 70.3 kg (155 lb)        Physical exam:    GEN: NAD  NECK- no mass  RESP: No wheezing, decreased BS b/l  CVS: S1,S2  RRR  NEURO: Normal speech, Non focal  EXT: ++ Edema   PSYCH: Normal Mood    Chart reviewed. Pertinent Notes reviewed.      Data Review :  Recent Labs     11/25/22  0437 11/24/22  0243 11/23/22  0111    138 132*   K 4.1 4.3 4.8   * 110* 104   CO2 19* 19* 18*   BUN 79* 105* 119*   CREA 2.86* 3.59* 4.92*   GLU 87 68 171*   CA 7.4* 7.9* 8.4*   PHOS 2.7 3.3  --      Recent Labs     11/24/22  0243 11/23/22  0111   WBC 6.9 9.5   HGB 9.1* 10.6*   HCT 28.4* 33.8*   PLT 81* 101*     No results for input(s): FE, TIBC, PSAT, FERR in the last 72 hours.    Lab Results   Component Value Date/Time    Hemoglobin A1c 5.5 11/24/2022 02:43 AM    Hemoglobin A1c 6.4 (H) 12/19/2016 03:06 PM    Hemoglobin A1c 6.9 (H) 06/14/2016 10:11 AM      No results found for: MCACR, MCA1, MCA2, MCA3, MCAU, MCAU2, MCALPOCT  Lab Results   Component Value Date/Time    NT pro- 11/23/2022 01:11 AM    NT pro- (H) 11/05/2022 03:45 PM    NT pro- 04/20/2015 10:50 AM     US Results (most recent):  Medication list  reviewed  Current Facility-Administered Medications   Medication Dose Route Frequency    0.9% sodium chloride infusion  125 mL/hr IntraVENous CONTINUOUS    [Held by provider] allopurinoL (ZYLOPRIM) tablet 300 mg  300 mg Oral DAILY    [Held by provider] amitriptyline (ELAVIL) tablet 25 mg  25 mg Oral QHS    [Held by provider] aspirin chewable tablet 81 mg  81 mg Oral DAILY    cinacalcet (SENSIPAR) tablet 30 mg  30 mg Oral DAILY    donepeziL (ARICEPT) tablet 10 mg  10 mg Oral QHS    [Held by provider] furosemide (LASIX) tablet 40 mg  40 mg Oral EVERY OTHER DAY    [Held by provider] metoprolol succinate (TOPROL-XL) XL tablet 50 mg  50 mg Oral DAILY    ondansetron (ZOFRAN ODT) tablet 4 mg  4 mg Oral Q8H PRN    pantoprazole (PROTONIX) tablet 40 mg  40 mg Oral DAILY    albuterol (PROVENTIL VENTOLIN) nebulizer solution 2.5 mg  2.5 mg Nebulization Q6H PRN    triamcinolone acetonide (KENALOG) 0.1 % cream   Topical BID    [Held by provider] losartan (COZAAR) tablet 100 mg  100 mg Oral QHS    acetaminophen (TYLENOL) tablet 650 mg  650 mg Oral Q6H PRN    polyethylene glycol (MIRALAX) packet 17 g  17 g Oral DAILY PRN        Lo Toribio MD  11/25/2022

## 2022-11-25 NOTE — PROGRESS NOTES
Problem: Risk for Spread of Infection  Goal: Prevent transmission of infectious organism to others  Description: Prevent the transmission of infectious organisms to other patients, staff members, and visitors. Outcome: Progressing Towards Goal     Problem: Patient Education:  Go to Education Activity  Goal: Patient/Family Education  Outcome: Progressing Towards Goal     Problem: Falls - Risk of  Goal: *Absence of Falls  Description: Document Golden Cardona Fall Risk and appropriate interventions in the flowsheet.   Outcome: Progressing Towards Goal  Note: Fall Risk Interventions:  Mobility Interventions: Bed/chair exit alarm         Medication Interventions: Bed/chair exit alarm    Elimination Interventions: Bed/chair exit alarm              Problem: Patient Education: Go to Patient Education Activity  Goal: Patient/Family Education  Outcome: Progressing Towards Goal

## 2022-11-25 NOTE — PROGRESS NOTES
Problem: Mobility Impaired (Adult and Pediatric)  Goal: *Acute Goals and Plan of Care (Insert Text)  Description: FUNCTIONAL STATUS PRIOR TO ADMISSION: Patient was ambulatory without AD for household distances, has RW and cane but doesn't use them. One fall a few weeks ago. HOME SUPPORT PRIOR TO ADMISSION: The patient lived with spouse but did not require assist.  Family lives next door and able to check on patient frequently. Physical Therapy Goals  Initiated 11/25/2022  1. Patient will move from supine to sit and sit to supine  in bed with modified independence within 7 day(s). 2.  Patient will transfer from bed to chair and chair to bed with supervision/set-up using the least restrictive device within 7 day(s). 3.  Patient will perform sit to stand with supervision/set-up within 7 day(s). 4.  Patient will ambulate with supervision/set-up for 100 feet with the least restrictive device within 7 day(s). 5.  Patient will ascend/descend 2 stairs with  handrail(s) with minimal assistance/contact guard assist within 7 day(s). Outcome: Not Met   PHYSICAL THERAPY EVALUATION  Patient: Avani Teixeira (89 y.o. female)  Date: 11/25/2022  [facility]: [facility] [A41.9]       Precautions: enteric        ASSESSMENT  Based on the objective data described below, the patient presents with general weakness and decreased tolerance of activity following admission for dehydration and kidney failure. Patient received in bed and agreeable to participate, many family members present in room. Patient was able to come to sit EOB with supervision and sitting balance is intact. BP checked and found to be 111/57, no complaints of dizziness. Patient ambulated x approx 30 feet in room, including into bathroom to toilet. Gait is slow but steady with light CGA. Educated on safety and falls prevention and left up in chair with call bell in reach and family still present.   Patient has supportive family, will benefit from HHPT to advance activity. Current Level of Function Impacting Discharge (mobility/balance): CGA to ambulate short distance    Functional Outcome Measure: The patient scored 55/100 on the Barthel  outcome measure which is indicative of partially dependent. Other factors to consider for discharge: falls risk     Patient will benefit from skilled therapy intervention to address the above noted impairments. PLAN :  Recommendations and Planned Interventions: bed mobility training, transfer training, gait training, therapeutic exercises, patient and family training/education, and therapeutic activities      Frequency/Duration: Patient will be followed by physical therapy:  3 times a week to address goals. Recommendation for discharge: (in order for the patient to meet his/her long term goals)  Physical therapy at least 2 days/week in the home AND ensure assist and/or supervision for safety with mobility    This discharge recommendation:  Has been made in collaboration with the attending provider and/or case management    IF patient discharges home will need the following DME: patient owns DME required for discharge         SUBJECTIVE:   Patient stated that felt okay.     OBJECTIVE DATA SUMMARY:   HISTORY:    Past Medical History:   Diagnosis Date    Adverse effect of anesthesia     passed out during EGD/stopped breathing    Arrhythmia     has pacemaker for low HR    Arthritis     Asthma     Cancer ([facility]     right kidney - surgery    Chronic pain     right side    Deep vein thrombosis (DVT) during current hospitalization ([facility]     history of DVT was on coumadin in the past    Diabetes ([address]     diet controlled    GERD (gastroesophageal reflux disease)     H/O acute pancreatitis     Hiatal hernia     Hypertension     MARLEN (obstructive sleep apnea)     does not use CPAP/pt states she has not used since a pacemaker inserted    Other ill-defined conditions(799.89)     lymph node enlargement - left chest - benign bx - watching    Other ill-defined conditions(799.89)     wheezes    Psychiatric disorder     depression    PUD (peptic ulcer disease)     hx of    Thromboembolus ([facility]     Unspecified adverse effect of anesthesia     passed out during EGD per pt - stopped breathing per pt per MD     Past Surgical History:   Procedure Laterality Date    HX APPENDECTOMY      HX CHOLECYSTECTOMY      HX GYN          HX HEENT Right     laser eye surgery to stop bleeding in back of eye - multiple laser surgeries    HX KNEE ARTHROSCOPY      left knee; cartilage repair    HX ORTHOPAEDIC      right hip replacement    HX ORTHOPAEDIC      lower back surgery    HX ORTHOPAEDIC      straighten toe - 2nd toe on right    HX ORTHOPAEDIC Bilateral     carpal tunnel release    HX PACEMAKER      not defibrillator    HX UROLOGICAL      implant in hip to control urine and then removed    HX UROLOGICAL      Right kidney partial nephrectomy       Personal factors and/or comorbidities impacting plan of care: multiple co-morbidities    Home Situation  Home Environment: Private residence  # Steps to Enter: 2  Rails to Enter: No  One/Two Story Residence: One story  Living Alone: No  Support Systems: Child(levi), Other Family Member(s), Spouse/Significant Other  Patient Expects to be Discharged to[de-identified] Home with home health  Current DME Used/Available at Home: Maday Villarreal, rolling, Shower chair, Cane, straight  Tub or Shower Type: Shower    EXAMINATION/PRESENTATION/DECISION MAKING:   Critical Behavior:              Hearing:   Auditory  Auditory Impairment: None  Skin:    Edema:   Range Of Motion:  AROM: Within functional limits                       Strength:    Strength: Generally decreased, functional                    Tone & Sensation:   Tone: Normal              Sensation: Intact               Coordination:  Coordination: Within functional limits  Vision:      Functional Mobility:  Bed Mobility:  Rolling: Stand-by assistance  Supine to Sit: Stand-by assistance        Transfers:  Sit to Stand: Stand-by assistance  Stand to Sit: Stand-by assistance        Bed to Chair: Contact guard assistance              Balance:   Sitting: Intact  Standing: Intact  Ambulation/Gait Training:  Distance (ft): 30 Feet (ft)  Assistive Device: Gait belt  Ambulation - Level of Assistance: Contact guard assistance        Gait Abnormalities: Decreased step clearance        Base of Support: Widened     Speed/Francie: Pace decreased (<100 feet/min)  Step Length: Left shortened;Right shortened                     Stairs: Therapeutic Exercises:       Functional Measure:  Barthel Index:    Bathin  Bladder: 10  Bowels: 10  Groomin  Dressin  Feedin  Mobility: 0  Stairs: 5  Toilet Use: 5  Transfer (Bed to Chair and Back): 10  Total: 55/100       The Barthel ADL Index: Guidelines  1. The index should be used as a record of what a patient does, not as a record of what a patient could do. 2. The main aim is to establish degree of independence from any help, physical or verbal, however minor and for whatever reason. 3. The need for supervision renders the patient not independent. 4. A patient's performance should be established using the best available evidence. Asking the patient, friends/relatives and nurses are the usual sources, but direct observation and common sense are also important. However direct testing is not needed. 5. Usually the patient's performance over the preceding 24-48 hours is important, but occasionally longer periods will be relevant. 6. Middle categories imply that the patient supplies over 50 per cent of the effort. 7. Use of aids to be independent is allowed. Score Interpretation (from [address] [address])    Independent   60-79 Minimally independent   40-59 Partially dependent   20-39 Very dependent   <20 Totally dependent     -Deondre Hayes., Barthel, DFlorW. (1965). Functional evaluation: the Barthel Index.  [address] [facility] (14)2.  -ADAN Leslie, [address] (1997). The Barthel activities of daily living index: self-reporting versus actual performance in the old (> or = 75 years). Journal [address] 45(7), 14 [facility], FlorLUISF, Vladimir Lopez., Kristel Alas. (1999). Measuring the change in disability after inpatient rehabilitation; comparison of the responsiveness of the Barthel Index and Functional [place] Measure. Journal of Neurology, Neurosurgery, and Psychiatry, 66(4), 881-853. SHANELLE Brandt, NATALIIA Vargas, & Mil Logan M.A. (2004) Assessment of post-stroke quality of life in cost-effectiveness studies: The usefulness of the Barthel Index and the EuroQoL-5D. Quality of Life Research, 15, 935-74           Physical Therapy Evaluation Charge Determination   History Examination Presentation Decision-Making   MEDIUM  Complexity : 1-2 comorbidities / personal factors will impact the outcome/ POC  LOW Complexity : 1-2 Standardized tests and measures addressing body structure, function, activity limitation and / or participation in recreation  LOW Complexity : Stable, uncomplicated  LOW Complexity : FOTO score of       Based on the above components, the patient evaluation is determined to be of the following complexity level: LOW     Pain Rating:      Activity Tolerance:   Good    After treatment patient left in no apparent distress:   Sitting in chair, Call bell within reach, and Caregiver / family present    COMMUNICATION/EDUCATION:   The patients plan of care was discussed with: Registered nurse. Fall prevention education was provided and the patient/caregiver indicated understanding. and Patient/family agree to work toward stated goals and plan of care.     Thank you for this referral.  Marshal Elena, PT   Time Calculation: 33 mins

## 2022-11-25 NOTE — PROGRESS NOTES
End of Shift Note    Bedside shift change report given to Ed, RN (oncoming nurse) by Dee Kruger LPN (offgoing nurse). Report included the following information SBAR, Kardex, Intake/Output, MAR, and Recent Results    Shift worked:  7p-730a     Shift summary and any significant changes:     Pt rested in bed through shift. Pt had no c/o pain. AM labs drawn. Otherwise, pt had an uneventful shift. Concerns for physician to address:       Zone phone for oncoming shift:   [phone]       Activity:  Activity Level: Up with Assistance  Number times ambulated in hallways past shift: 0  Number of times OOB to chair past shift: 0    Cardiac:   Cardiac Monitoring: Yes           Access:   Current line(s): PIV     Genitourinary:   Urinary status: voiding    Respiratory:   O2 Device: None (Room air)  Chronic home O2 use?: NO  Incentive spirometer at bedside: NO     GI:  Last Bowel Movement Date: 11/24/22  Current diet:  ADULT DIET Dysphagia - Soft & Bite Sized  Passing flatus: YES  Tolerating current diet: YES       Pain Management:   Patient states pain is manageable on current regimen: YES    Skin:  Anson Score: 19  Interventions: float heels, increase time out of bed, and PT/OT consult    Patient Safety:  Fall Score:  Total Score: 3  Interventions: gripper socks  High Fall Risk: Yes    Length of Stay:  Expected LOS: - - -  Actual LOS: 2      Dee Kruger LPN

## 2022-11-25 NOTE — TELEPHONE ENCOUNTER
#[phone] son, Patti Logan states pt is in [facility] and he needs to speak to Dr. Radha Garcia himself please. He states she was admitted for kidney failure. He needs to discuss medications for one thing. Please call.

## 2022-11-26 LAB
ALBUMIN SERPL-MCNC: 2.3 G/DL (ref 3.5–5)
ANION GAP SERPL CALC-SCNC: 8 MMOL/L (ref 5–15)
BUN SERPL-MCNC: 65 MG/DL (ref 6–20)
BUN/CREAT SERPL: 28 (ref 12–20)
CALCIUM SERPL-MCNC: 7.4 MG/DL (ref 8.5–10.1)
CHLORIDE SERPL-SCNC: 116 MMOL/L (ref 97–108)
CO2 SERPL-SCNC: 18 MMOL/L (ref 21–32)
CREAT SERPL-MCNC: 2.31 MG/DL (ref 0.55–1.02)
GLUCOSE SERPL-MCNC: 78 MG/DL (ref 65–100)
PHOSPHATE SERPL-MCNC: 2.5 MG/DL (ref 2.6–4.7)
POTASSIUM SERPL-SCNC: 3.9 MMOL/L (ref 3.5–5.1)
SODIUM SERPL-SCNC: 142 MMOL/L (ref 136–145)

## 2022-11-26 PROCEDURE — 51798 US URINE CAPACITY MEASURE: CPT

## 2022-11-26 PROCEDURE — 80069 RENAL FUNCTION PANEL: CPT

## 2022-11-26 PROCEDURE — 36415 COLL VENOUS BLD VENIPUNCTURE: CPT

## 2022-11-26 PROCEDURE — 87506 IADNA-DNA/RNA PROBE TQ 6-11: CPT

## 2022-11-26 PROCEDURE — 65270000046 HC RM TELEMETRY

## 2022-11-26 PROCEDURE — 74011250637 HC RX REV CODE- 250/637: Performed by: STUDENT IN AN ORGANIZED HEALTH CARE EDUCATION/TRAINING PROGRAM

## 2022-11-26 PROCEDURE — 89055 LEUKOCYTE ASSESSMENT FECAL: CPT

## 2022-11-26 RX ADMIN — CINACALCET 30 MG: 30 TABLET, FILM COATED ORAL at 10:27

## 2022-11-26 RX ADMIN — TRIAMCINOLONE ACETONIDE: 1 CREAM TOPICAL at 17:44

## 2022-11-26 RX ADMIN — PANTOPRAZOLE SODIUM 40 MG: 40 TABLET, DELAYED RELEASE ORAL at 10:27

## 2022-11-26 RX ADMIN — TRIAMCINOLONE ACETONIDE: 1 CREAM TOPICAL at 10:27

## 2022-11-26 RX ADMIN — DONEPEZIL HYDROCHLORIDE 10 MG: 5 TABLET, FILM COATED ORAL at 22:22

## 2022-11-26 NOTE — PROGRESS NOTES
Renal function improving  Cont present care  Daily labs over weekend  Call with any issues  Will check back on Monday        Wolf Hernandez MD  [facility]   [address] [facility] [address]  Phone - [phone]   Fax - [phone]  [email or web address]

## 2022-11-26 NOTE — PROGRESS NOTES
Problem: Risk for Spread of Infection  Goal: Prevent transmission of infectious organism to others  Description: Prevent the transmission of infectious organisms to other patients, staff members, and visitors. Outcome: Progressing Towards Goal     Problem: Patient Education:  Go to Education Activity  Goal: Patient/Family Education  Outcome: Progressing Towards Goal     Problem: Falls - Risk of  Goal: *Absence of Falls  Description: Document Hugo Blight Fall Risk and appropriate interventions in the flowsheet.   Outcome: Progressing Towards Goal  Note: Fall Risk Interventions:  Mobility Interventions: Communicate number of staff needed for ambulation/transfer         Medication Interventions: Patient to call before getting OOB    Elimination Interventions: Call light in reach              Problem: Patient Education: Go to Patient Education Activity  Goal: Patient/Family Education  Outcome: Progressing Towards Goal     Problem: Patient Education: Go to Patient Education Activity  Goal: Patient/Family Education  Outcome: Progressing Towards Goal

## 2022-11-26 NOTE — PROGRESS NOTES
End of Shift Note    Bedside shift change report given to [address] [place] [address] (oncoming nurse) by Eneida Alvarez RN (offgoing nurse). Report included the following information SBAR    Shift worked:  3710-7703     Shift summary and any significant changes:     Pt. Tolerated all medication during day shift. Echo completed. PT/OT worked with patient. Will continue to monitor. Concerns for physician to address:  no     Zone phone for oncoming shift:       Activity:  Activity Level: Up with Assistance  Number times ambulated in hallways past shift: 0  Number of times OOB to chair past shift: 3    Cardiac:   Cardiac Monitoring: Yes           Access:  Current line(s): PIV     Genitourinary:   Urinary status: voiding    Respiratory:   O2 Device: None (Room air)  Chronic home O2 use?: NO  Incentive spirometer at bedside: NO       GI:  Last Bowel Movement Date: 11/24/22  Current diet:  ADULT DIET Dysphagia - Soft & Bite Sized  Passing flatus: YES  Tolerating current diet: YES       Pain Management:   Patient states pain is manageable on current regimen: YES    Skin:  Anson Score: 19  Interventions: float heels    Patient Safety:  Fall Score:  Total Score: 3  Interventions: gripper socks  High Fall Risk: Yes    Length of Stay:  Expected LOS: 3d 2h  Actual LOS: 2      nEeida Alvarez, RN

## 2022-11-26 NOTE — PROGRESS NOTES
Physical therapy:    New orders received. Patient was evaluated by Physical therapy yesterday (11/25) and pt is mobilizing at standby-contact guard assist level and walked ~30 ft. Home Health PT was recommended upon discharge. Will follow up next week per established plan of care of 3x/week. Thank you.     Aleida Wu, PT, DPT

## 2022-11-26 NOTE — PROGRESS NOTES
Saida of Shift Note    Bedside shift change report given to Ed RN  (oncoming nurse) by Chase Baumann LPN (offgoing nurse). Report included the following information SBAR, Intake/Output, MAR, Accordion, Recent Results, and Med Rec Status    Shift worked:  7p-7a     Shift summary and any significant changes:     No complaints of pain. Pt had a small bowel movement and also voidedx2( 400ml of clear yellow urine. Due to pt voiding, stool sample was unable to be collected. New IV in R wrist      Concerns for physician to address: BUN - 65, Cr 2.31, Ca 7.4     Zone phone for oncoming shift:   [phone]       Activity:  Activity Level: Up with Assistance  Number times ambulated in hallways past shift: 0  Number of times OOB to chair past shift: 0    Cardiac:   Cardiac Monitoring: Yes           Access:  Current line(s): PIV     Genitourinary:   Urinary status: voiding    Respiratory:   O2 Device: None (Room air)  Chronic home O2 use?: NO  Incentive spirometer at bedside: YES       GI:  Last Bowel Movement Date: 11/25/22  Current diet:  ADULT DIET Dysphagia - Soft & Bite Sized  Passing flatus: YES  Tolerating current diet: YES       Pain Management:   Patient states pain is manageable on current regimen: N/A    Skin:  Anson Score: 19  Interventions: increase time out of bed    Patient Safety:  Fall Score:  Total Score: 2  Interventions: gripper socks and pt to call before getting OOB  High Fall Risk: Yes    Length of Stay:  Expected LOS: 3d 2h  Actual LOS: 1 [facility]

## 2022-11-26 NOTE — PROGRESS NOTES
Problem: Falls - Risk of  Goal: *Absence of Falls  Description: Document Cr Wagner Fall Risk and appropriate interventions in the flowsheet.   Outcome: Progressing Towards Goal  Note: Fall Risk Interventions:  Mobility Interventions: Communicate number of staff needed for ambulation/transfer         Medication Interventions: Patient to call before getting OOB    Elimination Interventions: Call light in reach, Bed/chair exit alarm

## 2022-11-26 NOTE — PROGRESS NOTES
Hospitalist Progress Note    NAME: Madyson Chavez   :  1935   MRN:  [id number]       Assessment / Plan: BENSON  CKD V      Nephrology consulted, recs appreciated  Echo show EF 55 to 60%  Maintain SBP >110  Gentle IVF  Palliative care consulted     Hypotension  GIB  H/o Schatzki's ring s/p dilation   Pt reports episodes of hypotension at home with hematochezia noted day prior to admission. She notes right sided abdominal pain and inability to swallow with nausea, vomiting and diarrhea. Gentle IVF  Hold antihypertensive medications  Hold ASA  GI Consulted, recs appreciated  Hb goal >7, pt does not want blood products  Hb stable for now, continue to monitor  PPI  Zofran prn nausea  Consider NM scan if Hb has significant drop or significant GIB     DM  AM HbA1C  DM diet when diet resumed     HTN  Hold antihypertensives and ASA     Asthma  Nebs prn    25.0 - 29.9 Overweight / Body mass index is 25.02 kg/m². Estimated discharge date:   Barriers:    Code status: Full  Prophylaxis: SCD's  Recommended Disposition: Home w/Family     Subjective:     Chief Complaint / Reason for Physician Visit  \"\". Discussed with RN events overnight. Seen and examined, renal function improving, continue IV fluid    Review of Systems:  Symptom Y/N Comments  Symptom Y/N Comments   Fever/Chills n   Chest Pain n    Poor Appetite    Edema     Cough    Abdominal Pain     Sputum    Joint Pain     SOB/LORENZO n   Pruritis/Rash     Nausea/vomit    Tolerating PT/OT     Diarrhea    Tolerating Diet y    Constipation    Other       Could NOT obtain due to:      Objective:     VITALS:   Last 24hrs VS reviewed since prior progress note.  Most recent are:  Patient Vitals for the past 24 hrs:   Temp Pulse Resp BP SpO2   22 0730 98.6 °F (37 °C) 74 -- (!) 102/53 92 %   22 2310 98.2 °F (36.8 °C) 70 19 (!) 103/52 97 %   22 1939 97.9 °F (36.6 °C) 66 16 (!) 109/52 94 %   22 1512 97.9 °F (36.6 °C) 67 18 116/60 100 %   11/25/22 1111 -- -- -- (!) 99/48 --         Intake/Output Summary (Last 24 hours) at 11/26/2022 0944  Last data filed at 11/26/2022 0500  Gross per 24 hour   Intake 1121.25 ml   Output 500 ml   Net 621.25 ml          I had a face to face encounter and independently examined this patient on 11/26/2022, as outlined below:  PHYSICAL EXAM:  General: WD, WN. Alert, cooperative, no acute distress    EENT:  EOMI. Anicteric sclerae. MMM  Resp:  CTA bilaterally, no wheezing or rales. No accessory muscle use  CV:  Regular  rhythm,  No edema  GI:  Soft, Non distended, Non tender. +Bowel sounds  Neurologic:  Alert and oriented X 3, normal speech,   Psych:   Good insight. Not anxious nor agitated  Skin:  No rashes. No jaundice    Reviewed most current lab test results and cultures  YES  Reviewed most current radiology test results   YES  Review and summation of old records today    NO  Reviewed patient's current orders and MAR    YES  PMH/ reviewed - no change compared to H&P  ________________________________________________________________________  Care Plan discussed with:    Comments   Patient     Family      RN     Care Manager     Consultant                        Multidiciplinary team rounds were held today with , nursing, pharmacist and clinical coordinator. Patient's plan of care was discussed; medications were reviewed and discharge planning was addressed. ________________________________________________________________________  Total NON critical care TIME:  35   Minutes    Total CRITICAL CARE TIME Spent:   Minutes non procedure based      Comments   >50% of visit spent in counseling and coordination of care     ________________________________________________________________________  Nicol Dunne MD     Procedures: see electronic medical records for all procedures/Xrays and details which were not copied into this note but were reviewed prior to creation of Plan.       LABS:  I reviewed today's most current labs and imaging studies. Pertinent labs include:  Recent Labs     11/24/22 0243   WBC 6.9   HGB 9.1*   HCT 28.4*   PLT 81*       Recent Labs     11/26/22 0311 11/25/22 0437 11/24/22 0243    140 138   K 3.9 4.1 4.3   * 114* 110*   CO2 18* 19* 19*   GLU 78 87 68   BUN 65* 79* 105*   CREA 2.31* 2.86* 3.59*   CA 7.4* 7.4* 7.9*   PHOS 2.5* 2.7 3.3   ALB 2.3* 2.4* 2.5*         Signed:  Nicol Dunne MD

## 2022-11-26 NOTE — PROGRESS NOTES
End of Shift Note    Bedside shift change report given to Simon Fine LPN(oncoming nurse) by Lulu Pereyra RN (offgoing nurse). Report included the following information SBAR    Shift worked:  4116-6644     Shift summary and any significant changes:     Pt. Resting comfortably in room. Family present. No c/o pain or distress. Will continue to monitor. Concerns for physician to address:  no     Zone phone for oncoming shift:  [phone]       Activity:  Activity Level: Up with Assistance  Number times ambulated in hallways past shift: 0  Number of times OOB to chair past shift: 3    Cardiac:   Cardiac Monitoring: YES           Access:  Current line(s): PIV     Genitourinary:   Urinary status: voiding    Respiratory:   O2 Device: None (Room air)  Chronic home O2 use?: NO  Incentive spirometer at bedside: NO       GI:  Last Bowel Movement Date: 11/25/22  Current diet:  ADULT DIET Dysphagia - Soft & Bite Sized  Passing flatus: YES  Tolerating current diet: YES       Pain Management:   Patient states pain is manageable on current regimen: YES    Skin:  Anson Score: 19  Interventions: float heels and increase time out of bed    Patient Safety:  Fall Score:  Total Score: 2  Interventions: gripper socks  High Fall Risk: Yes    Length of Stay:  Expected LOS: 3d 2h  Actual LOS: 3      Lulu Pereyra RN

## 2022-11-27 VITALS
DIASTOLIC BLOOD PRESSURE: 64 MMHG | BODY MASS INDEX: 24.91 KG/M2 | HEIGHT: 66 IN | SYSTOLIC BLOOD PRESSURE: 131 MMHG | RESPIRATION RATE: 18 BRPM | WEIGHT: 155 LBS | TEMPERATURE: 99.7 F | HEART RATE: 77 BPM | OXYGEN SATURATION: 98 %

## 2022-11-27 LAB
ALBUMIN SERPL-MCNC: 2.4 G/DL (ref 3.5–5)
ANION GAP SERPL CALC-SCNC: 9 MMOL/L (ref 5–15)
BASOPHILS # BLD: 0 K/UL (ref 0–0.1)
BASOPHILS NFR BLD: 0 % (ref 0–1)
BUN SERPL-MCNC: 41 MG/DL (ref 6–20)
BUN/CREAT SERPL: 21 (ref 12–20)
CALCIUM SERPL-MCNC: 7.5 MG/DL (ref 8.5–10.1)
CAMPYLOBACTER SPECIES, DNA: NEGATIVE
CHLORIDE SERPL-SCNC: 116 MMOL/L (ref 97–108)
CO2 SERPL-SCNC: 17 MMOL/L (ref 21–32)
CREAT SERPL-MCNC: 1.92 MG/DL (ref 0.55–1.02)
DIFFERENTIAL METHOD BLD: ABNORMAL
ENTEROTOXIGEN E COLI, DNA: NEGATIVE
EOSINOPHIL # BLD: 0.2 K/UL (ref 0–0.4)
EOSINOPHIL NFR BLD: 3 % (ref 0–7)
ERYTHROCYTE [DISTWIDTH] IN BLOOD BY AUTOMATED COUNT: 18.7 % (ref 11.5–14.5)
GLUCOSE SERPL-MCNC: 79 MG/DL (ref 65–100)
HCT VFR BLD AUTO: 29.5 % (ref 35–47)
HGB BLD-MCNC: 9.2 G/DL (ref 11.5–16)
IMM GRANULOCYTES # BLD AUTO: 0 K/UL (ref 0–0.04)
IMM GRANULOCYTES NFR BLD AUTO: 1 % (ref 0–0.5)
LYMPHOCYTES # BLD: 1.5 K/UL (ref 0.8–3.5)
LYMPHOCYTES NFR BLD: 29 % (ref 12–49)
MCH RBC QN AUTO: 27.4 PG (ref 26–34)
MCHC RBC AUTO-ENTMCNC: 31.2 G/DL (ref 30–36.5)
MCV RBC AUTO: 87.8 FL (ref 80–99)
MONOCYTES # BLD: 1.1 K/UL (ref 0–1)
MONOCYTES NFR BLD: 21 % (ref 5–13)
NEUTS SEG # BLD: 2.5 K/UL (ref 1.8–8)
NEUTS SEG NFR BLD: 46 % (ref 32–75)
NRBC # BLD: 0 K/UL (ref 0–0.01)
NRBC BLD-RTO: 0 PER 100 WBC
P SHIGELLOIDES DNA STL QL NAA+PROBE: NEGATIVE
PHOSPHATE SERPL-MCNC: 1.9 MG/DL (ref 2.6–4.7)
PLATELET # BLD AUTO: 113 K/UL (ref 150–400)
PMV BLD AUTO: 10.9 FL (ref 8.9–12.9)
POTASSIUM SERPL-SCNC: 3.9 MMOL/L (ref 3.5–5.1)
RBC # BLD AUTO: 3.36 M/UL (ref 3.8–5.2)
SALMONELLA SPECIES, DNA: NEGATIVE
SHIGA TOXIN PRODUCING, DNA: NEGATIVE
SHIGELLA SP+EIEC IPAH STL QL NAA+PROBE: NEGATIVE
SODIUM SERPL-SCNC: 142 MMOL/L (ref 136–145)
VIBRIO SPECIES, DNA: NEGATIVE
WBC # BLD AUTO: 5.3 K/UL (ref 3.6–11)
WBC #/AREA STL HPF: NORMAL /HPF (ref 0–4)
Y. ENTEROCOLITICA, DNA: NEGATIVE

## 2022-11-27 PROCEDURE — 74011250636 HC RX REV CODE- 250/636: Performed by: INTERNAL MEDICINE

## 2022-11-27 PROCEDURE — 74011000250 HC RX REV CODE- 250: Performed by: INTERNAL MEDICINE

## 2022-11-27 PROCEDURE — 85025 COMPLETE CBC W/AUTO DIFF WBC: CPT

## 2022-11-27 PROCEDURE — 36415 COLL VENOUS BLD VENIPUNCTURE: CPT

## 2022-11-27 PROCEDURE — 80069 RENAL FUNCTION PANEL: CPT

## 2022-11-27 PROCEDURE — 74011250637 HC RX REV CODE- 250/637: Performed by: STUDENT IN AN ORGANIZED HEALTH CARE EDUCATION/TRAINING PROGRAM

## 2022-11-27 RX ADMIN — CINACALCET 30 MG: 30 TABLET, FILM COATED ORAL at 11:30

## 2022-11-27 RX ADMIN — TRIAMCINOLONE ACETONIDE: 1 CREAM TOPICAL at 09:36

## 2022-11-27 RX ADMIN — PANTOPRAZOLE SODIUM 40 MG: 40 TABLET, DELAYED RELEASE ORAL at 09:37

## 2022-11-27 RX ADMIN — SODIUM PHOSPHATE, MONOBASIC, MONOHYDRATE: 276; 142 INJECTION, SOLUTION INTRAVENOUS at 12:21

## 2022-11-27 NOTE — PROGRESS NOTES
DISCHARGE NOTE FROM [facility] NURSE    Patient determined to be stable for discharge by attending provider. I have reviewed the discharge instructions and follow-up appointments with the patient, spouse, and daughter. They verbalized understanding and all questions were answered to their satisfaction. No complaints or further questions were expressed. No new medications given to patient. Appropriate educational materials and medication side effect teaching were provided. PIV were removed prior to discharge. Patient did not discharge with any line, craft, or drain. All personal items collected during admission were returned to the patient prior to discharge.     Post-op patient: Cathy Dunham RN

## 2022-11-27 NOTE — PROGRESS NOTES
Problem: Falls - Risk of  Goal: *Absence of Falls  Description: Document Barron Scarlet Fall Risk and appropriate interventions in the flowsheet.   Outcome: Progressing Towards Goal  Note: Fall Risk Interventions:  Mobility Interventions: Assess mobility with egress test         Medication Interventions: Patient to call before getting OOB, Teach patient to arise slowly    Elimination Interventions: Call light in reach

## 2022-11-27 NOTE — DISCHARGE SUMMARY
Hospitalist Discharge Summary     Patient ID:  Zeeshan Sanders  [id number]  57 y.o.  1935 11/22/2022    PCP on record: Ira Díaz MD    Admit date: 11/22/2022  Discharge date and time: 11/27/2022    DISCHARGE DIAGNOSIS:  BENSON  CKD V      CONSULTATIONS:  IP CONSULT TO HOSPITALIST  IP CONSULT TO GASTROENTEROLOGY  IP CONSULT TO NEPHROLOGY  IP CONSULT TO PALLIATIVE CARE - PROVIDER    Excerpted HPI from H&P of Sil Obrien, DO:    Zeeshan Sanders is a 82 y.o.  female with PMH of arrhythmia s/p pacemaker, arthritis, asthma, right renal ca s/p resection, h/o DVT, DM, pancreatitis, HTN, MARLEN, depression, CKD, and PUD who presents with nausea and inability to eat. Patient presents with family at bedside to assist with history. Over the weekend it was noted that patient was not able to eat. She would chew food, then spit it out secondary to nausea. She reports she does not have the sensation of a food bolus stuck in her esophagus. She continues to have an appetite. She was seen by her PCP on Monday who recommended Glucerna supplementation and prescribed zofran. She also reports noticing hematuria over the past 2 days. She was informed by the PCP she should follow up with nephrology after some blood work. She was seen by nephrology yesterday and she and her family were very distressed to hear the degree of her CKD has advanced. Medication changes were made and family has been keeping track of her blood pressure. Family specifically requested Dr. Rocky Briceno/partners on board this admission. The reason for her admission per the patient is due to diarrhea and hematochezia that she has noticed over the past several days. She also endorses generalized body aches, abdominal pain, nausea, and vomiting. Her family reports with any po intake, she has diarrhea with spots of bright red blood.  Additionally her family was able to take her blood pressure yesterday noted episodes of hypotension as low as 60/43 verified by two different machines on both arms. Patient denies fever, chills, cough, SOB, CP, urinary frequency, and dysuria.  ______________________________________________________________________  DISCHARGE SUMMARY/HOSPITAL COURSE:  for full details see H&P, daily progress notes, labs, consult notes. BENSON  CKD V        Nephrology consulted, recs appreciated  Echo show EF 55 to 60%  Maintain SBP >110  Gentle IVF  Palliative care consulted     Hypotension  GIB  H/o Schatzki's ring s/p dilation 2020  Pt reports episodes of hypotension at home with hematochezia noted day prior to admission. She notes right sided abdominal pain and inability to swallow with nausea, vomiting and diarrhea. Gentle IVF  Hold antihypertensive medications  Hold ASA  GI Consulted, recs appreciated  Hb goal >7, pt does not want blood products  Hb stable for now, continue to monitor  PPI  Zofran prn nausea  Consider NM scan if Hb has significant drop or significant GIB     DM  AM HbA1C  DM diet when diet resumed     HTN  Hold antihypertensives and ASA     Asthma  Nebs prn           _______________________________________________________________________  Patient seen and examined by me on discharge day. Pertinent Findings:  Gen:    Not in distress  Chest: Clear lungs  CVS:   Regular rhythm. No edema  Abd:  Soft, not distended, not tender  Neuro:  Alert,   _______________________________________________________________________  DISCHARGE MEDICATIONS:   Current Discharge Medication List        CONTINUE these medications which have NOT CHANGED    Details   ondansetron (ZOFRAN ODT) 4 mg disintegrating tablet Take 1 Tablet by mouth every eight (8) hours as needed for Nausea or Vomiting. Qty: 20 Tablet, Refills: 0    Associated Diagnoses: Acute renal failure superimposed on stage 5 chronic kidney disease, not on chronic dialysis, unspecified acute renal failure type ([facility] [address]  Nausea acetaminophen (TYLENOL) 500 mg tablet Take 500 mg by mouth every evening. triamcinolone (ARISTOCORT) 0.5 % topical cream Apply  to affected area two (2) times a day. use thin layer  Qty: 30 g, Refills: 1    Associated Diagnoses: Eczema, unspecified type      cinacalcet (SENSIPAR) 30 mg tablet TAKE ONE TABLET BY MOUTH DAILY  Qty: 30 Tablet, Refills: 5      pantoprazole (PROTONIX) 40 mg tablet Take 1 Tablet by mouth daily. Qty: 30 Tablet, Refills: 11      donepeziL (ARICEPT) 10 mg tablet Take 1 Tablet by mouth nightly. Qty: 30 Tablet, Refills: 11      PROAIR HFA 90 mcg/actuation inhaler Take 2 Puffs by inhalation four (4) times daily as needed. STOP taking these medications       furosemide (LASIX) 40 mg tablet Comments:   Reason for Stopping:         allopurinoL (ZYLOPRIM) 300 mg tablet Comments:   Reason for Stopping:         metoprolol succinate (TOPROL-XL) 50 mg XL tablet Comments:   Reason for Stopping:         losartan (COZAAR) 100 mg tablet Comments:   Reason for Stopping:         amitriptyline (ELAVIL) 25 mg tablet Comments:   Reason for Stopping:         aspirin 81 mg chewable tablet Comments:   Reason for Stopping:                 Patient Follow Up Instructions: Activity: Activity as tolerated  Diet: Cardiac Diet  Wound Care: As directed    Follow-up with PCP  in 5 days.   Follow-up tests/labs     Follow-up Information       Follow up With Specialties Details Why Martin Quintero MD Internal Medicine Physician   [address] [place] [address]  [phone]      Colten Rosales MD Internal Medicine Physician Follow up  [address] [place] [address]  [address] [facility]      Colten Rosales MD Internal Medicine Physician   [address] [place] [address]  [address] [facility] [address]      Angie Abbott MD Nephrology Follow up in 2 week(s)  [address] [facility] [address]  [phone]            ________________________________________________________________    Risk of deterioration: High    Condition at Discharge:  Stable  __________________________________________________________________    Disposition  Home with family, no needs    ____________________________________________________________________    Code Status: Full Code  ___________________________________________________________________      Total time in minutes spent coordinating this discharge (includes going over instructions, follow-up, prescriptions, and preparing report for sign off to her PCP) :  >30 minutes    Signed:   Tanisha Rivera MD

## 2022-11-27 NOTE — PROGRESS NOTES
6761 Message sent to Dr. Joss Abdalla re: Calcium 7.5 today, do you want me to hold the Sensipar?   1050 Per Dr. Petros Cavazos this dose. [address] per [address] given.

## 2022-11-27 NOTE — PROGRESS NOTES
2338: stool specimen collected. Stool is not liquid enough to send for C. Diff. Lab order ffor WBC, Stool and Enteric Panel re-ordered and sent to lab. End of Shift Note    Bedside shift change report given to Dima Delacruz RN (oncoming nurse) by Rona Fuentes LPN (offgoing nurse). Report included the following information SBAR, Kardex, Intake/Output, MAR, and Recent Results    Shift worked:  7p-730a     Shift summary and any significant changes:     Pt rested in bed through shift w/ family present. Pt had no complaints last night. AM labs drawn. Pt had 1 bm-lab sent to      [facility] physician to address:       Zone phone for oncoming shift:   [phone]       Activity:  Activity Level: Up with Assistance  Number times ambulated in hallways past shift: 0  Number of times OOB to chair past shift: 0    Cardiac:   Cardiac Monitoring: Yes           Access:   Current line(s): PIV     Genitourinary:   Urinary status: voiding    Respiratory:   O2 Device: None (Room air)  Chronic home O2 use?: N/A  Incentive spirometer at bedside: NO     GI:  Last Bowel Movement Date: 11/26/22  Current diet:  ADULT DIET Dysphagia - Soft & Bite Sized  Passing flatus: YES  Tolerating current diet: YES       Pain Management:   Patient states pain is manageable on current regimen: YES    Skin:  Anson Score: 19  Interventions: float heels and increase time out of bed    Patient Safety:  Fall Score:  Total Score: 2  Interventions: gripper socks, pt to call before getting OOB, and stay with me (per policy)  High Fall Risk: Yes    Length of Stay:  Expected LOS: 3d 2h  Actual LOS: [address] [address]

## 2022-11-27 NOTE — PROGRESS NOTES
Occupational Therapy note:    Duplicate order received and patient evaluated by OT 11/25. Patient is functioning close to her baseline for ADLs and does not require OT services during acute hospital stay. Recommend she return home with assist from family and no OT services.      Yusuf Day, OTR/L

## 2022-11-27 NOTE — PROGRESS NOTES
Hospitalist Progress Note    NAME: Leelee Jang   :  1935   MRN:  [id number]       Assessment / Plan: BENSON  CKD V      Nephrology consulted, recs appreciated  Echo show EF 55 to 60%  Maintain SBP >110  Renal function stable  Palliative care consulted     Hypotension  GIB  H/o Schatzki's ring s/p dilation   Pt reports episodes of hypotension at home with hematochezia noted day prior to admission. She notes right sided abdominal pain and inability to swallow with nausea, vomiting and diarrhea. Hold antihypertensive medications  Hold ASA  GI Consulted, recs appreciated  Hb goal >7, pt does not want blood products  Hb stable for now, continue to monitor  PPI  Zofran prn nausea  Consider NM scan if Hb has significant drop or significant GIB    Hypophosphatemia  Replaced     DM  AM HbA1C  DM diet when diet resumed     HTN  Hold antihypertensives and ASA     Asthma  Nebs prn    25.0 - 29.9 Overweight / Body mass index is 25.02 kg/m². Estimated discharge date:   Barriers:    Code status: Full  Prophylaxis: SCD's  Recommended Disposition: Home w/Family     Subjective:     Chief Complaint / Reason for Physician Visit  \"\". Discussed with RN events overnight. Feels better, eating okay, phosphorus replacement today, possible discharge tomorrow  Review of Systems:  Symptom Y/N Comments  Symptom Y/N Comments   Fever/Chills n   Chest Pain n    Poor Appetite    Edema     Cough    Abdominal Pain     Sputum    Joint Pain     SOB/LORENZO n   Pruritis/Rash     Nausea/vomit    Tolerating PT/OT     Diarrhea    Tolerating Diet y    Constipation    Other       Could NOT obtain due to:      Objective:     VITALS:   Last 24hrs VS reviewed since prior progress note.  Most recent are:  Patient Vitals for the past 24 hrs:   Temp Pulse Resp BP SpO2   22 1147 98.1 °F (36.7 °C) 73 18 (!) 107/55 100 %   22 1108 97.9 °F (36.6 °C) 71 -- (!) 112/52 100 %   22 1106 97.9 °F (36.6 °C) 74 -- -- 94 % 11/27/22 0930 99.1 °F (37.3 °C) 80 18 (!) 116/57 95 %   11/26/22 2256 99.5 °F (37.5 °C) 78 15 116/61 94 %   11/26/22 2107 98.2 °F (36.8 °C) 79 16 (!) 114/49 97 %   11/26/22 1550 98.1 °F (36.7 °C) 75 16 128/66 96 %         Intake/Output Summary (Last 24 hours) at 11/27/2022 1453  Last data filed at 11/27/2022 0620  Gross per 24 hour   Intake 325 ml   Output 450 ml   Net -125 ml          I had a face to face encounter and independently examined this patient on 11/27/2022, as outlined below:  PHYSICAL EXAM:  General: WD, WN. Alert, cooperative, no acute distress    EENT:  EOMI. Anicteric sclerae. MMM  Resp:  CTA bilaterally, no wheezing or rales. No accessory muscle use  CV:  Regular  rhythm,  No edema  GI:  Soft, Non distended, Non tender. +Bowel sounds  Neurologic:  Alert and oriented X 3, normal speech,   Psych:   Good insight. Not anxious nor agitated  Skin:  No rashes. No jaundice    Reviewed most current lab test results and cultures  YES  Reviewed most current radiology test results   YES  Review and summation of old records today    NO  Reviewed patient's current orders and MAR    YES  PMH/SH reviewed - no change compared to H&P  ________________________________________________________________________  Care Plan discussed with:    Comments   Patient     Family      RN     Care Manager     Consultant                        Multidiciplinary team rounds were held today with , nursing, pharmacist and clinical coordinator. Patient's plan of care was discussed; medications were reviewed and discharge planning was addressed.      ________________________________________________________________________  Total NON critical care TIME:  35   Minutes    Total CRITICAL CARE TIME Spent:   Minutes non procedure based      Comments   >50% of visit spent in counseling and coordination of care     ________________________________________________________________________  Rose Cody MD     Procedures: see electronic medical records for all procedures/Xrays and details which were not copied into this note but were reviewed prior to creation of Plan. LABS:  I reviewed today's most current labs and imaging studies. Pertinent labs include:  Recent Labs     11/27/22 0422   WBC 5.3   HGB 9.2*   HCT 29.5*   *       Recent Labs     11/27/22 0422 11/26/22  0311 11/25/22  0437    142 140   K 3.9 3.9 4.1   * 116* 114*   CO2 17* 18* 19*   GLU 79 78 87   BUN 41* 65* 79*   CREA 1.92* 2.31* 2.86*   CA 7.5* 7.4* 7.4*   PHOS 1.9* 2.5* 2.7   ALB 2.4* 2.3* 2.4*         Signed:  Memo Small MD

## 2022-11-27 NOTE — DISCHARGE INSTRUCTIONS
DISCHARGE SUMMARY from Nurse    The following personal items are in your possession at time of discharge:    Dental Appliances: None  Visual Aid: None  Home Medications: None  Jewelry: None  Clothing: None (left in in pt. room)  Other Valuables: None             PATIENT INSTRUCTIONS:    To prevent infection remember to use good handwashing. What to do at Home:  Recommended activity: Activity as tolerated    Recommended diet: Diabetic diet as tolerated    *  Please give a list of your current medications to your Primary Care Provider. *  Please update this list whenever your medications are discontinued, doses are      changed, or new medications (including over-the-counter products) are added. *  Please carry medication information at all times in case of emergency situations. These are general instructions for a healthy lifestyle:    No smoking/ No tobacco products/ Avoid exposure to second hand smoke    Surgeon General's Warning:  Quitting smoking now greatly reduces serious risk to your health. Obesity, smoking, and sedentary lifestyle greatly increases your risk for illness    A healthy diet, regular physical exercise & weight monitoring are important for maintaining a healthy lifestyle    You may be retaining fluid if you have a history of heart failure or if you experience any of the following symptoms:  Weight gain of 3 pounds or more overnight or 5 pounds in a week, increased swelling in our hands or feet or shortness of breath while lying flat in bed. Please call your doctor as soon as you notice any of these symptoms; do not wait until your next office visit. Recognize signs and symptoms of STROKE:    F-face looks uneven    A-arms unable to move or move unevenly    S-speech slurred or non-existent    T-time-call 911 as soon as signs and symptoms begin-DO NOT go       Back to bed or wait to see if you get better-TIME IS BRAIN.         The discharge information has been reviewed with the patient. The patient verbalized understanding.

## 2022-11-28 ENCOUNTER — TELEPHONE (OUTPATIENT)
Dept: INTERNAL MEDICINE CLINIC | Age: 87
End: 2022-11-28

## 2022-11-28 NOTE — TELEPHONE ENCOUNTER
----- Message from Julio Castro sent at 11/28/2022  9:34 AM EST -----  Regarding: shot  She is not going to appt today, at the hosp she was only given 20mg , instead of her reg 40. Her son is concerned, please call Crow Louis , son. Thank you.

## 2022-11-28 NOTE — TELEPHONE ENCOUNTER
States that pt was recently in hospital    States they mentioned hh to patient    Asks what it was needed for and is it necessary.     Please call to advise

## 2022-11-28 NOTE — TELEPHONE ENCOUNTER
Return call placed to pt's son. Voicemail identified pt's son. Detailed message left for son stating ok to bring pt in to [facility] for her Retacrit today as scheduled.

## 2022-11-29 ENCOUNTER — TELEPHONE (OUTPATIENT)
Dept: ONCOLOGY | Age: 87
End: 2022-11-29

## 2022-11-29 NOTE — PROGRESS NOTES
Physician Progress Note      Cassius Moya  CSN #:                  [id number]  :                       1935  ADMIT DATE:       2022 11:51 PM  [address] [place] DATE:        2022 6:40 PM  RESPONDING  PROVIDER #:        Lorena Guerrero MD          QUERY TEXT:    Patient admitted with kun, atn. Noted documentation of sepsis (pn). In order to support the diagnosis of sepsis, please include additional clinical indicators in your documentation. Or please document if the diagnosis of sepsis has been ruled out after further study. The medical record reflects the following:  Risk Factors: 87F w diarrhea, ftt  Clinical Indicators: 23 pn-admitted on 2022 from home with a diagnosis of sepsis. ? gi pn- need to rule out infectious diarrhea, especially given her hx of recent antibiotics, and colitis as she's had hypotension and at risk for ischemic colitis. wbc 6, plt 81, lac 1.28, bp 84/46  Treatment: paired bc pending, NS2L  Thanks,  Nadir Yuen RN/CDI   Options provided:  -- sepsis  present as evidenced by, Please document evidence. -- sepsis was ruled out  -- Other - I will add my own diagnosis  -- Disagree - Not applicable / Not valid  -- Disagree - Clinically unable to determine / Unknown  -- Refer to Clinical Documentation Reviewer    PROVIDER RESPONSE TEXT:    sepsis was ruled out after study.     Query created by: Tricia Emmanuel on 2022 12:32 PM      Electronically signed by:  Lorena Guerrero MD 2022 9:03 PM

## 2022-11-29 NOTE — TELEPHONE ENCOUNTER
Patients son Luciana called & left a vm stating that pt was hospitalized last Tuesday. Stated that pt received half of an injection while in hospital. Ankit Maldonado would like a return call to discuss when pt is getting other half of injection.  Please call Ankit Maldonado @ [phone]

## 2022-11-30 LAB
BACTERIA SPEC CULT: NORMAL
SERVICE CMNT-IMP: NORMAL

## 2022-12-02 ENCOUNTER — TELEPHONE (OUTPATIENT)
Dept: INTERNAL MEDICINE CLINIC | Age: 87
End: 2022-12-02

## 2022-12-02 ENCOUNTER — VIRTUAL VISIT (OUTPATIENT)
Dept: INTERNAL MEDICINE CLINIC | Age: 87
End: 2022-12-02
Payer: MEDICARE

## 2022-12-02 DIAGNOSIS — Z09 HOSPITAL DISCHARGE FOLLOW-UP: ICD-10-CM

## 2022-12-02 DIAGNOSIS — R53.81 DEBILITY: Primary | ICD-10-CM

## 2022-12-02 DIAGNOSIS — K92.2 GASTROINTESTINAL HEMORRHAGE, UNSPECIFIED GASTROINTESTINAL HEMORRHAGE TYPE: ICD-10-CM

## 2022-12-02 DIAGNOSIS — N17.9 AKI (ACUTE KIDNEY INJURY) (HCC): ICD-10-CM

## 2022-12-02 NOTE — PROGRESS NOTES
PROGRESS NOTE  Name: Tristin Preston   : 1935       ASSESSMENT/ PLAN:     BENSON, dehydration, hypotension: She improved with IV fluids. She has upcoming appt with nephrologist, Dr. Marva Cordero. F/E/N: Poor appetite and N/V are concerns. She has a history of Schatski's ring. Per patient, GI saw her in the hospital and were reluctant to do procedure due to her age. Continue prn zofran. Push fluids and liquid diet, if she is unable to get adequate fluids and nutrition she will need to follow with GI; at some point they may need to bite the proverbial bullet and do scope. She is at high risk of ending up back in the hospital with severe dehydration. Debility: Home health PT and OT apparently were not ordered as planned. We will refer to [facility]. GI bleed, anemia: No blood products per prior discussion with hospitalist. Hgb holding steady at about 9-10. Follow up as planned. I have reviewed the patient's medications and risks/side effects/benefits were discussed. Diagnosis(-es) explained to patient and questions answered. Literature provided where appropriate. SUBJECTIVE  Ms. Tristin Preston presents today acutely for     Chief Complaint   Patient presents with    Hospital Follow Up     She was hospitalized  - :    From H and P:     CHIEF COMPLAINT: Malaise, nausea, decreased po intake     HISTORY OF PRESENT ILLNESS:     Tristin Preston is a 82 y.o.  female with PMH of arrhythmia s/p pacemaker, arthritis, asthma, right renal ca s/p resection, h/o DVT, DM, pancreatitis, HTN, MARLEN, depression, CKD, and PUD who presents with nausea and inability to eat. Patient presents with family at bedside to assist with history. Over the weekend it was noted that patient was not able to eat. She would chew food, then spit it out secondary to nausea. She reports she does not have the sensation of a food bolus stuck in her esophagus.  She continues to have an appetite. She was seen by her PCP on Monday who recommended Glucerna supplementation and prescribed zofran. She also reports noticing hematuria over the past 2 days. She was informed by the PCP she should follow up with nephrology after some blood work. She was seen by nephrology yesterday and she and her family were very distressed to hear the degree of her CKD has advanced. Medication changes were made and family has been keeping track of her blood pressure. Family specifically requested Dr. Gm rBiceno/partners on board this admission. The reason for her admission per the patient is due to diarrhea and hematochezia that she has noticed over the past several days. She also endorses generalized body aches, abdominal pain, nausea, and vomiting. Her family reports with any po intake, she has diarrhea with spots of bright red blood. Additionally her family was able to take her blood pressure yesterday noted episodes of hypotension as low as 60/43 verified by two different machines on both arms. Patient denies fever, chills, cough, SOB, CP, urinary frequency, and dysuria. ED Course  Afebrile HR 52 BP 83/46 RR 16 SpO2 100% on RA  Labs: Hb 10.6, Plt 100, UA with small leukocyte esterase and 1+ bacteria, Na 132, K 4.8, Cr 4.92   Imaging: EKG with atrial paced rhythm, CXR: mild bibasilar opacities atelectasis vs edema vs infection  S/p IVF    Hospital Course (from Dr. Jovi Westbrook incomplete note): DISCHARGE SUMMARY/HOSPITAL COURSE:  for full details see H&P, daily progress notes, labs, consult notes. BENSON  CKD V        Nephrology consulted, recs appreciated  Echo show EF 55 to 60%  Maintain SBP >110  Gentle IVF  Palliative care consulted     Hypotension  GIB  H/o Schatzki's ring s/p dilation 2020  Pt reports episodes of hypotension at home with hematochezia noted day prior to admission. She notes right sided abdominal pain and inability to swallow with nausea, vomiting and diarrhea.      Gentle IVF  Hold antihypertensive medications  Hold ASA  GI Consulted, recs appreciated  Hb goal >7, pt does not want blood products  Hb stable for now, continue to monitor  PPI  Zofran prn nausea  Consider NM scan if Hb has significant drop or significant GIB     DM  AM HbA1C  DM diet when diet resumed     HTN  Hold antihypertensives and ASA     Asthma  Nebs prn    She is back home. She had some nausea this morning, \"phlegm built up in my throat. \" She threw up 2-3 times. \"It was yellow and smelly. \" She has zofran. \"I don't have an appetite. Nothing tastes like food. \" She has tried Ensure. She can tolerate Activia. Dr. Lloyd Pollard appt is coming up on 19th. No home health yet--not set up yet.      Past Medical History:   Diagnosis Date    Adverse effect of anesthesia     passed out during EGD/stopped breathing    Arrhythmia     has pacemaker for low HR    Arthritis     Asthma     Cancer ([address]     right kidney - surgery    Chronic pain     right side    Deep vein thrombosis (DVT) during current hospitalization [facility]     history of DVT was on coumadin in the past    Diabetes ([address]     diet controlled    GERD (gastroesophageal reflux disease)     H/O acute pancreatitis     Hiatal hernia     Hypertension     MARLEN (obstructive sleep apnea)     does not use CPAP/pt states she has not used since a pacemaker inserted    Other ill-defined conditions(799.89)     lymph node enlargement - left chest - benign bx - watching    Other ill-defined conditions(799.89)     wheezes    Psychiatric disorder     depression    PUD (peptic ulcer disease)     hx of    Thromboembolus ([address]     Unspecified adverse effect of anesthesia     passed out during EGD per pt - stopped breathing per pt per MD     Past Medical History:   Diagnosis Date    Adverse effect of anesthesia     passed out during EGD/stopped breathing    Arrhythmia     has pacemaker for low HR    Arthritis     Asthma     Cancer ([address]     right kidney - surgery    Chronic pain right side    Deep vein thrombosis (DVT) during current hospitalization ([facility] [address]     history of DVT was on coumadin in the past    Diabetes ([address]     diet controlled    GERD (gastroesophageal reflux disease)     H/O acute pancreatitis     Hiatal hernia     Hypertension     MARLEN (obstructive sleep apnea)     does not use CPAP/pt states she has not used since a pacemaker inserted    Other ill-defined conditions(799.89)     lymph node enlargement - left chest - benign bx - watching    Other ill-defined conditions(799.89)     wheezes    Psychiatric disorder     depression    PUD (peptic ulcer disease)     hx of    Thromboembolus ([facility] [address]     Unspecified adverse effect of anesthesia     passed out during EGD per pt - stopped breathing per pt per MD     ROS: Complete review of systems was performed and is otherwise unremarkable except as noted elsewhere. OBJECTIVE  There were no vitals taken for this visit. Gen: Pleasant 82 y.o.  female in NAD.

## 2022-12-02 NOTE — TELEPHONE ENCOUNTER
Called, no answer left message to call office back. Need to know if patient has a preference for  [address] since they live out of service area for [address] [place] [address] who cannot accept patient.

## 2022-12-02 NOTE — PROGRESS NOTES
1. \"Have you been to the ER, urgent care clinic since your last visit? Hospitalized since your last visit? Yes 11/22/2022 BENSON    2. \"Have you seen or consulted any other health care providers outside of the [address] [place] [address] since your last visit? \" No     3. For patients aged 39-70: Has the patient had a colonoscopy / FIT/ Cologuard? Yes - no Care Gap present      If the patient is female:    4. For patients aged 41-77: Has the patient had a mammogram within the past 2 years? NA - based on age or sex      11. For patients aged 21-65: Has the patient had a pap smear?  NA - based on age or sex

## 2022-12-02 NOTE — TELEPHONE ENCOUNTER
Bibi with BS   [phone]  Option 2        States received referral    States doesn't provide services in pt area, thus unable to accept pt.

## 2022-12-05 ENCOUNTER — HOSPITAL ENCOUNTER (OUTPATIENT)
Dept: INFUSION THERAPY | Age: 87
Discharge: HOME | End: 2022-12-05
Payer: MEDICARE

## 2022-12-05 ENCOUNTER — TELEPHONE (OUTPATIENT)
Dept: INTERNAL MEDICINE CLINIC | Age: 87
End: 2022-12-05

## 2022-12-05 VITALS
BODY MASS INDEX: 27.54 KG/M2 | RESPIRATION RATE: 17 BRPM | TEMPERATURE: 97.5 F | DIASTOLIC BLOOD PRESSURE: 83 MMHG | HEART RATE: 107 BPM | WEIGHT: 170.6 LBS | SYSTOLIC BLOOD PRESSURE: 121 MMHG

## 2022-12-05 DIAGNOSIS — D64.9 ANEMIA, UNSPECIFIED TYPE: Primary | ICD-10-CM

## 2022-12-05 DIAGNOSIS — R53.81 DEBILITY: ICD-10-CM

## 2022-12-05 DIAGNOSIS — N17.9 AKI (ACUTE KIDNEY INJURY) (HCC): Primary | ICD-10-CM

## 2022-12-05 LAB
HGB BLD-MCNC: 9.9 G/DL (ref 11.5–16)
IRON SATN MFR SERPL: 21 % (ref 20–50)
IRON SERPL-MCNC: 21 UG/DL (ref 35–150)
TIBC SERPL-MCNC: 100 UG/DL (ref 250–450)

## 2022-12-05 PROCEDURE — 96372 THER/PROPH/DIAG INJ SC/IM: CPT

## 2022-12-05 PROCEDURE — 85018 HEMOGLOBIN: CPT

## 2022-12-05 PROCEDURE — 74011250636 HC RX REV CODE- 250/636: Performed by: STUDENT IN AN ORGANIZED HEALTH CARE EDUCATION/TRAINING PROGRAM

## 2022-12-05 PROCEDURE — 83540 ASSAY OF IRON: CPT

## 2022-12-05 PROCEDURE — 36415 COLL VENOUS BLD VENIPUNCTURE: CPT

## 2022-12-05 RX ADMIN — EPOETIN ALFA-EPBX 40000 UNITS: 40000 INJECTION, SOLUTION INTRAVENOUS; SUBCUTANEOUS at 11:31

## 2022-12-05 NOTE — PROGRESS NOTES
[facility] Short Consult Note:  Arrived - 12    Patient denied having any symptoms of COVID-19, i.e. SOB, coughing, fever, or generally not feeling well. Also denies having been exposed to COVID-19 recently or having had any recent contact with family/friend that has a pending COVID test.     Lab work obtained from the left ac bleeding stopped with 2x2's and coban. Visit Vitals  /83 (BP 1 Location: Left upper arm, BP Patient Position: Sitting)   Pulse (!) 107   Temp 97.5 °F (36.4 °C)   Resp 17   Wt 77.4 kg (170 lb 9.6 oz)   Breastfeeding No   BMI 27.54 kg/m²       Assessment - unchanged. Hgb - 9.9  Recent Results (from the past 12 hour(s))   IRON PROFILE    Collection Time: 12/05/22 11:23 AM   Result Value Ref Range    Iron 21 (L) 35 - 150 ug/dL    TIBC 100 (L) 250 - 450 ug/dL    Iron % saturation 21 20 - 50 %   POC HEMOGLOBIN    Collection Time: 12/05/22 11:23 AM   Result Value Ref Range    Hemoglobin (POC) 9.9 (L) 11.5 - 16.0 g/dL     See [facility] for pending labs. Medication given:  Retacrit 40,000 units SQ in left arm. 1135 - Tolerated well. Pt denies any acute problems/changes. Discharged from [facility] ambulatory. No distress. Next appt:    Future Appointments   Date Time Provider Rosales Messer   12/21/2022 10:00 AM Reno Steele MD [facility] AMB   1/3/2023  1:30 PM VINSON FASTRACK [facility] REG   1/30/2023 11:30 AM VINSON FASTRACK 6 [facility] REG   2/15/2023 11:30 AM Tesfaye Gabriel MD [facility] [address] BS AMB   3/7/2023  9:30 AM Sherley Villalba MD [facility] BS AMB

## 2022-12-05 NOTE — TELEPHONE ENCOUNTER
----- Message from Alec Bartlett sent at 12/2/2022  4:36 PM EST -----  Subject: Message to Provider    QUESTIONS  Information for Provider? Mr. Dicky Hamman is returning [facility]. Pts son hung up   before i could find out  ---------------------------------------------------------------------------  --------------  [address]  [phone]; OK to leave message on voicemail  ---------------------------------------------------------------------------  --------------  SCRIPT ANSWERS  Relationship to Patient? Other  Representative Name? Veronica Evans   Is the [employer] on the appropriate HIPAA document in Epic?  Yes

## 2022-12-05 NOTE — TELEPHONE ENCOUNTER
Spoke with son [employer], HIPAA  Two pt identifiers confirmed.     Son notified that [address] declined to accept patient due to service area    He states the granddaughter is looking into other [facility] options and he will let office know what company they will want to use once they have that information

## 2022-12-05 NOTE — TELEPHONE ENCOUNTER
Family wants referral for Claudio Guallpa sent to [address] [place] [address] Dr Calzada Clif[place] [phone]    Lluvia Garcia per Dr. Minh Amador    Referral sent

## 2022-12-07 ENCOUNTER — TELEPHONE (OUTPATIENT)
Dept: ENDOCRINOLOGY | Age: 87
End: 2022-12-07

## 2022-12-07 DIAGNOSIS — E21.3 HYPERPARATHYROIDISM (HCC): Primary | ICD-10-CM

## 2022-12-07 DIAGNOSIS — E83.52 HYPERCALCEMIA: ICD-10-CM

## 2022-12-07 NOTE — TELEPHONE ENCOUNTER
On 11/27/22 her chart has a renal function panel. Calcium was 7.5. Son said she just had labs on 12/5/22. Called 's office. The results will be faxed here. Was told calcium is 8.

## 2022-12-07 NOTE — TELEPHONE ENCOUNTER
12/7/2022  2:10 PM      Patient son Cornelius Santos wanted  to know the pt calcium level was 8. 0. Wilman#[phone]      Thanks,  Telma Denton

## 2022-12-08 NOTE — TELEPHONE ENCOUNTER
I spoke with pt's son and instructed him to decrease her Cinaclcet to 15mg every other day. I will repeat her Ca level in 3 weeks.

## 2022-12-19 RX ORDER — DICLOFENAC SODIUM 10 MG/G
GEL TOPICAL 4 TIMES DAILY
Qty: 50 G | Refills: 1 | Status: SHIPPED | OUTPATIENT
Start: 2022-12-19

## 2022-12-19 NOTE — TELEPHONE ENCOUNTER
----- Message from Natasha Du sent at 12/19/2022 11:21 AM EST -----  Regarding: Meds  Good morning, mom needs a rrescription for Diclofenac Gel , her neck pain.

## 2022-12-20 ENCOUNTER — TELEPHONE (OUTPATIENT)
Dept: INTERNAL MEDICINE CLINIC | Age: 87
End: 2022-12-20

## 2022-12-20 RX ORDER — METHOCARBAMOL 750 MG/1
750 TABLET, FILM COATED ORAL 4 TIMES DAILY
Qty: 60 TABLET | Refills: 1 | Status: SHIPPED | OUTPATIENT
Start: 2022-12-20

## 2022-12-20 NOTE — TELEPHONE ENCOUNTER
Prescription done. Let patient know. Orders Placed This Encounter    methocarbamoL (ROBAXIN) 750 mg tablet     Sig: Take 1 Tablet by mouth four (4) times daily.      Dispense:  60 Tablet     Refill:  1

## 2022-12-20 NOTE — TELEPHONE ENCOUNTER
Pt son Kathy Stapleton called, 11/21/22 hipaa    See also mychart encounters    States that they expected the diclofenac gel to be stronger but that is the same dosage as the OTC    States it doesn't work, and they do not want the med that went thru yesterday. States needs something stronger please    Please respond by phone or mychart.

## 2022-12-21 ENCOUNTER — OFFICE VISIT (OUTPATIENT)
Dept: ONCOLOGY | Age: 87
End: 2022-12-21
Payer: MEDICARE

## 2022-12-21 VITALS
RESPIRATION RATE: 16 BRPM | TEMPERATURE: 98.2 F | SYSTOLIC BLOOD PRESSURE: 151 MMHG | HEART RATE: 85 BPM | DIASTOLIC BLOOD PRESSURE: 91 MMHG | BODY MASS INDEX: 27.54 KG/M2 | HEIGHT: 66 IN | OXYGEN SATURATION: 97 %

## 2022-12-21 DIAGNOSIS — R53.82 CHRONIC FATIGUE: Primary | ICD-10-CM

## 2022-12-21 DIAGNOSIS — D69.6 THROMBOCYTOPENIA, UNSPECIFIED (HCC): ICD-10-CM

## 2022-12-21 DIAGNOSIS — D50.9 IRON DEFICIENCY ANEMIA, UNSPECIFIED IRON DEFICIENCY ANEMIA TYPE: ICD-10-CM

## 2022-12-21 DIAGNOSIS — R68.89 OTHER GENERAL SYMPTOMS AND SIGNS: ICD-10-CM

## 2022-12-21 PROBLEM — E11.9 TYPE 2 DIABETES MELLITUS (HCC): Status: ACTIVE | Noted: 2022-01-01

## 2022-12-21 PROCEDURE — 99214 OFFICE O/P EST MOD 30 MIN: CPT | Performed by: STUDENT IN AN ORGANIZED HEALTH CARE EDUCATION/TRAINING PROGRAM

## 2022-12-21 PROCEDURE — G8417 CALC BMI ABV UP PARAM F/U: HCPCS | Performed by: STUDENT IN AN ORGANIZED HEALTH CARE EDUCATION/TRAINING PROGRAM

## 2022-12-21 PROCEDURE — G8399 PT W/DXA RESULTS DOCUMENT: HCPCS | Performed by: STUDENT IN AN ORGANIZED HEALTH CARE EDUCATION/TRAINING PROGRAM

## 2022-12-21 PROCEDURE — G9231 DOC ESRD DIA TRANS PREG: HCPCS | Performed by: STUDENT IN AN ORGANIZED HEALTH CARE EDUCATION/TRAINING PROGRAM

## 2022-12-21 PROCEDURE — G8536 NO DOC ELDER MAL SCRN: HCPCS | Performed by: STUDENT IN AN ORGANIZED HEALTH CARE EDUCATION/TRAINING PROGRAM

## 2022-12-21 PROCEDURE — G8428 CUR MEDS NOT DOCUMENT: HCPCS | Performed by: STUDENT IN AN ORGANIZED HEALTH CARE EDUCATION/TRAINING PROGRAM

## 2022-12-21 PROCEDURE — 1111F DSCHRG MED/CURRENT MED MERGE: CPT | Performed by: STUDENT IN AN ORGANIZED HEALTH CARE EDUCATION/TRAINING PROGRAM

## 2022-12-21 PROCEDURE — 3078F DIAST BP <80 MM HG: CPT | Performed by: STUDENT IN AN ORGANIZED HEALTH CARE EDUCATION/TRAINING PROGRAM

## 2022-12-21 PROCEDURE — G8510 SCR DEP NEG, NO PLAN REQD: HCPCS | Performed by: STUDENT IN AN ORGANIZED HEALTH CARE EDUCATION/TRAINING PROGRAM

## 2022-12-21 PROCEDURE — 1090F PRES/ABSN URINE INCON ASSESS: CPT | Performed by: STUDENT IN AN ORGANIZED HEALTH CARE EDUCATION/TRAINING PROGRAM

## 2022-12-21 PROCEDURE — 3074F SYST BP LT 130 MM HG: CPT | Performed by: STUDENT IN AN ORGANIZED HEALTH CARE EDUCATION/TRAINING PROGRAM

## 2022-12-21 PROCEDURE — 1101F PT FALLS ASSESS-DOCD LE1/YR: CPT | Performed by: STUDENT IN AN ORGANIZED HEALTH CARE EDUCATION/TRAINING PROGRAM

## 2022-12-21 PROCEDURE — 1123F ACP DISCUSS/DSCN MKR DOCD: CPT | Performed by: STUDENT IN AN ORGANIZED HEALTH CARE EDUCATION/TRAINING PROGRAM

## 2022-12-21 RX ORDER — SODIUM BICARBONATE 650 MG/1
650 TABLET ORAL DAILY
COMMUNITY
Start: 2022-12-19

## 2022-12-21 RX ORDER — METOCLOPRAMIDE 5 MG/1
5 TABLET ORAL 4 TIMES DAILY
COMMUNITY
Start: 2022-12-20

## 2022-12-21 RX ORDER — FAMOTIDINE 40 MG/1
40 TABLET, FILM COATED ORAL
COMMUNITY
Start: 2022-12-20

## 2022-12-21 RX ORDER — FUROSEMIDE 40 MG/1
40 TABLET ORAL DAILY
COMMUNITY
Start: 2022-12-16

## 2022-12-21 RX ORDER — METOPROLOL SUCCINATE 25 MG/1
25 TABLET, EXTENDED RELEASE ORAL DAILY
COMMUNITY
Start: 2022-12-19

## 2022-12-21 NOTE — PROGRESS NOTES
[address] [facility] [address] [facility] [address] [facility]  W: [phone] F: [phone]      Reason for Visit:   Tristin Preston is a 82 y.o. female who is being seen in follow-up for normocytic anemia in the setting of chronic kidney disease. Hematology / Oncology Treatment History:     Hematological/Oncological Diagnosis: Anemia of  Chronic kidney disease    Date of Diagnosis: Chronic    Treatment course: Retacrit 40,000 units every 3 weeks       History of Present Illness:     Very pleasant 82 y.o. female with a relevant medical history most significant for hyperparathyroidism with hypercalcemia, diabetes mellitus type 2, hypertension, history of DVT in 2013, history of renal cell carcinoma status post right partial nephrectomy in 2005, hypertension, GERD, presents for evaluation and treatment of anemia associated with chronic kidney disease has been longstanding. The patient presents to us as a transfer of care from [place] [address]. Overall, the patient reports clinical stability. She was recently started on a new medication cinacalcet for management of hyperparathyroidism. She is tolerating that medication well and has had some improvement in her confusion, mentation, and improvement in energy level since starting this medication. Most recent labs reviewed from outside source, notable for last hemoglobin on January 25, 2022 of 10.6 g/dL, platelet count of 452, white blood cell count of 4.0, last creatinine of 1.6, last iron studies done in December 2021 showing ferritin of 516, iron saturation of 26%. Patient has a history of dementia and is on donezepil. She started retacrit on 2/21/22. Interval history:  12/21/22: Patient is here today with her son. She was on retacrit every 4 weeks. She reports fatigue, poor appetite, and bilateral lower extremity swelling.  She is following with her PCP and cardiologist to address these issues. She feels unwell. No other current clinical symptoms. She denies any bleeding, bruising, focal bone pain. Review of Systems: A complete review of systems was obtained, negative except as described above.     Past Medical History:   Diagnosis Date    Adverse effect of anesthesia     passed out during EGD/stopped breathing    Arrhythmia     has pacemaker for low HR    Arthritis     Asthma     Cancer ([facility]     right kidney - surgery    Chronic pain     right side    Deep vein thrombosis (DVT) during current hospitalization ([facility]     history of DVT was on coumadin in the past    Diabetes ([facility]     diet controlled    GERD (gastroesophageal reflux disease)     H/O acute pancreatitis     Hiatal hernia     Hypertension     MARLEN (obstructive sleep apnea)     does not use CPAP/pt states she has not used since a pacemaker inserted    Other ill-defined conditions(799.89)     lymph node enlargement - left chest - benign bx - watching    Other ill-defined conditions(799.89)     wheezes    Psychiatric disorder     depression    PUD (peptic ulcer disease)     hx of    Thromboembolus ([facility]     Unspecified adverse effect of anesthesia     passed out during EGD per pt - stopped breathing per pt per MD      Past Surgical History:   Procedure Laterality Date    HX APPENDECTOMY      HX CHOLECYSTECTOMY      HX GYN          HX HEENT Right     laser eye surgery to stop bleeding in back of eye - multiple laser surgeries    HX KNEE ARTHROSCOPY      left knee; cartilage repair    HX ORTHOPAEDIC      right hip replacement    HX ORTHOPAEDIC      lower back surgery    HX ORTHOPAEDIC      straighten toe - 2nd toe on right    HX ORTHOPAEDIC Bilateral     carpal tunnel release    HX PACEMAKER      not defibrillator    HX UROLOGICAL      implant in hip to control urine and then removed    HX UROLOGICAL      Right kidney partial nephrectomy      Social History     Tobacco Use    Smoking status: Former     Packs/day: 0.25     Years: 20.00     Pack years: 5.00     Types: Cigarettes     Quit date: 1971     Years since quittin.3    Smokeless tobacco: Never   Substance Use Topics    Alcohol use: No      Family History   Problem Relation Age of Onset    Stroke Mother         brain aneurysm    Hypertension Father     Heart Disease Father     Cancer Sister         breast    Cancer Brother         lung and prostate    Cancer Sister         breast    SLE Other         half siblings (5+) - lupus    Hypertension Daughter     Diabetes Daughter      Current Outpatient Medications   Medication Sig    famotidine (PEPCID) 40 mg tablet     furosemide (LASIX) 40 mg tablet Take 40 mg by mouth daily. sodium bicarbonate 650 mg tablet     methocarbamoL (ROBAXIN) 750 mg tablet Take 1 Tablet by mouth four (4) times daily. diclofenac (VOLTAREN) 1 % gel Apply  to affected area four (4) times daily. acetaminophen (TYLENOL) 500 mg tablet Take 500 mg by mouth every evening. triamcinolone (ARISTOCORT) 0.5 % topical cream Apply  to affected area two (2) times a day. use thin layer    cinacalcet (SENSIPAR) 30 mg tablet TAKE ONE TABLET BY MOUTH DAILY    donepeziL (ARICEPT) 10 mg tablet Take 1 Tablet by mouth nightly. PROAIR HFA 90 mcg/actuation inhaler Take 2 Puffs by inhalation four (4) times daily as needed. metoclopramide HCl (REGLAN) 5 mg tablet  (Patient not taking: Reported on 2022)    metoprolol succinate (TOPROL-XL) 25 mg XL tablet     ondansetron (ZOFRAN ODT) 4 mg disintegrating tablet Take 1 Tablet by mouth every eight (8) hours as needed for Nausea or Vomiting. (Patient not taking: No sig reported)    pantoprazole (PROTONIX) 40 mg tablet Take 1 Tablet by mouth daily. (Patient not taking: Reported on 2022)     No current facility-administered medications for this visit. Allergies   Allergen Reactions    Pcn [Penicillins] Rash     But can take cephalosporins.  Allergic to all \"cillins\". Codeine Other (comments)     Hallucinations, takes hydrocodone at home for pain    Contrast Dye [Iodine] Other (comments)     Not to take due to kidney function    Prednisone Other (comments)     \"makes my pancreas numbers go way up\"            Physical Exam:     Visit Vitals  BP (!) 151/91 (BP 1 Location: Left upper arm, BP Patient Position: Sitting)   Pulse 85   Temp 98.2 °F (36.8 °C) (Oral)   Resp 16   Ht 5' 6\" (1.676 m)   SpO2 97%   BMI 27.54 kg/m²        Pain Scale: 0 - No pain/10      ECOG PS: 1  General: No distress  Eyes: PERRLA, anicteric sclerae  HENT: Atraumatic with normal appearance of ears and nose; OP clear  Neck: Supple; no visualized JVD  Lymphatic: No cervical, or supraclavicular lymphadenopathy. Respiratory: CTAB, normal respiratory effort  CV: Normal rate, regular rhythm, no murmurs, no peripheral edema  GI: Soft, nontender, nondistended, no palpable masses, no hepatomegaly, no splenomegaly  MS: Normal gait and station. Digits without clubbing or cyanosis. Skin: No rashes, ecchymoses, or petechiae. Normal temperature, turgor, and texture. Neuro/Psych: Alert, oriented, appropriate affect, normal judgment/insight      Results:     Lab Results   Component Value Date/Time    WBC 5.3 11/27/2022 04:22 AM    HGB 9.2 (L) 11/27/2022 04:22 AM    HCT 29.5 (L) 11/27/2022 04:22 AM    PLATELET 112 (L) 94/07/7546 04:22 AM    MCV 87.8 11/27/2022 04:22 AM    ABS.  NEUTROPHILS 2.5 11/27/2022 04:22 AM    Hemoglobin (POC) 9.9 (L) 12/05/2022 11:23 AM     Lab Results   Component Value Date/Time    Sodium 142 11/27/2022 04:22 AM    Potassium 3.9 11/27/2022 04:22 AM    Chloride 116 (H) 11/27/2022 04:22 AM    CO2 17 (L) 11/27/2022 04:22 AM    Glucose 79 11/27/2022 04:22 AM    BUN 41 (H) 11/27/2022 04:22 AM    Creatinine 1.92 (H) 11/27/2022 04:22 AM    GFR est AA 22 (L) 05/27/2022 12:59 PM    GFR est non-AA 18 (L) 05/27/2022 12:59 PM    Calcium 7.5 (L) 11/27/2022 04:22 AM    Sodium,  (L) 11/23/2022 01:18 AM    Potassium, POC 5.7 (H) 11/23/2022 01:18 AM    Chloride,  11/23/2022 01:18 AM    Glucose (POC) 206 (H) 10/03/2018 11:37 AM    Glucose,  (H) 11/23/2022 01:18 AM    Creatinine, POC 5.0 (H) 11/23/2022 01:18 AM    Calcium, ionized (POC) 1.07 (L) 11/23/2022 01:18 AM     Lab Results   Component Value Date/Time    Bilirubin, total 0.5 11/23/2022 01:11 AM    ALT (SGPT) 14 11/23/2022 01:11 AM    Alk. phosphatase 92 11/23/2022 01:11 AM    Protein, total 6.9 11/23/2022 01:11 AM    Albumin 2.4 (L) 11/27/2022 04:22 AM    Globulin 3.8 11/23/2022 01:11 AM         Assessment and Recommendations:     # Anemia of chronic kidney disease stage III  -The patient has been taking Retacrit administered by [facility] [address] [place] [address]. She wishes to transfer her care closer to her home here in [facility].    -Chart reviewed,   Lab Results   Component Value Date/Time    Iron 21 (L) 12/05/2022 11:23 AM    TIBC 100 (L) 12/05/2022 11:23 AM    Iron % saturation 21 12/05/2022 11:23 AM    Ferritin 461 (H) 10/31/2022 09:58 AM           -Increase frequency of Retacrit 40,000 units to every 3 weeks with hold parameters for holding if hemoglobin greater than 11 g/dL. # Distant history of renal cell carcinoma  -No current clinical symptoms of be concerning for disease recurrence. The patient reports that she continues to follow with urology    Plan for follow-up in 6 months. Change Retacrit administration to every 3 weeks.           Luis Montes De Oca MD    Attending [address] [place] [address]

## 2022-12-21 NOTE — TELEPHONE ENCOUNTER
Two pt identifiers confirmed. I advised the patient per , he sent in new Rx into her pharmacy. The medication may upset her stomach, she may want to eat something before taking medication. Pt verbalized understanding of information discussed w/ no further questions at this time.   Signed By: Nicolasa Maki LPN     December 21, 2022

## 2022-12-21 NOTE — PROGRESS NOTES
Rio Merritt is a 82 y.o. female who presents for follow up and lab review  Chief Complaint   Patient presents with    Follow-up    Anemia       The patient reports no new clinical symptoms or new complaints since last clinic evaluation. No interval hospitalizations reported    No interval surgery or procedures reported    No reported new medication changes reported       Medications reviewed with the patient, and chart updated to reflect changes. Review of Systems   Constitutional: Negative. HENT: Negative. Eyes: Negative. Respiratory: Negative. Cardiovascular: Negative. Gastrointestinal: Negative. Genitourinary: Negative. Musculoskeletal: Negative. Skin: Negative. Neurological: Negative. Endo/Heme/Allergies: Negative.     Psychiatric/Behavioral: Negative.   '

## 2022-12-22 ENCOUNTER — TELEPHONE (OUTPATIENT)
Dept: INTERNAL MEDICINE CLINIC | Age: 87
End: 2022-12-22

## 2022-12-22 NOTE — TELEPHONE ENCOUNTER
Two pt identifiers confirmed. The patient's son stated her BP(153/83) did go down; however, now he thinks she has a UTI. I encouraged her son to take her to UC. The patient did not want to go. An OV with Kiran Squires was scheduled for tomorrow. I advised him, if his mom gets worse, she needs to go to the ED or UC. Pt's son verbalized understanding of information discussed w/ no further questions at this time.   Signed By: Katerine Mattson LPN     December 22, 2022

## 2022-12-22 NOTE — TELEPHONE ENCOUNTER
Pt son blanca called    States that pt needs to be seen today  Psr advised there no availability in practice today, soonest is tomorrow. Advised that a message for immediate callback will be sent to clinical team to advise and that if caller thinks its an emergency, to please use ED or UC as an immediate option.     States pt has bp of 161/109 and is taking her bp meds    Please call [phone] to advise

## 2022-12-23 ENCOUNTER — CLINICAL SUPPORT (OUTPATIENT)
Dept: INTERNAL MEDICINE CLINIC | Age: 87
End: 2022-12-23
Payer: MEDICARE

## 2022-12-23 ENCOUNTER — HOSPITAL ENCOUNTER (OUTPATIENT)
Dept: GENERAL RADIOLOGY | Age: 87
Discharge: HOME | End: 2022-12-23
Payer: MEDICARE

## 2022-12-23 ENCOUNTER — OFFICE VISIT (OUTPATIENT)
Dept: INTERNAL MEDICINE CLINIC | Age: 87
End: 2022-12-23
Payer: MEDICARE

## 2022-12-23 VITALS
TEMPERATURE: 98 F | OXYGEN SATURATION: 97 % | DIASTOLIC BLOOD PRESSURE: 91 MMHG | RESPIRATION RATE: 16 BRPM | SYSTOLIC BLOOD PRESSURE: 157 MMHG | HEART RATE: 81 BPM

## 2022-12-23 VITALS
RESPIRATION RATE: 16 BRPM | HEART RATE: 80 BPM | TEMPERATURE: 98 F | OXYGEN SATURATION: 98 % | DIASTOLIC BLOOD PRESSURE: 91 MMHG | SYSTOLIC BLOOD PRESSURE: 157 MMHG

## 2022-12-23 DIAGNOSIS — N17.9 ACUTE RENAL FAILURE SUPERIMPOSED ON STAGE 5 CHRONIC KIDNEY DISEASE, NOT ON CHRONIC DIALYSIS, UNSPECIFIED ACUTE RENAL FAILURE TYPE (HCC): ICD-10-CM

## 2022-12-23 DIAGNOSIS — M54.2 NECK PAIN: ICD-10-CM

## 2022-12-23 DIAGNOSIS — N18.5 ACUTE RENAL FAILURE SUPERIMPOSED ON STAGE 5 CHRONIC KIDNEY DISEASE, NOT ON CHRONIC DIALYSIS, UNSPECIFIED ACUTE RENAL FAILURE TYPE (HCC): ICD-10-CM

## 2022-12-23 DIAGNOSIS — D64.9 ANEMIA, UNSPECIFIED TYPE: ICD-10-CM

## 2022-12-23 DIAGNOSIS — M54.9 BACK PAIN, UNSPECIFIED BACK LOCATION, UNSPECIFIED BACK PAIN LATERALITY, UNSPECIFIED CHRONICITY: ICD-10-CM

## 2022-12-23 DIAGNOSIS — N17.9 ACUTE RENAL FAILURE SUPERIMPOSED ON STAGE 5 CHRONIC KIDNEY DISEASE, NOT ON CHRONIC DIALYSIS, UNSPECIFIED ACUTE RENAL FAILURE TYPE (HCC): Primary | ICD-10-CM

## 2022-12-23 DIAGNOSIS — N18.31 STAGE 3A CHRONIC KIDNEY DISEASE (HCC): ICD-10-CM

## 2022-12-23 DIAGNOSIS — N18.5 ACUTE RENAL FAILURE SUPERIMPOSED ON STAGE 5 CHRONIC KIDNEY DISEASE, NOT ON CHRONIC DIALYSIS, UNSPECIFIED ACUTE RENAL FAILURE TYPE (HCC): Primary | ICD-10-CM

## 2022-12-23 DIAGNOSIS — I10 PRIMARY HYPERTENSION: Primary | ICD-10-CM

## 2022-12-23 DIAGNOSIS — N30.00 ACUTE CYSTITIS WITHOUT HEMATURIA: ICD-10-CM

## 2022-12-23 PROBLEM — E86.0 DEHYDRATION: Status: RESOLVED | Noted: 2022-01-01 | Resolved: 2022-01-01

## 2022-12-23 LAB
BILIRUB UR QL STRIP: NEGATIVE
GLUCOSE UR-MCNC: NEGATIVE MG/DL
KETONES P FAST UR STRIP-MCNC: NEGATIVE MG/DL
PH UR STRIP: 5 [PH] (ref 4.6–8)
PROT UR QL STRIP: NORMAL
SP GR UR STRIP: 1.01 (ref 1–1.03)
UA UROBILINOGEN AMB POC: NORMAL (ref 0.2–1)
URINALYSIS CLARITY POC: CLEAR
URINALYSIS COLOR POC: YELLOW
URINE BLOOD POC: NEGATIVE
URINE LEUKOCYTES POC: NEGATIVE
URINE NITRITES POC: NEGATIVE

## 2022-12-23 PROCEDURE — 72050 X-RAY EXAM NECK SPINE 4/5VWS: CPT

## 2022-12-23 RX ORDER — AMLODIPINE BESYLATE 2.5 MG/1
2.5 TABLET ORAL DAILY
Qty: 30 TABLET | Refills: 1 | Status: SHIPPED | OUTPATIENT
Start: 2022-12-23

## 2022-12-23 RX ORDER — CEPHALEXIN 500 MG/1
500 CAPSULE ORAL 2 TIMES DAILY
Qty: 14 CAPSULE | Refills: 0 | Status: SHIPPED | OUTPATIENT
Start: 2022-12-23 | End: 2022-12-30

## 2022-12-23 NOTE — PROGRESS NOTES
1. \"Have you been to the ER, urgent care clinic since your last visit? Hospitalized since your last visit? \" No    Signed By: Dima Luis LPN     December 23, 2022

## 2022-12-23 NOTE — PROGRESS NOTES
Identified pt with two pt identifiers(name and ). Reviewed record in preparation for visit and have obtained necessary documentation.   Chief Complaint   Patient presents with    Back Pain        Vitals:    22 1422 22 1429   BP: (!) 159/107 (!) 157/91   Pulse: 81 81   Resp: 16    Temp: 98 °F (36.7 °C)    TempSrc: Temporal    SpO2: 97%    PainSc:  10 - Worst pain ever    PainLoc: Back          Gerald Freeman, [facility]  [address] [place] [address], [address] [place] [address]  IV, [address] [place] [address] [address] [place] [address]  W: [phone]  F: [phone]    The patient was seen by Lara Santana today VV    Signed By: Gerald Freeman LPN     2022

## 2022-12-23 NOTE — PROGRESS NOTES
HISTORY OF PRESENT ILLNESS  Cheyenne Wong is a 82 y.o. female. HPI  Pt of Dr. Natty Etienne, last there vv 12/2/22. Here for acute care. This is an established visit completed with telemedicine was completed with video assist. The patient acknowledges and agrees to this method of visitation. Pt presents in the office today. Pt c/o confusion, worsening of appetite, and elevated BP x 5 days. Her  states this is usually what happens when she gets a UTI. UA borderline today. Ordered keflex 500 mg BID  She c/o constant neck pain and a knot on the back of her neck that is tender to palpation x 5 days. It bothers her while walking, laying down at night. They have tried ice, which decreased swelling. She was also given voltaren gel by Dr. Natty Etienne, which is minimally helpful. Denies pus. Ordered XR neck. Ordered keflex 500 mg BID. Advised pt to go to ER if sx's worsen.     Has a history of hypertension  BP today is 157/91  BP at home 161/109  She takes toprol-XL 25 mg  (recently restarted by Dr. Nitin Borden)  Previously was taking losartan and norvasc, however these were stopped in the hospital because of her kidneys  Will add back norvasc 2.5 mg since kidney fx improved on last labs    She sees Dr. Nitin Borden (neph)   Lov 12/19/22   Cr on last labs: 1.9, 2.0  Of note, she was in the hospital 11/22/22 for CKD V    Also taking Lasix 40 mg     Patient Active Problem List    Diagnosis Date Noted    Type 2 diabetes mellitus 12/21/2022    Thrombocytopenia, unspecified 12/21/2022    Sepsis ([address] 11/23/2022    Poor appetite 11/23/2022    Palliative care by specialist 11/23/2022    Dehydration 11/23/2022    CKD (chronic kidney disease) stage 4, GFR 15-29 ml/min ([address] 06/21/2022    Vision loss, right eye 09/29/2018    Anemia 02/22/2018    GERD (gastroesophageal reflux disease) 02/22/2018    Stage 3 chronic kidney disease ([address] 02/22/2018    Asthma 02/22/2018    HTN (hypertension) 02/22/2018    Acute gastric ulcer 02/22/2018    GI bleed 02/15/2018    Localized primary osteoarthritis of left lower leg 2016    Primary localized osteoarthritis of left knee 2016    Sinus node dysfunction (HCC) 2013     Current Outpatient Medications   Medication Sig Dispense Refill    famotidine (PEPCID) 40 mg tablet Take 40 mg by mouth nightly. furosemide (LASIX) 40 mg tablet Take 40 mg by mouth daily. metoclopramide HCl (REGLAN) 5 mg tablet Take 5 mg by mouth four (4) times daily. metoprolol succinate (TOPROL-XL) 25 mg XL tablet Take 25 mg by mouth daily. sodium bicarbonate 650 mg tablet Take 650 mg by mouth daily. diclofenac (VOLTAREN) 1 % gel Apply  to affected area four (4) times daily. 50 g 1    ondansetron (ZOFRAN ODT) 4 mg disintegrating tablet Take 1 Tablet by mouth every eight (8) hours as needed for Nausea or Vomiting. 20 Tablet 0    acetaminophen (TYLENOL) 500 mg tablet Take 500 mg by mouth every evening. triamcinolone (ARISTOCORT) 0.5 % topical cream Apply  to affected area two (2) times a day. use thin layer 30 g 1    cinacalcet (SENSIPAR) 30 mg tablet TAKE ONE TABLET BY MOUTH DAILY 30 Tablet 5    pantoprazole (PROTONIX) 40 mg tablet Take 1 Tablet by mouth daily. 30 Tablet 11    donepeziL (ARICEPT) 10 mg tablet Take 1 Tablet by mouth nightly. 30 Tablet 11    PROAIR HFA 90 mcg/actuation inhaler Take 2 Puffs by inhalation four (4) times daily as needed. methocarbamoL (ROBAXIN) 750 mg tablet Take 1 Tablet by mouth four (4) times daily.  (Patient not taking: No sig reported) 60 Tablet 1     Past Surgical History:   Procedure Laterality Date    HX APPENDECTOMY      HX CHOLECYSTECTOMY      HX GYN          HX HEENT Right     laser eye surgery to stop bleeding in back of eye - multiple laser surgeries    HX KNEE ARTHROSCOPY      left knee; cartilage repair    HX ORTHOPAEDIC      right hip replacement    HX ORTHOPAEDIC      lower back surgery    HX ORTHOPAEDIC      straighten toe - 2nd toe on right HX ORTHOPAEDIC Bilateral     carpal tunnel release    HX PACEMAKER      not defibrillator    HX UROLOGICAL      implant in hip to control urine and then removed    HX UROLOGICAL      Right kidney partial nephrectomy      Lab Results   Component Value Date/Time    WBC 7.2 12/21/2022 11:30 AM    HGB 10.5 (L) 12/21/2022 11:30 AM    Hemoglobin (POC) 9.9 (L) 12/05/2022 11:23 AM    HCT 36.8 12/21/2022 11:30 AM    PLATELET 764 27/80/8171 11:30 AM    MCV 92.9 12/21/2022 11:30 AM     Lab Results   Component Value Date/Time    Triglyceride 87 02/24/2014 02:30 AM     Lab Results   Component Value Date/Time    GFR est non-AA 18 (L) 05/27/2022 12:59 PM    GFRNA, POC 29 (L) 09/27/2019 05:49 PM    GFR est AA 22 (L) 05/27/2022 12:59 PM    GFRAA, POC 35 (L) 09/27/2019 05:49 PM    Creatinine 1.56 (H) 12/21/2022 11:30 AM    Creatinine, POC 5.0 (H) 11/23/2022 01:18 AM    BUN 29 (H) 12/21/2022 11:30 AM    Sodium 138 12/21/2022 11:30 AM    Sodium,  (L) 11/23/2022 01:18 AM    Potassium 3.6 12/21/2022 11:30 AM    Potassium, POC 5.7 (H) 11/23/2022 01:18 AM    Chloride 100 12/21/2022 11:30 AM    Chloride,  11/23/2022 01:18 AM    CO2 31 12/21/2022 11:30 AM    Magnesium 1.3 (L) 07/24/2021 07:56 PM    Phosphorus 1.9 (L) 11/27/2022 04:22 AM    PTH, Intact 126.7 (H) 10/13/2022 12:25 PM        Review of Systems   Respiratory:  Negative for shortness of breath. Cardiovascular:  Negative for chest pain. Musculoskeletal:  Positive for neck pain. Physical Exam  Constitutional:       General: She is not in acute distress. Appearance: Normal appearance. She is not ill-appearing, toxic-appearing or diaphoretic. HENT:      Head: Normocephalic and atraumatic. Eyes:      General:         Right eye: No discharge. Left eye: No discharge. Conjunctiva/sclera: Conjunctivae normal.   Neurological:      General: No focal deficit present. Mental Status: She is alert and oriented to person, place, and time. Psychiatric:         Mood and Affect: Mood normal.         Behavior: Behavior normal.       ASSESSMENT and PLAN    ICD-10-CM ICD-9-CM    1. Primary hypertension  I10 401.9    Blood pressure has been climbing up patient previously was on losartan 100 and Norvasc 10 mg prior to her admission in November for hypotension    She is currently just on Toprol XL    She saw her nephrologist on Monday and it was added back at that time    Blood pressure is still elevated    Will add back Norvasc slowly at 2.5 mg    Remain off losartan as this was stopped due to advancing CKD    She is to follow-up in our clinic in 1 to 2 weeks to further adjust blood pressure medication   2. Stage 3a chronic kidney disease (HCC)  N18.31 585. 3    Reviewed recent labs from Wednesday creatinine improved to 1.5 had been up to around 2.0 during her hospitalization remain off losartan at this time to saw her nephrologist on Monday continue to monitor closely   3. Acute cystitis without hematuria  N30.00 595.0    Some concern for urinary tract infection she has symptoms similar to in the past UA is unremarkable I will send this for culture    In the meantime we will cover with Keflex which she does tolerate cephalosporins despite her penicillin allergy renally dosed the Keflex   4. Neck pain  M54.2 723.1 XR SPINE CERV 4 OR 5 V   Patient with pain in the back of her neck and prominence of the cervical spine unclear if there is an underlying infection versus bony hypertrophy from arthritic and degenerative changes    The Keflex will cover infection    Will be getting a C-spine plain film today for further evaluation    Can continue to use diclofenac for pain topically or Tylenol    ER for progressive symptoms   5.  Anemia, unspecified type  D64.9 285.9    Had GI bleeding in the past was anemic during her previous hospitalization CBC was just checked this week and shows improvement no active signs of bleeding at this time     Depression screen reviewed and negative. Scribed by Frandy Beckman, as dictated by Dr. Natanael Rossi. Current diagnosis and concerns discussed with pt at length. Pt understands risks and benefits or current treatment plan and medications, and accepts the treatment and medication with any possible risks. Pt asks appropriate questions, which were answered. Pt was instructed to call with any concerns or problems. I have reviewed the note documented by the scribe. The services provided are my own. The documentation is accurate. Adry Amato, who was evaluated through a synchronous (real-time) audio-video encounter, and/or her healthcare decision maker, is aware that it is a billable service, which includes applicable co-pays, with coverage as determined by her insurance carrier. She provided verbal consent to proceed and patient identification was verified. This visit was conducted pursuant to the emergency declaration under the [address] [place] [address], [address] [place] [address] authority and the [employer] and [employer] General Act. A caregiver was present when appropriate. Ability to conduct physical exam was limited.  The patient was located at: Home: [facility] [address] [id number]  The provider was located at: Home: [unfilled]    --Rahat Bojorquez on 12/23/2022 at 2:39 PM

## 2022-12-25 LAB
BACTERIA SPEC CULT: NORMAL
CC UR VC: NORMAL
SERVICE CMNT-IMP: NORMAL

## 2022-12-27 ENCOUNTER — OFFICE VISIT (OUTPATIENT)
Dept: INTERNAL MEDICINE CLINIC | Age: 87
End: 2022-12-27
Payer: MEDICARE

## 2022-12-27 ENCOUNTER — APPOINTMENT (OUTPATIENT)
Dept: INFUSION THERAPY | Age: 87
End: 2022-12-27
Payer: MEDICARE

## 2022-12-27 VITALS
WEIGHT: 170 LBS | DIASTOLIC BLOOD PRESSURE: 89 MMHG | SYSTOLIC BLOOD PRESSURE: 157 MMHG | HEIGHT: 66 IN | OXYGEN SATURATION: 98 % | BODY MASS INDEX: 27.32 KG/M2 | RESPIRATION RATE: 16 BRPM | TEMPERATURE: 98 F | HEART RATE: 63 BPM

## 2022-12-27 DIAGNOSIS — M54.2 NECK PAIN: Primary | ICD-10-CM

## 2022-12-27 PROCEDURE — G8417 CALC BMI ABV UP PARAM F/U: HCPCS | Performed by: INTERNAL MEDICINE

## 2022-12-27 PROCEDURE — G8511 SCR DEP POS, NO PLAN DOC RNG: HCPCS | Performed by: INTERNAL MEDICINE

## 2022-12-27 PROCEDURE — G8536 NO DOC ELDER MAL SCRN: HCPCS | Performed by: INTERNAL MEDICINE

## 2022-12-27 PROCEDURE — 1090F PRES/ABSN URINE INCON ASSESS: CPT | Performed by: INTERNAL MEDICINE

## 2022-12-27 PROCEDURE — G8427 DOCREV CUR MEDS BY ELIG CLIN: HCPCS | Performed by: INTERNAL MEDICINE

## 2022-12-27 PROCEDURE — 99213 OFFICE O/P EST LOW 20 MIN: CPT | Performed by: INTERNAL MEDICINE

## 2022-12-27 PROCEDURE — G8399 PT W/DXA RESULTS DOCUMENT: HCPCS | Performed by: INTERNAL MEDICINE

## 2022-12-27 PROCEDURE — 1101F PT FALLS ASSESS-DOCD LE1/YR: CPT | Performed by: INTERNAL MEDICINE

## 2022-12-27 PROCEDURE — 1111F DSCHRG MED/CURRENT MED MERGE: CPT | Performed by: INTERNAL MEDICINE

## 2022-12-27 NOTE — PROGRESS NOTES
1. \"Have you been to the ER, urgent care clinic since your last visit? Hospitalized since your last visit? \" No    2. \"Have you seen or consulted any other health care providers outside of the [address] [place] [address] since your last visit? \" No     3. For patients aged 39-70: Has the patient had a colonoscopy / FIT/ Cologuard? NA - based on age      If the patient is female:    4. For patients aged 41-77: Has the patient had a mammogram within the past 2 years? NA - based on age or sex      11. For patients aged 21-65: Has the patient had a pap smear?  NA - based on age or sex

## 2022-12-27 NOTE — PROGRESS NOTES
PROGRESS NOTE  Name: Trey Bennett   : 1935       ASSESSMENT/ PLAN:     Diagnoses and all orders for this visit:    Neck pain  -     REFERRAL TO PHYSICAL THERAPY    Continue current analgesics, including robaxin. Follow-up and Dispositions    Return if symptoms worsen or fail to improve. I have reviewed the patient's medications and risks/side effects/benefits were discussed. Diagnosis(-es) explained to patient and questions answered. Literature provided where appropriate. SUBJECTIVE  Ms. Trey Bennett presents today acutely for     Chief Complaint   Patient presents with    Neck Pain    Back Pain     She has neck pain which is chronic. Friday she developed pain in R flank. She had a video visit with Dr. Jonelle Ramirez, and C-spine xray, showing arthritis and degenerative disc disease. UA was fairly negative. \"Her memory is off, like that time when her calcium was high. \"     She had recent labs with Dr. Bhupendra Bennett which were reviewed. Last calcium was normal. She has anemia and gets iron infusions for anemia. OBJECTIVE  Visit Vitals  BP (!) 157/89   Pulse 63   Temp 98 °F (36.7 °C) (Temporal)   Resp 16   Ht 5' 6\" (1.676 m)   Wt 170 lb (77.1 kg)   SpO2 98%   BMI 27.44 kg/m²     Gen: Pleasant 82 y.o.  female in NAD. HEENT: PERRLA. EOMI. OP moist and pink. Neck: Supple. No LAD. HEART: RRR, No M/G/R.   LUNGS: CTAB No W/R. ABDOMEN: S, NT, ND, BS+. EXTREMITIES: Warm. No C/C/E. MUSCULOSKELETAL: Normal ROM, muscle strength 5/5 all groups. NEURO: Alert and oriented x 3. Cranial nerves grossly intact. No focal sensory or motor deficits noted. SKIN: Warm. Dry. No rashes or other lesions noted.

## 2022-12-28 ENCOUNTER — APPOINTMENT (OUTPATIENT)
Dept: INFUSION THERAPY | Age: 87
End: 2022-12-28
Payer: MEDICARE

## 2022-12-29 DIAGNOSIS — E21.3 HYPERPARATHYROIDISM (HCC): ICD-10-CM

## 2022-12-29 DIAGNOSIS — E83.52 HYPERCALCEMIA: ICD-10-CM

## 2023-01-03 ENCOUNTER — HOSPITAL ENCOUNTER (OUTPATIENT)
Dept: INFUSION THERAPY | Age: 88
Discharge: HOME | End: 2023-01-03
Payer: MEDICARE

## 2023-01-03 VITALS
SYSTOLIC BLOOD PRESSURE: 129 MMHG | HEART RATE: 103 BPM | TEMPERATURE: 97.5 F | OXYGEN SATURATION: 97 % | RESPIRATION RATE: 17 BRPM | DIASTOLIC BLOOD PRESSURE: 88 MMHG

## 2023-01-03 DIAGNOSIS — D64.9 ANEMIA, UNSPECIFIED TYPE: Primary | ICD-10-CM

## 2023-01-03 LAB — HGB BLD-MCNC: 10.7 G/DL (ref 11.5–16)

## 2023-01-03 PROCEDURE — 74011250636 HC RX REV CODE- 250/636: Performed by: STUDENT IN AN ORGANIZED HEALTH CARE EDUCATION/TRAINING PROGRAM

## 2023-01-03 PROCEDURE — 36416 COLLJ CAPILLARY BLOOD SPEC: CPT

## 2023-01-03 PROCEDURE — 85018 HEMOGLOBIN: CPT

## 2023-01-03 PROCEDURE — 96372 THER/PROPH/DIAG INJ SC/IM: CPT

## 2023-01-03 RX ADMIN — EPOETIN ALFA-EPBX 40000 UNITS: 40000 INJECTION, SOLUTION INTRAVENOUS; SUBCUTANEOUS at 14:08

## 2023-01-03 NOTE — PROGRESS NOTES
Pt arrived to [facility] in wheelchair in no acute distress at [phone] for Retacrit. Assessment unremarkable except increased weakness, fatigue and nausea with decreased appetite. Pt's son reports Nephrologist started pt on bicarb, which has been discontinued due to the symptoms. RONNELL Kapoor notified of symptoms and recommends pt contact nephrology. Labs obtained, hemocue. Visit Vitals  /88 (BP 1 Location: Left upper arm, BP Patient Position: Sitting)   Pulse (!) 103   Temp 97.5 °F (36.4 °C)   Resp 17   SpO2 97%     Recent Results (from the past 12 hour(s))   POC HEMOGLOBIN    Collection Time: 01/03/23  1:59 PM   Result Value Ref Range    Hemoglobin (POC) 10.7 (L) 11.5 - 16.0 g/dL       The following medications administered:  Medications Administered       epoetin gigi-epbx (RETACRIT) injection 40,000 Units       Admin Date  01/03/2023 Action  Given Dose  40,000 Units Route  SubCUTAneous Administered By  Ashley Lozano RN                    Pt tolerated treatment well. Pt discharged in wheelchair in no acute distress at 1415, accompanied by family. Next appointment 1/23/23 at 1330.

## 2023-01-04 ENCOUNTER — HOSPITAL ENCOUNTER (INPATIENT)
Age: 88
LOS: 4 days | Discharge: HOME | DRG: 683 | End: 2023-01-08
Attending: STUDENT IN AN ORGANIZED HEALTH CARE EDUCATION/TRAINING PROGRAM | Admitting: STUDENT IN AN ORGANIZED HEALTH CARE EDUCATION/TRAINING PROGRAM
Payer: MEDICARE

## 2023-01-04 DIAGNOSIS — E21.3 HYPERPARATHYROIDISM (HCC): Primary | ICD-10-CM

## 2023-01-04 DIAGNOSIS — R62.7 FAILURE TO THRIVE IN ADULT: Primary | ICD-10-CM

## 2023-01-04 DIAGNOSIS — N17.9 AKI (ACUTE KIDNEY INJURY) (HCC): ICD-10-CM

## 2023-01-04 DIAGNOSIS — E83.52 HYPERCALCEMIA: ICD-10-CM

## 2023-01-04 LAB
ALBUMIN SERPL-MCNC: 3.2 G/DL (ref 3.5–5)
ALBUMIN/GLOB SERPL: 0.7 (ref 1.1–2.2)
ALP SERPL-CCNC: 86 U/L (ref 45–117)
ALT SERPL-CCNC: 22 U/L (ref 12–78)
ANION GAP SERPL CALC-SCNC: 9 MMOL/L (ref 5–15)
APPEARANCE UR: CLEAR
AST SERPL-CCNC: 38 U/L (ref 15–37)
BACTERIA URNS QL MICRO: NEGATIVE /HPF
BASOPHILS # BLD: 0 K/UL (ref 0–0.1)
BASOPHILS NFR BLD: 0 % (ref 0–1)
BILIRUB SERPL-MCNC: 0.6 MG/DL (ref 0.2–1)
BILIRUB UR QL CFM: NEGATIVE
BUN SERPL-MCNC: 43 MG/DL (ref 6–20)
BUN/CREAT SERPL: 17 (ref 12–20)
CALCIUM SERPL-MCNC: 11.1 MG/DL (ref 8.5–10.1)
CHLORIDE SERPL-SCNC: 95 MMOL/L (ref 97–108)
CO2 SERPL-SCNC: 33 MMOL/L (ref 21–32)
COLOR UR: ABNORMAL
COVID-19 RAPID TEST, COVR: NOT DETECTED
CREAT SERPL-MCNC: 2.52 MG/DL (ref 0.55–1.02)
DIFFERENTIAL METHOD BLD: ABNORMAL
EOSINOPHIL # BLD: 0 K/UL (ref 0–0.4)
EOSINOPHIL NFR BLD: 1 % (ref 0–7)
EPITH CASTS URNS QL MICRO: ABNORMAL /LPF
ERYTHROCYTE [DISTWIDTH] IN BLOOD BY AUTOMATED COUNT: 19.6 % (ref 11.5–14.5)
FLUAV AG NPH QL IA: NEGATIVE
FLUBV AG NOSE QL IA: NEGATIVE
GLOBULIN SER CALC-MCNC: 4.7 G/DL (ref 2–4)
GLUCOSE SERPL-MCNC: 165 MG/DL (ref 65–100)
GLUCOSE UR STRIP.AUTO-MCNC: NEGATIVE MG/DL
HCT VFR BLD AUTO: 37 % (ref 35–47)
HGB BLD-MCNC: 11 G/DL (ref 11.5–16)
HGB UR QL STRIP: NEGATIVE
IMM GRANULOCYTES # BLD AUTO: 0 K/UL (ref 0–0.04)
IMM GRANULOCYTES NFR BLD AUTO: 0 % (ref 0–0.5)
KETONES UR QL STRIP.AUTO: ABNORMAL MG/DL
LEUKOCYTE ESTERASE UR QL STRIP.AUTO: ABNORMAL
LYMPHOCYTES # BLD: 1.6 K/UL (ref 0.8–3.5)
LYMPHOCYTES NFR BLD: 24 % (ref 12–49)
MCH RBC QN AUTO: 26.3 PG (ref 26–34)
MCHC RBC AUTO-ENTMCNC: 29.7 G/DL (ref 30–36.5)
MCV RBC AUTO: 88.3 FL (ref 80–99)
MONOCYTES # BLD: 1.1 K/UL (ref 0–1)
MONOCYTES NFR BLD: 16 % (ref 5–13)
MUCOUS THREADS URNS QL MICRO: ABNORMAL /LPF
NEUTS SEG # BLD: 4 K/UL (ref 1.8–8)
NEUTS SEG NFR BLD: 59 % (ref 32–75)
NITRITE UR QL STRIP.AUTO: NEGATIVE
NRBC # BLD: 0 K/UL (ref 0–0.01)
NRBC BLD-RTO: 0 PER 100 WBC
PH UR STRIP: 5.5 (ref 5–8)
PLATELET # BLD AUTO: 226 K/UL (ref 150–400)
PMV BLD AUTO: 10.3 FL (ref 8.9–12.9)
POTASSIUM SERPL-SCNC: 3.4 MMOL/L (ref 3.5–5.1)
PROT SERPL-MCNC: 7.9 G/DL (ref 6.4–8.2)
PROT UR STRIP-MCNC: 30 MG/DL
RBC # BLD AUTO: 4.19 M/UL (ref 3.8–5.2)
RBC #/AREA URNS HPF: ABNORMAL /HPF (ref 0–5)
SODIUM SERPL-SCNC: 137 MMOL/L (ref 136–145)
SOURCE, COVRS: NORMAL
SP GR UR REFRACTOMETRY: 1.02
TROPONIN-HIGH SENSITIVITY: 70 NG/L (ref 0–51)
TROPONIN-HIGH SENSITIVITY: 81 NG/L (ref 0–51)
UA: UC IF INDICATED,UAUC: ABNORMAL
UROBILINOGEN UR QL STRIP.AUTO: 1 EU/DL (ref 0.2–1)
WBC # BLD AUTO: 6.8 K/UL (ref 3.6–11)
WBC URNS QL MICRO: ABNORMAL /HPF (ref 0–4)

## 2023-01-04 PROCEDURE — 65270000029 HC RM PRIVATE

## 2023-01-04 PROCEDURE — 87804 INFLUENZA ASSAY W/OPTIC: CPT

## 2023-01-04 PROCEDURE — 93005 ELECTROCARDIOGRAM TRACING: CPT

## 2023-01-04 PROCEDURE — 74011250637 HC RX REV CODE- 250/637: Performed by: STUDENT IN AN ORGANIZED HEALTH CARE EDUCATION/TRAINING PROGRAM

## 2023-01-04 PROCEDURE — 74011000250 HC RX REV CODE- 250: Performed by: STUDENT IN AN ORGANIZED HEALTH CARE EDUCATION/TRAINING PROGRAM

## 2023-01-04 PROCEDURE — 81001 URINALYSIS AUTO W/SCOPE: CPT

## 2023-01-04 PROCEDURE — 80053 COMPREHEN METABOLIC PANEL: CPT

## 2023-01-04 PROCEDURE — 87635 SARS-COV-2 COVID-19 AMP PRB: CPT

## 2023-01-04 PROCEDURE — 99285 EMERGENCY DEPT VISIT HI MDM: CPT

## 2023-01-04 PROCEDURE — 65270000046 HC RM TELEMETRY

## 2023-01-04 PROCEDURE — 84484 ASSAY OF TROPONIN QUANT: CPT

## 2023-01-04 PROCEDURE — 36415 COLL VENOUS BLD VENIPUNCTURE: CPT

## 2023-01-04 PROCEDURE — 85025 COMPLETE CBC W/AUTO DIFF WBC: CPT

## 2023-01-04 PROCEDURE — 74011250636 HC RX REV CODE- 250/636: Performed by: STUDENT IN AN ORGANIZED HEALTH CARE EDUCATION/TRAINING PROGRAM

## 2023-01-04 RX ORDER — ENOXAPARIN SODIUM 100 MG/ML
30 INJECTION SUBCUTANEOUS DAILY
Status: DISCONTINUED | OUTPATIENT
Start: 2023-01-05 | End: 2023-01-04

## 2023-01-04 RX ORDER — METOPROLOL SUCCINATE 25 MG/1
25 TABLET, EXTENDED RELEASE ORAL DAILY
Status: DISCONTINUED | OUTPATIENT
Start: 2023-01-05 | End: 2023-01-08 | Stop reason: HOSPADM

## 2023-01-04 RX ORDER — ONDANSETRON 4 MG/1
4 TABLET, ORALLY DISINTEGRATING ORAL
Status: DISCONTINUED | OUTPATIENT
Start: 2023-01-04 | End: 2023-01-08 | Stop reason: HOSPADM

## 2023-01-04 RX ORDER — PANTOPRAZOLE SODIUM 40 MG/1
40 TABLET, DELAYED RELEASE ORAL
Status: DISCONTINUED | OUTPATIENT
Start: 2023-01-05 | End: 2023-01-08 | Stop reason: HOSPADM

## 2023-01-04 RX ORDER — SODIUM CHLORIDE, SODIUM LACTATE, POTASSIUM CHLORIDE, CALCIUM CHLORIDE 600; 310; 30; 20 MG/100ML; MG/100ML; MG/100ML; MG/100ML
100 INJECTION, SOLUTION INTRAVENOUS CONTINUOUS
Status: DISPENSED | OUTPATIENT
Start: 2023-01-04 | End: 2023-01-05

## 2023-01-04 RX ORDER — ACETAMINOPHEN 650 MG/1
650 SUPPOSITORY RECTAL
Status: DISCONTINUED | OUTPATIENT
Start: 2023-01-04 | End: 2023-01-08 | Stop reason: HOSPADM

## 2023-01-04 RX ORDER — DONEPEZIL HYDROCHLORIDE 5 MG/1
10 TABLET, FILM COATED ORAL
Status: DISCONTINUED | OUTPATIENT
Start: 2023-01-04 | End: 2023-01-08 | Stop reason: HOSPADM

## 2023-01-04 RX ORDER — ONDANSETRON 2 MG/ML
4 INJECTION INTRAMUSCULAR; INTRAVENOUS
Status: DISCONTINUED | OUTPATIENT
Start: 2023-01-04 | End: 2023-01-08 | Stop reason: HOSPADM

## 2023-01-04 RX ORDER — SODIUM CHLORIDE 0.9 % (FLUSH) 0.9 %
5-40 SYRINGE (ML) INJECTION EVERY 8 HOURS
Status: DISCONTINUED | OUTPATIENT
Start: 2023-01-04 | End: 2023-01-08 | Stop reason: HOSPADM

## 2023-01-04 RX ORDER — SODIUM CHLORIDE 0.9 % (FLUSH) 0.9 %
5-40 SYRINGE (ML) INJECTION AS NEEDED
Status: DISCONTINUED | OUTPATIENT
Start: 2023-01-04 | End: 2023-01-08 | Stop reason: HOSPADM

## 2023-01-04 RX ORDER — HEPARIN SODIUM 5000 [USP'U]/ML
5000 INJECTION, SOLUTION INTRAVENOUS; SUBCUTANEOUS EVERY 8 HOURS
Status: DISCONTINUED | OUTPATIENT
Start: 2023-01-04 | End: 2023-01-08 | Stop reason: HOSPADM

## 2023-01-04 RX ORDER — ACETAMINOPHEN 325 MG/1
650 TABLET ORAL
Status: DISCONTINUED | OUTPATIENT
Start: 2023-01-04 | End: 2023-01-08 | Stop reason: HOSPADM

## 2023-01-04 RX ORDER — POLYETHYLENE GLYCOL 3350 17 G/17G
17 POWDER, FOR SOLUTION ORAL DAILY PRN
Status: DISCONTINUED | OUTPATIENT
Start: 2023-01-04 | End: 2023-01-08 | Stop reason: HOSPADM

## 2023-01-04 RX ADMIN — SODIUM CHLORIDE, POTASSIUM CHLORIDE, SODIUM LACTATE AND CALCIUM CHLORIDE 100 ML/HR: 600; 310; 30; 20 INJECTION, SOLUTION INTRAVENOUS at 21:21

## 2023-01-04 RX ADMIN — DONEPEZIL HYDROCHLORIDE 10 MG: 5 TABLET, FILM COATED ORAL at 21:21

## 2023-01-04 RX ADMIN — SODIUM CHLORIDE, PRESERVATIVE FREE 10 ML: 5 INJECTION INTRAVENOUS at 23:48

## 2023-01-04 NOTE — ED NOTES
Pt has been declining since the last admitting, son stated that she is not eating or drinking as much.  She was walking to bathroom and showering her self not really the past week and half getting worse

## 2023-01-05 LAB
ANION GAP SERPL CALC-SCNC: 4 MMOL/L (ref 5–15)
ATRIAL RATE: 66 BPM
BASOPHILS # BLD: 0 K/UL (ref 0–0.1)
BASOPHILS NFR BLD: 0 % (ref 0–1)
BUN SERPL-MCNC: 45 MG/DL (ref 6–20)
BUN/CREAT SERPL: 19 (ref 12–20)
CALCIUM SERPL-MCNC: 10.5 MG/DL (ref 8.5–10.1)
CALCULATED P AXIS, ECG09: -31 DEGREES
CALCULATED R AXIS, ECG10: -21 DEGREES
CALCULATED T AXIS, ECG11: 88 DEGREES
CHLORIDE SERPL-SCNC: 97 MMOL/L (ref 97–108)
CO2 SERPL-SCNC: 32 MMOL/L (ref 21–32)
CREAT SERPL-MCNC: 2.35 MG/DL (ref 0.55–1.02)
DIAGNOSIS, 93000: NORMAL
DIFFERENTIAL METHOD BLD: ABNORMAL
EOSINOPHIL # BLD: 0 K/UL (ref 0–0.4)
EOSINOPHIL NFR BLD: 0 % (ref 0–7)
ERYTHROCYTE [DISTWIDTH] IN BLOOD BY AUTOMATED COUNT: 19.4 % (ref 11.5–14.5)
GLUCOSE SERPL-MCNC: 113 MG/DL (ref 65–100)
HCT VFR BLD AUTO: 31.7 % (ref 35–47)
HGB BLD-MCNC: 9.8 G/DL (ref 11.5–16)
IMM GRANULOCYTES # BLD AUTO: 0 K/UL (ref 0–0.04)
IMM GRANULOCYTES NFR BLD AUTO: 0 % (ref 0–0.5)
LYMPHOCYTES # BLD: 1.9 K/UL (ref 0.8–3.5)
LYMPHOCYTES NFR BLD: 26 % (ref 12–49)
MAGNESIUM SERPL-MCNC: 1.8 MG/DL (ref 1.6–2.4)
MCH RBC QN AUTO: 26.6 PG (ref 26–34)
MCHC RBC AUTO-ENTMCNC: 30.9 G/DL (ref 30–36.5)
MCV RBC AUTO: 86.1 FL (ref 80–99)
MONOCYTES # BLD: 1.4 K/UL (ref 0–1)
MONOCYTES NFR BLD: 18 % (ref 5–13)
NEUTS SEG # BLD: 4.2 K/UL (ref 1.8–8)
NEUTS SEG NFR BLD: 56 % (ref 32–75)
NRBC # BLD: 0 K/UL (ref 0–0.01)
NRBC BLD-RTO: 0 PER 100 WBC
P-R INTERVAL, ECG05: 180 MS
PLATELET # BLD AUTO: 245 K/UL (ref 150–400)
PMV BLD AUTO: 12.8 FL (ref 8.9–12.9)
POTASSIUM SERPL-SCNC: 3.7 MMOL/L (ref 3.5–5.1)
Q-T INTERVAL, ECG07: 372 MS
QRS DURATION, ECG06: 80 MS
QTC CALCULATION (BEZET), ECG08: 389 MS
RBC # BLD AUTO: 3.68 M/UL (ref 3.8–5.2)
SODIUM SERPL-SCNC: 133 MMOL/L (ref 136–145)
VENTRICULAR RATE, ECG03: 66 BPM
WBC # BLD AUTO: 7.6 K/UL (ref 3.6–11)

## 2023-01-05 PROCEDURE — 97162 PT EVAL MOD COMPLEX 30 MIN: CPT

## 2023-01-05 PROCEDURE — 80048 BASIC METABOLIC PNL TOTAL CA: CPT

## 2023-01-05 PROCEDURE — 97166 OT EVAL MOD COMPLEX 45 MIN: CPT | Performed by: OCCUPATIONAL THERAPIST

## 2023-01-05 PROCEDURE — 85025 COMPLETE CBC W/AUTO DIFF WBC: CPT

## 2023-01-05 PROCEDURE — 74011000250 HC RX REV CODE- 250: Performed by: STUDENT IN AN ORGANIZED HEALTH CARE EDUCATION/TRAINING PROGRAM

## 2023-01-05 PROCEDURE — 97530 THERAPEUTIC ACTIVITIES: CPT

## 2023-01-05 PROCEDURE — 65270000046 HC RM TELEMETRY

## 2023-01-05 PROCEDURE — 74011250637 HC RX REV CODE- 250/637: Performed by: STUDENT IN AN ORGANIZED HEALTH CARE EDUCATION/TRAINING PROGRAM

## 2023-01-05 PROCEDURE — 83735 ASSAY OF MAGNESIUM: CPT

## 2023-01-05 PROCEDURE — 97530 THERAPEUTIC ACTIVITIES: CPT | Performed by: OCCUPATIONAL THERAPIST

## 2023-01-05 PROCEDURE — 36415 COLL VENOUS BLD VENIPUNCTURE: CPT

## 2023-01-05 PROCEDURE — 74011250636 HC RX REV CODE- 250/636: Performed by: INTERNAL MEDICINE

## 2023-01-05 PROCEDURE — 74011250636 HC RX REV CODE- 250/636: Performed by: STUDENT IN AN ORGANIZED HEALTH CARE EDUCATION/TRAINING PROGRAM

## 2023-01-05 RX ORDER — SODIUM CHLORIDE AND POTASSIUM CHLORIDE 150; 900 MG/100ML; MG/100ML
INJECTION, SOLUTION INTRAVENOUS CONTINUOUS
Status: DISCONTINUED | OUTPATIENT
Start: 2023-01-05 | End: 2023-01-08 | Stop reason: HOSPADM

## 2023-01-05 RX ADMIN — HEPARIN SODIUM 5000 UNITS: 5000 INJECTION INTRAVENOUS; SUBCUTANEOUS at 01:22

## 2023-01-05 RX ADMIN — PANTOPRAZOLE SODIUM 40 MG: 40 TABLET, DELAYED RELEASE ORAL at 10:08

## 2023-01-05 RX ADMIN — SODIUM CHLORIDE, PRESERVATIVE FREE 10 ML: 5 INJECTION INTRAVENOUS at 18:44

## 2023-01-05 RX ADMIN — METOPROLOL SUCCINATE 25 MG: 50 TABLET, EXTENDED RELEASE ORAL at 10:08

## 2023-01-05 RX ADMIN — HEPARIN SODIUM 5000 UNITS: 5000 INJECTION INTRAVENOUS; SUBCUTANEOUS at 18:45

## 2023-01-05 RX ADMIN — POTASSIUM CHLORIDE AND SODIUM CHLORIDE: 900; 150 INJECTION, SOLUTION INTRAVENOUS at 18:44

## 2023-01-05 RX ADMIN — HEPARIN SODIUM 5000 UNITS: 5000 INJECTION INTRAVENOUS; SUBCUTANEOUS at 10:08

## 2023-01-05 RX ADMIN — ACETAMINOPHEN 650 MG: 325 TABLET ORAL at 19:56

## 2023-01-05 RX ADMIN — DONEPEZIL HYDROCHLORIDE 10 MG: 5 TABLET, FILM COATED ORAL at 22:31

## 2023-01-05 RX ADMIN — HEPARIN SODIUM 5000 UNITS: 5000 INJECTION INTRAVENOUS; SUBCUTANEOUS at 23:31

## 2023-01-05 RX ADMIN — SODIUM CHLORIDE, PRESERVATIVE FREE 10 ML: 5 INJECTION INTRAVENOUS at 22:31

## 2023-01-05 NOTE — PROGRESS NOTES
End of Shift Note    Bedside shift change report given to Linda Holland RN (oncoming nurse) by Josephine Vernon RN (offgoing nurse). Report included the following information SBAR, Kardex, and MAR    Shift worked:  7a - 7:30p     Shift summary and any significant changes:     Pt admitted to Oncology from the ED     Pt seen by PT and OT in ED     6 - 7 family members present at bedside     Pt pleasantly confused, A&O x 1     Palliative consult in place; pt not seen yet     GI consult in place; pt not seen yet     Pt up to BSCC x 1 w/ 2 person assist     Dual skin assessment performed     Informed by pt's family that pt is lactose intolerant. Added this to pt's diet order. Family agitated, stating they haven't seen a doctor, that they have no idea what the plan for pt is right now, and that they couldn't get pt's PCP and attending, Dr. Paola Schwab, on the phone at his office despite numerous attempts. Paged Dr. Daquan Hanna using number listed in his last note; quickly received call back from Dr. Daquan Hanna, who stated he was out of the office this afternoon for 1/2 day at work. He plans to round on pt tomorrow morning. Per Dr. Daquan Hanna, palliative has been consulted and pt's family is considering hospice. If family agrees to hospice, pt will not be seen by GI, will see if pt is inpatient hospice eligible or not and go from there. If family decides against hospice, then will aggressively rehydrate pt w/ IVF, and GI will see pt. IVF ordered per MD until hospice decision is confirmed. Informed pt's family of this, was told they are not planning to pursue hospice at this time. Concerns for physician to address:       Zone phone for oncoming shift:   [phone]       Activity:  Activity Level:  Up with Assistance  Number times ambulated in hallways past shift: 0  Number of times OOB to chair past shift: 0    Cardiac:   Cardiac Monitoring: Yes      Cardiac Rhythm: Sinus Rhythm    Access:  Current line(s): PIV     Genitourinary: Urinary status: voiding    Respiratory:   O2 Device: None (Room air)  Chronic home O2 use?: NO  Incentive spirometer at bedside: NO       GI:     Current diet:  ADULT DIET Regular  ADULT ORAL NUTRITION SUPPLEMENT Breakfast, AM Snack, Lunch, PM Snack, Dinner; Renal Supplement  Passing flatus: YES  Tolerating current diet: NO       Pain Management:   Patient states pain is manageable on current regimen: YES    Skin:  Anson Score: 15  Interventions: turn team, speciality bed, float heels, increase time out of bed, PT/OT consult, limit briefs, and nutritional support     Patient Safety:  Fall Score:  Total Score: 4  Interventions: bed/chair alarm, assistive device (walker, cane, etc), gripper socks, pt to call before getting OOB, stay with me (per policy), and gait belt  High Fall Risk: Yes    Length of Stay:  Expected LOS: 2d 4h  Actual LOS: 1      Josephine Vernon RN

## 2023-01-05 NOTE — PROGRESS NOTES
Problem: Falls - Risk of  Goal: *Absence of Falls  Description: Document Xavier Hale Fall Risk and appropriate interventions in the flowsheet.   Outcome: Progressing Towards Goal  Note: Fall Risk Interventions:  Mobility Interventions: Bed/chair exit alarm    Mentation Interventions: Family/sitter at bedside    Medication Interventions: Bed/chair exit alarm    Elimination Interventions: Call light in reach, Toileting schedule/hourly rounds

## 2023-01-05 NOTE — PROGRESS NOTES
TRANSFER - OUT REPORT:    Verbal report given to Chano Duke RN (name) on Leo Neely  being transferred to Room 1120 (unit) for routine progression of care       Report consisted of patients Situation, Background, Assessment and   Recommendations(SBAR). Information from the following report(s) SBAR, Kardex, MAR, and Recent Results was reviewed with the receiving nurse. Lines:   Peripheral IV Left Antecubital (Active)   Site Assessment Clean, dry, & intact 01/05/23 1213   Phlebitis Assessment 0 01/05/23 1213   Infiltration Assessment 0 01/05/23 1213   Dressing Status Clean, dry, & intact 01/05/23 1213   Dressing Type Tape;Transparent 01/05/23 1213   Hub Color/Line Status Pink;Capped 01/05/23 1213        Opportunity for questions and clarification was provided.       Patient transported with:

## 2023-01-05 NOTE — PROGRESS NOTES
Problem: Mobility Impaired (Adult and Pediatric)  Goal: *Acute Goals and Plan of Care (Insert Text)  Description: FUNCTIONAL STATUS PRIOR TO ADMISSION: Pt is unable to fill in hx due to cognitive status but dtr in room gives details. She states that pt had been doing relatively well, had been walking without a device and had been going to OPPT as well, did her ADLS and sponge bathed. She states though that over last week pt has begun to refuse to eat and drink and has been declining. She has been attempting to use a walker since getting weaker but overall has been more confused and struggling over the last week. She has had one fall sliding off the bed. Dtr states she has been just wanting to sleep. Dtr states pt c/o some pain in neck and HA and back. HOME SUPPORT PRIOR TO ADMISSION: The patient lived with . Has needed more assist over last week. Children have been taking turns helping. Physical Therapy Goals  Initiated 1/5/2023  1. Patient will move from supine to sit and sit to supine , scoot up and down, and roll side to side in bed with supervision/set-up within 7 day(s). 2.  Patient will transfer from bed to chair and chair to bed with supervision/set-up using the least restrictive device within 7 day(s). 3.  Patient will perform sit to stand with minimal assistance/contact guard assist within 7 day(s). 4.  Patient will ambulate with minimal assistance/contact guard assist for 150 feet with the least restrictive device within 7 day(s). Outcome: Not Met   PHYSICAL THERAPY EVALUATION  Patient: Chas Waters (89 y.o. female)  Date: 1/5/2023  Primary Diagnosis: Failure to thrive in adult [R62.7]       Precautions: fall, dementia      ASSESSMENT  Based on the objective data described below, the patient presents with limited gait and mobility below her recent baseline with hx of at least 1 week significant decline at home refusing to [address].  She was very confused and needed visual cues for very automatic activity. Her command following is decreased. She was unable to answer questions. She needing min A guiding to edge of bed. She could stand with min A of 2 but then wanted to sit right back down. Vitals were stable. She did scoot herself with partial stand and side step toward [facility] with min A. Pt returned to supine with SBA. Discussed all with dtr. Dtr stated it was the not eating/drinking that brought them to the hospital. Family is aware that this may be a progression of the dementia. They are supportive. It would likely be best if pt could return to her own home with family support and would need 24 hr S/assist. Feel her skilled needs for therapy are most appropriate through home health PT/OT to assess function in her familiar environment and educate family in safety and level of assist for pt. Discussed resource of [employer] with the dtr tierney as resource for the family. If family is unable to provide the assist/supervision for pt she may need alternative placement but her skilled therapy needs are likely limited due to her level of cognition at this time. Current Level of Function Impacting Discharge (mobility/balance): see above    Functional Outcome Measure: The patient scored 35/100 on the barthel outcome measure which is indicative of 65% functional impairment. Other factors to consider for discharge: see above     Patient will benefit from skilled therapy intervention to address the above noted impairments. PLAN :  Recommendations and Planned Interventions: bed mobility training, transfer training, gait training, therapeutic exercises, patient and family training/education, and therapeutic activities      Frequency/Duration: Patient will be followed by physical therapy:  3 times a week to address goals.     Recommendation for discharge: (in order for the patient to meet his/her long term goals)  It would likely be best if pt could return to her own home with family support and would need 24 hr S/assist. Feel her skilled needs for therapy are most appropriate through home health PT/OT to assess function in her familiar environment and educate family in safety and level of assist for pt. Discussed resource of [employer] with the dtr tierney as resource for the family. If family is unable to provide the assist/supervision for pt she may need alternative placement but her skilled therapy needs are likely limited due to her level of cognition at this time. This discharge recommendation:  Has been made in collaboration with the attending provider and/or case management    IF patient discharges home will need the following DME: patient owns DME required for discharge         SUBJECTIVE:   Patient stated My son is Jacinto Roldan.     OBJECTIVE DATA SUMMARY:   HISTORY:    Past Medical History:   Diagnosis Date    Adverse effect of anesthesia     passed out during EGD/stopped breathing    Arrhythmia     has pacemaker for low HR    Arthritis     Asthma     Cancer ([address]     right kidney - surgery    Chronic pain     right side    Deep vein thrombosis (DVT) during current hospitalization ([address]     history of DVT was on coumadin in the past    Diabetes ([address]     diet controlled    GERD (gastroesophageal reflux disease)     H/O acute pancreatitis     Hiatal hernia     Hypertension     MARLEN (obstructive sleep apnea)     does not use CPAP/pt states she has not used since a pacemaker inserted    Other ill-defined conditions(799.89)     lymph node enlargement - left chest - benign bx - watching    Other ill-defined conditions(799.89)     wheezes    Psychiatric disorder     depression    PUD (peptic ulcer disease)     hx of    Thromboembolus ([address]     Unspecified adverse effect of anesthesia     passed out during EGD per pt - stopped breathing per pt per MD     Past Surgical History:   Procedure Laterality Date    HX APPENDECTOMY      HX CHOLECYSTECTOMY      HX GYN          HX HEENT Right     laser eye surgery to stop bleeding in back of eye - multiple laser surgeries    HX KNEE ARTHROSCOPY      left knee; cartilage repair    HX ORTHOPAEDIC      right hip replacement    HX ORTHOPAEDIC      lower back surgery    HX ORTHOPAEDIC      straighten toe - 2nd toe on right    HX ORTHOPAEDIC Bilateral     carpal tunnel release    HX PACEMAKER      not defibrillator    HX UROLOGICAL      implant in hip to control urine and then removed    HX UROLOGICAL      Right kidney partial nephrectomy       Personal factors and/or comorbidities impacting plan of care: dementia    Home Situation  Home Environment: Private residence  # Steps to Enter: 1  One/Two Story Residence: One story  Living Alone: No  Support Systems: Spouse/Significant Other, Child(levi) (Spouse is the main caregiver, pt has 4 children)  Current DME Used/Available at Home: Walker, rolling, Shower chair, Commode, bedside  Tub or Shower Type:  (sponge bathes)    EXAMINATION/PRESENTATION/DECISION MAKING:   Critical Behavior:  Neurologic State: Alert, Confused, Eyes open spontaneously  Orientation Level: Oriented to person, Disoriented to place, Disoriented to situation, Disoriented to time  Cognition: Decreased attention/concentration, Memory loss, Follows commands  Safety/Judgement: Lack of insight into deficits    Range Of Motion:  AROM: Generally decreased, functional           PROM: Generally decreased, functional           Strength:    Strength: Generally decreased, functional                    Tone & Sensation:   Tone: Normal              Sensation:  (unable to accurately assess)               Coordination:  Coordination: Within functional limits  Vision:   Corrective Lenses:  (appears to have decreased vision but unable to accurately assess)  Functional Mobility:  Bed Mobility:  Rolling: Supervision  Supine to Sit: Minimum assistance (guiding)  Sit to Supine: Stand-by assistance  Scooting: Supervision  Transfers:  Sit to Stand: Minimum assistance  Stand to Sit: Minimum assistance                       Balance:   Sitting: Intact  Standing: Impaired  Standing - Static: Constant support; Fair  Standing - Dynamic : Constant support; Fair  Ambulation/Gait Training:              Gait Description (WDL):  (side steps with min HHA only; unable to cognitively follow more OOB mobility at this time)                         Functional Measure:  Barthel Index:    Bathin  Bladder: 5  Bowels: 10  Groomin  Dressin  Feedin  Mobility: 0  Stairs: 0  Toilet Use: 0  Transfer (Bed to Chair and Back): 10  Total: 35/100       The Barthel ADL Index: Guidelines  1. The index should be used as a record of what a patient does, not as a record of what a patient could do. 2. The main aim is to establish degree of independence from any help, physical or verbal, however minor and for whatever reason. 3. The need for supervision renders the patient not independent. 4. A patient's performance should be established using the best available evidence. Asking the patient, friends/relatives and nurses are the usual sources, but direct observation and common sense are also important. However direct testing is not needed. 5. Usually the patient's performance over the preceding 24-48 hours is important, but occasionally longer periods will be relevant. 6. Middle categories imply that the patient supplies over 50 per cent of the effort. 7. Use of aids to be independent is allowed. Score Interpretation (from [address] [address])    Independent   60-79 Minimally independent   40-59 Partially dependent   20-39 Very dependent   <20 Totally dependent     -Deondre Hayes., Barthel, D.W. (1965). Functional evaluation: the Barthel Index. [address] [facility] ([address] (1997). The Barthel activities of daily living index: self-reporting versus actual performance in the old (> or = 75 years).  Journal of [facility]4 [facility] 45(7), [address] [facility], [place]. Virgie Williamson., [facility]., Hilary Harp (1999). Measuring the change in disability after inpatient rehabilitation; comparison of the responsiveness of the Barthel Index and Functional [place] Measure. Journal of Neurology, Neurosurgery, and Psychiatry, 66(4), 116-326. SHANELLE Sánchez, NATALIIA Vargas, & Lenny Torres M.A. (2004) Assessment of post-stroke quality of life in cost-effectiveness studies: The usefulness of the Barthel Index and the EuroQoL-5D. Quality of Life Research, 15, 155-06           Physical Therapy Evaluation Charge Determination   History Examination Presentation Decision-Making   HIGH Complexity :3+ comorbidities / personal factors will impact the outcome/ POC  MEDIUM Complexity : 3 Standardized tests and measures addressing body structure, function, activity limitation and / or participation in recreation  MEDIUM Complexity : Evolving with changing characteristics  MEDIUM Complexity : FOTO score of 26-74      Based on the above components, the patient evaluation is determined to be of the following complexity level: MEDIUM    Pain Rating:  No apparent current c/o or indicators    Activity Tolerance:   Fair, VSS    After treatment patient left in no apparent distress:   Supine in bed, Call bell within reach, Caregiver / family present, and Side rails x 3    COMMUNICATION/EDUCATION:   The patients plan of care was discussed with: Occupational therapist, Registered nurse, and Case management. Fall prevention education was provided and the patient/caregiver indicated understanding., Patient/family have participated as able in goal setting and plan of care. , and Patient/family agree to work toward stated goals and plan of care.     Thank you for this referral.  Oksana Goins, PT   Time Calculation: 34 mins

## 2023-01-05 NOTE — H&P
This note is compiled to communicate with the medical care team. It may contain sensitive and protected information. It is not intended to serve as a source of communication with patients/families/friends; that communication occurs at the bedside or via alternative direct communications means (secure messaging, letters, video/telephone, etc.). Hospitalist Admission Note    NAME: Guido Dancer   :  1935   MRN:  [id number]     Date/Time:  2023 11:19 PM    Patient PCP: Lucy Rabago MD  ______________________________________________________________________  Given the patient's current clinical presentation, I have a high level of concern for decompensation if discharged from the emergency department. Complex decision making was performed, which includes reviewing the patient's available past medical records, laboratory results, and x-ray films. My assessment of this patient's clinical condition and my plan of care is as follows. Subjective:   CHIEF COMPLAINT: Fatigue    HISTORY OF PRESENT ILLNESS:     Guido Dancer is a 82 y.o.  female with pertinent past medical history as listed below who presents with complaints of progressive functional decline with poor p.o. intake and intermittent confusion over the past week. Patient reports she has a known esophageal issue (\"weak esophagus\") and has been disinterested in food. On review, it appears she has been diagnosed with dysphagia to solids and occasionally regurgitates solids. She also reports disinterest in food citing bland taste with increasing metallic taste in her mouth likely secondary to her declining renal function and low potassium diet. In the ED, patient afebrile and hemodynamically stable saturating mid 90s on room air. ECG demonstrates atrial paced rhythm with LVH criteria, COVID and flu negative.   Labs demonstrate: Unremarkable CBC, high sensitive troponin elevated at 81 downtrending to 70, UA not reflexed to culture, sodium 137, potassium 3.4, bicarb 33, glucose 165, BUN 43, creatinine 2.52 (baseline around 1.7). We were asked to admit for work up and evaluation of the above problems.      Past Medical History:   Diagnosis Date    Adverse effect of anesthesia     passed out during EGD/stopped breathing    Arrhythmia     has pacemaker for low HR    Arthritis     Asthma     Cancer ([facility] [address]     right kidney - surgery    Chronic pain     right side    Deep vein thrombosis (DVT) during current hospitalization ([facility] [address]     history of DVT was on coumadin in the past    Diabetes ([facility] [address]     diet controlled    GERD (gastroesophageal reflux disease)     H/O acute pancreatitis     Hiatal hernia     Hypertension     MARLEN (obstructive sleep apnea)     does not use CPAP/pt states she has not used since a pacemaker inserted    Other ill-defined conditions(799.89)     lymph node enlargement - left chest - benign bx - watching    Other ill-defined conditions(799.89)     wheezes    Psychiatric disorder     depression    PUD (peptic ulcer disease)     hx of    Thromboembolus ([facility] [address]     Unspecified adverse effect of anesthesia     passed out during EGD per pt - stopped breathing per pt per MD        Past Surgical History:   Procedure Laterality Date    HX APPENDECTOMY      HX CHOLECYSTECTOMY      HX GYN          HX HEENT Right     laser eye surgery to stop bleeding in back of eye - multiple laser surgeries    HX KNEE ARTHROSCOPY      left knee; cartilage repair    HX ORTHOPAEDIC      right hip replacement    HX ORTHOPAEDIC      lower back surgery    HX ORTHOPAEDIC      straighten toe - 2nd toe on right    HX ORTHOPAEDIC Bilateral     carpal tunnel release    HX PACEMAKER      not defibrillator    HX UROLOGICAL      implant in hip to control urine and then removed    HX UROLOGICAL      Right kidney partial nephrectomy       Social History     Tobacco Use    Smoking status: Former     Packs/day: 0.25     Years: 20.00     Pack years: 5.00 Types: Cigarettes     Quit date: 1971     Years since quittin.4    Smokeless tobacco: Never   Substance Use Topics    Alcohol use: No        Family History   Problem Relation Age of Onset    Stroke Mother         brain aneurysm    Hypertension Father     Heart Disease Father     Cancer Sister         breast    Cancer Brother         lung and prostate    Cancer Sister         breast    SLE Other         half siblings (5+) - lupus    Hypertension Daughter     Diabetes Daughter        Allergies   Allergen Reactions    Pcn [Penicillins] Rash     But can take cephalosporins. Allergic to all \"cillins\". Codeine Other (comments)     Hallucinations, takes hydrocodone at home for pain    Contrast Dye [Iodine] Other (comments)     Not to take due to kidney function    Prednisone Other (comments)     \"makes my pancreas numbers go way up\"        Prior to Admission medications    Medication Sig Start Date End Date Taking? Authorizing Provider   amLODIPine (NORVASC) 2.5 mg tablet Take 1 Tablet by mouth daily. 22   Sundeep Rico MD   famotidine (PEPCID) 40 mg tablet Take 40 mg by mouth nightly. 22   Provider, Historical   furosemide (LASIX) 40 mg tablet Take 40 mg by mouth daily. 22   Provider, Historical   metoprolol succinate (TOPROL-XL) 25 mg XL tablet Take 25 mg by mouth daily. 22   Provider, Historical   sodium bicarbonate 650 mg tablet Take 650 mg by mouth daily. 22   Provider, Historical   methocarbamoL (ROBAXIN) 750 mg tablet Take 1 Tablet by mouth four (4) times daily. 22   Sarah Camacho MD   triamcinolone (ARISTOCORT) 0.5 % topical cream Apply  to affected area two (2) times a day. use thin layer 22   Sarah Camacho MD   donepeziL (ARICEPT) 10 mg tablet Take 1 Tablet by mouth nightly. 3/17/22   Sarah Camacho MD       REVIEW OF SYSTEMS:     I am not able to complete the review of systems because:    The patient is intubated and sedated   x The patient has altered mental status due to his acute medical problems    The patient has baseline aphasia from prior stroke(s)    The patient has baseline dementia and is not reliable historian    The patient is in acute medical distress and unable to provide information           Objective:   VITALS:    Visit Vitals  BP (!) 159/86   Pulse 80   Temp 97.9 °F (36.6 °C)   Resp 22   Ht 5' 1\" (1.549 m)   Wt 77.1 kg (170 lb)   SpO2 93%   BMI 32.12 kg/m²       PHYSICAL EXAM:    General:   Alert, cooperative, no distress, chronically ill-appearing frail elderly -American female        HEENT:  Atraumatic, anicteric sclerae, pink conjunctivae, dry mucous membranes, dentition fair     Neck:  Supple, symmetrical, trachea midline, no abnormalities on palpation     Lungs:   CTAB. Symmetric expansion. Good aeration. Normal respiratory effort. Chest wall:  No tenderness. No Accessory muscle use. Cardiovascular:   RRR, No murmur/rub/gallop. No JVD. Radial/AT/DP pulse 2+     GI/:   Soft, non-tender. Not distended. Bowel sounds normal. No HSM     Extremities No edema. No cyanosis. Capillary refill normal     Skin: No significant lesions noted. Not Jaundiced. No rashes, dry     Neurologic: PERRL. EOMI. No facial asymmetry. No aphasia or slurred speech. Moves all extremities. Sensation to light touch grossly intact BUE/BLE. No overt focal deficits appreciated     Psych:  Alert and oriented X 4. Normal affect.  Good insight     Other:   N/A         LAB DATA REVIEWED:    Recent Results (from the past 24 hour(s))   EKG, 12 LEAD, INITIAL    Collection Time: 01/04/23  5:14 PM   Result Value Ref Range    Ventricular Rate 66 BPM    Atrial Rate 66 BPM    P-R Interval 180 ms    QRS Duration 80 ms    Q-T Interval 372 ms    QTC Calculation (Bezet) 389 ms    Calculated P Axis -31 degrees    Calculated R Axis -21 degrees    Calculated T Axis 88 degrees    Diagnosis       Atrial-paced rhythm  Voltage criteria for left ventricular hypertrophy  T wave abnormality, consider inferolateral ischemia  When compared with ECG of 22-NOV-2022 23:55,  QT has shortened     CBC WITH AUTOMATED DIFF    Collection Time: 01/04/23  5:18 PM   Result Value Ref Range    WBC 6.8 3.6 - 11.0 K/uL    RBC 4.19 3.80 - 5.20 M/uL    HGB 11.0 (L) 11.5 - 16.0 g/dL    HCT 37.0 35.0 - 47.0 %    MCV 88.3 80.0 - 99.0 FL    MCH 26.3 26.0 - 34.0 PG    MCHC 29.7 (L) 30.0 - 36.5 g/dL    RDW 19.6 (H) 11.5 - 14.5 %    PLATELET 353 299 - 337 K/uL    MPV 10.3 8.9 - 12.9 FL    NRBC 0.0 0  WBC    ABSOLUTE NRBC 0.00 0.00 - 0.01 K/uL    NEUTROPHILS 59 32 - 75 %    LYMPHOCYTES 24 12 - 49 %    MONOCYTES 16 (H) 5 - 13 %    EOSINOPHILS 1 0 - 7 %    BASOPHILS 0 0 - 1 %    IMMATURE GRANULOCYTES 0 0.0 - 0.5 %    ABS. NEUTROPHILS 4.0 1.8 - 8.0 K/UL    ABS. LYMPHOCYTES 1.6 0.8 - 3.5 K/UL    ABS. MONOCYTES 1.1 (H) 0.0 - 1.0 K/UL    ABS. EOSINOPHILS 0.0 0.0 - 0.4 K/UL    ABS. BASOPHILS 0.0 0.0 - 0.1 K/UL    ABS. IMM. GRANS. 0.0 0.00 - 0.04 K/UL    DF AUTOMATED     METABOLIC PANEL, COMPREHENSIVE    Collection Time: 01/04/23  5:18 PM   Result Value Ref Range    Sodium 137 136 - 145 mmol/L    Potassium 3.4 (L) 3.5 - 5.1 mmol/L    Chloride 95 (L) 97 - 108 mmol/L    CO2 33 (H) 21 - 32 mmol/L    Anion gap 9 5 - 15 mmol/L    Glucose 165 (H) 65 - 100 mg/dL    BUN 43 (H) 6 - 20 MG/DL    Creatinine 2.52 (H) 0.55 - 1.02 MG/DL    BUN/Creatinine ratio 17 12 - 20      eGFR 18 (L) >60 ml/min/1.73m2    Calcium 11.1 (H) 8.5 - 10.1 MG/DL    Bilirubin, total 0.6 0.2 - 1.0 MG/DL    ALT (SGPT) 22 12 - 78 U/L    AST (SGOT) 38 (H) 15 - 37 U/L    Alk.  phosphatase 86 45 - 117 U/L    Protein, total 7.9 6.4 - 8.2 g/dL    Albumin 3.2 (L) 3.5 - 5.0 g/dL    Globulin 4.7 (H) 2.0 - 4.0 g/dL    A-G Ratio 0.7 (L) 1.1 - 2.2     TROPONIN-HIGH SENSITIVITY    Collection Time: 01/04/23  5:18 PM   Result Value Ref Range    Troponin-High Sensitivity 81 (H) 0 - 51 ng/L   INFLUENZA A+B VIRAL AGS    Collection Time: 01/04/23  5:18 PM   Result Value Ref Range    Influenza A Antigen Negative NEG      Influenza B Antigen Negative NEG     COVID-19 RAPID TEST    Collection Time: 01/04/23  5:18 PM   Result Value Ref Range    Specimen source Nasopharyngeal      COVID-19 rapid test Not detected NOTD     TROPONIN-HIGH SENSITIVITY    Collection Time: 01/04/23  6:41 PM   Result Value Ref Range    Troponin-High Sensitivity 70 (H) 0 - 51 ng/L   URINALYSIS W/ REFLEX CULTURE    Collection Time: 01/04/23 10:10 PM    Specimen: Urine    Urine specimen   Result Value Ref Range    Color DARK YELLOW      Appearance CLEAR CLEAR      Specific gravity 1.018      pH (UA) 5.5 5.0 - 8.0      Protein 30 (A) NEG mg/dL    Glucose Negative NEG mg/dL    Ketone TRACE (A) NEG mg/dL    Blood Negative NEG      Urobilinogen 1.0 0.2 - 1.0 EU/dL    Nitrites Negative NEG      Leukocyte Esterase TRACE (A) NEG      WBC 0-4 0 - 4 /hpf    RBC 0-5 0 - 5 /hpf    Epithelial cells FEW FEW /lpf    Bacteria Negative NEG /hpf    UA:UC IF INDICATED CULTURE NOT INDICATED BY UA RESULT CNI      Mucus TRACE (A) NEG /lpf   BILIRUBIN, CONFIRM    Collection Time: 01/04/23 10:10 PM   Result Value Ref Range    Bilirubin UA, confirm Negative       _____________________________________________________________________    Assessment / Plan:  BENSON on CKD 4, prerenal suspected  Mildly elevated troponin, suspect type II MI in setting of reduced renal function  Poor p.o. intake  Dysphagia-history of Schatzki ring requiring esophageal dilation in 2020  Dementia  GERD    PLAN:    Admit to telemetry monitoring  1 L lactated Ringer's over 10 hours  Trend renal function  Renally dose medications and avoid nephrotoxic agents  Discussed extensively with family need to determine goals of care and plans moving forward as patient is now displaying disinterest in eating and having functional decline in setting of multiple comorbidities.   Informed him that she would qualify for hospice if transition to focus on comfort/preservation of dignity deemed to be most in accordance with patient's wishes. They will consider and get back to us. Would consider palliative care consultation if family agreeable. Will consult gastroenterology per family request, greatly appreciate their expertise  -History of Schatzki ring status post dilation in 2020  Given renal function we will switch Pepcid to daily Protonix  Continue PTA donepezil I will  Continue PTA metoprolol  Hold PTA furosemide, amlodipine in setting of BENSON  Patient with metabolic alkalosis-we will hold and likely discontinue sodium bicarbonate tabs  If renal function fails to improve we will consult nephrology    Code Status: Full    DVT Prophylaxis: Heparin  GI Prophylaxis: Protonix  Diet: Regular       Care Plan discussed with:    Comments   Patient x    Family  x At bedside   RN     Care Manager                    Consultant:      _______________________________________________________________________  Baseline Level of Function: Debilitated    Expected  Disposition: TBD  Home with Family    HH/PT/OT/RN    SNF/LTC    [facility]    ________________________________________________________________________  TOTAL TIME:  80 Minutes   MEDICAL COMPLEXITY: high  TOBACCO CESSATION COUNSELING: NO        Comments    x Reviewed previous records   >50% of visit spent in counseling and coordination of care x Discussion with patient and/or family and questions answered       ________________________________________________________________________  Signed: Fredi Baldwin DO  1/4/2023 11:19 PM    Please note that this documentation was completed in part with Dragon dictation software. Occasionally unanticipated grammatical, syntax, homophones, and other interpretive errors are inadvertently transcribed by the computer software. Please excuse and disregard any errors that have escaped final proofreading.  If in doubt, please do not hesitate to reach out to myself or the assigned hospitalist via [employer] paging system for clarification.

## 2023-01-05 NOTE — PROGRESS NOTES
TRANSFER - IN REPORT:    Verbal report received from Sean Stevenson [facility] (ext. [phone]) on Vani Lyon  being received from ED Rm # [id number] for routine progression of care      Report consisted of patients Situation, Background, Assessment and   Recommendations(SBAR). Information from the following report(s) SBAR, Kardex, and MAR was reviewed with the receiving nurse. Opportunity for questions and clarification was provided. Assessment completed upon patients arrival to unit and care assumed.

## 2023-01-05 NOTE — PROGRESS NOTES
Bedside and Verbal shift change report given to Manjit Smiley RN (oncoming nurse) by Jazlyn Lennon (offgoing nurse). Report included the following information SBAR and Kardex.

## 2023-01-05 NOTE — PROGRESS NOTES
Problem: Self Care Deficits Care Plan (Adult)  Goal: *Acute Goals and Plan of Care (Insert Text)  Description: FUNCTIONAL STATUS PRIOR TO ADMISSION: pt has a longstanding history of poor PO intake which has increased recently, since christmas pt has had a steady decline in function, prior to this pt was ambulatory without assist devices (recently started using RW), performing ADLs on her own and sponge bathing, pt has been more confused recently (dementia)    HOME SUPPORT PRIOR TO ADMISSION: The patient lived with  whom is pts main caregiver and has been having increasing difficulty managing pts needs recently. Pt has 4 children whom are involved. Occupational Therapy Goals:  Initiated 1/5/2023  1. Patient will perform grooming seated with supervision/set-up within 7 days. 2. Patient will perform sponge bath after set up with minimal assistance within 7 days. 3. Patient will perform lower body dressing with minimal assistance within 7 days. 4. Patient will perform toileting with minimal assist within 7 days. 6. Patient will transfer from toilet with supervision/set-up using the least restrictive device and appropriate durable medical equipment within 7 days. Outcome: Not Met   OCCUPATIONAL THERAPY EVALUATION  Patient: Chas Waters (42 y.o. female)  Date: 1/5/2023  Primary Diagnosis: Failure to thrive in adult [R62.7]       Precautions: fall       ASSESSMENT  Based on the objective data described below, the patient presents with flat affect, decreased attention, vision and activity tolerance. Pt needs constant cues for all tasks at this time. She has a history of dementia and pts daughter reports that pt has not been eating or drinking recently. She was able to mobilize and perform ADLS jeanine week. Most recently pt has been sleeping a lot and not eating/drinking. Upon arrival to room pt had nursing at bedside trying to get pt to take her morning medication.   BUE are functional pt OB Progress Note:  Delivery, POD#1    S: 44yo POD#1 s/p LTCS c/b sPEC, refusing Mag, on Keppra and Labetalol 200 TID. Her pain is well controlled. She is tolerating a regular diet and passing flatus. Denies N/V. Denies CP/SOB/lightheadedness/dizziness. She is ambulating without difficulty. Voiding spontaneously. Denies HA, blurry vision, RUQ Pain    O:   Vital Signs Last 24 Hrs  T(C): 36.7 (14 Aug 2019 05:15), Max: 37.1 (13 Aug 2019 09:22)  T(F): 98.1 (14 Aug 2019 05:15), Max: 98.8 (13 Aug 2019 09:22)  HR: 93 (14 Aug 2019 05:15) (81 - 100)  BP: 110/71 (14 Aug 2019 05:15) (110/71 - 174/107)  BP(mean): 91 (13 Aug 2019 14:00) (91 - 103)  RR: 18 (14 Aug 2019 05:15) (13 - 22)  SpO2: 96% (14 Aug 2019 05:15) (95% - 98%)    Labs:  Blood type: A Positive  Rubella IgG: RPR: Negative                          10.7<L>   13.37<H> >-----------< 154    (  @ 06:10 )             32.2<L>                        14.4   10.19 >-----------< 175    (  @ 08:55 )             43.3                        12.5   8.74 >-----------< 161    (  @ 14:11 )             39.1    19 @ 06:10      137  |  103  |  10  ----------------------------<  105<H>  4.3   |  23  |  0.61    19 @ 08:55      139  |  102  |  8   ----------------------------<  74  4.2   |  21<L>  |  0.64    19 @ 14:11      138  |  100  |  11  ----------------------------<  92  4.0   |  22  |  0.64        Ca    8.9      14 Aug 2019 06:10  Ca    9.4      13 Aug 2019 08:55  Ca    9.8      12 Aug 2019 14:11    TPro  5.4<L>  /  Alb  2.9<L>  /  TBili  < 0.2<L>  /  DBili  x   /  AST  22  /  ALT  10  /  AlkPhos  126<H>  19 @ 06:10  TPro  7.0  /  Alb  3.5  /  TBili  0.2  /  DBili  x   /  AST  21  /  ALT  12  /  AlkPhos  181<H>  19 @ 08:55  TPro  6.2  /  Alb  3.3  /  TBili  < 0.2<L>  /  DBili  x   /  AST  17  /  ALT  11  /  AlkPhos  167<H>  19 @ 14:11          acetaminophen   Tablet .. 975 milliGRAM(s) Oral <User Schedule>  butorphanol Injectable 0.125 milliGRAM(s) IV Push every 6 hours PRN  dexamethasone  Injectable 4 milliGRAM(s) IV Push every 6 hours PRN  diphenhydrAMINE 25 milliGRAM(s) Oral every 6 hours PRN  diphtheria/tetanus/pertussis (acellular) Vaccine (ADAcel) 0.5 milliLiter(s) IntraMuscular once  docusate sodium 100 milliGRAM(s) Oral two times a day PRN  ferrous    sulfate 325 milliGRAM(s) Oral daily  glycerin Suppository - Adult 1 Suppository(s) Rectal at bedtime PRN  heparin  Injectable 5000 Unit(s) SubCutaneous every 12 hours  ibuprofen  Tablet. 600 milliGRAM(s) Oral every 6 hours  ketorolac   Injectable 30 milliGRAM(s) IV Push every 6 hours  labetalol 200 milliGRAM(s) Oral every 8 hours  lactated ringers. 1000 milliLiter(s) IV Continuous <Continuous>  lactated ringers. 1000 milliLiter(s) IV Continuous <Continuous>  lanolin Ointment 1 Application(s) Topical every 6 hours PRN  levETIRAcetam 500 milliGRAM(s) Oral two times a day  magnesium hydroxide Suspension 30 milliLiter(s) Oral two times a day PRN  naloxone Injectable 0.1 milliGRAM(s) IV Push every 3 minutes PRN  ondansetron Injectable 4 milliGRAM(s) IV Push every 6 hours PRN  oxyCODONE    IR 5 milliGRAM(s) Oral every 3 hours PRN  oxyCODONE    IR 10 milliGRAM(s) Oral every 3 hours PRN  oxyCODONE    IR 5 milliGRAM(s) Oral every 3 hours PRN  oxyCODONE    IR 5 milliGRAM(s) Oral once PRN  oxytocin Infusion 50 milliUNIT(s)/Min IV Continuous <Continuous>  prenatal multivitamin 1 Tablet(s) Oral daily  simethicone 80 milliGRAM(s) Chew every 4 hours PRN      PE:  General: NAD  Abdomen: Mildly distended, appropriately tender, incision c/d/i.  Extremities: No erythema, no pitting edema she is limited by her mentation and attention with self care tasks. With prompting and cues pt was able to take her medications for nursing. When therapist tried to get pt to drink ensure pt was somewhat paranoid of what the liquid was and how long it had been open. Unable to get pt to attempt taking a sip despite cues. She was able to come to seated edge of bed with supervision/SBA. Good balance dynamically seated. Min assist for sit to stand and pt needed manual assist to remain standing as pt wanted to sit back down. She was not orthostatic. Progress with OT maybe limited due to pts mental status and if pt continues to decline to eat/drink rehab will not be possible. Noted that palliative care consult is pending    Current Level of Function Impacting Discharge (ADLs/self-care): SBA bed mobility, min assist sit to stand    Feeding: Supervision (physically capable but declining all PO, pt did take her pills for nursing with verbal cues for attention)    Oral Facial Hygiene/Grooming: Minimum assistance    Bathing: Moderate assistance    Upper Body Dressing: Maximum assistance    Lower Body Dressing: Maximum assistance    Toileting: Maximum assistance       Functional Outcome Measure: The patient scored 35/100 on the barthel outcome measure which is indicative of significant decline in mobility and ADLS. Other factors to consider for discharge: see assessment     Patient will benefit from skilled therapy intervention to address the above noted impairments. PLAN :  Recommendations and Planned Interventions: self care training, functional mobility training, therapeutic exercise, balance training, therapeutic activities, patient education, home safety training, and family training/education    Frequency/Duration: Patient will be followed by occupational therapy 3 times a week trial to address goals.     Recommendation for discharge: (in order for the patient to meet his/her long term goals)  Occupational therapy at least 2 days/week in the home AND ensure assist and/or supervision for safety with mobility and ADLS with 80/6 assist    This discharge recommendation:  Has been made in collaboration with the attending provider and/or case management    IF patient discharges home will need the following DME: wheelchair       SUBJECTIVE:   Patient stated Neeru Semen would I do that?  referring to drinking liquids    OBJECTIVE DATA SUMMARY:   HISTORY:   Past Medical History:   Diagnosis Date    Adverse effect of anesthesia     passed out during EGD/stopped breathing    Arrhythmia     has pacemaker for low HR    Arthritis     Asthma     Cancer ([facility] [facility]     right kidney - surgery    Chronic pain     right side    Deep vein thrombosis (DVT) during current hospitalization ([facility] [address]     history of DVT was on coumadin in the past    Diabetes ([facility] [address]     diet controlled    GERD (gastroesophageal reflux disease)     H/O acute pancreatitis     Hiatal hernia     Hypertension     MARLEN (obstructive sleep apnea)     does not use CPAP/pt states she has not used since a pacemaker inserted    Other ill-defined conditions(799.89)     lymph node enlargement - left chest - benign bx - watching    Other ill-defined conditions(799.89)     wheezes    Psychiatric disorder     depression    PUD (peptic ulcer disease)     hx of    Thromboembolus ([facility] [address]     Unspecified adverse effect of anesthesia     passed out during EGD per pt - stopped breathing per pt per MD     Past Surgical History:   Procedure Laterality Date    HX APPENDECTOMY      HX CHOLECYSTECTOMY      HX GYN          HX HEENT Right     laser eye surgery to stop bleeding in back of eye - multiple laser surgeries    HX KNEE ARTHROSCOPY      left knee; cartilage repair    HX ORTHOPAEDIC      right hip replacement    HX ORTHOPAEDIC      lower back surgery    HX ORTHOPAEDIC      straighten toe - 2nd toe on right    HX ORTHOPAEDIC Bilateral     carpal tunnel release    HX PACEMAKER      not defibrillator    HX UROLOGICAL      implant in hip to control urine and then removed    HX UROLOGICAL      Right kidney partial nephrectomy       Expanded or extensive additional review of patient history:     Home Situation  Home Environment: Private residence  # Steps to Enter: 1  One/Two Story Residence: One story  Living Alone: No  Support Systems: Spouse/Significant Other, Child(levi) (Spouse is the main caregiver, pt has 4 children)  Current DME Used/Available at Home: Walker, rolling, Shower chair, Commode, bedside  Tub or Shower Type:  (sponge bathes)    Hand dominance: Right    EXAMINATION OF PERFORMANCE DEFICITS:  Cognitive/Behavioral Status:  Neurologic State: Alert  Orientation Level: Oriented to person;Disoriented to time;Disoriented to situation;Disoriented to place  Cognition: Decreased attention/concentration;Decreased command following; Impaired decision making;Memory loss  Perception: Cues to maintain midline in standing  Perseveration: Perseverates during conversation  Safety/Judgement: Lack of insight into deficits    Vision/Perceptual:                                Corrective Lenses:  (appears to have decreased vision but unable to accurately assess)    Range of Motion:    AROM: Generally decreased, functional  PROM: Generally decreased, functional                      Strength:    Strength: Generally decreased, functional                Coordination:  Coordination: Within functional limits  Fine Motor Skills-Upper: Left Intact; Right Intact    Gross Motor Skills-Upper: Left Intact; Right Intact    Tone & Sensation:    Tone: Normal  Sensation:  (unable to accurately assess)                      Balance:  Sitting: Intact  Standing: Impaired  Standing - Static: Constant support; Fair  Standing - Dynamic : Constant support; Fair    Functional Mobility and Transfers for ADLs:  Bed Mobility:  Rolling: Supervision  Supine to Sit: Stand-by assistance  Sit to Supine: Stand-by assistance  Scooting: Supervision    Transfers:  Sit to Stand: Minimum assistance  Stand to Sit: Minimum assistance  Bathroom Mobility:  (unable this session)  Toilet Transfer : Minimum assistance (stand pivot)    ADL Assessment:  Feeding: Supervision (physically capable but declining all PO, pt did take her pills for nursing with verbal cues for attention)    Oral Facial Hygiene/Grooming: Minimum assistance    Bathing: Moderate assistance    Upper Body Dressing: Maximum assistance    Lower Body Dressing: Maximum assistance    Toileting: Maximum assistance                  ADL Intervention and task modifications:    See assessment. Educated pts daughter on management techniques for dementia. Cognitive Retraining  Safety/Judgement: Lack of insight into deficits      Functional Measure:    Barthel Index:  Bathin  Bladder: 5  Bowels: 10  Groomin  Dressin  Feedin  Mobility: 0  Stairs: 0  Toilet Use: 0  Transfer (Bed to Chair and Back): 10  Total: 35/100      The Barthel ADL Index: Guidelines  1. The index should be used as a record of what a patient does, not as a record of what a patient could do. 2. The main aim is to establish degree of independence from any help, physical or verbal, however minor and for whatever reason. 3. The need for supervision renders the patient not independent. 4. A patient's performance should be established using the best available evidence. Asking the patient, friends/relatives and nurses are the usual sources, but direct observation and common sense are also important. However direct testing is not needed. 5. Usually the patient's performance over the preceding 24-48 hours is important, but occasionally longer periods will be relevant. 6. Middle categories imply that the patient supplies over 50 per cent of the effort. 7. Use of aids to be independent is allowed.     Score Interpretation (from [address] [place] [address])    Independent   60-79 Minimally independent   40-59 Partially dependent   20-39 Very dependent   <20 Totally dependent     -Deondre Hayes., Barthel, D.W. (5312). Functional evaluation: the Barthel Index. [address] ([address] (1997). The Barthel activities of daily living index: self-reporting versus actual performance in the old (> or = 75 years). Journal of [address] [place] [address] 45(7), [facility], JCHRISTINEF, Sterling Escamilla., Joshua Noonan. (1999). Measuring the change in disability after inpatient rehabilitation; comparison of the responsiveness of the Barthel Index and Functional Randolph Measure. Journal of Neurology, Neurosurgery, and Psychiatry, 66(4), 426-483. Deanna Torres, N.J.A, NATALIIA Power, & Nancy Alves MAUBREY. (2004) Assessment of post-stroke quality of life in cost-effectiveness studies: The usefulness of the Barthel Index and the EuroQoL-5D. Quality of Life Research, 15, 326-11         Occupational Therapy Evaluation Charge Determination   History Examination Decision-Making   HIGH Complexity : Extensive review of history including physical, cognitive and psychosocial history  MEDIUM Complexity : 3-5 performance deficits relating to physical, cognitive , or psychosocial skils that result in activity limitations and / or participation restrictions MEDIUM Complexity : Patient may present with comorbidities that affect occupational performnce.  Miniml to moderate modification of tasks or assistance (eg, physical or verbal ) with assesment(s) is necessary to enable patient to complete evaluation       Based on the above components, the patient evaluation is determined to be of the following complexity level: MEDIUM  Pain Rating:  No report or indication of pain this session    Activity Tolerance:   poor    After treatment patient left in no apparent distress:    Supine in bed, Call bell within reach, Caregiver / family present, and Side rails x 3    COMMUNICATION/EDUCATION:   The patients plan of care was discussed with: Physical therapist, Registered nurse, and patient . Home safety education was provided and the patient/caregiver indicated understanding., Patient/family have participated as able in goal setting and plan of care., Patient/family agree to work toward stated goals and plan of care. , and Patient is unable to participate in goal setting and plan of care. This patients plan of care is appropriate for delegation to [facility].     Thank you for this referral.  Betty Gonzalez OTR/L  Time Calculation: 30 mins

## 2023-01-05 NOTE — PROGRESS NOTES
INTERNAL MEDICINE ATTENDING NOTE      Patient Name: Colette Levine   : 1935   Admit: 2023    ASSESSMENT / PLAN    BENSON on CKD 4: Likely prerenal. IVF. Hold diuretics, amlodipine. Failure to thrive in adult (2023): Nausea and poor appetite. History of Schatzki ring requiring esophageal dilatation in   Dementia:   GERD:   Full code. Hospice suggested, and family will consider it. Consult palliative service to continue the conversation. Arabella Patel MD, FACP  Best contact is via Pager: [phone], or via hospital  at [phone]. Do NOT use Perfect Serve. SUBJECTIVE:   Ms. Colette Levine is a patient of mine who was admitted by hospitalist this morning. Data reviewed. A and P as outlined above.

## 2023-01-05 NOTE — ED PROVIDER NOTES
EMERGENCY DEPARTMENT HISTORY AND PHYSICAL EXAM      Date: 1/4/2023  Patient Name: Alfredo Simons    History of Presenting Illness     Chief Complaint   Patient presents with    Fatigue     Over past month, pt's son reports significantly decline PO intake as well as confusion beyond baseline; unremarkable blood and urine testing performed last week    Back Pain     Mid back pain; no specific injury       HPI: Alfredo Simons, 82 y.o. female presents to the ED with cc of progressively worsening decreased appetite, decreased PO intake and confusion per the son for the past week. Patient's son states that patient is having difficulty swallowing due to history of an esophageal issue, states she has a \"weak esophagus\". Reports that she is also on a low potassium diet due to kidney disease and patient states that \"nothing tastes good\" so she is eating less. Son  reports this has happened before where patient stopped eating or drinking, she was admitted due to CKD, received fluids and was discharged home with improvement in her kidney function. I spoke to Dr. Andres Bah who recommended they come to the ED for evaluation and admission due to the decreased p.o. intake, weakness, confusion. Patient or son were seen at [facility] earlier today but left as they had waited too long. Clinical information was obtained from an independent historian patient's son who stated above. There are no other complaints, changes, or physical findings at this time. PCP: Ysabel Cao MD    No current facility-administered medications on file prior to encounter. Current Outpatient Medications on File Prior to Encounter   Medication Sig Dispense Refill    amLODIPine (NORVASC) 2.5 mg tablet Take 1 Tablet by mouth daily. 30 Tablet 1    famotidine (PEPCID) 40 mg tablet Take 40 mg by mouth nightly. furosemide (LASIX) 40 mg tablet Take 40 mg by mouth daily.       metoprolol succinate (TOPROL-XL) 25 mg XL tablet Take 25 mg by mouth daily.      sodium bicarbonate 650 mg tablet Take 650 mg by mouth daily. methocarbamoL (ROBAXIN) 750 mg tablet Take 1 Tablet by mouth four (4) times daily. 60 Tablet 1    triamcinolone (ARISTOCORT) 0.5 % topical cream Apply  to affected area two (2) times a day. use thin layer 30 g 1    donepeziL (ARICEPT) 10 mg tablet Take 1 Tablet by mouth nightly.  27 Tablet 11       Past History     Past Medical History:  Past Medical History:   Diagnosis Date    Adverse effect of anesthesia     passed out during EGD/stopped breathing    Arrhythmia     has pacemaker for low HR    Arthritis     Asthma     Cancer ([facility] [address]     right kidney - surgery    Chronic pain     right side    Deep vein thrombosis (DVT) during current hospitalization ([facility] [address]     history of DVT was on coumadin in the past    Diabetes ([facility] [address]     diet controlled    GERD (gastroesophageal reflux disease)     H/O acute pancreatitis     Hiatal hernia     Hypertension     MARLEN (obstructive sleep apnea)     does not use CPAP/pt states she has not used since a pacemaker inserted    Other ill-defined conditions(799.89)     lymph node enlargement - left chest - benign bx - watching    Other ill-defined conditions(799.89)     wheezes    Psychiatric disorder     depression    PUD (peptic ulcer disease)     hx of    Thromboembolus ([address]     Unspecified adverse effect of anesthesia     passed out during EGD per pt - stopped breathing per pt per MD       Past Surgical History:  Past Surgical History:   Procedure Laterality Date    HX APPENDECTOMY      HX CHOLECYSTECTOMY      HX GYN          HX HEENT Right     laser eye surgery to stop bleeding in back of eye - multiple laser surgeries    HX KNEE ARTHROSCOPY      left knee; cartilage repair    HX ORTHOPAEDIC      right hip replacement    HX ORTHOPAEDIC      lower back surgery    HX ORTHOPAEDIC      straighten toe - 2nd toe on right    HX ORTHOPAEDIC Bilateral     carpal tunnel release    HX PACEMAKER      not defibrillator    HX UROLOGICAL      implant in hip to control urine and then removed    HX UROLOGICAL      Right kidney partial nephrectomy       Family History:  Family History   Problem Relation Age of Onset    Stroke Mother         brain aneurysm    Hypertension Father     Heart Disease Father     Cancer Sister         breast    Cancer Brother         lung and prostate    Cancer Sister         breast    SLE Other         half siblings (5+) - lupus    Hypertension Daughter     Diabetes Daughter        Social History:  Social History     Tobacco Use    Smoking status: Former     Packs/day: 0.25     Years: 20.00     Pack years: 5.00     Types: Cigarettes     Quit date: 1971     Years since quittin.4    Smokeless tobacco: Never   Vaping Use    Vaping Use: Never used   Substance Use Topics    Alcohol use: No    Drug use: No       Allergies: Allergies   Allergen Reactions    Pcn [Penicillins] Rash     But can take cephalosporins. Allergic to all \"cillins\". Codeine Other (comments)     Hallucinations, takes hydrocodone at home for pain    Contrast Dye [Iodine] Other (comments)     Not to take due to kidney function    Prednisone Other (comments)     \"makes my pancreas numbers go way up\"         Review of Systems   Review of Systems   Constitutional:  Positive for appetite change. Negative for chills and fever. HENT:  Negative for rhinorrhea and sore throat. Eyes:  Negative for visual disturbance. Respiratory:  Negative for cough and shortness of breath. Cardiovascular:  Negative for chest pain. Gastrointestinal:  Negative for abdominal pain, diarrhea, nausea and vomiting. Genitourinary:  Negative for dysuria and hematuria. Musculoskeletal:  Negative for arthralgias and myalgias. Skin:  Negative for rash. Neurological:  Negative for dizziness, seizures and weakness. All other systems reviewed and are negative. Physical Exam   Physical Exam  Vitals and nursing note reviewed. Constitutional:       Appearance: She is well-developed. HENT:      Head: Normocephalic and atraumatic. Cardiovascular:      Rate and Rhythm: Normal rate and regular rhythm. Pulmonary:      Effort: Pulmonary effort is normal.      Breath sounds: Normal breath sounds. Abdominal:      General: There is no distension. Palpations: Abdomen is soft. Musculoskeletal:         General: Normal range of motion. Cervical back: Neck supple. Skin:     General: Skin is warm and dry. Neurological:      Mental Status: She is alert. Psychiatric:         Mood and Affect: Mood normal.         Behavior: Behavior normal.       Diagnostic Study Results     Labs -     Recent Results (from the past 12 hour(s))   EKG, 12 LEAD, INITIAL    Collection Time: 01/04/23  5:14 PM   Result Value Ref Range    Ventricular Rate 66 BPM    Atrial Rate 66 BPM    P-R Interval 180 ms    QRS Duration 80 ms    Q-T Interval 372 ms    QTC Calculation (Bezet) 389 ms    Calculated P Axis -31 degrees    Calculated R Axis -21 degrees    Calculated T Axis 88 degrees    Diagnosis       Atrial-paced rhythm  Voltage criteria for left ventricular hypertrophy  T wave abnormality, consider inferolateral ischemia  When compared with ECG of 22-NOV-2022 23:55,  QT has shortened     CBC WITH AUTOMATED DIFF    Collection Time: 01/04/23  5:18 PM   Result Value Ref Range    WBC 6.8 3.6 - 11.0 K/uL    RBC 4.19 3.80 - 5.20 M/uL    HGB 11.0 (L) 11.5 - 16.0 g/dL    HCT 37.0 35.0 - 47.0 %    MCV 88.3 80.0 - 99.0 FL    MCH 26.3 26.0 - 34.0 PG    MCHC 29.7 (L) 30.0 - 36.5 g/dL    RDW 19.6 (H) 11.5 - 14.5 %    PLATELET 152 191 - 350 K/uL    MPV 10.3 8.9 - 12.9 FL    NRBC 0.0 0  WBC    ABSOLUTE NRBC 0.00 0.00 - 0.01 K/uL    NEUTROPHILS 59 32 - 75 %    LYMPHOCYTES 24 12 - 49 %    MONOCYTES 16 (H) 5 - 13 %    EOSINOPHILS 1 0 - 7 %    BASOPHILS 0 0 - 1 %    IMMATURE GRANULOCYTES 0 0.0 - 0.5 %    ABS. NEUTROPHILS 4.0 1.8 - 8.0 K/UL    ABS.  LYMPHOCYTES 1.6 0.8 - 3.5 K/UL    ABS. MONOCYTES 1.1 (H) 0.0 - 1.0 K/UL    ABS. EOSINOPHILS 0.0 0.0 - 0.4 K/UL    ABS. BASOPHILS 0.0 0.0 - 0.1 K/UL    ABS. IMM. GRANS. 0.0 0.00 - 0.04 K/UL    DF AUTOMATED     METABOLIC PANEL, COMPREHENSIVE    Collection Time: 01/04/23  5:18 PM   Result Value Ref Range    Sodium 137 136 - 145 mmol/L    Potassium 3.4 (L) 3.5 - 5.1 mmol/L    Chloride 95 (L) 97 - 108 mmol/L    CO2 33 (H) 21 - 32 mmol/L    Anion gap 9 5 - 15 mmol/L    Glucose 165 (H) 65 - 100 mg/dL    BUN 43 (H) 6 - 20 MG/DL    Creatinine 2.52 (H) 0.55 - 1.02 MG/DL    BUN/Creatinine ratio 17 12 - 20      eGFR 18 (L) >60 ml/min/1.73m2    Calcium 11.1 (H) 8.5 - 10.1 MG/DL    Bilirubin, total 0.6 0.2 - 1.0 MG/DL    ALT (SGPT) 22 12 - 78 U/L    AST (SGOT) 38 (H) 15 - 37 U/L    Alk. phosphatase 86 45 - 117 U/L    Protein, total 7.9 6.4 - 8.2 g/dL    Albumin 3.2 (L) 3.5 - 5.0 g/dL    Globulin 4.7 (H) 2.0 - 4.0 g/dL    A-G Ratio 0.7 (L) 1.1 - 2.2     TROPONIN-HIGH SENSITIVITY    Collection Time: 01/04/23  5:18 PM   Result Value Ref Range    Troponin-High Sensitivity 81 (H) 0 - 51 ng/L   INFLUENZA A+B VIRAL AGS    Collection Time: 01/04/23  5:18 PM   Result Value Ref Range    Influenza A Antigen Negative NEG      Influenza B Antigen Negative NEG     COVID-19 RAPID TEST    Collection Time: 01/04/23  5:18 PM   Result Value Ref Range    Specimen source Nasopharyngeal      COVID-19 rapid test Not detected NOTD     TROPONIN-HIGH SENSITIVITY    Collection Time: 01/04/23  6:41 PM   Result Value Ref Range    Troponin-High Sensitivity 70 (H) 0 - 51 ng/L       Radiologic Studies -   No orders to display     CT Results  (Last 48 hours)      None          CXR Results  (Last 48 hours)      None            Medical Decision Making   I am the first provider for this patient. I reviewed the vital signs, available nursing notes, past medical history, past surgical history, family history and social history.     Vital Signs-Reviewed the patient's vital signs. Patient Vitals for the past 12 hrs:   Temp Pulse Resp BP SpO2   01/04/23 2122 -- 77 16 131/83 100 %   01/04/23 1850 97.9 °F (36.6 °C) 81 16 (!) 150/74 --   01/04/23 1805 97.3 °F (36.3 °C) -- -- (!) 145/89 --   01/04/23 1708 97.5 °F (36.4 °C) 81 18 116/87 100 %       82 y.o. female presents to the ED with cc of decreased PO intake, weakness, failure to thrive. Patient's care is impacted by chronic conditions including CKD, anemia . Patient's external medical records from infusion center reviewed and revealed that patient was recommended to stop bicarb due to side effects    DDX: failure to thrive, electrolyte abnormality anemia, CKD, BENSON, ACS, arrhythmia/    ED Course:   Initial assessment performed. The patients presenting problems have been discussed, and they are in agreement with the care plan formulated and outlined with them. I have encouraged them to ask questions as they arise throughout their visit. The patient was treated with   Medications Administered       donepeziL (ARICEPT) tablet 10 mg       Admin Date  01/04/2023 Action  Given Dose  10 mg Route  Oral Administered By  Princess Mi RN              lactated Ringers infusion       Admin Date  01/04/2023 Action  New Bag Dose  100 mL/hr Rate  100 mL/hr Route  IntraVENous Administered By  Princess Mi RN                .        EKG interpretation by me: atrial paced, LVH, rate 66     Labs interpretation by me revealed anemia, BENSON to 2.52 on CKD, troponin mildly elevated at 81    Labs:  Recent Results (from the past 12 hour(s))   EKG, 12 LEAD, INITIAL    Collection Time: 01/04/23  5:14 PM   Result Value Ref Range    Ventricular Rate 66 BPM    Atrial Rate 66 BPM    P-R Interval 180 ms    QRS Duration 80 ms    Q-T Interval 372 ms    QTC Calculation (Bezet) 389 ms    Calculated P Axis -31 degrees    Calculated R Axis -21 degrees    Calculated T Axis 88 degrees    Diagnosis       Atrial-paced rhythm  Voltage criteria for left ventricular hypertrophy  T wave abnormality, consider inferolateral ischemia  When compared with ECG of 22-NOV-2022 23:55,  QT has shortened     CBC WITH AUTOMATED DIFF    Collection Time: 01/04/23  5:18 PM   Result Value Ref Range    WBC 6.8 3.6 - 11.0 K/uL    RBC 4.19 3.80 - 5.20 M/uL    HGB 11.0 (L) 11.5 - 16.0 g/dL    HCT 37.0 35.0 - 47.0 %    MCV 88.3 80.0 - 99.0 FL    MCH 26.3 26.0 - 34.0 PG    MCHC 29.7 (L) 30.0 - 36.5 g/dL    RDW 19.6 (H) 11.5 - 14.5 %    PLATELET 397 498 - 930 K/uL    MPV 10.3 8.9 - 12.9 FL    NRBC 0.0 0  WBC    ABSOLUTE NRBC 0.00 0.00 - 0.01 K/uL    NEUTROPHILS 59 32 - 75 %    LYMPHOCYTES 24 12 - 49 %    MONOCYTES 16 (H) 5 - 13 %    EOSINOPHILS 1 0 - 7 %    BASOPHILS 0 0 - 1 %    IMMATURE GRANULOCYTES 0 0.0 - 0.5 %    ABS. NEUTROPHILS 4.0 1.8 - 8.0 K/UL    ABS. LYMPHOCYTES 1.6 0.8 - 3.5 K/UL    ABS. MONOCYTES 1.1 (H) 0.0 - 1.0 K/UL    ABS. EOSINOPHILS 0.0 0.0 - 0.4 K/UL    ABS. BASOPHILS 0.0 0.0 - 0.1 K/UL    ABS. IMM. GRANS. 0.0 0.00 - 0.04 K/UL    DF AUTOMATED     METABOLIC PANEL, COMPREHENSIVE    Collection Time: 01/04/23  5:18 PM   Result Value Ref Range    Sodium 137 136 - 145 mmol/L    Potassium 3.4 (L) 3.5 - 5.1 mmol/L    Chloride 95 (L) 97 - 108 mmol/L    CO2 33 (H) 21 - 32 mmol/L    Anion gap 9 5 - 15 mmol/L    Glucose 165 (H) 65 - 100 mg/dL    BUN 43 (H) 6 - 20 MG/DL    Creatinine 2.52 (H) 0.55 - 1.02 MG/DL    BUN/Creatinine ratio 17 12 - 20      eGFR 18 (L) >60 ml/min/1.73m2    Calcium 11.1 (H) 8.5 - 10.1 MG/DL    Bilirubin, total 0.6 0.2 - 1.0 MG/DL    ALT (SGPT) 22 12 - 78 U/L    AST (SGOT) 38 (H) 15 - 37 U/L    Alk.  phosphatase 86 45 - 117 U/L    Protein, total 7.9 6.4 - 8.2 g/dL    Albumin 3.2 (L) 3.5 - 5.0 g/dL    Globulin 4.7 (H) 2.0 - 4.0 g/dL    A-G Ratio 0.7 (L) 1.1 - 2.2     TROPONIN-HIGH SENSITIVITY    Collection Time: 01/04/23  5:18 PM   Result Value Ref Range    Troponin-High Sensitivity 81 (H) 0 - 51 ng/L   INFLUENZA A+B VIRAL AGS    Collection Time: 01/04/23  5:18 PM   Result Value Ref Range    Influenza A Antigen Negative NEG      Influenza B Antigen Negative NEG     COVID-19 RAPID TEST    Collection Time: 01/04/23  5:18 PM   Result Value Ref Range    Specimen source Nasopharyngeal      COVID-19 rapid test Not detected NOTD     TROPONIN-HIGH SENSITIVITY    Collection Time: 01/04/23  6:41 PM   Result Value Ref Range    Troponin-High Sensitivity 70 (H) 0 - 51 ng/L         Imaging revealed:  No orders to display         I considered performing xray/CT of abdomen but did not after shared decision making with son due to benign abdominal exam, no nauea or vomiting. As a result of the failure to thrive, BENSON findings I chose to admit to hospitalist.     Management of the patient was discussed with the hospitalist who stated he would admit. Disposition:      Admitted to Floor Medical Floor the case was discussed with the admitting physician     Diagnosis     Clinical Impression:   1. Failure to thrive in adult    2. BENSON (acute kidney injury) ([facility] [address]        Attestations:    Mohamud Zhou, DO        Please note that this dictation was completed with [email or web address], the computer voice recognition software. Quite often unanticipated grammatical, syntax, homophones, and other interpretive errors are inadvertently transcribed by the computer software. Please disregard these errors. Please excuse any errors that have escaped final proofreading. Thank you.

## 2023-01-06 ENCOUNTER — APPOINTMENT (OUTPATIENT)
Dept: CT IMAGING | Age: 88
DRG: 683 | End: 2023-01-06
Attending: PHYSICIAN ASSISTANT
Payer: MEDICARE

## 2023-01-06 ENCOUNTER — TELEPHONE (OUTPATIENT)
Dept: INTERNAL MEDICINE CLINIC | Age: 88
End: 2023-01-06

## 2023-01-06 PROBLEM — Z71.89 GOALS OF CARE, COUNSELING/DISCUSSION: Status: ACTIVE | Noted: 2023-01-06

## 2023-01-06 PROBLEM — R41.0 DELIRIUM: Status: ACTIVE | Noted: 2023-01-06

## 2023-01-06 PROBLEM — R63.0 ANOREXIA: Status: ACTIVE | Noted: 2023-01-06

## 2023-01-06 PROBLEM — R41.0 DELIRIUM: Status: ACTIVE | Noted: 2023-01-01

## 2023-01-06 PROBLEM — R63.0 ANOREXIA: Status: ACTIVE | Noted: 2023-01-01

## 2023-01-06 PROBLEM — Z71.89 GOALS OF CARE, COUNSELING/DISCUSSION: Status: ACTIVE | Noted: 2023-01-01

## 2023-01-06 PROBLEM — R54 FRAILTY: Status: ACTIVE | Noted: 2023-01-06

## 2023-01-06 PROBLEM — R54 FRAILTY: Status: ACTIVE | Noted: 2023-01-01

## 2023-01-06 LAB
ANION GAP SERPL CALC-SCNC: 5 MMOL/L (ref 5–15)
BUN SERPL-MCNC: 37 MG/DL (ref 6–20)
BUN/CREAT SERPL: 20 (ref 12–20)
CALCIUM SERPL-MCNC: 10.3 MG/DL (ref 8.5–10.1)
CHLORIDE SERPL-SCNC: 100 MMOL/L (ref 97–108)
CO2 SERPL-SCNC: 30 MMOL/L (ref 21–32)
CREAT SERPL-MCNC: 1.87 MG/DL (ref 0.55–1.02)
GLUCOSE BLD STRIP.AUTO-MCNC: 84 MG/DL (ref 65–117)
GLUCOSE SERPL-MCNC: 84 MG/DL (ref 65–100)
POTASSIUM SERPL-SCNC: 3.3 MMOL/L (ref 3.5–5.1)
SERVICE CMNT-IMP: NORMAL
SODIUM SERPL-SCNC: 135 MMOL/L (ref 136–145)

## 2023-01-06 PROCEDURE — 74011250637 HC RX REV CODE- 250/637: Performed by: INTERNAL MEDICINE

## 2023-01-06 PROCEDURE — 74176 CT ABD & PELVIS W/O CONTRAST: CPT

## 2023-01-06 PROCEDURE — 36415 COLL VENOUS BLD VENIPUNCTURE: CPT

## 2023-01-06 PROCEDURE — 74011000250 HC RX REV CODE- 250: Performed by: STUDENT IN AN ORGANIZED HEALTH CARE EDUCATION/TRAINING PROGRAM

## 2023-01-06 PROCEDURE — 65270000046 HC RM TELEMETRY

## 2023-01-06 PROCEDURE — 74011250636 HC RX REV CODE- 250/636: Performed by: INTERNAL MEDICINE

## 2023-01-06 PROCEDURE — 99221 1ST HOSP IP/OBS SF/LOW 40: CPT | Performed by: NURSE PRACTITIONER

## 2023-01-06 PROCEDURE — 74011250637 HC RX REV CODE- 250/637: Performed by: STUDENT IN AN ORGANIZED HEALTH CARE EDUCATION/TRAINING PROGRAM

## 2023-01-06 PROCEDURE — 82962 GLUCOSE BLOOD TEST: CPT

## 2023-01-06 PROCEDURE — 74011250636 HC RX REV CODE- 250/636: Performed by: STUDENT IN AN ORGANIZED HEALTH CARE EDUCATION/TRAINING PROGRAM

## 2023-01-06 PROCEDURE — 80048 BASIC METABOLIC PNL TOTAL CA: CPT

## 2023-01-06 PROCEDURE — 99233 SBSQ HOSP IP/OBS HIGH 50: CPT | Performed by: INTERNAL MEDICINE

## 2023-01-06 RX ORDER — SODIUM CHLORIDE 0.9 % (FLUSH) 0.9 %
5-40 SYRINGE (ML) INJECTION AS NEEDED
OUTPATIENT
Start: 2023-01-06

## 2023-01-06 RX ORDER — SODIUM CHLORIDE 9 MG/ML
50 INJECTION, SOLUTION INTRAVENOUS CONTINUOUS
OUTPATIENT
Start: 2023-01-06 | End: 2023-01-06

## 2023-01-06 RX ORDER — SODIUM CHLORIDE 0.9 % (FLUSH) 0.9 %
5-40 SYRINGE (ML) INJECTION EVERY 8 HOURS
OUTPATIENT
Start: 2023-01-06

## 2023-01-06 RX ORDER — MIRTAZAPINE 15 MG/1
15 TABLET, FILM COATED ORAL
Status: DISCONTINUED | OUTPATIENT
Start: 2023-01-06 | End: 2023-01-08 | Stop reason: HOSPADM

## 2023-01-06 RX ORDER — DEXTROMETHORPHAN/PSEUDOEPHED 2.5-7.5/.8
1.2 DROPS ORAL
OUTPATIENT
Start: 2023-01-06

## 2023-01-06 RX ADMIN — MIRTAZAPINE 15 MG: 15 TABLET, FILM COATED ORAL at 22:36

## 2023-01-06 RX ADMIN — HEPARIN SODIUM 5000 UNITS: 5000 INJECTION INTRAVENOUS; SUBCUTANEOUS at 09:16

## 2023-01-06 RX ADMIN — SODIUM CHLORIDE, PRESERVATIVE FREE 10 ML: 5 INJECTION INTRAVENOUS at 22:42

## 2023-01-06 RX ADMIN — ACETAMINOPHEN 650 MG: 325 TABLET ORAL at 00:39

## 2023-01-06 RX ADMIN — HEPARIN SODIUM 5000 UNITS: 5000 INJECTION INTRAVENOUS; SUBCUTANEOUS at 22:36

## 2023-01-06 RX ADMIN — DONEPEZIL HYDROCHLORIDE 10 MG: 5 TABLET, FILM COATED ORAL at 22:36

## 2023-01-06 RX ADMIN — SODIUM CHLORIDE, PRESERVATIVE FREE 10 ML: 5 INJECTION INTRAVENOUS at 13:42

## 2023-01-06 RX ADMIN — METOPROLOL SUCCINATE 25 MG: 50 TABLET, EXTENDED RELEASE ORAL at 09:16

## 2023-01-06 RX ADMIN — POTASSIUM CHLORIDE AND SODIUM CHLORIDE: 900; 150 INJECTION, SOLUTION INTRAVENOUS at 14:58

## 2023-01-06 RX ADMIN — HEPARIN SODIUM 5000 UNITS: 5000 INJECTION INTRAVENOUS; SUBCUTANEOUS at 16:57

## 2023-01-06 RX ADMIN — PANTOPRAZOLE SODIUM 40 MG: 40 TABLET, DELAYED RELEASE ORAL at 09:15

## 2023-01-06 RX ADMIN — SODIUM CHLORIDE, PRESERVATIVE FREE 10 ML: 5 INJECTION INTRAVENOUS at 06:44

## 2023-01-06 NOTE — TELEPHONE ENCOUNTER
----- Message from Billie Angelucci sent at 1/5/2023  3:51 PM EST -----  Subject: Message to Provider    QUESTIONS  Information for Provider? Son, Julian Marshall, is on the phone and sitting with   pt at the hospital. Staff refuses to give an update and requests son/POA   to talk to Dr Alberts Forward about the care.   ---------------------------------------------------------------------------  --------------  [id number] [employer]  [phone]; OK to leave message on voicemail  ---------------------------------------------------------------------------  --------------  SCRIPT ANSWERS  Relationship to Patient? Other  Representative Name? Julian Marshall   Is the Representative on the appropriate HIPAA document in Epic?  Yes

## 2023-01-06 NOTE — CONSULTS
Gastroenterology Consult  ([place], [place] for Dr. Everardo West)     Referring Physician: Dr. Tayo Carcamo Date: 1/6/2023     Subjective:     Chief Complaint: poor appetite    History of Present Illness: Josef Du is a 82 y.o. female with PMH dementia, CKD IV, pacemaker who is seen in consultation for poor appetite and dysphagia. Patient is accompanied by her son, her daughter, and her daughter in law who assist with history as patient has dementia. She lives with her . Per her son, she has had a marked decline in her mental state and her appetite in the past week. Patient gives a mixed history. She states she doesn't want to eat and has nausea. Then she says that certain things get hung up in her chest for a while and won't go down. Reports this happens with solids but not liquids. However, last night she ate several bites of steak without any difficulty. Son feels it may \"be in her head because she was on a low salt diet and family was constantly telling her what she couldn't eat so she just shut down and stopped eating altogether. \"  No fevers, chills, abd pain. +chronic diarrhea. Barium swallow 11/23/22: The swallowing mechanism is  intact. There is a small sliding hiatal hernia with moderate abnormality of the  peristalsis. There is no stricture, extravasation or filling defect. Next     IMPRESSION  Abnormal peristalsis with small sliding hiatal hernia. EGD 1/29/20: Findings:  Esophagus: Partially obstructing Schatzki's ring was noted in lower third of esophagus. Medium sliding hiatal hernia was noted.    Stomach:moderate erythema was noted in  body, antrum  Duodenum/jejunum:normal        Therapies:  esophageal dilation with 15-18 mm CRE balloon  biopsy of stomach body, antrum        Past Medical History:   Diagnosis Date    Adverse effect of anesthesia     passed out during EGD/stopped breathing    Arrhythmia     has pacemaker for low HR    Arthritis     Asthma [facility])     right kidney - surgery    Chronic pain     right side    Deep vein thrombosis (DVT) during current hospitalization ([facility] [address]     history of DVT was on coumadin in the past    Diabetes ([facility] [address]     diet controlled    GERD (gastroesophageal reflux disease)     H/O acute pancreatitis     Hiatal hernia     Hypertension     MARLEN (obstructive sleep apnea)     does not use CPAP/pt states she has not used since a pacemaker inserted    Other ill-defined conditions(799.89)     lymph node enlargement - left chest - benign bx - watching    Other ill-defined conditions(799.89)     wheezes    Psychiatric disorder     depression    PUD (peptic ulcer disease)     hx of    Thromboembolus ([facility] [address]     Unspecified adverse effect of anesthesia     passed out during EGD per pt - stopped breathing per pt per MD     Past Surgical History:   Procedure Laterality Date    HX APPENDECTOMY      HX CHOLECYSTECTOMY      HX GYN          HX HEENT Right     laser eye surgery to stop bleeding in back of eye - multiple laser surgeries    HX KNEE ARTHROSCOPY      left knee; cartilage repair    HX ORTHOPAEDIC      right hip replacement    HX ORTHOPAEDIC      lower back surgery    HX ORTHOPAEDIC      straighten toe - 2nd toe on right    HX ORTHOPAEDIC Bilateral     carpal tunnel release    HX PACEMAKER      not defibrillator    HX UROLOGICAL      implant in hip to control urine and then removed    HX UROLOGICAL      Right kidney partial nephrectomy      Family History   Problem Relation Age of Onset    Stroke Mother         brain aneurysm    Hypertension Father     Heart Disease Father     Cancer Sister         breast    Cancer Brother         lung and prostate    Cancer Sister         breast    SLE Other         half siblings (5+) - lupus    Hypertension Daughter     Diabetes Daughter      Social History     Tobacco Use    Smoking status: Former     Packs/day: 0.25     Years: 20.00     Pack years: 5.00     Types: Cigarettes     Quit date: 1971     Years since quittin.4    Smokeless tobacco: Never   Substance Use Topics    Alcohol use: No      Allergies   Allergen Reactions    Pcn [Penicillins] Rash     But can take cephalosporins. Allergic to all \"cillins\". Codeine Other (comments)     Hallucinations, takes hydrocodone at home for pain    Contrast Dye [Iodine] Other (comments)     Not to take due to kidney function    Lactose Diarrhea    Prednisone Other (comments)     \"makes my pancreas numbers go way up\"     Current Facility-Administered Medications   Medication Dose Route Frequency    mirtazapine (REMERON) tablet 15 mg  15 mg Oral QHS    0.9% sodium chloride with KCl 20 mEq/L infusion   IntraVENous CONTINUOUS    donepeziL (ARICEPT) tablet 10 mg  10 mg Oral QHS    metoprolol succinate (TOPROL-XL) XL tablet 25 mg  25 mg Oral DAILY    sodium chloride (NS) flush 5-40 mL  5-40 mL IntraVENous Q8H    sodium chloride (NS) flush 5-40 mL  5-40 mL IntraVENous PRN    acetaminophen (TYLENOL) tablet 650 mg  650 mg Oral Q6H PRN    Or    acetaminophen (TYLENOL) suppository 650 mg  650 mg Rectal Q6H PRN    polyethylene glycol (MIRALAX) packet 17 g  17 g Oral DAILY PRN    ondansetron (ZOFRAN ODT) tablet 4 mg  4 mg Oral Q8H PRN    Or    ondansetron (ZOFRAN) injection 4 mg  4 mg IntraVENous Q6H PRN    pantoprazole (PROTONIX) tablet 40 mg  40 mg Oral ACB    heparin (porcine) injection 5,000 Units  5,000 Units SubCUTAneous Q8H        Review of Systems:  A detailed review of systems was performed as follows:  Constitutional:  Negative  Eyes:  No ocular sensitivity to the sun, blurred vision or double vision. ENMT:  No nose or sinus problems. Respiratory: No coughing, wheezing or sob  Cardiac:  No chest pain, exertional chest pain or palpitations  Gastrointestinal:  See history of the present illness  :   +CKD  Musculoskeletal:  No arthritis or hot swollen joints.     Endocrine:  No thyroid disease or diabetes  Psychiatric: No depression or feeling blue  Integumentary:  No skin rash or sensitivity to the sun. Neurologic:  +dementia   Heme-Lymphatic:  +anemia of chronic disease      Objective:     Physical Exam:  Visit Vitals  BP (!) 149/75 (BP 1 Location: Right upper arm, BP Patient Position: At rest)   Pulse 74   Temp 97.9 °F (36.6 °C)   Resp 16   Ht 5' 1\" (1.549 m)   Wt 77.1 kg (170 lb)   SpO2 98%   BMI 32.12 kg/m²        Gen: elderly BF in nad  Skin:  Extremities and face reveal no rashes. HEENT: Sclerae anicteric. Extra-occular muscles are intact. Cardiovascular: Regular rate and rhythm. +murmur   Respiratory:  Comfortable breathing with no accessory muscle use. Clear breath sounds with no wheezes, rales, or rhonchi. GI:  Abdomen nondistended, soft, and nontender. Normal active bowel sounds. No enlargement of the liver or spleen. No masses palpable. Rectal:  Deferred  Musculoskeletal:  No pitting edema of the lower legs. Neurological:  some memory deficits c/w hx of dementia   Psychiatric:  pleasant  Lymphatic:  No cervical or supraclavicular adenopathy. Lab/Data Review:  CMP:   Lab Results   Component Value Date/Time     (L) 01/06/2023 05:19 AM    K 3.3 (L) 01/06/2023 05:19 AM     01/06/2023 05:19 AM    CO2 30 01/06/2023 05:19 AM    AGAP 5 01/06/2023 05:19 AM    GLU 84 01/06/2023 05:19 AM    BUN 37 (H) 01/06/2023 05:19 AM    CREA 1.87 (H) 01/06/2023 05:19 AM    CA 10.3 (H) 01/06/2023 05:19 AM     CBC: No results found for: WBC, HGB, HGBEXT, HCT, HCTEXT, PLT, PLTEXT, HGBEXT, HCTEXT, PLTEXT      Assessment/Plan:     Active Problems:    Failure to thrive in adult (1/4/2023)     Poor appetite  Dysphagia  Dementia with progression in confusion over the past week  CKD IV    Pleasant 81 y/o BF with hx of dementia and CKD 4 presents with 1 week of worsening confusion, poor appetite and failure to thrive. There is also a hx of dysphagia. Family wants \"to do everything that will help her and not kill her. \"  We discussed that all procedures carry some risk, even if they are deemed low risk procedures, particularly in the elderly and they voice understanding. We will proceed with a  non contrast CT abd/pelvis to see if there is any organic pathology on imaging to explain nausea and poor appetite. We will plan for an EGD on Monday to assess for recurrent Scatzki's ring and dilate if present and assess for PUD, H. Pylori, gastritis and pyloric stenosis. She does have some dysmotility on her barium swallow which could be presbyesophagus for age. We discussed that there is no specific treatment for this. We also discussed that if our evaluation is negative, this could all be from progression of her dementia. In that case, I would recommend against PEG tube placement but ultimately the decision is up to the patient/family.       THALIA So  01/06/23  10:05 AM

## 2023-01-06 NOTE — PROGRESS NOTES
End of Shift Note    Bedside shift change report given to Flor Borja (oncoming nurse) by Shawn Olson RN (offgoing nurse). Report included the following information SBAR, Kardex, Intake/Output, MAR, and Recent Results    Shift worked:  7p-7a     Shift summary and any significant changes:     Family member present at bedside all shift. Pt pleasantly confused. Pt resting most of the night. Assisted multiple times to the bedside commode. Attempted to put an IV in because the IV pump was beeping constantly because of resistance. I was not able to put in a new IV. IV is still flushing. Blood work done. Concerns for physician to address:       Zone phone for oncoming shift:          Activity:  Activity Level: Up with Assistance  Number times ambulated in hallways past shift: 0  Number of times OOB to chair past shift: 0    Cardiac:   Cardiac Monitoring: Yes      Cardiac Rhythm: Sinus Rhythm    Access:  Current line(s): PIV     Genitourinary:   Urinary status: voiding and incontinent    Respiratory:   O2 Device: None (Room air)  Chronic home O2 use?: NO  Incentive spirometer at bedside: NO       GI:     Current diet:  ADULT ORAL NUTRITION SUPPLEMENT Breakfast, AM Snack, Lunch, PM Snack, Dinner; Renal Supplement  ADULT DIET Regular; Lactose-Controlled  Passing flatus: YES  Tolerating current diet: NO       Pain Management:   Patient states pain is manageable on current regimen: YES    Skin:  Anson Score: 15  Interventions: increase time out of bed    Patient Safety:  Fall Score:  Total Score: 4  Interventions: bed/chair alarm, assistive device (walker, cane, etc), gripper socks, pt to call before getting OOB, and stay with me (per policy)  High Fall Risk: Yes    Length of Stay:  Expected LOS: 2d 4h  Actual LOS: 2      Shawn Olson RN

## 2023-01-06 NOTE — PROGRESS NOTES
INTERNAL MEDICINE ATTENDING NOTE      Patient Name: Denisha Ritter   : 1935   Admit: 2023    ASSESSMENT / PLAN    BENSON on CKD 4: Likely prerenal. IVF. Improved today. Failure to thrive in adult (2023): Nausea and poor appetite. Her son thinks she doesn't eat because she is depressed (though that doesn't explain the N/V) and notes that she tolerated a small amount of steak last night. The family wonders if an appetite stimulant will help--I'll add Remeron. If there is a depression component, this may help that also. History of Schatzki ring requiring esophageal dilatation in   Hypertension: Meds held. For now, add back as dehydration improves/BP goes up. Diabetes mellitus type 2: Diet controlled with A1C 5.5 in November. She has been following with endocrinologist in outpatient setting. Asthma: Controlled. Chronic pain: Stable. Dementia: Per nurses, the patient is confused at times, perhaps related to this. History of dysrhythmia s/p pacemaker: She sees Dr. Kiara Acevedo in outpatient setting. GERD:   Anemia of chronic disease: She has been seeing Dr. Luisa Scott in outpatient setting for iron infusions. The patient has declined blood products. Full code. We have consulted Palliative to clarify goals of care and this meeting has yet to happen, but see below. Family meeting: I met with son and 3 other members of her family (and another listening in on phone), who were with the patient today. The patient has expressed previously to the night nurse that she doesn't want surgery, but the family disbelieves this. Ms. Jorje Middleton does not answer me on this question when directly asked--she just stares blankly. Her son and other family members want aggressive care, up to and including EGD, and PEG if necessary. Rafael Rajan MD, FACP  Best contact is via Pager: [phone], or via hospital  at [phone]. Do NOT use Perfect Serve.                        SUBJECTIVE:   Ms. Fozia Alvarado Cierra Fry was seen by me today during rounds. At this time, she is resting comfortably. The patient is awake and answers some questions. At times, she simply stares and declines to answer (see my note about discussion of surgery). She has no new complaints today. ROS otherwise unremarkable except as noted elsewhere. OBJECTIVE:   Blood pressure 133/66, pulse 68, temperature 97.9 °F (36.6 °C), resp. rate 17, height 5' 1\" (1.549 m), weight 170 lb (77.1 kg), SpO2 99 %. Gen: Patient is in no acute distress. Lungs: CTAB. Heart: RRR. Abd: S, NT, ND, BS present. Exremities: Warm. Recent Results (from the past 12 hour(s))   GLUCOSE, POC    Collection Time: 01/06/23  3:45 AM   Result Value Ref Range    Glucose (POC) 84 65 - 117 mg/dL    Performed by Erik Yip RN    METABOLIC PANEL, BASIC    Collection Time: 01/06/23  5:19 AM   Result Value Ref Range    Sodium 135 (L) 136 - 145 mmol/L    Potassium 3.3 (L) 3.5 - 5.1 mmol/L    Chloride 100 97 - 108 mmol/L    CO2 30 21 - 32 mmol/L    Anion gap 5 5 - 15 mmol/L    Glucose 84 65 - 100 mg/dL    BUN 37 (H) 6 - 20 MG/DL    Creatinine 1.87 (H) 0.55 - 1.02 MG/DL    BUN/Creatinine ratio 20 12 - 20      eGFR 26 (L) >60 ml/min/1.73m2    Calcium 10.3 (H) 8.5 - 10.1 MG/DL         Becky Alvarez MD, FACP  Best contact is via Pager: [phone], or via hospital  at [phone]. Do NOT use Perfect Serve.

## 2023-01-06 NOTE — CONSULTS
Palliative Medicine Consult  [place]: [phone] ([phone])    Patient Name: Cecilia Garces  YOB: 1935    Date of Initial Consult: 1/5/23  Reason for Consult: Care Decisions  Requesting Provider: Sejal Leo   Primary Care Physician: Hayes Trinidad MD     SUMMARY:   Cecilia Garces is a 82 y.o. with a past history of GERD, DM, hiatal hernia, PUD, who was admitted on 1/4/2023 from home with a diagnosis of BENSON on CKD4, in the setting of poor PO intake. PALLIATIVE DIAGNOSES:   Goals of care  Anorexia  Delirium  Frailty  Palliative Care     PLAN:   Met briefly with son, daughter in law, and patient after they had talked to GI- they have a plan in place to rule out reversible causes of her anorexia before making any decisions. Her increased confusion and lack of appetite was such a sudden onset from what they recall, that it makes sense to rule out any reversible causes. Plan for CT today, and EGD on Monday  I will follow up with family after the EGD to further talk through next steps.   Family is not interested in any procedures that don't have the potential to help her, so hopefully we will have more answers early next week  Initial consult note routed to primary continuity provider and/or primary health care team members  Communicated plan of care with: Ilda Cortez [facility] Team     GOALS OF CARE / TREATMENT PREFERENCES:     GOALS OF CARE:  Patient/Health Care Proxy Stated Goals: Cure    TREATMENT PREFERENCES:   Code Status: Full Code    Advance Care Planning:  [x] [facility] Interdisciplinary Team has updated the ACP Navigator with Devinhsahiln and Patient Capacity      Primary Decision MakerNivia Burkitt - [phone]    Secondary Decision Maker: Africa Lin - Daughter - [phone]    Secondary Decision Maker: Strauss Garaulito - Child - [phone]    Medical Interventions: Full interventions       Other:    As far as possible, the palliative care team has discussed with patient / health care proxy about goals of care / treatment preferences for patient. HISTORY:     History obtained from: chart    CHIEF COMPLAINT: \"Im just not hungry\"    HPI/SUBJECTIVE:    The patient is:   [x] Verbal and participatory  [] Non-participatory due to:        Clinical Pain Assessment (nonverbal scale for severity on nonverbal patients):   Clinical Pain Assessment  Severity: 0          Duration: for how long has pt been experiencing pain (e.g., 2 days, 1 month, years)  Frequency: how often pain is an issue (e.g., several times per day, once every few days, constant)     FUNCTIONAL ASSESSMENT:     Palliative Performance Scale (PPS):  PPS: 30       PSYCHOSOCIAL/SPIRITUAL SCREENING:     Palliative IDT has assessed this patient for cultural preferences / practices and a referral made as appropriate to needs (Cultural Services, Patient Advocacy, Ethics, etc.)    Any spiritual / Latter-day concerns:  [] Yes /  [x] No   If \"Yes\" to discuss with pastoral care during IDT     Does caregiver feel burdened by caring for their loved one:   [] Yes /  [x] No /  [] No Caregiver Present/Available [] No Caregiver [] Pt Lives at [place] [id number]  If \"Yes\" to discuss with social work during IDT    Anticipatory grief assessment:   [x] Normal  / [] Maladaptive     If \"Maladaptive\" to discuss with social work during IDT    ESAS Anxiety: Anxiety: 0    ESAS Depression: Depression: 0        REVIEW OF SYSTEMS:     Positive and pertinent negative findings in ROS are noted above in HPI. The following systems were [x] reviewed / [] unable to be reviewed as noted in HPI  Other findings are noted below. Systems: constitutional, ears/nose/mouth/throat, respiratory, gastrointestinal, genitourinary, musculoskeletal, integumentary, neurologic, psychiatric, endocrine. Positive findings noted below.   Modified ESAS Completed by: provider   Fatigue: 0 Drowsiness: 0   Depression: 0 Pain: 0   Anxiety: 0 Nausea: 0   Anorexia: 8 Dyspnea: 0                    PHYSICAL EXAM:     From RN flowsheet:  Wt Readings from Last 3 Encounters:   01/04/23 170 lb (77.1 kg)   12/27/22 170 lb (77.1 kg)   12/05/22 170 lb 9.6 oz (77.4 kg)     Blood pressure (!) 155/76, pulse 67, temperature 98.2 °F (36.8 °C), resp. rate 16, height 5' 1\" (1.549 m), weight 170 lb (77.1 kg), SpO2 99 %.     Pain Scale 1: Numeric (0 - 10)  Pain Intensity 1: 0                 Last bowel movement, if known:     Constitutional: awake, alert, sweet disposition, frail  Eyes: pupils equal, anicteric  ENMT: no nasal discharge, moist mucous membranes  Cardiovascular: regular rhythm, distal pulses intact  Respiratory: breathing not labored, symmetric  Gastrointestinal: soft non-tender, +bowel sounds  Musculoskeletal: no deformity, no tenderness to palpation  Skin: warm, dry  Neurologic: following commands, moving all extremities  Psychiatric: full affect, no hallucinations  Other:       HISTORY:     Active Problems:    Failure to thrive in adult (1/4/2023)    Past Medical History:   Diagnosis Date    Adverse effect of anesthesia     passed out during EGD/stopped breathing    Arrhythmia     has pacemaker for low HR    Arthritis     Asthma     Cancer ([address]     right kidney - surgery    Chronic pain     right side    Deep vein thrombosis (DVT) during current hospitalization ([address]     history of DVT was on coumadin in the past    Diabetes ([address]     diet controlled    GERD (gastroesophageal reflux disease)     H/O acute pancreatitis     Hiatal hernia     Hypertension     MARLEN (obstructive sleep apnea)     does not use CPAP/pt states she has not used since a pacemaker inserted    Other ill-defined conditions(799.89)     lymph node enlargement - left chest - benign bx - watching    Other ill-defined conditions(799.89)     wheezes    Psychiatric disorder     depression    PUD (peptic ulcer disease)     hx of    Thromboembolus ([address]     Unspecified adverse effect of anesthesia     passed out during EGD per pt - stopped breathing per pt per MD      Past Surgical History:   Procedure Laterality Date    HX APPENDECTOMY      HX CHOLECYSTECTOMY      HX GYN          HX HEENT Right     laser eye surgery to stop bleeding in back of eye - multiple laser surgeries    HX KNEE ARTHROSCOPY      left knee; cartilage repair    HX ORTHOPAEDIC      right hip replacement    HX ORTHOPAEDIC      lower back surgery    HX ORTHOPAEDIC      straighten toe - 2nd toe on right    HX ORTHOPAEDIC Bilateral     carpal tunnel release    HX PACEMAKER      not defibrillator    HX UROLOGICAL      implant in hip to control urine and then removed    HX UROLOGICAL      Right kidney partial nephrectomy      Family History   Problem Relation Age of Onset    Stroke Mother         brain aneurysm    Hypertension Father     Heart Disease Father     Cancer Sister         breast    Cancer Brother         lung and prostate    Cancer Sister         breast    SLE Other         half siblings (5+) - lupus    Hypertension Daughter     Diabetes Daughter       History reviewed, no pertinent family history. Social History     Tobacco Use    Smoking status: Former     Packs/day: 0.25     Years: 20.00     Pack years: 5.00     Types: Cigarettes     Quit date: 1971     Years since quittin.4    Smokeless tobacco: Never   Substance Use Topics    Alcohol use: No     Allergies   Allergen Reactions    Pcn [Penicillins] Rash     But can take cephalosporins. Allergic to all \"cillins\".     Codeine Other (comments)     Hallucinations, takes hydrocodone at home for pain    Contrast Dye [Iodine] Other (comments)     Not to take due to kidney function    Lactose Diarrhea    Prednisone Other (comments)     \"makes my pancreas numbers go way up\"      Current Facility-Administered Medications   Medication Dose Route Frequency    mirtazapine (REMERON) tablet 15 mg  15 mg Oral QHS    0.9% sodium chloride with KCl 20 mEq/L infusion IntraVENous CONTINUOUS    donepeziL (ARICEPT) tablet 10 mg  10 mg Oral QHS    metoprolol succinate (TOPROL-XL) XL tablet 25 mg  25 mg Oral DAILY    sodium chloride (NS) flush 5-40 mL  5-40 mL IntraVENous Q8H    sodium chloride (NS) flush 5-40 mL  5-40 mL IntraVENous PRN    acetaminophen (TYLENOL) tablet 650 mg  650 mg Oral Q6H PRN    Or    acetaminophen (TYLENOL) suppository 650 mg  650 mg Rectal Q6H PRN    polyethylene glycol (MIRALAX) packet 17 g  17 g Oral DAILY PRN    ondansetron (ZOFRAN ODT) tablet 4 mg  4 mg Oral Q8H PRN    Or    ondansetron (ZOFRAN) injection 4 mg  4 mg IntraVENous Q6H PRN    pantoprazole (PROTONIX) tablet 40 mg  40 mg Oral ACB    heparin (porcine) injection 5,000 Units  5,000 Units SubCUTAneous Q8H          LAB AND IMAGING FINDINGS:     Lab Results   Component Value Date/Time    WBC 7.6 01/05/2023 02:07 AM    HGB 9.8 (L) 01/05/2023 02:07 AM    PLATELET 884 52/61/1574 02:07 AM     Lab Results   Component Value Date/Time    Sodium 135 (L) 01/06/2023 05:19 AM    Potassium 3.3 (L) 01/06/2023 05:19 AM    Chloride 100 01/06/2023 05:19 AM    CO2 30 01/06/2023 05:19 AM    BUN 37 (H) 01/06/2023 05:19 AM    Creatinine 1.87 (H) 01/06/2023 05:19 AM    Calcium 10.3 (H) 01/06/2023 05:19 AM    Magnesium 1.8 01/05/2023 02:07 AM    Phosphorus 3.7 01/04/2023 04:26 PM      Lab Results   Component Value Date/Time    Alk.  phosphatase 86 01/04/2023 05:18 PM    Protein, total 7.9 01/04/2023 05:18 PM    Albumin 3.2 (L) 01/04/2023 05:18 PM    Globulin 4.7 (H) 01/04/2023 05:18 PM     Lab Results   Component Value Date/Time    INR 1.4 (H) 07/03/2016 11:45 PM    Prothrombin time 14.5 (H) 07/03/2016 11:45 PM    aPTT 56.3 (H) 05/13/2015 12:57 AM      Lab Results   Component Value Date/Time    Iron 15 (L) 12/21/2022 11:30 AM    TIBC 99 (L) 12/21/2022 11:30 AM    Iron % saturation 15 (L) 12/21/2022 11:30 AM    Ferritin 914 (H) 12/21/2022 11:30 AM      No results found for: PH, PCO2, PO2  No components found for: [place] Lab Results   Component Value Date/Time     (H) 07/03/2016 11:45 PM    CK - MB 1.3 07/03/2016 11:45 PM                Total time:   Counseling / coordination time, spent as noted above:   > 50% counseling / coordination?:     Prolonged service was provided for  []30 min   []75 min in face to face time in the presence of the patient, spent as noted above. Time Start:   Time End:   Note: this can only be billed with 84091 (initial) or 47133 (follow up). If multiple start / stop times, list each separately.

## 2023-01-07 LAB
ALBUMIN SERPL-MCNC: 2.7 G/DL (ref 3.5–5)
ALBUMIN/GLOB SERPL: 0.7 (ref 1.1–2.2)
ALP SERPL-CCNC: 71 U/L (ref 45–117)
ALT SERPL-CCNC: 18 U/L (ref 12–78)
ANION GAP SERPL CALC-SCNC: 5 MMOL/L (ref 5–15)
AST SERPL-CCNC: 29 U/L (ref 15–37)
BILIRUB SERPL-MCNC: 0.5 MG/DL (ref 0.2–1)
BUN SERPL-MCNC: 27 MG/DL (ref 6–20)
BUN/CREAT SERPL: 18 (ref 12–20)
CALCIUM SERPL-MCNC: 10.3 MG/DL (ref 8.5–10.1)
CHLORIDE SERPL-SCNC: 105 MMOL/L (ref 97–108)
CO2 SERPL-SCNC: 30 MMOL/L (ref 21–32)
CREAT SERPL-MCNC: 1.49 MG/DL (ref 0.55–1.02)
GLOBULIN SER CALC-MCNC: 3.7 G/DL (ref 2–4)
GLUCOSE SERPL-MCNC: 77 MG/DL (ref 65–100)
POTASSIUM SERPL-SCNC: 3.5 MMOL/L (ref 3.5–5.1)
PROT SERPL-MCNC: 6.4 G/DL (ref 6.4–8.2)
SODIUM SERPL-SCNC: 140 MMOL/L (ref 136–145)

## 2023-01-07 PROCEDURE — 80053 COMPREHEN METABOLIC PANEL: CPT

## 2023-01-07 PROCEDURE — 74011250636 HC RX REV CODE- 250/636: Performed by: INTERNAL MEDICINE

## 2023-01-07 PROCEDURE — 74011250637 HC RX REV CODE- 250/637: Performed by: INTERNAL MEDICINE

## 2023-01-07 PROCEDURE — 74011000250 HC RX REV CODE- 250: Performed by: STUDENT IN AN ORGANIZED HEALTH CARE EDUCATION/TRAINING PROGRAM

## 2023-01-07 PROCEDURE — 65270000046 HC RM TELEMETRY

## 2023-01-07 PROCEDURE — 36415 COLL VENOUS BLD VENIPUNCTURE: CPT

## 2023-01-07 PROCEDURE — 99233 SBSQ HOSP IP/OBS HIGH 50: CPT | Performed by: INTERNAL MEDICINE

## 2023-01-07 PROCEDURE — 74011250637 HC RX REV CODE- 250/637: Performed by: STUDENT IN AN ORGANIZED HEALTH CARE EDUCATION/TRAINING PROGRAM

## 2023-01-07 PROCEDURE — 74011250636 HC RX REV CODE- 250/636: Performed by: STUDENT IN AN ORGANIZED HEALTH CARE EDUCATION/TRAINING PROGRAM

## 2023-01-07 RX ADMIN — DONEPEZIL HYDROCHLORIDE 10 MG: 5 TABLET, FILM COATED ORAL at 22:37

## 2023-01-07 RX ADMIN — SODIUM CHLORIDE, PRESERVATIVE FREE 10 ML: 5 INJECTION INTRAVENOUS at 06:17

## 2023-01-07 RX ADMIN — POTASSIUM CHLORIDE AND SODIUM CHLORIDE: 900; 150 INJECTION, SOLUTION INTRAVENOUS at 05:31

## 2023-01-07 RX ADMIN — HEPARIN SODIUM 5000 UNITS: 5000 INJECTION INTRAVENOUS; SUBCUTANEOUS at 23:49

## 2023-01-07 RX ADMIN — POTASSIUM CHLORIDE AND SODIUM CHLORIDE: 900; 150 INJECTION, SOLUTION INTRAVENOUS at 18:45

## 2023-01-07 RX ADMIN — ACETAMINOPHEN 650 MG: 325 TABLET ORAL at 20:55

## 2023-01-07 RX ADMIN — PANTOPRAZOLE SODIUM 40 MG: 40 TABLET, DELAYED RELEASE ORAL at 10:56

## 2023-01-07 RX ADMIN — ACETAMINOPHEN 650 MG: 325 TABLET ORAL at 10:56

## 2023-01-07 RX ADMIN — SODIUM CHLORIDE, PRESERVATIVE FREE 10 ML: 5 INJECTION INTRAVENOUS at 22:36

## 2023-01-07 RX ADMIN — HEPARIN SODIUM 5000 UNITS: 5000 INJECTION INTRAVENOUS; SUBCUTANEOUS at 18:47

## 2023-01-07 RX ADMIN — MIRTAZAPINE 15 MG: 15 TABLET, FILM COATED ORAL at 22:36

## 2023-01-07 RX ADMIN — METOPROLOL SUCCINATE 25 MG: 50 TABLET, EXTENDED RELEASE ORAL at 10:56

## 2023-01-07 RX ADMIN — SODIUM CHLORIDE, PRESERVATIVE FREE 10 ML: 5 INJECTION INTRAVENOUS at 18:47

## 2023-01-07 RX ADMIN — HEPARIN SODIUM 5000 UNITS: 5000 INJECTION INTRAVENOUS; SUBCUTANEOUS at 10:56

## 2023-01-07 NOTE — PROGRESS NOTES
End of Shift Note    Bedside shift change report given to Nitish Phelps (oncoming nurse) by Mary Ramos RN (offgoing nurse). Report included the following information SBAR, Kardex, Intake/Output, and MAR    Shift worked:  7P-7A     Shift summary and any significant changes:     Family present at the bedside. Scheduled medications were given, see MAR. Patient is on continuous infusion. Frequent rounding has been done.      Concerns for physician to address:       Zone phone for oncoming shift:              Mary Ramos RN

## 2023-01-07 NOTE — PROGRESS NOTES
Transition of Care Plan  RUR: 26%  DISPOSITION: The disposition plan is pending  F/U with PCP/Specialist    Transport: family         Reason for Admission:   failure to thrive in adult                  RUR Score:     26%      PCP: First and Last name:  Hesham Madera MD     Name of Practice:   Are you a current patient: Yes/No:   Approximate date of last visit:    Can you do a virtual visit with your PCP:              Resources/supports as identified by patient/family:    and children                 Top Challenges facing patient (as identified by patient/family and CM):  none                     Finances/Medication cost?                    Transportation? Support system or lack thereof? Living arrangements? Self-care/ADLs/Cognition? Current Advanced Directive/Advance Care Plan:  Full Code      Healthcare Decision Maker:   Click here to complete HealthCare Decision Makers including selection of the Healthcare Decision Maker Relationship (ie \"Primary\")      Primary Decision MakerRonal Aileen - [phone]    Secondary Decision Maker: Jason Solis Daughter - [phone]    Secondary Decision Maker: Santiago Alex Solis Child - [phone]    Payor Source Payor: Jennifer Pro / Plan: Eran Bhatt PPO / Product Type: Managed Care Medicare /                             Plan for utilizing home health:    HHPT/OT likely recommended                  Transition of Care Plan:                  Patient lives with her  and requires assistance with her ADLs/IADLs. Patient's daughters have been taking turns staying with patient to assist. Patient ambulates with a walker and has a shower chair, and bsc. Patient uses [facility] for prescriptions. Patient has been doing OPPT/OT. Care Management Interventions  PCP Verified by CM:  Yes  Palliative Care Criteria Met (RRAT>21 & CHF Dx)?: No  Mode of Transport at Discharge: Other (see comment) (family)  Transition of Care Consult (CM Consult): Discharge Planning  MyChart Signup: No  Discharge Durable Medical Equipment: No  Health Maintenance Reviewed: Yes  Physical Therapy Consult: Yes  Occupational Therapy Consult: Yes  Speech Therapy Consult: No  Support Systems: Spouse/Significant Other, Child(levi)  Confirm Follow Up Transport: Family  [facility] [facility] Information Provided?: No  Discharge Location  Patient Expects to be Discharged to[de-identified] Home with home health    11:01 AM  TRISTIAN Aguillon

## 2023-01-07 NOTE — PROGRESS NOTES
INTERNAL MEDICINE ATTENDING NOTE      Patient Name: Rob Carrasco   : 1935   Admit: 2023    ASSESSMENT / PLAN    BENSON on CKD 4: Likely prerenal. IVF. Improved. Failure to thrive in adult (2023): Nausea and poor appetite. GI consulted and work up is in progress; appreciate consult. The family wonders if an appetite stimulant will help--I'll add Remeron. If there is a depression component, this may help that also. History of Schatzki ring requiring esophageal dilatation in : EGD planned for Monday. Hypertension: Meds were initially held. Will add back now that BP is high. Diabetes mellitus type 2: Diet controlled with A1C 5.5 in November. She has been following with endocrinologist in outpatient setting. Asthma: Controlled. Chronic pain: Stable. Dementia: Ongoing. \"\" noted. History of dysrhythmia s/p pacemaker: She sees Dr. Addie Hdz in outpatient setting. GERD:   Anemia of chronic disease: She has been seeing Dr. Kali Huerta in outpatient setting for iron infusions. The patient has declined blood products. Full code. Appreciate Palliative Care input. For now, family wants to pursue aggressive approach. Heather Arriaga MD, FACP  Best contact is via Pager: [phone], or via hospital  at [phone]. Do NOT use Perfect Serve. SUBJECTIVE:   Ms. Rob Carrasco was seen by me today during rounds. At this time, she is resting comfortably. Her daughter is with her. The patient is awake but confused, doesn't respond to questions--daughter notes that she is more confused at night. She has no new complaints today. ROS otherwise unremarkable except as noted elsewhere. OBJECTIVE:   Blood pressure (!) 170/70, pulse 72, temperature 98.4 °F (36.9 °C), resp. rate 18, height 5' 1\" (1.549 m), weight 170 lb (77.1 kg), SpO2 98 %. Gen: Patient is in no acute distress. Lungs: CTAB. Heart: RRR. Abd: S, NT, ND, BS present. Exremities: Warm.      Recent Results (from the past 12 hour(s))   METABOLIC PANEL, COMPREHENSIVE    Collection Time: 01/07/23  5:30 AM   Result Value Ref Range    Sodium 140 136 - 145 mmol/L    Potassium 3.5 3.5 - 5.1 mmol/L    Chloride 105 97 - 108 mmol/L    CO2 30 21 - 32 mmol/L    Anion gap 5 5 - 15 mmol/L    Glucose 77 65 - 100 mg/dL    BUN 27 (H) 6 - 20 MG/DL    Creatinine 1.49 (H) 0.55 - 1.02 MG/DL    BUN/Creatinine ratio 18 12 - 20      eGFR 34 (L) >60 ml/min/1.73m2    Calcium 10.3 (H) 8.5 - 10.1 MG/DL    Bilirubin, total 0.5 0.2 - 1.0 MG/DL    ALT (SGPT) 18 12 - 78 U/L    AST (SGOT) 29 15 - 37 U/L    Alk. phosphatase 71 45 - 117 U/L    Protein, total 6.4 6.4 - 8.2 g/dL    Albumin 2.7 (L) 3.5 - 5.0 g/dL    Globulin 3.7 2.0 - 4.0 g/dL    A-G Ratio 0.7 (L) 1.1 - 2.2          CT abdomen pelv WO contrast:   New small pericardial effusion  Possible gastritis  No acute process identified in the abdomen or pelvis. Ra Geronimo MD, FACP  Best contact is via Pager: [phone], or via hospital  at [phone]. Do NOT use Perfect Serve.

## 2023-01-07 NOTE — PROGRESS NOTES
.End of Shift Note    Bedside shift change report given to [address] [place] [address] (oncoming nurse) by Adriel Garcia RN (offgoing nurse). Report included the following information SBAR, Kardex, and Intake/Output    Shift worked: 7a-7p   Shift summary and any significant changes:    Patient tolerating care fairly well. Medications given per [facility] and education provided. No complaints of pain made throughout shift. Pt up to BSC 3x on shift. CT scan completed. Hourly rounding completed and pt is resting quietly with family at bedside.           Adriel Garcia RN

## 2023-01-07 NOTE — CONSULTS
Consult    NAME: Colette Levine   :  1935   MRN:  [id number]     Date/Time:  2023 12:35 PM    Patient PCP: Anival Yost MD  ________________________________________________________________________     Assessment:     Dysphagia  Worsening memory  BENSON    1. SSS with resting sinus bradycardia with HR in 20's s/p dual chamber PM implant 2013, [facility]. 2.  Chronic dyspnea, negative nuclear stress test 2006, 2013, and 7/15/2013, 3/15/2017, equivocal troponin with cath 10/2017 with mild CAD, negative V/Q 10/2017. Echo 2021 with NL EF, stable aortic sclerosis. 3.  HTN. 4.  Dyslipidemia. 5.  Diabetes mellitus type 2 without chronic insulin. 6.  DVT in , warfarin stopped due to Anemia, Hgb 10.6 2017. She follows at the infusion clinic at [facility]. 7.  Renal cell carcinoma s/p partial right nephrectomy years ago. 8.  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 9.  Gout. 10.  GERD and possible esophageal stricture, sees Dr. Jose Waldrop. 11.  Asthma. 12.  Sleep apnea, intolerant of CPAP. 13.  Diverticulosis. , retired from [employer], [address] a [employer]. Plan:     Family requesting cardiac evaluation    No chest pain, no ST changes, negative enzymes. Recent negative stres  Euvolemic to dry with poor po intake. CT with small pericardial effusion. Recent echo with no sig effusion. S/p PPM    - Cont asa  - Cont metoprolol  - Decrease hydration as tolerates PO    For EGD Monday. Dr. Heidi Meng available for cardiology concerns. [x]        High complexity decision making was performed        Subjective:   CHIEF COMPLAINT:     HISTORY OF PRESENT ILLNESS:       Colette Levine is a 82 y.o.  female with pertinent past medical history as listed below who presents with complaints of progressive functional decline with poor p.o. intake and intermittent confusion over the past week.   Patient reports she has a known esophageal issue (\"weak esophagus\") and has been disinterested in food. On review, it appears she has been diagnosed with dysphagia to solids and occasionally regurgitates solids. She also reports disinterest in food citing bland taste with increasing metallic taste in her mouth likely secondary to her declining renal function and low potassium diet. In the ED, patient afebrile and hemodynamically stable saturating mid 90s on room air. ECG demonstrates atrial paced rhythm with LVH criteria, COVID and flu negative. Labs demonstrate: Unremarkable CBC, high sensitive troponin elevated at 81 downtrending to 70, UA not reflexed to culture, sodium 137, potassium 3.4, bicarb 33, glucose 165, BUN 43, creatinine 2.52 (baseline around 1.7). Son Johanna Rose requested cardiolgy evaluation. We were asked to consult for work up and evaluation of the above problems.      Past Medical History:   Diagnosis Date    Adverse effect of anesthesia     passed out during EGD/stopped breathing    Arrhythmia     has pacemaker for low HR    Arthritis     Asthma     Cancer ([address]     right kidney - surgery    Chronic pain     right side    Deep vein thrombosis (DVT) during current hospitalization ([address]     history of DVT was on coumadin in the past    Diabetes ([address]     diet controlled    GERD (gastroesophageal reflux disease)     H/O acute pancreatitis     Hiatal hernia     Hypertension     MARLEN (obstructive sleep apnea)     does not use CPAP/pt states she has not used since a pacemaker inserted    Other ill-defined conditions(799.89)     lymph node enlargement - left chest - benign bx - watching    Other ill-defined conditions(799.89)     wheezes    Psychiatric disorder     depression    PUD (peptic ulcer disease)     hx of    Thromboembolus ([address]     Unspecified adverse effect of anesthesia     passed out during EGD per pt - stopped breathing per pt per MD      Past Surgical History:   Procedure Laterality Date    HX APPENDECTOMY HX CHOLECYSTECTOMY      HX GYN          HX HEENT Right     laser eye surgery to stop bleeding in back of eye - multiple laser surgeries    HX KNEE ARTHROSCOPY      left knee; cartilage repair    HX ORTHOPAEDIC      right hip replacement    HX ORTHOPAEDIC      lower back surgery    HX ORTHOPAEDIC      straighten toe - 2nd toe on right    HX ORTHOPAEDIC Bilateral     carpal tunnel release    HX PACEMAKER      not defibrillator    HX UROLOGICAL      implant in hip to control urine and then removed    HX UROLOGICAL      Right kidney partial nephrectomy     Allergies   Allergen Reactions    Pcn [Penicillins] Rash     But can take cephalosporins. Allergic to all \"cillins\". Codeine Other (comments)     Hallucinations, takes hydrocodone at home for pain    Contrast Dye [Iodine] Other (comments)     Not to take due to kidney function    Lactose Diarrhea    Prednisone Other (comments)     \"makes my pancreas numbers go way up\"      Meds:  See below  Social History     Tobacco Use    Smoking status: Former     Packs/day: 0.25     Years: 20.00     Pack years: 5.00     Types: Cigarettes     Quit date: 1971     Years since quittin.4    Smokeless tobacco: Never   Substance Use Topics    Alcohol use: No      Family History   Problem Relation Age of Onset    Stroke Mother         brain aneurysm    Hypertension Father     Heart Disease Father     Cancer Sister         breast    Cancer Brother         lung and prostate    Cancer Sister         breast    SLE Other         half siblings (5+) - lupus    Hypertension Daughter     Diabetes Daughter        REVIEW OF SYSTEMS:     [x]         Unable to obtain  ROS due to poor memory   []         Total of 12 systems reviewed as follows:     Total of 12 systems reviewed as follows:       POSITIVE= Bold text  Negative = normal text  General:  fever, chills, sweats, generalized weakness, weight loss/gain,      loss of appetite   Eyes:    blurred vision, eye pain, loss of vision, double vision  ENT:    rhinorrhea, pharyngitis   Respiratory:   cough, sputum production, SOB, LORENZO, wheezing, pleuritic pain   Cardiology:   chest pain, palpitations, orthopnea, PND, edema, syncope   Gastrointestinal:  abdominal pain , N/V, diarrhea, dysphagia, constipation, bleeding   Genitourinary:  frequency, urgency, dysuria, hematuria, incontinence   Muskuloskeletal :  arthralgia, myalgia, back pain  Hematology:  easy bruising, nose or gum bleeding, lymphadenopathy   Dermatological: rash, ulceration, pruritis, color change / jaundice  Endocrine:   hot flashes or polydipsia   Neurological:  headache, dizziness, confusion, focal weakness, paresthesia,     Speech difficulties, memory loss, gait difficulty  Psychological: Feelings of anxiety, depression, agitation    Objective:      Physical Exam:    Last 24hrs VS reviewed since prior progress note. Most recent are:    Visit Vitals  BP (!) 151/69   Pulse 74   Temp 98.1 °F (36.7 °C)   Resp 21   Ht 5' 1\" (1.549 m)   Wt 77.1 kg (170 lb)   SpO2 94%   BMI 32.12 kg/m²     No intake or output data in the 24 hours ending 01/07/23 1235     General Appearance: Well developed, well nourished, alert & oriented x to self,    no acute distress. Ears/Nose/Mouth/Throat: Pupils equal and round, Hearing grossly normal.  Neck: Supple. JVP within normal limits. Carotids good upstrokes, with no bruit. Chest: Lungs clear to auscultation bilaterally. Cardiovascular: Regular rate and rhythm, S1S2 normal, no murmur, rubs, gallops. Abdomen: Soft, non-tender, bowel sounds are active. No organomegaly. Extremities: No edema bilaterally. Femoral pulses +1, Distal Pulses +1. Skin: Warm and dry. Neuro: CN II-XII grossly intact, Strength and sensation grossly intact. Data:      Prior to Admission medications    Medication Sig Start Date End Date Taking? Authorizing Provider   amLODIPine (NORVASC) 2.5 mg tablet Take 1 Tablet by mouth daily.  12/23/22   Yoselin Hart MD famotidine (PEPCID) 40 mg tablet Take 40 mg by mouth nightly. 12/20/22   Provider, Historical   furosemide (LASIX) 40 mg tablet Take 40 mg by mouth daily. 12/16/22   Provider, Historical   metoprolol succinate (TOPROL-XL) 25 mg XL tablet Take 25 mg by mouth daily. 12/19/22   Provider, Historical   sodium bicarbonate 650 mg tablet Take 650 mg by mouth daily. 12/19/22   Provider, Historical   methocarbamoL (ROBAXIN) 750 mg tablet Take 1 Tablet by mouth four (4) times daily. 12/20/22   Hayes Trinidad MD   triamcinolone (ARISTOCORT) 0.5 % topical cream Apply  to affected area two (2) times a day. use thin layer 9/7/22   Hayes Trinidad MD   donepeziL (ARICEPT) 10 mg tablet Take 1 Tablet by mouth nightly. 3/17/22   Hayes Trinidad MD       Recent Results (from the past 24 hour(s))   METABOLIC PANEL, COMPREHENSIVE    Collection Time: 01/07/23  5:30 AM   Result Value Ref Range    Sodium 140 136 - 145 mmol/L    Potassium 3.5 3.5 - 5.1 mmol/L    Chloride 105 97 - 108 mmol/L    CO2 30 21 - 32 mmol/L    Anion gap 5 5 - 15 mmol/L    Glucose 77 65 - 100 mg/dL    BUN 27 (H) 6 - 20 MG/DL    Creatinine 1.49 (H) 0.55 - 1.02 MG/DL    BUN/Creatinine ratio 18 12 - 20      eGFR 34 (L) >60 ml/min/1.73m2    Calcium 10.3 (H) 8.5 - 10.1 MG/DL    Bilirubin, total 0.5 0.2 - 1.0 MG/DL    ALT (SGPT) 18 12 - 78 U/L    AST (SGOT) 29 15 - 37 U/L    Alk. phosphatase 71 45 - 117 U/L    Protein, total 6.4 6.4 - 8.2 g/dL    Albumin 2.7 (L) 3.5 - 5.0 g/dL    Globulin 3.7 2.0 - 4.0 g/dL    A-G Ratio 0.7 (L) 1.1 - 2.2        Return Office Visit    December 15, 2022      Cecilia Garces  82year old F (1935)  Account #: [de-identified]  PRIMARY CARE PHYSICIAN:  Sejal Leo MD  CARDIOLOGIST:  Jose J Cates MD    REASON FOR VISIT:  This 82year old female presents for VI. HISTORY OF PRESENT ILLNESS:   1.  SSS with resting sinus bradycardia with HR in 20's s/p dual chamber PM implant 8/13/2013, [facility].   2.  Chronic dyspnea, negative nuclear stress test 5/11/2006, 1/20/2013, and 7/15/2013, 3/15/2017, equivocal troponin with cath 10/2017 with mild CAD, negative V/Q 10/2017. Echo 8/2021 with NL EF, stable aortic sclerosis. 3.  HTN. 4.  Dyslipidemia. 5.  Diabetes mellitus type 2 without chronic insulin. 6.  DVT in 2011, warfarin stopped due to Anemia, Hgb 10.6 2/2017. She follows at the infusion clinic at [facility]. 7.  Renal cell carcinoma s/p partial right nephrectomy years ago. 8.  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 9.  Gout. 10.  GERD and possible esophageal stricture, sees Dr. Ralph Butler. 11.  Asthma. 12.  Sleep apnea, intolerant of CPAP. 13.  Diverticulosis. , retired from [email or web address], [address] a walker. Here for follow to PET and echo. PET scan did not reveal any evidence of reversible ischemia. Echocardiogram revealed normal LV systolic function. Patient states her shortness of breath has improved. Patient also is following up with me for venous insufficiency. Patient did not use the compression stocking on regular basis. Swelling is getting better with Bumex. I have advised her to start wearing the compression stocking along with salt restriction and leg elevation. CARDIAC HISTORY  RISK FACTORS:  1 Hypertension   2 Type 2 Diabetes   3 Dyslipidemia   4 Tobacco Use       CARDIOVASCULAR PROCEDURES  Procedure Date Results   [facility] MPI 03/15/2017 EF 0.69 (69%), No Ischemia, No Scar   [facility] MPI 07/15/2013 EF 0.62, No Ischemia, No Scar   [facility] MPI  EF 0.68, Normal   Cardiac PET 11/29/2022 Stress 59%; Rest 59%. Normal myocardial perfusion study. No evidence of significant Lexiscan induced myocardial ischemia. Fixed apical defect is favored to represent artifact. Cath 10/23/2017 normal   [facility] 08/21/2013 Dual Chamber PPM ([facility]), Normal fx. A cap confirm turned on. Echo 08/31/2021 EF range 60%-65%, Mild LVH with normal LV cavity size and systolic function, EF 40-62%.  Thickened aortic valve with mild aortic stenosis. No significant change in mean gradient compared to echo from 2019. Mild tricuspid regurgitation with mild pulmonary hypertension. Echo 07/17/2019 EF range 60%-65%, The left ventricular ejection fraction was estimated to be 60%. Normal left ventricular function with mild concentric left ventricular hypertrophy. Mild aortic stenosis with mean gradient of 18 mm Hg. Mild pulmonary artery hypertension. Echo 03/11/2014 EF 0.60 (60%), No significant valvular heart disease. Mild GUERLINE. Echo 05/17/2012 EF 0.55, Normal left ventricular function with moderate LVH. Aortic sclerosis with no aortic stenosis. Mild mitral annular calcification with mild mitral regurgitation. EKG 11/15/2017 HR 60 NST, Atrial paced   Holter 05/17/2012 Sinus Rhythm, Average HR 62, rare ectopy.    MPI  Reportedly normal, done at [address]:  Ingredient Reaction (Severity) Comment   CODEINE rash Codeine   PENICILLINS rash Penicillins     Problem Description Onset Date Notes   Benign essential hypertension 01/01/2007    Mixed hyperlipidemia 04/26/2012    Shortness of breath 02/21/2014    Cardiac pacemaker in situ 03/05/2014    Fitting and adjustment of cardiac pacemaker 03/05/2014        PAST MEDICAL/SURGICAL HISTORY  (Detailed)    Disease/disorder Onset Date Surgical History Date Comments     Cardiac pacemaker       Appendectomy       Caesarean Section       Right blood vessel burst in eye       ORIF right hip       Left Knee Surgery       Lower back surgery     Allergy       Asthma       Atypical Chest Pain       Cancer, renal cell  Partial right nephrectomy     Cardiovascular Disease       Castleman disease       Cataracts       Diabetes       Diverticulosis       DVT: LLE 2011      GERD       Gout       Hypertension       Left Knee Replacement    BSP 03/02/2017 -   Obesity       Pancreatitis       Pneumonia 2010      Sleep apnea         OBSTETRIC HISTORY:  Not currently pregnant. Family History  (Detailed)  Relationship Family Member Name  Age at Death Condition Onset Age Cause of Death   Brother  N  Cancer, lung  N   Brother number 2 N  Hypertension  N   Brother number 2 N  Diabetes mellitus  N   Father  Y  Sudden Death  Y   Father  N  Valvular Heart Disease  N   Father  N  Hypertension  N   Mother  N  Hypertension  N   Mother  Y  Sudden Death  Y   Sister  N  Cancer, breast  N     SOCIAL HISTORY  (Detailed)  Tobacco use reviewed. Preferred language is [place]. EDUCATION/EMPLOYMENT/OCCUPATION  Employment History Status Retired Restrictions                                        MARITAL STATUS/FAMILY/SOCIAL SUPPORT  Currently . Has children:  0 son(s). 0 daughter(s). Smoking status: Former smoker. SMOKING STATUS  Use Status Type Smoking Status Usage Per Day Years Used Total Pack Years   yes  Former smoker            ALCOHOL  There is no history of alcohol use. Patient quit drinking in . CAFFEINE  The patient uses caffeine: coffee and tea. [place] LIFESTYLE  Exercises occasionally. REVIEW OF SYMPTOMS:    CONST - Negative for weight gain, weight loss, fever. EYES - Negative for visual changes. ENT - Negative for hearing loss. RESP - Negative for snoring, hemoptysis, dyspnea. CARD - Negative for chest pain, diaphoresis, orthopnea, palpitation, syncope, PND.  VASC - Negative for claudication, edema. GI - Negative for nausea, reflux, bleeding.  - Negative for hematuria, nocturia. REPROD - Negative for Hx of OCP.  ENDO - Negative for goiter, tremors. NEURO - Negative for dizziness, memory loss, seizures. PSYCH - Negative for depression, hallucinations. DERM - Negative for rash, skin sores. M/S - Negative for joint pain, myalgia. HEMAT - Negative for acute anemia, thrombocytopenia.     VITAL SIGNS  Time BP mm/Hg Pulse /min Resp /min Temp F Ht ft Ht in Ht cm Wt lb Wt kg BMI kg/m2 BSA m2 O2 Sat%   12:11 /80 91   5.0 2.00 157.48 159.20 72.212 29.12  95       PHYSICAL EXAM:  Exam Findings Details   Const Neg Level of Distress - Awake / Alert. Nourishment - Well Nourished. Appearance - Well Developed. Resp Neg Respirations - Nonlabored. Breath Sounds - Clear Throughout. Rales - Absent. Wheezes - Absent. Rhonchi - Absent. Cardiac Neg Rhythm - Regular. Palpation - PMI Normal.  Heart Sounds - S1 Normal, S2 Normal, No S3, No S4. Extra Sounds - None. Murmurs - None. Vasc Neg Carotid - Bilateral Normal Pulse. Dorsalis Pedis - Bilateral Normal Pulse. Vasc Pos Aorta - Nonpalpable. Abd Neg Tenderness - None. EXT Neg Clubbing - Absent. Lower Extremity Edema - Absent. Psych Neg Orientation - Oriented to Time, Person, Place. IMPRESSION AND PLAN  01. Edema, unspecified type (R60.9):  Patient has bilateral leg edema exact etiology is not clear. Recently she has been evaluated for venous insufficiency with venous reflux studies which revealed bilateral GSV reflux starting from SF junction. Patient is seen improvement in her leg edema I have advised her to continue conservative management for now. 02. Shortness of breath (R06.02):  Echocardiogram revealed normal LV systolic function and no regional wall motion abnormalities noted. Stress test did not reveal any evidence of reversible ischemia. Patient has seen improvement in her symptoms are patient was started on Bumex. Will continue the same for now. 03. Essential hypertension (I10):  Reasonably well controlled. 04. Mixed hyperlipidemia (E78.2):  Has had elevated CK with statin in past, but doing well with pravachol. Managed by PCP. 05. Type 2 diabetes mellitus without complications (D64.1): The patient is on chronic medical therapy. Managed by: Other physician. 06. Presence of cardiac pacemaker (Z95.0): This is a dual chamber device. Today's interrogation showed normal function.   The incision at the device implantation site is well healed with no evidence of bleeding or infection. She will continue to be followed in device clinic.   07. Body mass index [BMI] 30.0-30.9, adult (Z68.30)     ORDERS:  1 Dietary management education, guidance, and counseling    2 Return office visit with Terra Jean Baptiste MD in 6 Months. 3 The patient was instructed on AHA diet and regular exercise. FINAL MEDICATION LIST  Medication Sig Desc Non-VCS   amitriptyline 25 mg tablet take 1 tablet by oral route  every day at bedtime N   amlodipine 5 mg tablet take 1 tablet by oral route  every day Y   Aspir-81 81 mg tablet,delayed release take 1 tablet by oral route  every day N   cinacalcet HCl take 1 tablet by oral route  every day with food Y   donepezil 10 mg tablet take 1 tablet by oral route  every day in the evening N   furosemide 40 mg tablet take 1 tablet by oral route  every day Y   hydrocodone 5 mg-acetaminophen 325 mg tablet take 1 tablet by oral route  every 6 hours as needed for pain Y   ibuprofen 600 mg tablet take 1 tablet by oral route 2 times every day with food Y   losartan 100 mg Tab take 1 tablet (100MG)  by oral route  every day N   metoprolol succinate ER 50 mg tablet,extended release 24 hr take 1 tablet by oral route  every morning N   nitroglycerin 0.4 mg sublingual tablet place 1 tablet by sublingual route once at the first sign of an attack; no more than 3 tabs are recommended within a 15 minute period.  Shakira Giordano

## 2023-01-08 VITALS
OXYGEN SATURATION: 95 % | HEART RATE: 71 BPM | DIASTOLIC BLOOD PRESSURE: 72 MMHG | WEIGHT: 170 LBS | SYSTOLIC BLOOD PRESSURE: 155 MMHG | BODY MASS INDEX: 32.1 KG/M2 | HEIGHT: 61 IN | TEMPERATURE: 98.8 F | RESPIRATION RATE: 17 BRPM

## 2023-01-08 LAB
ALBUMIN SERPL-MCNC: 2.6 G/DL (ref 3.5–5)
ALBUMIN/GLOB SERPL: 0.7 (ref 1.1–2.2)
ALP SERPL-CCNC: 71 U/L (ref 45–117)
ALT SERPL-CCNC: 18 U/L (ref 12–78)
ANION GAP SERPL CALC-SCNC: 6 MMOL/L (ref 5–15)
AST SERPL-CCNC: 34 U/L (ref 15–37)
BILIRUB SERPL-MCNC: 0.5 MG/DL (ref 0.2–1)
BUN SERPL-MCNC: 20 MG/DL (ref 6–20)
BUN/CREAT SERPL: 15 (ref 12–20)
CALCIUM SERPL-MCNC: 10.3 MG/DL (ref 8.5–10.1)
CHLORIDE SERPL-SCNC: 110 MMOL/L (ref 97–108)
CO2 SERPL-SCNC: 26 MMOL/L (ref 21–32)
CREAT SERPL-MCNC: 1.33 MG/DL (ref 0.55–1.02)
GLOBULIN SER CALC-MCNC: 3.7 G/DL (ref 2–4)
GLUCOSE SERPL-MCNC: 62 MG/DL (ref 65–100)
POTASSIUM SERPL-SCNC: 4.2 MMOL/L (ref 3.5–5.1)
PROT SERPL-MCNC: 6.3 G/DL (ref 6.4–8.2)
SODIUM SERPL-SCNC: 142 MMOL/L (ref 136–145)

## 2023-01-08 PROCEDURE — 80053 COMPREHEN METABOLIC PANEL: CPT

## 2023-01-08 PROCEDURE — 74011250637 HC RX REV CODE- 250/637: Performed by: STUDENT IN AN ORGANIZED HEALTH CARE EDUCATION/TRAINING PROGRAM

## 2023-01-08 PROCEDURE — 74011250636 HC RX REV CODE- 250/636: Performed by: INTERNAL MEDICINE

## 2023-01-08 PROCEDURE — 36415 COLL VENOUS BLD VENIPUNCTURE: CPT

## 2023-01-08 PROCEDURE — 74011250636 HC RX REV CODE- 250/636: Performed by: STUDENT IN AN ORGANIZED HEALTH CARE EDUCATION/TRAINING PROGRAM

## 2023-01-08 PROCEDURE — 99239 HOSP IP/OBS DSCHRG MGMT >30: CPT | Performed by: INTERNAL MEDICINE

## 2023-01-08 PROCEDURE — 74011000250 HC RX REV CODE- 250: Performed by: STUDENT IN AN ORGANIZED HEALTH CARE EDUCATION/TRAINING PROGRAM

## 2023-01-08 RX ORDER — MIRTAZAPINE 15 MG/1
15 TABLET, FILM COATED ORAL
Qty: 30 TABLET | Refills: 11 | Status: SHIPPED | OUTPATIENT
Start: 2023-01-08

## 2023-01-08 RX ADMIN — METOPROLOL SUCCINATE 25 MG: 50 TABLET, EXTENDED RELEASE ORAL at 10:30

## 2023-01-08 RX ADMIN — SODIUM CHLORIDE, PRESERVATIVE FREE 10 ML: 5 INJECTION INTRAVENOUS at 06:26

## 2023-01-08 RX ADMIN — PANTOPRAZOLE SODIUM 40 MG: 40 TABLET, DELAYED RELEASE ORAL at 10:29

## 2023-01-08 RX ADMIN — POTASSIUM CHLORIDE AND SODIUM CHLORIDE: 900; 150 INJECTION, SOLUTION INTRAVENOUS at 04:52

## 2023-01-08 NOTE — PROGRESS NOTES
Problem: Falls - Risk of  Goal: *Absence of Falls  Description: Document Compa Sol Fall Risk and appropriate interventions in the flowsheet. Outcome: Progressing Towards Goal  Note: Fall Risk Interventions:  Mobility Interventions: PT Consult for mobility concerns    Mentation Interventions: Family/sitter at bedside    Medication Interventions: Teach patient to arise slowly    Elimination Interventions: Toileting schedule/hourly rounds              Problem: Pressure Injury - Risk of  Goal: *Prevention of pressure injury  Description: Document Anson Scale and appropriate interventions in the flowsheet.   Outcome: Progressing Towards Goal  Note: Pressure Injury Interventions:  Sensory Interventions: Check visual cues for pain    Moisture Interventions: Absorbent underpads    Activity Interventions: Increase time out of bed    Mobility Interventions: PT/OT evaluation    Nutrition Interventions: Document food/fluid/supplement intake    Friction and Shear Interventions: Feet elevated on foot rest

## 2023-01-08 NOTE — PROGRESS NOTES
End of Shift Note    Bedside shift change report given to [facility], RN (oncoming nurse) by Harry Stoll RN (offgoing nurse). Report included the following information SBAR, Kardex, and MAR    Shift worked:  7a - 7:30p     Shift summary and any significant changes:     Scheduled meds given     Podiatry consult called     Pt's son Enid Hernandez is pt's POA. Copy made of paperwork and placed in pt's cubby     PRN tylenol given x 1     IVF rate decreased from 125 to 100 mL/hr     Many family members present at bedside. Educated family x 2 about current visitor policy     Pt's son Enid Hernandez has requested to be called with any new updates and test results. Contact info in chart and verified. Concerns for physician to address:  Pt's son Enid Hernandez has requested for a call from pt's attending, Dr. Chirag Leonard, regarding plan of care for pt     [employer] phone for oncoming shift:   [phone]       Activity:  Activity Level: Up with Assistance  Number times ambulated in hallways past shift: 0  Number of times OOB to chair past shift: 0    Cardiac:   Cardiac Monitoring: Yes      Cardiac Rhythm: Sinus Rhythm    Access:  Current line(s): PIV     Genitourinary:   Urinary status: voiding    Respiratory:   O2 Device: None (Room air)  Chronic home O2 use?: NO  Incentive spirometer at bedside: NO       GI:  Last Bowel Movement Date: 01/06/23  Current diet:  ADULT ORAL NUTRITION SUPPLEMENT Breakfast, AM Snack, Lunch, PM Snack, Dinner; Renal Supplement  ADULT DIET Regular; Lactose-Controlled  DIET NPO  Passing flatus: YES  Tolerating current diet: YES       Pain Management:   Patient states pain is manageable on current regimen: YES    Skin:  Anson Score: 17  Interventions: turn team, speciality bed, float heels, increase time out of bed, PT/OT consult, limit briefs, and nutritional support     Patient Safety:  Fall Score:  Total Score: 3  Interventions: bed/chair alarm, assistive device (walker, cane, etc), gripper socks, pt to call before getting OOB, stay with me (per policy), and gait belt  High Fall Risk: Yes    Length of Stay:  Expected LOS: 2d 4h  Actual LOS: 3      Shawn Andrade RN

## 2023-01-08 NOTE — DISCHARGE SUMMARY
Physician Discharge Summary     Patient ID:  Jil Choi  [id number]  43 y.o.  1935    Admit date: 1/4/2023    Discharge date: 1/8/2023    Admission Diagnoses: Failure to thrive in adult [R62.7]    Discharge Diagnoses:  Principal Diagnosis                                               Active Problems:    Failure to thrive in adult (1/4/2023)      Goals of care, counseling/discussion (1/6/2023)      Delirium (1/6/2023)      Frailty (1/6/2023)      Anorexia (1/6/2023)       Consults: Cardiology, GI, Palliative Care, and Podiatry    Significant Diagnostic Studies:     CT abdomen pelv WO contrast:   New small pericardial effusion  Possible gastritis  No acute process identified in the abdomen or pelvis. Hospital Course: Ms. Jil Choi is a patient of mine who presented with the problems above. See the initial H and P for full details. CHIEF COMPLAINT: Fatigue     HISTORY OF PRESENT ILLNESS:     Jil Choi is a 82 y.o.  female with pertinent past medical history as listed below who presents with complaints of progressive functional decline with poor p.o. intake and intermittent confusion over the past week. Patient reports she has a known esophageal issue (\"weak esophagus\") and has been disinterested in food. On review, it appears she has been diagnosed with dysphagia to solids and occasionally regurgitates solids. She also reports disinterest in food citing bland taste with increasing metallic taste in her mouth likely secondary to her declining renal function and low potassium diet. In the ED, patient afebrile and hemodynamically stable saturating mid 90s on room air. ECG demonstrates atrial paced rhythm with LVH criteria, COVID and flu negative. Labs demonstrate: Unremarkable CBC, high sensitive troponin elevated at 81 downtrending to 70, UA not reflexed to culture, sodium 137, potassium 3.4, bicarb 33, glucose 165, BUN 43, creatinine 2.52 (baseline around 1.7).     By problem. .. BENSON on CKD 4: Likely prerenal due to dehydration due to poor PO intake. She was treated with IVF and is improved. Failure to thrive in adult (1/4/2023): Nausea and poor appetite. GI consulted and work up including EGD was contemplated; appreciate consult. The family wondered if an appetite stimulant would help--I added Remeron. If there is a depression component, this may help that also. Ultimately, the patient's son and POA met with me and family and opted to forego EGD--see family meeting note from 1/8/2023. History of Schatzki ring requiring esophageal dilatation in 2020. Hypertension: Meds were initially held, but added back as her BP increased. Diabetes mellitus type 2: Diet controlled with A1C 5.5 in November. She has been following with endocrinologist in outpatient setting. Asthma: Controlled. Chronic pain: Stable. Dementia: Worsening. \"Sundowning\" noted. History of dysrhythmia s/p pacemaker: She sees Dr. Louie Eisenberg in outpatient setting. GERD:   Anemia of chronic disease: She has been seeing Dr. Oli Lyons in outpatient setting for iron infusions. The patient has declined blood products. Discharge Exam:  Blood pressure (!) 160/86, pulse 65, temperature 99 °F (37.2 °C), resp. rate 18, height 5' 1\" (1.549 m), weight 170 lb (77.1 kg), SpO2 100 %. Gen: Patient is in no acute distress. Lungs: CTAB. Heart: RRR. Abd: S, NT, ND, BS present. Exremities: Warm. Disposition: home    Patient Instructions:   Current Discharge Medication List        START taking these medications    Details   mirtazapine (REMERON) 15 mg tablet Take 1 Tablet by mouth nightly. Qty: 30 Tablet, Refills: 11           CONTINUE these medications which have NOT CHANGED    Details   amLODIPine (NORVASC) 2.5 mg tablet Take 1 Tablet by mouth daily. Qty: 30 Tablet, Refills: 1      famotidine (PEPCID) 40 mg tablet Take 40 mg by mouth nightly.       metoprolol succinate (TOPROL-XL) 25 mg XL tablet Take 25 mg by mouth daily. sodium bicarbonate 650 mg tablet Take 650 mg by mouth daily. methocarbamoL (ROBAXIN) 750 mg tablet Take 1 Tablet by mouth four (4) times daily. Qty: 60 Tablet, Refills: 1      triamcinolone (ARISTOCORT) 0.5 % topical cream Apply  to affected area two (2) times a day. use thin layer  Qty: 30 g, Refills: 1    Associated Diagnoses: Eczema, unspecified type      donepeziL (ARICEPT) 10 mg tablet Take 1 Tablet by mouth nightly. Qty: 30 Tablet, Refills: 11           STOP taking these medications       furosemide (LASIX) 40 mg tablet Comments:   Reason for Stopping:             Activity: Activity as tolerated  Diet: Encourage fluids and Resume previous diet      Follow-up with Dr. Soraya Guillaume in about a week. Keep other specialty appointments. Signed:  Claudine Pickard MD  1/8/2023  1:35 PM    Note: Greater than 30 minutes were spent on activities related to this discharge.

## 2023-01-08 NOTE — DISCHARGE INSTRUCTIONS
PATIENT DISCHARGE INSTRUCTIONS      PATIENT DISCHARGE INSTRUCTIONS    Vera Harrison / [id number] : 1935    Admitted 2023 Discharged: 2023       It is important that you take the medication exactly as they are prescribed. Keep your medication in the bottles provided by the pharmacist and keep a list of the medication names, dosages, and times to be taken in your wallet. Do not take other medications without consulting your doctor.      What to do at Home    Recommended Diet: Encourage fluids and Comfort feeding    Recommended Activity: Activity as tolerated

## 2023-01-08 NOTE — FAMILY MEETING
Family meeting notes:     I met with son (POA) and 2 other members of her family. He is upset because, \"nobody has told me anything. \" \"I want the big picture. \"     I discussed with him that the big picture is that she is 82 with worsening dementia, nearing end of life. She would likely qualify for hospice. This means that for an average patient in her condition, life expectancy would be 6 months or less. I clarified that this is a guess, and we don't have a good \"crystal ball\"--Sometimes people on hospice die the next day, sometimes survive for years. She is clearly deteriorating, and will need 24 hour supervision in the home or a facility. I invited him to rethink code status; \"If she were to be put through a code blue it would be terrible for her\"--cracked ribs, ventilators, ICU, etc--and survival prospects would be minimal.     He asked me why is she getting EGD tomorrow, and I reminded him of the 3 family meetings held on Friday (with myself, Palliative Care, and Gastroenterology team) in which they expressed the wish to treat her aggressively. He was there, and nodded at this reminder. He then stated that he was told in the past by Dr. Romayne David not to let her get dilatation again, as \"it caused her to bleed and put her in the hospital.\"      I suggested that we cancel the test and send her home. I asked him why philosophically would we want to do EGD if not to look for a blockage and do a procedure such as dilatation. If the treatment is off limits, then it doesn't make any sense to even put her through the stress and discomfort of the test.      He said that he wants to think about it. I reminded him of the goal of a surrogate decision maker. As far as possible it is to enact the patient's wishes, not impose that of the family or himself. The principle is \"substituted judgement. \"     He is going to consider options and get back to me. I gave him my personal cell number.

## 2023-01-08 NOTE — PROGRESS NOTES
End of Shift Note    Bedside shift change report given to Renetta Welch RN (oncoming nurse) by Lia Mcpherson LPN (offgoing nurse). Report included the following information SBAR, Kardex, and MAR    Shift worked:  7p-7:30a     Shift summary and any significant changes:     Pt tolerated  care fairly  well. All pt medications administered per MAR. PRN tylenol administered to pt. Pt assisted to bedside commode with x2 assist.    Pt lines patent, continuous 0.9% sodium chloride with Kcl 20 running. Pt scheduled labs drawn. Pt resting in bed, routine rounding completed. Pt has family at bedside.     Concerns for physician to address:       Zone phone for oncoming shift:                Lia Mcpherson LPN

## 2023-01-08 NOTE — CONSULTS
Consult received. Does not appear urgent and a routine evaluation for painful nail. As on call provider I will obtain appropriate instruments from office and will assess patient Monday after clinic with nail nippers.

## 2023-01-09 DIAGNOSIS — R54 AGE-RELATED PHYSICAL DEBILITY: Primary | ICD-10-CM

## 2023-01-12 ENCOUNTER — TELEPHONE (OUTPATIENT)
Dept: INTERNAL MEDICINE CLINIC | Age: 88
End: 2023-01-12

## 2023-01-12 NOTE — TELEPHONE ENCOUNTER
#[phone]  son, Ana Laura Polo states that they are going to need help with pt. When pt discharged they didn't think they would, but now realize that they do. Pt is not eating or drinking (very very little)  so has not had to urinate or had a bm since 3 pm on 1-11-23. Pt is staying in bed as well, not getting up. He states that pt has dementia and thinks she is still in the hospital.  They are trying to comfort her and assure her she is at home, but it is not helping much.

## 2023-01-13 NOTE — TELEPHONE ENCOUNTER
If she hasn't urinated at all in 2 days, then she should go to ER. PCO ([phone]): Visually significant PCO present on exam today. Recommend YAG laser capsulotomy to improve vision and decrease glare symptoms. RBAs of procedure discussed. Patient agrees and wishes to proceed.

## 2023-01-13 NOTE — TELEPHONE ENCOUNTER
Two pt identifiers confirmed. I spoke to the patient's son, he stated his mom is not eating, he is very concerned about her. I told him if she has not voided in 48hours, she would need to go to the ED. He said, it was a misunderstanding, she has gone to the bathroom. Plus she uses an incontinence pads. The concerns he is having, she is not eating, not doing well, does not want to get out of bed. I asked him if he was requesting a Hospice referral or [facility]. He stated \" I don't know, I just know what to do\"  His mom was recently in the hospital.    I explained to him, I would ask  how to proceed to move forward. Pt 's son verbalized understanding of information discussed w/ no further questions at this time.        Signed By: Brandon Luong LPN     January 13, 4637

## 2023-01-15 ENCOUNTER — PATIENT MESSAGE (OUTPATIENT)
Dept: INTERNAL MEDICINE CLINIC | Age: 88
End: 2023-01-15

## 2023-01-15 DIAGNOSIS — R62.7 FAILURE TO THRIVE IN ADULT: Primary | ICD-10-CM

## 2023-01-17 ENCOUNTER — TELEPHONE (OUTPATIENT)
Dept: INTERNAL MEDICINE CLINIC | Age: 88
End: 2023-01-17

## 2023-01-17 NOTE — TELEPHONE ENCOUNTER
Patient will be seen tomorrow for a VV.    Signed By: Charanjit Mccabe, BRAXTON     January 17, 6978

## 2023-01-17 NOTE — TELEPHONE ENCOUNTER
Patient son Lucas Matter called    States can dr leong please order hospice for hospice    States can this be ordered please    Please call: [phone]

## 2023-01-18 ENCOUNTER — VIRTUAL VISIT (OUTPATIENT)
Dept: INTERNAL MEDICINE CLINIC | Age: 88
End: 2023-01-18
Payer: MEDICARE

## 2023-01-18 DIAGNOSIS — N17.9 ACUTE RENAL FAILURE SUPERIMPOSED ON STAGE 5 CHRONIC KIDNEY DISEASE, NOT ON CHRONIC DIALYSIS, UNSPECIFIED ACUTE RENAL FAILURE TYPE (HCC): Primary | ICD-10-CM

## 2023-01-18 DIAGNOSIS — E11.8 CONTROLLED DIABETES MELLITUS TYPE 2 WITH COMPLICATIONS, UNSPECIFIED WHETHER LONG TERM INSULIN USE (HCC): ICD-10-CM

## 2023-01-18 DIAGNOSIS — D69.6 THROMBOCYTOPENIA, UNSPECIFIED (HCC): ICD-10-CM

## 2023-01-18 DIAGNOSIS — N18.5 ACUTE RENAL FAILURE SUPERIMPOSED ON STAGE 5 CHRONIC KIDNEY DISEASE, NOT ON CHRONIC DIALYSIS, UNSPECIFIED ACUTE RENAL FAILURE TYPE (HCC): Primary | ICD-10-CM

## 2023-01-18 DIAGNOSIS — I49.5 SINUS NODE DYSFUNCTION (HCC): ICD-10-CM

## 2023-01-18 DIAGNOSIS — E21.3 HYPERPARATHYROIDISM (HCC): ICD-10-CM

## 2023-01-18 PROCEDURE — 99214 OFFICE O/P EST MOD 30 MIN: CPT | Performed by: INTERNAL MEDICINE

## 2023-01-18 PROCEDURE — G8427 DOCREV CUR MEDS BY ELIG CLIN: HCPCS | Performed by: INTERNAL MEDICINE

## 2023-01-18 NOTE — PROGRESS NOTES
1. \"Have you been to the ER, urgent care clinic since your last visit? Hospitalized since your last visit? Yes, 1/4/2023 Failure to thrive    2. \"Have you seen or consulted any other health care providers outside of the [address] [place] [address] since your last visit? \" No     3. For patients aged 39-70: Has the patient had a colonoscopy / FIT/ Cologuard? Yes - no Care Gap present      If the patient is female:    4. For patients aged 41-77: Has the patient had a mammogram within the past 2 years? NA - based on age or sex      11. For patients aged 21-65: Has the patient had a pap smear?  NA - based on age or sex

## 2023-01-18 NOTE — PROGRESS NOTES
Maricruz Ceron is a 82 y.o. female who was seen by synchronous (real-time) audio-video technology on 2023 for No chief complaint on file. Progress Note         PROGRESS NOTE  Name: Maricruz Ceron   : 1935       ASSESSMENT/ PLAN:     Adult failure to thrive, dehydration: Nausea, poor appetite. Hospice care. Acute renal failure superimposed on stage 5 chronic kidney disease, not on chronic dialysis, unspecified acute renal failure type [facility]): She improved with IV fluids. Dementia: Severe, progressive. History of Hyperparathyroidism [facility]):   History of Sinus node dysfunction ([facility]): She has seen Dr. Daina Warner and is s/p pacemaker. Thrombocytopenia, unspecified ([facility]  Controlled diabetes mellitus type 2 with complications, unspecified whether long term insulin use ([facility]): Normal A1C. Diet controlled. Chronic pain: Stable. Follow up with home hospice. Reach out to us as needed. SUBJECTIVE  Ms. Maricruz Ceron presents today acutely for hospital follow up. She was hospitalized on my service  - :     Hospital Course: Ms. Maricruz Ceron is a patient of mine who presented with the problems above. See the initial H and P for full details. CHIEF COMPLAINT: Fatigue     HISTORY OF PRESENT ILLNESS:     Maricruz Ceron is a 82 y.o.  female with pertinent past medical history as listed below who presents with complaints of progressive functional decline with poor p.o. intake and intermittent confusion over the past week. Patient reports she has a known esophageal issue (\"weak esophagus\") and has been disinterested in food. On review, it appears she has been diagnosed with dysphagia to solids and occasionally regurgitates solids. She also reports disinterest in food citing bland taste with increasing metallic taste in her mouth likely secondary to her declining renal function and low potassium diet.      In the ED, patient afebrile and hemodynamically stable saturating mid 90s on room air. ECG demonstrates atrial paced rhythm with LVH criteria, COVID and flu negative. Labs demonstrate: Unremarkable CBC, high sensitive troponin elevated at 81 downtrending to 70, UA not reflexed to culture, sodium 137, potassium 3.4, bicarb 33, glucose 165, BUN 43, creatinine 2.52 (baseline around 1.7). By problem. .. BENSON on CKD 4: Likely prerenal due to dehydration due to poor PO intake. She was treated with IVF and is improved. Failure to thrive in adult (1/4/2023): Nausea and poor appetite. GI consulted and work up including EGD was contemplated; appreciate consult. The family wondered if an appetite stimulant would help--I added Remeron. If there is a depression component, this may help that also. Ultimately, the patient's son and POA met with me and family and opted to forego EGD--see family meeting note from 1/8/2023. History of Schatzki ring requiring esophageal dilatation in 2020. Hypertension: Meds were initially held, but added back as her BP increased. Diabetes mellitus type 2: Diet controlled with A1C 5.5 in November. She has been following with endocrinologist in outpatient setting. Asthma: Controlled. Chronic pain: Stable. Dementia: Worsening. \"Sundowning\" noted. History of dysrhythmia s/p pacemaker: She sees Dr. Rianna Bryant in outpatient setting. GERD:   Anemia of chronic disease: She has been seeing Dr. Geneva Amado in outpatient setting for iron infusions. The patient has declined blood products. Now, she is home, lying in bed. She denies SOB. She has pain in her shoulder, neck, and knees when she tries to get up and do things. Also, she gets dizzy/lightheaded when she gets up to the commode. A hospice agency has been contacted and plans to come in to get them set up in the next day or two.      Past Medical History:   Diagnosis Date    Adverse effect of anesthesia     passed out during EGD/stopped breathing    Arrhythmia     has pacemaker for low HR    Arthritis     Asthma     Cancer ([facility]     right kidney - surgery    Chronic pain     right side    Deep vein thrombosis (DVT) during current hospitalization ([facility]     history of DVT was on coumadin in the past    Diabetes ([facility]     diet controlled    GERD (gastroesophageal reflux disease)     H/O acute pancreatitis     Hiatal hernia     Hypertension     MARLEN (obstructive sleep apnea)     does not use CPAP/pt states she has not used since a pacemaker inserted    Other ill-defined conditions(799.89)     lymph node enlargement - left chest - benign bx - watching    Other ill-defined conditions(799.89)     wheezes    Psychiatric disorder     depression    PUD (peptic ulcer disease)     hx of    Thromboembolus ([facility]     Unspecified adverse effect of anesthesia     passed out during EGD per pt - stopped breathing per pt per MD     Current Outpatient Medications on File Prior to Visit   Medication Sig Dispense Refill    amLODIPine (NORVASC) 2.5 mg tablet Take 1 Tablet by mouth daily. 30 Tablet 1    donepeziL (ARICEPT) 10 mg tablet Take 1 Tablet by mouth nightly. 30 Tablet 11    mirtazapine (REMERON) 15 mg tablet Take 1 Tablet by mouth nightly. (Patient not taking: Reported on 1/18/2023) 30 Tablet 11    famotidine (PEPCID) 40 mg tablet Take 40 mg by mouth nightly. (Patient not taking: Reported on 1/18/2023)      metoprolol succinate (TOPROL-XL) 25 mg XL tablet Take 25 mg by mouth daily. (Patient not taking: Reported on 1/18/2023)      sodium bicarbonate 650 mg tablet Take 650 mg by mouth daily. (Patient not taking: Reported on 1/18/2023)      methocarbamoL (ROBAXIN) 750 mg tablet Take 1 Tablet by mouth four (4) times daily. (Patient not taking: Reported on 1/18/2023) 60 Tablet 1    triamcinolone (ARISTOCORT) 0.5 % topical cream Apply  to affected area two (2) times a day.  use thin layer (Patient not taking: Reported on 1/18/2023) 30 g 1     No current facility-administered medications on file prior to visit.     ROS: Complete review of systems was performed and is otherwise unremarkable except as noted elsewhere. OBJECTIVE  There were no vitals taken for this visit. Gen: Pleasant 82 y.o.  female in NAD. Objective:     Patient-Reported Vitals 12/2/2022   Patient-Reported Pulse 91   Patient-Reported Systolic  510   Patient-Reported Diastolic 59        [INSTRUCTIONS:  \"[x]\" Indicates a positive item  \"[]\" Indicates a negative item  -- DELETE ALL ITEMS NOT EXAMINED]    Constitutional: [x] Appears well-developed and well-nourished [x] No apparent distress      [] Abnormal -     Mental status: [x] Alert and awake  [x] Oriented to person/place/time [x] Able to follow commands    [] Abnormal -     Eyes:   EOM    [x]  Normal    [] Abnormal -   Sclera  [x]  Normal    [] Abnormal -          Discharge [x]  None visible   [] Abnormal -     HENT: [x] Normocephalic, atraumatic  [] Abnormal -   [x] Mouth/Throat: Mucous membranes are moist    External Ears [x] Normal  [] Abnormal -    Neck: [x] No visualized mass [] Abnormal -     Pulmonary/Chest: [x] Respiratory effort normal   [x] No visualized signs of difficulty breathing or respiratory distress        [] Abnormal -      Musculoskeletal:   [x] Normal gait with no signs of ataxia         [x] Normal range of motion of neck        [] Abnormal -     Neurological:        [x] No Facial Asymmetry (Cranial nerve 7 motor function) (limited exam due to video visit)          [x] No gaze palsy        [] Abnormal -          Skin:        [x] No significant exanthematous lesions or discoloration noted on facial skin         [] Abnormal -            Psychiatric:       [x] Normal Affect [] Abnormal -       Other pertinent observable physical exam findings:-        We discussed the expected course, resolution and complications of the diagnosis(es) in detail. Medication risks, benefits, costs, interactions, and alternatives were discussed as indicated.   I advised her to contact the office if her condition worsens, changes or fails to improve as anticipated. She expressed understanding with the diagnosis(es) and plan. Bong Duarte, who was evaluated through a patient-initiated, synchronous (real-time) audio-video encounter, and/or her healthcare decision maker, is aware that it is a billable service, with coverage as determined by her insurance carrier. She provided verbal consent to proceed: YES, and patient identification was verified. It was conducted pursuant to the emergency declaration under the [address] [place] [address] authority and the [employer] and [employer] General Act. A caregiver was present when appropriate. Ability to conduct physical exam was limited. I was not in office. The patient was at home or otherwise outside the office.       Marge Thomas MD

## 2023-01-19 ENCOUNTER — DOCUMENTATION ONLY (OUTPATIENT)
Dept: INTERNAL MEDICINE CLINIC | Age: 88
End: 2023-01-19

## 2023-01-19 NOTE — PROGRESS NOTES
Hospice Referral was faxed to [facility]. Referral, Face sheet, labs, meds and last OV note   Fax# [phone]  Att: Kj Taveras  Phone # [phone]  Fax confirmed it went through.     Signed By: Ruiz Carreon LPN     January 19, 2261

## 2023-01-20 ENCOUNTER — TELEPHONE (OUTPATIENT)
Dept: INTERNAL MEDICINE CLINIC | Age: 88
End: 2023-01-20

## 2023-01-20 ENCOUNTER — TELEPHONE (OUTPATIENT)
Dept: ONCOLOGY | Age: 88
End: 2023-01-20

## 2023-01-20 NOTE — TELEPHONE ENCOUNTER
#[phone]  son, Jackelin Self states that pt is now under Hospice care. They will not pay for the infusion that pt needs on Monday, 1-23-23. Jackelin Self is asking if there is a supplement that  pt can take to help with what she was going to get infusion for? Please call to advise so he knows what to do. Thanks.

## 2023-01-20 NOTE — TELEPHONE ENCOUNTER
She can stop the Retacrit infusions. This was for anemia, and generally isn't something that would be continued for someone in hospice. IT is not going to make her more comfortable.

## 2023-01-20 NOTE — TELEPHONE ENCOUNTER
Jacek Sotelo called & stated that pt was on hospice. Jacek Sotelo asking for help of another program that will help with injections.   Please call [phone]

## 2023-01-20 NOTE — TELEPHONE ENCOUNTER
PHI verified and HIPPA verified by two patient identifiers. Nurse returned call to pt's son. Nurse explained to pt's son we do not have a service or organization that will pay for pt's Retacrit when pt goes on hospice.

## 2023-01-20 NOTE — TELEPHONE ENCOUNTER
Spoke with son, Skyler Laura - HIPAA  Two pt identifiers confirmed.     Notified of response from Dr. Ra Cheung understanding

## 2023-01-23 ENCOUNTER — HOSPITAL ENCOUNTER (OUTPATIENT)
Dept: INFUSION THERAPY | Age: 88
End: 2023-01-23

## 2023-01-30 ENCOUNTER — APPOINTMENT (OUTPATIENT)
Dept: INFUSION THERAPY | Age: 88
End: 2023-01-30

## 2023-02-13 ENCOUNTER — APPOINTMENT (OUTPATIENT)
Dept: INFUSION THERAPY | Age: 88
End: 2023-02-13

## 2023-02-27 ENCOUNTER — APPOINTMENT (OUTPATIENT)
Dept: INFUSION THERAPY | Age: 88
End: 2023-02-27

## 2023-03-06 ENCOUNTER — APPOINTMENT (OUTPATIENT)
Dept: INFUSION THERAPY | Age: 88
End: 2023-03-06

## 2023-03-27 ENCOUNTER — APPOINTMENT (OUTPATIENT)
Dept: INFUSION THERAPY | Age: 88
End: 2023-03-27

## 2023-09-02 NOTE — PROGRESS NOTES
[facility]fusion [facility] Progress Note    1100  Pt arrived in stable condition and in no distress for Retacrit    Assessment completed. Patient reports no new complaints. Labs obtained per order and sent for processing. Patient Vitals for the past 12 hrs:   Temp Pulse Resp BP SpO2   08/08/22 1108 97.8 °F (36.6 °C) 61 18 109/62 97 %        Recent Results (from the past 12 hour(s))   IRON PROFILE    Collection Time: 08/08/22 11:13 AM   Result Value Ref Range    Iron 58 35 - 150 ug/dL    TIBC 151 (L) 250 - 450 ug/dL    Iron % saturation 38 20 - 50 %   FERRITIN    Collection Time: 08/08/22 11:13 AM   Result Value Ref Range    Ferritin 475 (H) 26 - 388 NG/ML   POC HEMOGLOBIN    Collection Time: 08/08/22 11:17 AM   Result Value Ref Range    Hemoglobin (POC) 10.2 (L) 11.5 - 16.0 g/dL        The following medications administered:  Medications Administered       epoetin gigi-epbx (RETACRIT) injection 40,000 Units       Admin Date  08/08/2022 Action  Given Dose  40,000 Units Route  SubCUTAneous Administered By  Yumiko Gates RN                    Pt tolerated treatment well. No s/s of adverse reaction noted. Pt provided with education on possible side effects of medication along with discharge instructions. Pt verbalized understanding. 1130  Pt discharged in no acute distress.  Next appointment:    Future Appointments   Date Time Provider Rosales Messer   9/7/2022 10:15 AM Jourdan Almendarez MD [facility] BS AMB   9/12/2022 12:00 PM Breivonne Mates [address] REG   10/3/2022  2:00 PM VINSON FASTRACK [address] REG   10/13/2022 11:30 AM Ricardo Bustamante MD [facility] [address] BS AMB   10/31/2022  2:00 PM VINSON FASTRACK [address] [place] [address] REG   12/21/2022 10:00 AM Mimi Ramires MD [facility] BS AMB
gait, locomotion, and balance/muscle strength/posture

## 2025-02-12 NOTE — PROGRESS NOTES
Rectal exam done by Gerardo Matthews RN with Phoebe Garcia present for duration of test.  Anal manometry catheter inserted into rectum. Manometry procedure complete. Catheter inserted into rectum. Balloon filled with 40cc of luke warm H2O, and pt escorted to bathroom for 3 min expulsion test.  Pt was not able to expel balloon. Balloon deflated and catheter removed. Pt tolerated procedures well.
Detail Level: Detailed
Detail Level: Zone

## 2025-05-08 NOTE — ED NOTES
Bedside shift change report given to [facility] RN (oncoming nurse) by Kuldeep Carvalho (offgoing nurse). Report included the following information SBAR, Kardex, ED Summary, Intake/Output, MAR, Recent Results, and Cardiac Rhythm SINUS . none

## (undated) DEVICE — 1200 GUARD II KIT W/5MM TUBE W/O VAC TUBE: Brand: GUARDIAN

## (undated) DEVICE — Device: Brand: SINGLE USE SOFT BRUSH

## (undated) DEVICE — BITE BLK ENDOSCP AD 54FR GRN POLYETH ENDOSCP W STRP SLD

## (undated) DEVICE — NEEDLE HYPO 18GA L1.5IN PNK S STL HUB POLYPR SHLD REG BVL

## (undated) DEVICE — Z DISCONTINUED NO SUB IDED SET EXTN W/ 4 W STPCOCK M SPIN LOK 36IN

## (undated) DEVICE — SOLIDIFIER FLUID 3000 CC ABSORB

## (undated) DEVICE — CANN NASAL O2 CAPNOGRAPHY AD -- FILTERLINE

## (undated) DEVICE — SYRINGE MED 20ML STD CLR PLAS LUERLOCK TIP N CTRL DISP

## (undated) DEVICE — NEONATAL-ADULT SPO2 SENSOR: Brand: NELLCOR

## (undated) DEVICE — KIT IV STRT W CHLORAPREP PD 1ML

## (undated) DEVICE — SET ADMIN 16ML TBNG L100IN 2 Y INJ SITE IV PIGGY BK DISP

## (undated) DEVICE — CLIP MED L235CM L2.8MM 11MM OPN HEMSTAT FIX RESOL

## (undated) DEVICE — FORCEPS BX L240CM JAW DIA2.8MM L CAP W/ NDL MIC MESH TOOTH

## (undated) DEVICE — MUI SCIENTIFIC BALLOON

## (undated) DEVICE — CONTAINER SPEC 20 ML LID NEUT BUFF FORMALIN 10 % POLYPR STS

## (undated) DEVICE — KENDALL RADIOLUCENT FOAM MONITORING ELECTRODE -RECTANGULAR SHAPE: Brand: KENDALL

## (undated) DEVICE — Device

## (undated) DEVICE — KIT COMPLIANCE W ENDOGLDE + 11 NO BRSH ENDOKT

## (undated) DEVICE — ENDO CARRY-ON PROCEDURE KIT INCLUDES ENZYMATIC SPONGE, GAUZE, BIOHAZARD LABEL, TRAY, LUBRICANT, DIRTY SCOPE LABEL, WATER LABEL, TRAY, DRAWSTRING PAD, AND DEFENDO 4-PIECE KIT.: Brand: ENDO CARRY-ON PROCEDURE KIT

## (undated) DEVICE — BW-412T DISP COMBO CLEANING BRUSH: Brand: SINGLE USE COMBINATION CLEANING BRUSH

## (undated) DEVICE — BASIN EMESIS 500CC ROSE 250/CS 60/PLT: Brand: MEDEGEN MEDICAL PRODUCTS, LLC

## (undated) DEVICE — BW-400L DISP SNGL-END CLEANINGBRUSH: Brand: OLYMPUS

## (undated) DEVICE — SET EXTN TBNG L BOR 4 W STPCOCK ST 32IN PRIMING VOL 6ML

## (undated) DEVICE — WRISTBAND ID AD W1XL11.5IN RED POLY ALRG PREPRINTED PERM

## (undated) DEVICE — BAG BELONG PT PERS CLEAR HANDL

## (undated) DEVICE — Device: Brand: MEDICAL ACTION INDUSTRIES

## (undated) DEVICE — CATH IV AUTOGRD BC BLU 22GA 25 -- INSYTE

## (undated) DEVICE — STOPCOCK IV 4 W TRNSPAR

## (undated) DEVICE — SHEATH CATH ANORECT MNOMTR

## (undated) DEVICE — Z CONVERTED USE 2274299 CUFF BLD PRESSURE LNG MED AD 25-35 CM ARM FLEXIPORT DISP

## (undated) DEVICE — BIPOLAR ELECTROHEMOSTASIS CATHETER: Brand: INJECTION GOLD PROBE

## (undated) DEVICE — ESOPHAGEAL BALLOON DILATATION CATHETER: Brand: CRE FIXED WIRE

## (undated) DEVICE — SYR 10ML LUER LOK 1/5ML GRAD --

## (undated) DEVICE — TUBING HYDR IRR --

## (undated) DEVICE — BLOCK BITE ENDO --

## (undated) DEVICE — BAG SPEC BIOHZD LF 2MIL 6X10IN -- CONVERT TO ITEM 357326

## (undated) DEVICE — SYRINGE 50ML E/T